# Patient Record
Sex: MALE | Race: WHITE | NOT HISPANIC OR LATINO | URBAN - METROPOLITAN AREA
[De-identification: names, ages, dates, MRNs, and addresses within clinical notes are randomized per-mention and may not be internally consistent; named-entity substitution may affect disease eponyms.]

---

## 2017-02-02 ENCOUNTER — APPOINTMENT (RX ONLY)
Dept: URBAN - METROPOLITAN AREA CLINIC 349 | Facility: CLINIC | Age: 77
Setting detail: DERMATOLOGY
End: 2017-02-02

## 2017-02-02 DIAGNOSIS — L82.1 OTHER SEBORRHEIC KERATOSIS: ICD-10-CM | Status: STABLE

## 2017-02-02 DIAGNOSIS — L57.0 ACTINIC KERATOSIS: ICD-10-CM

## 2017-02-02 DIAGNOSIS — Z71.89 OTHER SPECIFIED COUNSELING: ICD-10-CM

## 2017-02-02 PROCEDURE — ? COUNSELING

## 2017-02-02 PROCEDURE — 17000 DESTRUCT PREMALG LESION: CPT

## 2017-02-02 PROCEDURE — 17003 DESTRUCT PREMALG LES 2-14: CPT

## 2017-02-02 PROCEDURE — 99213 OFFICE O/P EST LOW 20 MIN: CPT | Mod: 25

## 2017-02-02 PROCEDURE — ? LIQUID NITROGEN

## 2017-02-02 ASSESSMENT — LOCATION DETAILED DESCRIPTION DERM
LOCATION DETAILED: RIGHT SUPERIOR OCCIPITAL SCALP
LOCATION DETAILED: RIGHT SUPERIOR UPPER BACK
LOCATION DETAILED: MID-OCCIPITAL SCALP
LOCATION DETAILED: LEFT MEDIAL INFERIOR CHEST
LOCATION DETAILED: LEFT SUPERIOR PARIETAL SCALP
LOCATION DETAILED: POSTERIOR MID-PARIETAL SCALP
LOCATION DETAILED: LEFT SUPERIOR OCCIPITAL SCALP
LOCATION DETAILED: LEFT MEDIAL SUPERIOR CHEST
LOCATION DETAILED: LEFT CENTRAL MALAR CHEEK

## 2017-02-02 ASSESSMENT — LOCATION SIMPLE DESCRIPTION DERM
LOCATION SIMPLE: LEFT OCCIPITAL SCALP
LOCATION SIMPLE: RIGHT OCCIPITAL SCALP
LOCATION SIMPLE: RIGHT UPPER BACK
LOCATION SIMPLE: POSTERIOR SCALP
LOCATION SIMPLE: CHEST
LOCATION SIMPLE: SCALP
LOCATION SIMPLE: LEFT CHEEK

## 2017-02-02 ASSESSMENT — PAIN INTENSITY VAS: HOW INTENSE IS YOUR PAIN 0 BEING NO PAIN, 10 BEING THE MOST SEVERE PAIN POSSIBLE?: NO PAIN

## 2017-02-02 ASSESSMENT — LOCATION ZONE DERM
LOCATION ZONE: TRUNK
LOCATION ZONE: SCALP
LOCATION ZONE: FACE

## 2017-02-02 NOTE — PROCEDURE: LIQUID NITROGEN
Number Of Freeze-Thaw Cycles: 2 freeze-thaw cycles
Detail Level: Detailed
Render Post-Care Instructions In Note?: no
Duration Of Freeze Thaw-Cycle (Seconds): 3
Consent: The patient's consent was obtained including but not limited to risks of crusting, scabbing, blistering, scarring, darker or lighter pigmentary change, recurrence, incomplete removal and infection.
Post-Care Instructions: I reviewed with the patient in detail post-care instructions. Patient is to wear sunprotection, and avoid picking at any of the treated lesions. Pt may apply Vaseline to crusted or scabbing areas.

## 2018-02-01 ENCOUNTER — APPOINTMENT (RX ONLY)
Dept: URBAN - METROPOLITAN AREA CLINIC 349 | Facility: CLINIC | Age: 78
Setting detail: DERMATOLOGY
End: 2018-02-01

## 2018-02-01 DIAGNOSIS — L82.1 OTHER SEBORRHEIC KERATOSIS: ICD-10-CM

## 2018-02-01 DIAGNOSIS — Z71.89 OTHER SPECIFIED COUNSELING: ICD-10-CM

## 2018-02-01 DIAGNOSIS — L91.8 OTHER HYPERTROPHIC DISORDERS OF THE SKIN: ICD-10-CM

## 2018-02-01 DIAGNOSIS — D18.0 HEMANGIOMA: ICD-10-CM

## 2018-02-01 DIAGNOSIS — L21.8 OTHER SEBORRHEIC DERMATITIS: ICD-10-CM

## 2018-02-01 PROBLEM — D18.01 HEMANGIOMA OF SKIN AND SUBCUTANEOUS TISSUE: Status: ACTIVE | Noted: 2018-02-01

## 2018-02-01 PROCEDURE — 99213 OFFICE O/P EST LOW 20 MIN: CPT

## 2018-02-01 PROCEDURE — ? PRESCRIPTION

## 2018-02-01 PROCEDURE — ? TREATMENT REGIMEN

## 2018-02-01 PROCEDURE — ? COUNSELING

## 2018-02-01 RX ORDER — CLOBETASOL PROPIONATE 0.46 MG/ML
SOLUTION TOPICAL
Qty: 1 | Refills: 6 | Status: ERX | COMMUNITY
Start: 2018-02-01

## 2018-02-01 RX ORDER — KETOCONAZOLE 20.5 MG/ML
SHAMPOO, SUSPENSION TOPICAL
Qty: 1 | Refills: 11 | Status: ERX | COMMUNITY
Start: 2018-02-01

## 2018-02-01 RX ADMIN — KETOCONAZOLE: 20.5 SHAMPOO, SUSPENSION TOPICAL at 00:00

## 2018-02-01 RX ADMIN — CLOBETASOL PROPIONATE: 0.46 SOLUTION TOPICAL at 00:00

## 2018-02-01 ASSESSMENT — LOCATION DETAILED DESCRIPTION DERM
LOCATION DETAILED: LEFT MEDIAL FRONTAL SCALP
LOCATION DETAILED: RIGHT PROXIMAL DORSAL FOREARM
LOCATION DETAILED: LEFT SUPERIOR PARIETAL SCALP
LOCATION DETAILED: LEFT CLAVICULAR NECK
LOCATION DETAILED: LEFT PROXIMAL PRETIBIAL REGION
LOCATION DETAILED: LEFT DISTAL DORSAL FOREARM
LOCATION DETAILED: LEFT MEDIAL UPPER BACK
LOCATION DETAILED: RIGHT CLAVICULAR NECK
LOCATION DETAILED: LEFT MEDIAL SUPERIOR CHEST
LOCATION DETAILED: RIGHT SUPERIOR MEDIAL UPPER BACK
LOCATION DETAILED: LEFT RIB CAGE
LOCATION DETAILED: RIGHT PROXIMAL PRETIBIAL REGION

## 2018-02-01 ASSESSMENT — LOCATION ZONE DERM
LOCATION ZONE: ARM
LOCATION ZONE: LEG
LOCATION ZONE: NECK
LOCATION ZONE: SCALP
LOCATION ZONE: TRUNK

## 2018-02-01 ASSESSMENT — LOCATION SIMPLE DESCRIPTION DERM
LOCATION SIMPLE: SCALP
LOCATION SIMPLE: ABDOMEN
LOCATION SIMPLE: RIGHT ANTERIOR NECK
LOCATION SIMPLE: CHEST
LOCATION SIMPLE: RIGHT UPPER BACK
LOCATION SIMPLE: RIGHT PRETIBIAL REGION
LOCATION SIMPLE: LEFT UPPER BACK
LOCATION SIMPLE: LEFT ANTERIOR NECK
LOCATION SIMPLE: LEFT SCALP
LOCATION SIMPLE: LEFT PRETIBIAL REGION
LOCATION SIMPLE: LEFT FOREARM
LOCATION SIMPLE: RIGHT FOREARM

## 2019-02-01 ENCOUNTER — APPOINTMENT (RX ONLY)
Dept: URBAN - METROPOLITAN AREA CLINIC 349 | Facility: CLINIC | Age: 79
Setting detail: DERMATOLOGY
End: 2019-02-01

## 2019-02-01 DIAGNOSIS — L91.8 OTHER HYPERTROPHIC DISORDERS OF THE SKIN: ICD-10-CM

## 2019-02-01 DIAGNOSIS — L57.0 ACTINIC KERATOSIS: ICD-10-CM

## 2019-02-01 DIAGNOSIS — L82.1 OTHER SEBORRHEIC KERATOSIS: ICD-10-CM

## 2019-02-01 DIAGNOSIS — Z71.89 OTHER SPECIFIED COUNSELING: ICD-10-CM

## 2019-02-01 PROCEDURE — 17003 DESTRUCT PREMALG LES 2-14: CPT

## 2019-02-01 PROCEDURE — 17000 DESTRUCT PREMALG LESION: CPT

## 2019-02-01 PROCEDURE — ? LIQUID NITROGEN

## 2019-02-01 PROCEDURE — ? COUNSELING

## 2019-02-01 PROCEDURE — 99213 OFFICE O/P EST LOW 20 MIN: CPT | Mod: 25

## 2019-02-01 ASSESSMENT — LOCATION ZONE DERM
LOCATION ZONE: TRUNK
LOCATION ZONE: NECK
LOCATION ZONE: ARM

## 2019-02-01 ASSESSMENT — LOCATION DETAILED DESCRIPTION DERM
LOCATION DETAILED: RIGHT INFERIOR MEDIAL UPPER BACK
LOCATION DETAILED: SUPERIOR THORACIC SPINE
LOCATION DETAILED: RIGHT MEDIAL SUPERIOR CHEST
LOCATION DETAILED: LEFT DORSAL WRIST
LOCATION DETAILED: EPIGASTRIC SKIN
LOCATION DETAILED: RIGHT PROXIMAL RADIAL DORSAL FOREARM
LOCATION DETAILED: LEFT DISTAL DORSAL FOREARM
LOCATION DETAILED: LEFT MEDIAL TRAPEZIAL NECK
LOCATION DETAILED: RIGHT CLAVICULAR NECK
LOCATION DETAILED: RIGHT DISTAL DORSAL FOREARM

## 2019-02-01 ASSESSMENT — LOCATION SIMPLE DESCRIPTION DERM
LOCATION SIMPLE: RIGHT UPPER BACK
LOCATION SIMPLE: CHEST
LOCATION SIMPLE: LEFT FOREARM
LOCATION SIMPLE: RIGHT ANTERIOR NECK
LOCATION SIMPLE: UPPER BACK
LOCATION SIMPLE: RIGHT FOREARM
LOCATION SIMPLE: ABDOMEN
LOCATION SIMPLE: LEFT WRIST
LOCATION SIMPLE: POSTERIOR NECK

## 2019-02-01 NOTE — PROCEDURE: LIQUID NITROGEN
Detail Level: Zone
Render Post-Care Instructions In Note?: no
Consent: The patient's consent was obtained including but not limited to risks of crusting, scabbing, blistering, scarring, darker or lighter pigmentary change, recurrence, incomplete removal and infection.
Duration Of Freeze Thaw-Cycle (Seconds): 3
Post-Care Instructions: I reviewed with the patient in detail post-care instructions. Patient is to wear sunprotection, and avoid picking at any of the treated lesions. Pt may apply Vaseline to crusted or scabbing areas.
Number Of Freeze-Thaw Cycles: 1 freeze-thaw cycle

## 2023-09-07 ENCOUNTER — APPOINTMENT (RX ONLY)
Dept: URBAN - METROPOLITAN AREA CLINIC 329 | Facility: CLINIC | Age: 83
Setting detail: DERMATOLOGY
End: 2023-09-07

## 2023-09-07 DIAGNOSIS — D18.0 HEMANGIOMA: ICD-10-CM

## 2023-09-07 DIAGNOSIS — L82.1 OTHER SEBORRHEIC KERATOSIS: ICD-10-CM

## 2023-09-07 DIAGNOSIS — D22 MELANOCYTIC NEVI: ICD-10-CM

## 2023-09-07 DIAGNOSIS — L57.0 ACTINIC KERATOSIS: ICD-10-CM | Status: INADEQUATELY CONTROLLED

## 2023-09-07 DIAGNOSIS — L81.4 OTHER MELANIN HYPERPIGMENTATION: ICD-10-CM

## 2023-09-07 PROBLEM — D22.5 MELANOCYTIC NEVI OF TRUNK: Status: ACTIVE | Noted: 2023-09-07

## 2023-09-07 PROBLEM — D22.72 MELANOCYTIC NEVI OF LEFT LOWER LIMB, INCLUDING HIP: Status: ACTIVE | Noted: 2023-09-07

## 2023-09-07 PROBLEM — D22.62 MELANOCYTIC NEVI OF LEFT UPPER LIMB, INCLUDING SHOULDER: Status: ACTIVE | Noted: 2023-09-07

## 2023-09-07 PROBLEM — D48.5 NEOPLASM OF UNCERTAIN BEHAVIOR OF SKIN: Status: ACTIVE | Noted: 2023-09-07

## 2023-09-07 PROBLEM — D18.01 HEMANGIOMA OF SKIN AND SUBCUTANEOUS TISSUE: Status: ACTIVE | Noted: 2023-09-07

## 2023-09-07 PROCEDURE — ? BIOPSY BY SHAVE METHOD

## 2023-09-07 PROCEDURE — 17003 DESTRUCT PREMALG LES 2-14: CPT

## 2023-09-07 PROCEDURE — ? LIQUID NITROGEN

## 2023-09-07 PROCEDURE — 11102 TANGNTL BX SKIN SINGLE LES: CPT

## 2023-09-07 PROCEDURE — 17000 DESTRUCT PREMALG LESION: CPT | Mod: 59

## 2023-09-07 PROCEDURE — ? TREATMENT REGIMEN

## 2023-09-07 PROCEDURE — ? COUNSELING

## 2023-09-07 PROCEDURE — 99203 OFFICE O/P NEW LOW 30 MIN: CPT | Mod: 25

## 2023-09-07 PROCEDURE — ? FULL BODY SKIN EXAM

## 2023-09-07 ASSESSMENT — LOCATION SIMPLE DESCRIPTION DERM
LOCATION SIMPLE: LEFT UPPER BACK
LOCATION SIMPLE: RIGHT UPPER BACK
LOCATION SIMPLE: LEFT ZYGOMA
LOCATION SIMPLE: LEFT UPPER BACK
LOCATION SIMPLE: LEFT NOSE
LOCATION SIMPLE: POSTERIOR SCALP
LOCATION SIMPLE: RIGHT ELBOW
LOCATION SIMPLE: LEFT FOREHEAD
LOCATION SIMPLE: RIGHT TEMPLE
LOCATION SIMPLE: LEFT UPPER ARM
LOCATION SIMPLE: CHEST
LOCATION SIMPLE: LEFT HAND
LOCATION SIMPLE: RIGHT THIGH
LOCATION SIMPLE: RIGHT FOREHEAD
LOCATION SIMPLE: LEFT EAR
LOCATION SIMPLE: LEFT THIGH
LOCATION SIMPLE: LEFT FOREARM
LOCATION SIMPLE: NOSE

## 2023-09-07 ASSESSMENT — LOCATION DETAILED DESCRIPTION DERM
LOCATION DETAILED: LEFT SUPERIOR FOREHEAD
LOCATION DETAILED: RIGHT SUPERIOR MEDIAL UPPER BACK
LOCATION DETAILED: LEFT LATERAL ZYGOMA
LOCATION DETAILED: LEFT RADIAL DORSAL HAND
LOCATION DETAILED: LEFT DISTAL DORSAL FOREARM
LOCATION DETAILED: LEFT SUPERIOR UPPER BACK
LOCATION DETAILED: NASAL TIP
LOCATION DETAILED: RIGHT ANTECUBITAL SKIN
LOCATION DETAILED: LEFT ANTERIOR DISTAL UPPER ARM
LOCATION DETAILED: LEFT NASAL ALA
LOCATION DETAILED: RIGHT INFERIOR UPPER BACK
LOCATION DETAILED: RIGHT ANTERIOR PROXIMAL THIGH
LOCATION DETAILED: RIGHT CENTRAL TEMPLE
LOCATION DETAILED: LEFT MEDIAL SUPERIOR CHEST
LOCATION DETAILED: RIGHT SUPERIOR FOREHEAD
LOCATION DETAILED: LEFT SUPERIOR UPPER BACK
LOCATION DETAILED: LEFT ANTERIOR PROXIMAL THIGH
LOCATION DETAILED: MID-OCCIPITAL SCALP
LOCATION DETAILED: LEFT SUPERIOR HELIX

## 2023-09-07 ASSESSMENT — LOCATION ZONE DERM
LOCATION ZONE: FACE
LOCATION ZONE: NOSE
LOCATION ZONE: EAR
LOCATION ZONE: TRUNK
LOCATION ZONE: TRUNK
LOCATION ZONE: ARM
LOCATION ZONE: HAND
LOCATION ZONE: SCALP
LOCATION ZONE: LEG

## 2023-09-07 NOTE — PROCEDURE: LIQUID NITROGEN
Render Note In Bullet Format When Appropriate: No
Post-Care Instructions: I reviewed with the patient in detail post-care instructions. Patient is to wear sunprotection, and avoid picking at any of the treated lesions. Pt may apply Vaseline to crusted or scabbing areas.
Duration Of Freeze Thaw-Cycle (Seconds): 0
Show Applicator Variable?: Yes
Detail Level: Detailed
Consent: The patient's consent was obtained including but not limited to risks of crusting, scabbing, blistering, scarring, darker or lighter pigmentary change, recurrence, incomplete removal and infection.
Number Of Freeze-Thaw Cycles: 2 freeze-thaw cycles

## 2023-09-07 NOTE — HPI: SKIN LESION
How Severe Is Your Skin Lesion?: mild
Is This A New Presentation, Or A Follow-Up?: Skin Lesions
Additional History: Patient declines any changes or concerns.

## 2023-11-09 ENCOUNTER — HOSPITAL ENCOUNTER (EMERGENCY)
Age: 83
Discharge: HOME | End: 2023-11-09
Payer: MEDICARE

## 2023-11-09 ENCOUNTER — APPOINTMENT (OUTPATIENT)
Dept: GENERAL RADIOLOGY | Age: 83
End: 2023-11-09
Payer: MEDICARE

## 2023-11-09 VITALS
WEIGHT: 206 LBS | DIASTOLIC BLOOD PRESSURE: 96 MMHG | TEMPERATURE: 97.9 F | RESPIRATION RATE: 25 BRPM | HEIGHT: 71 IN | SYSTOLIC BLOOD PRESSURE: 129 MMHG | HEART RATE: 102 BPM | OXYGEN SATURATION: 100 % | BODY MASS INDEX: 28.84 KG/M2

## 2023-11-09 DIAGNOSIS — J18.9 PNEUMONIA OF LEFT LOWER LOBE DUE TO INFECTIOUS ORGANISM: Primary | ICD-10-CM

## 2023-11-09 DIAGNOSIS — I73.9 CLAUDICATION (HCC): ICD-10-CM

## 2023-11-09 LAB
ALBUMIN SERPL-MCNC: 4.1 G/DL (ref 3.2–4.6)
ALBUMIN/GLOB SERPL: 1.4 (ref 0.4–1.6)
ALP SERPL-CCNC: 72 U/L (ref 45–117)
ALT SERPL-CCNC: 15 U/L (ref 13–61)
ANION GAP SERPL CALC-SCNC: 11 MMOL/L (ref 2–11)
AST SERPL-CCNC: 35 U/L (ref 15–37)
BASOPHILS # BLD: 0.1 K/UL (ref 0–0.2)
BASOPHILS NFR BLD: 1 % (ref 0–2)
BILIRUB SERPL-MCNC: 1.4 MG/DL (ref 0.2–1.1)
BUN SERPL-MCNC: 15 MG/DL (ref 8–23)
CALCIUM SERPL-MCNC: 9.1 MG/DL (ref 8.3–10.4)
CHLORIDE SERPL-SCNC: 102 MMOL/L (ref 98–107)
CO2 SERPL-SCNC: 26 MMOL/L (ref 21–32)
CREAT SERPL-MCNC: 0.99 MG/DL (ref 0.8–1.5)
DIFFERENTIAL METHOD BLD: NORMAL
EKG ATRIAL RATE: 101 BPM
EKG DIAGNOSIS: NORMAL
EKG P AXIS: 47 DEGREES
EKG P-R INTERVAL: 184 MS
EKG Q-T INTERVAL: 361 MS
EKG QRS DURATION: 96 MS
EKG QTC CALCULATION (BAZETT): 468 MS
EKG R AXIS: 21 DEGREES
EKG T AXIS: 87 DEGREES
EKG VENTRICULAR RATE: 101 BPM
EOSINOPHIL # BLD: 0.1 K/UL (ref 0–0.8)
EOSINOPHIL NFR BLD: 1 % (ref 0.5–7.8)
ERYTHROCYTE [DISTWIDTH] IN BLOOD BY AUTOMATED COUNT: 14.5 % (ref 11.9–14.6)
GLOBULIN SER CALC-MCNC: 3 G/DL (ref 2.8–4.5)
GLUCOSE SERPL-MCNC: 118 MG/DL (ref 65–100)
HCT VFR BLD AUTO: 43.5 % (ref 41.1–50.3)
HGB BLD-MCNC: 13.9 G/DL (ref 13.6–17.2)
IMM GRANULOCYTES # BLD AUTO: 0 K/UL (ref 0–0.5)
IMM GRANULOCYTES NFR BLD AUTO: 0 % (ref 0–5)
LYMPHOCYTES # BLD: 1.3 K/UL (ref 0.5–4.6)
LYMPHOCYTES NFR BLD: 14 % (ref 13–44)
MAGNESIUM SERPL-MCNC: 2 MG/DL (ref 1.2–2.6)
MCH RBC QN AUTO: 28.6 PG (ref 26.1–32.9)
MCHC RBC AUTO-ENTMCNC: 32 G/DL (ref 31.4–35)
MCV RBC AUTO: 89.5 FL (ref 82–102)
MONOCYTES # BLD: 0.9 K/UL (ref 0.1–1.3)
MONOCYTES NFR BLD: 10 % (ref 4–12)
NEUTS SEG # BLD: 6.6 K/UL (ref 1.7–8.2)
NEUTS SEG NFR BLD: 75 % (ref 43–78)
NRBC # BLD: 0 K/UL (ref 0–0.2)
NT PRO BNP: 5776 PG/ML (ref 0–450)
PLATELET # BLD AUTO: 248 K/UL (ref 150–450)
PMV BLD AUTO: 10.9 FL (ref 9.4–12.3)
POTASSIUM SERPL-SCNC: 4.7 MMOL/L (ref 3.5–5.1)
PROT SERPL-MCNC: 7.1 G/DL (ref 6.4–8.2)
RBC # BLD AUTO: 4.86 M/UL (ref 4.23–5.6)
SODIUM SERPL-SCNC: 139 MMOL/L (ref 133–143)
WBC # BLD AUTO: 8.9 K/UL (ref 4.3–11.1)

## 2023-11-09 PROCEDURE — 94762 N-INVAS EAR/PLS OXIMTRY CONT: CPT

## 2023-11-09 PROCEDURE — 96367 TX/PROPH/DG ADDL SEQ IV INF: CPT

## 2023-11-09 PROCEDURE — 2580000003 HC RX 258: Performed by: PHYSICIAN ASSISTANT

## 2023-11-09 PROCEDURE — 85025 COMPLETE CBC W/AUTO DIFF WBC: CPT

## 2023-11-09 PROCEDURE — 6360000002 HC RX W HCPCS: Performed by: PHYSICIAN ASSISTANT

## 2023-11-09 PROCEDURE — 83880 ASSAY OF NATRIURETIC PEPTIDE: CPT

## 2023-11-09 PROCEDURE — 80053 COMPREHEN METABOLIC PANEL: CPT

## 2023-11-09 PROCEDURE — 96365 THER/PROPH/DIAG IV INF INIT: CPT

## 2023-11-09 PROCEDURE — 83735 ASSAY OF MAGNESIUM: CPT

## 2023-11-09 PROCEDURE — 99285 EMERGENCY DEPT VISIT HI MDM: CPT

## 2023-11-09 PROCEDURE — 71045 X-RAY EXAM CHEST 1 VIEW: CPT

## 2023-11-09 PROCEDURE — 93005 ELECTROCARDIOGRAM TRACING: CPT | Performed by: STUDENT IN AN ORGANIZED HEALTH CARE EDUCATION/TRAINING PROGRAM

## 2023-11-09 PROCEDURE — 93010 ELECTROCARDIOGRAM REPORT: CPT | Performed by: INTERNAL MEDICINE

## 2023-11-09 RX ORDER — ALBUTEROL SULFATE 90 UG/1
2 AEROSOL, METERED RESPIRATORY (INHALATION) 4 TIMES DAILY PRN
Qty: 18 G | Refills: 5 | Status: SHIPPED | OUTPATIENT
Start: 2023-11-09

## 2023-11-09 RX ORDER — AMOXICILLIN AND CLAVULANATE POTASSIUM 875; 125 MG/1; MG/1
1 TABLET, FILM COATED ORAL 2 TIMES DAILY
Qty: 14 TABLET | Refills: 0 | Status: SHIPPED | OUTPATIENT
Start: 2023-11-09 | End: 2023-11-16

## 2023-11-09 RX ORDER — AZITHROMYCIN 250 MG/1
250 TABLET, FILM COATED ORAL SEE ADMIN INSTRUCTIONS
Qty: 6 TABLET | Refills: 0 | Status: SHIPPED | OUTPATIENT
Start: 2023-11-09 | End: 2023-11-14

## 2023-11-09 RX ADMIN — AZITHROMYCIN MONOHYDRATE 500 MG: 500 INJECTION, POWDER, LYOPHILIZED, FOR SOLUTION INTRAVENOUS at 15:11

## 2023-11-09 RX ADMIN — CEFTRIAXONE 1000 MG: 1 INJECTION, POWDER, FOR SOLUTION INTRAMUSCULAR; INTRAVENOUS at 14:32

## 2023-11-09 ASSESSMENT — PAIN DESCRIPTION - LOCATION: LOCATION: LEG

## 2023-11-09 ASSESSMENT — PAIN - FUNCTIONAL ASSESSMENT: PAIN_FUNCTIONAL_ASSESSMENT: 0-10

## 2023-11-09 ASSESSMENT — PAIN SCALES - GENERAL: PAINLEVEL_OUTOF10: 6

## 2023-11-09 ASSESSMENT — PAIN DESCRIPTION - DESCRIPTORS: DESCRIPTORS: ACHING

## 2023-11-09 NOTE — DISCHARGE INSTRUCTIONS
You Chest XR showed pneumonia on the left lung, you are being discharged with two antibiotics and inhaler. You can take 2 puffs of inhaler every 4-6 hours for shortness of breath. Please take full course of antibiotics. We recommend close follow up with your doctor on Monday to re-evaluate symptoms. We also placed referral for follow up with vascular surgeon to better evaluate your leg pain. Return immediately to the ER with any worsening shortness of breath or concerning symptoms.

## 2023-11-09 NOTE — ED PROVIDER NOTES
Emergency Department Provider Note       PCP: No, Pcp   Age: 82 y.o. Sex: male     DISPOSITION Decision To Discharge 11/09/2023 04:33:29 PM       ICD-10-CM    1. Pneumonia of left lower lobe due to infectious organism  J18.9       2. Claudication ([address])  I73.9 [facility], [facility] Decision Making     Complexity of Problems Addressed:  Acute illness    Data Reviewed and Analyzed:   I independently ordered and reviewed each unique test.  I reviewed external records: provider visit note from PCP. I independently ordered and interpreted the ED       I interpreted the X-rays CXR shows left lower pneumonia. Discussion of management or test interpretation. Patient is an 82-year-old male presenting with persistent cough and shortness of breath with exertion that has been ongoing for the last 5 weeks. He did complete course of amoxicillin in the beginning of October for sinus infection however he does not feel like his cough has gotten any better. He also reports that he gets short of breath when he is up and walking however has not stopped him from playing golf on a regular basis. He also reports that for the last month he gets pain in both of his legs after walking approximately 100 feet but improves with rest.  He did see his primary doctor yesterday but they did not do anything for his symptoms. He is afebrile, nontoxic in appearance, speaking in complete sentences without any signs of respiratory distress. No significant swelling in either leg and no pain with palpation of the legs. I am able to palpate both posterior tibialis pulses however I cannot palpate either dorsalis pedis pulses. I am able to hear DP pulses on bedside Doppler.   Labs Reviewed   COMPREHENSIVE METABOLIC PANEL - Abnormal; Notable for the following components:       Result Value    Glucose 118 (*)     Total Bilirubin 1.4 (*)     All other components within normal limits   BRAIN Eosinophils Absolute 0.1 0.0 - 0.8 K/UL    Basophils Absolute 0.1 0.0 - 0.2 K/UL    Absolute Immature Granulocyte 0.0 0.0 - 0.5 K/UL   Comprehensive Metabolic Panel   Result Value Ref Range    Sodium 139 133 - 143 mmol/L    Potassium 4.7 3.5 - 5.1 mmol/L    Chloride 102 98 - 107 mmol/L    CO2 26 21 - 32 mmol/L    Anion Gap 11 2 - 11 mmol/L    Glucose 118 (H) 65 - 100 mg/dL    BUN 15 8 - 23 MG/DL    Creatinine 0.99 0.8 - 1.5 MG/DL    Est, Glom Filt Rate >60 >60 ml/min/1.73m2    Calcium 9.1 8.3 - 10.4 MG/DL    Total Bilirubin 1.4 (H) 0.2 - 1.1 MG/DL    ALT 15 13.0 - 61.0 U/L    AST 35 15 - 37 U/L    Alk Phosphatase 72 45.0 - 117.0 U/L    Total Protein 7.1 6.4 - 8.2 g/dL    Albumin 4.1 3.2 - 4.6 g/dL    Globulin 3.0 2.8 - 4.5 g/dL    Albumin/Globulin Ratio 1.4 0.4 - 1.6     Magnesium   Result Value Ref Range    Magnesium 2.0 1.2 - 2.6 mg/dL   Brain Natriuretic Peptide   Result Value Ref Range    NT Pro-BNP 5,776 (H) 0 - 450 PG/ML   EKG 12 Lead   Result Value Ref Range    Ventricular Rate 101 BPM    Atrial Rate 101 BPM    P-R Interval 184 ms    QRS Duration 96 ms    Q-T Interval 361 ms    QTc Calculation (Bazett) 468 ms    P Axis 47 degrees    R Axis 21 degrees    T Axis 87 degrees    Diagnosis       Sinus tachycardia  Atrial premature complex  Probable left atrial enlargement  Anterior infarct, old    Confirmed by Sumi Rosenberg MD (), PEDRO DELGADO ([id number]) on 11/9/2023 5:55:56 PM          XR CHEST PORTABLE   Final Result   Left hemidiaphragm elevation and left basilar airspace disease reflecting   atelectasis or infiltrates. Voice dictation software was used during the making of this note. This software is not perfect and grammatical and other typographical errors may be present. This note has not been completely proofread for errors.      Karlene Sebastian  11/12/23 2057

## 2023-11-09 NOTE — ED TRIAGE NOTES
Pt ambulatory to triage. Pt states he went to urgent care and was told to come to the ED. Pt reports bilateral calf pain only when he walks and shortness of breath that started after he finished taking amoxicillin which was on 10/23. Pt denies any swelling.

## 2023-11-09 NOTE — ED NOTES
Pt 96% SpO2 before ambulatory. Pt 95% SpO2 while ambulating . Pt 96%SpO2 after walking/ at this time.      Cecille Patterson RN  11/09/23 0037

## 2023-11-12 ASSESSMENT — ENCOUNTER SYMPTOMS
VOMITING: 0
WHEEZING: 0
NAUSEA: 0
SORE THROAT: 0
ABDOMINAL PAIN: 0
COUGH: 1
BACK PAIN: 0
SHORTNESS OF BREATH: 1
DIARRHEA: 0
CHEST TIGHTNESS: 0
EYE REDNESS: 0

## 2023-11-20 ENCOUNTER — APPOINTMENT (OUTPATIENT)
Dept: NON INVASIVE DIAGNOSTICS | Age: 83
DRG: 273 | End: 2023-11-20
Attending: INTERNAL MEDICINE
Payer: MEDICARE

## 2023-11-20 ENCOUNTER — APPOINTMENT (OUTPATIENT)
Dept: GENERAL RADIOLOGY | Age: 83
DRG: 273 | End: 2023-11-20
Payer: MEDICARE

## 2023-11-20 ENCOUNTER — HOSPITAL ENCOUNTER (EMERGENCY)
Age: 83
Discharge: ANOTHER HOSPITAL | DRG: 273 | End: 2023-11-20
Attending: STUDENT IN AN ORGANIZED HEALTH CARE EDUCATION/TRAINING PROGRAM
Payer: MEDICARE

## 2023-11-20 ENCOUNTER — HOSPITAL ENCOUNTER (INPATIENT)
Age: 83
LOS: 2 days | Discharge: HOME | DRG: 273 | End: 2023-11-22
Attending: INTERNAL MEDICINE | Admitting: INTERNAL MEDICINE
Payer: MEDICARE

## 2023-11-20 VITALS
RESPIRATION RATE: 25 BRPM | HEART RATE: 102 BPM | TEMPERATURE: 97.9 F | HEIGHT: 71 IN | DIASTOLIC BLOOD PRESSURE: 102 MMHG | OXYGEN SATURATION: 99 % | BODY MASS INDEX: 29.12 KG/M2 | SYSTOLIC BLOOD PRESSURE: 125 MMHG | WEIGHT: 208 LBS

## 2023-11-20 DIAGNOSIS — I48.3 TYPICAL ATRIAL FLUTTER (HCC): ICD-10-CM

## 2023-11-20 DIAGNOSIS — I48.92 ATRIAL FLUTTER, UNSPECIFIED TYPE (HCC): ICD-10-CM

## 2023-11-20 DIAGNOSIS — R00.0 TACHYARRHYTHMIA: Primary | ICD-10-CM

## 2023-11-20 DIAGNOSIS — I48.91 ATRIAL FIBRILLATION WITH RVR (HCC): Primary | ICD-10-CM

## 2023-11-20 DIAGNOSIS — I48.92 ATRIAL FLUTTER (HCC): ICD-10-CM

## 2023-11-20 DIAGNOSIS — I48.91 A-FIB (HCC): ICD-10-CM

## 2023-11-20 PROBLEM — E87.70 VOLUME OVERLOAD: Status: ACTIVE | Noted: 2023-11-20

## 2023-11-20 LAB
ALBUMIN SERPL-MCNC: 4.4 G/DL (ref 3.2–4.6)
ALBUMIN/GLOB SERPL: 1.5 (ref 0.4–1.6)
ALP SERPL-CCNC: 66 U/L (ref 45–117)
ALT SERPL-CCNC: 13 U/L (ref 13–61)
ANION GAP SERPL CALC-SCNC: 12 MMOL/L (ref 2–11)
APTT PPP: 28.8 SEC (ref 24.5–34.2)
AST SERPL-CCNC: 34 U/L (ref 15–37)
BASOPHILS # BLD: 0.1 K/UL (ref 0–0.2)
BASOPHILS NFR BLD: 1 % (ref 0–2)
BILIRUB SERPL-MCNC: 1.1 MG/DL (ref 0.2–1.1)
BUN SERPL-MCNC: 24 MG/DL (ref 8–23)
CALCIUM SERPL-MCNC: 9.6 MG/DL (ref 8.3–10.4)
CHLORIDE SERPL-SCNC: 104 MMOL/L (ref 98–107)
CO2 SERPL-SCNC: 28 MMOL/L (ref 21–32)
CREAT SERPL-MCNC: 1.04 MG/DL (ref 0.8–1.5)
DIFFERENTIAL METHOD BLD: ABNORMAL
ECHO AO ASC DIAM: 3.6 CM
ECHO AO ASCENDING AORTA INDEX: 1.68 CM/M2
ECHO AO ROOT DIAM: 3.5 CM
ECHO AO ROOT INDEX: 1.64 CM/M2
ECHO AV AREA PEAK VELOCITY: 1.3 CM2
ECHO AV AREA VTI: 1.4 CM2
ECHO AV AREA/BSA PEAK VELOCITY: 0.6 CM2/M2
ECHO AV AREA/BSA VTI: 0.7 CM2/M2
ECHO AV MEAN GRADIENT: 8 MMHG
ECHO AV MEAN VELOCITY: 1.4 M/S
ECHO AV PEAK GRADIENT: 12 MMHG
ECHO AV PEAK VELOCITY: 1.8 M/S
ECHO AV VELOCITY RATIO: 0.33
ECHO AV VTI: 27.3 CM
ECHO BSA: 2.17 M2
ECHO EST RA PRESSURE: 15 MMHG
ECHO IVC PROX: 3.1 CM
ECHO LA AREA 4C: 27 CM2
ECHO LA DIAMETER INDEX: 1.92 CM/M2
ECHO LA DIAMETER: 4.1 CM
ECHO LA MAJOR AXIS: 6.7 CM
ECHO LA TO AORTIC ROOT RATIO: 1.17
ECHO LA VOL MOD A4C: 87 ML (ref 18–58)
ECHO LA VOLUME INDEX MOD A4C: 41 ML/M2 (ref 16–34)
ECHO LV E' LATERAL VELOCITY: 9 CM/S
ECHO LV E' SEPTAL VELOCITY: 9 CM/S
ECHO LV EDV A2C: 228 ML
ECHO LV EDV A4C: 235 ML
ECHO LV EDV INDEX A4C: 110 ML/M2
ECHO LV EDV NDEX A2C: 107 ML/M2
ECHO LV EJECTION FRACTION A2C: 24 %
ECHO LV EJECTION FRACTION A4C: 26 %
ECHO LV EJECTION FRACTION BIPLANE: 25 % (ref 55–100)
ECHO LV ESV A2C: 173 ML
ECHO LV ESV A4C: 175 ML
ECHO LV ESV INDEX A2C: 81 ML/M2
ECHO LV ESV INDEX A4C: 82 ML/M2
ECHO LV FRACTIONAL SHORTENING: 19 % (ref 28–44)
ECHO LV INTERNAL DIMENSION DIASTOLE INDEX: 2.2 CM/M2
ECHO LV INTERNAL DIMENSION DIASTOLIC: 4.7 CM (ref 4.2–5.9)
ECHO LV INTERNAL DIMENSION SYSTOLIC INDEX: 1.78 CM/M2
ECHO LV INTERNAL DIMENSION SYSTOLIC: 3.8 CM
ECHO LV IVSD: 1.5 CM (ref 0.6–1)
ECHO LV MASS 2D: 251.4 G (ref 88–224)
ECHO LV MASS INDEX 2D: 117.5 G/M2 (ref 49–115)
ECHO LV POSTERIOR WALL DIASTOLIC: 1.2 CM (ref 0.6–1)
ECHO LV RELATIVE WALL THICKNESS RATIO: 0.51
ECHO LVOT AREA: 3.8 CM2
ECHO LVOT AV VTI INDEX: 0.38
ECHO LVOT DIAM: 2.2 CM
ECHO LVOT MEAN GRADIENT: 1 MMHG
ECHO LVOT PEAK GRADIENT: 1 MMHG
ECHO LVOT PEAK VELOCITY: 0.6 M/S
ECHO LVOT STROKE VOLUME INDEX: 18.3 ML/M2
ECHO LVOT SV: 39.1 ML
ECHO LVOT VTI: 10.3 CM
ECHO MV E DECELERATION TIME (DT): 103 MS
ECHO MV E VELOCITY: 1.41 M/S
ECHO MV E/E' LATERAL: 15.67
ECHO MV E/E' RATIO (AVERAGED): 15.67
ECHO PV ACCELERATION TIME (AT): 63 MS
ECHO PV MAX VELOCITY: 0.7 M/S
ECHO PV PEAK GRADIENT: 2 MMHG
ECHO RIGHT VENTRICULAR SYSTOLIC PRESSURE (RVSP): 46 MMHG
ECHO RV BASAL DIMENSION: 4.7 CM
ECHO RV FREE WALL PEAK S': 9 CM/S
ECHO RV INTERNAL DIMENSION: 4.3 CM
ECHO RV TAPSE: 0.7 CM (ref 1.7–?)
ECHO TV REGURGITANT MAX VELOCITY: 2.79 M/S
ECHO TV REGURGITANT PEAK GRADIENT: 31 MMHG
EKG ATRIAL RATE: 103 BPM
EKG ATRIAL RATE: 152 BPM
EKG DIAGNOSIS: NORMAL
EKG DIAGNOSIS: NORMAL
EKG P AXIS: 0 DEGREES
EKG Q-T INTERVAL: 320 MS
EKG Q-T INTERVAL: 384 MS
EKG QRS DURATION: 115 MS
EKG QRS DURATION: 98 MS
EKG QTC CALCULATION (BAZETT): 488 MS
EKG QTC CALCULATION (BAZETT): 508 MS
EKG R AXIS: 75 DEGREES
EKG R AXIS: 82 DEGREES
EKG T AXIS: -43 DEGREES
EKG T AXIS: 85 DEGREES
EKG VENTRICULAR RATE: 104 BPM
EKG VENTRICULAR RATE: 153 BPM
EOSINOPHIL # BLD: 0.1 K/UL (ref 0–0.8)
EOSINOPHIL NFR BLD: 1 % (ref 0.5–7.8)
ERYTHROCYTE [DISTWIDTH] IN BLOOD BY AUTOMATED COUNT: 14.7 % (ref 11.9–14.6)
GLOBULIN SER CALC-MCNC: 2.9 G/DL (ref 2.8–4.5)
GLUCOSE SERPL-MCNC: 140 MG/DL (ref 65–100)
HCT VFR BLD AUTO: 45.5 % (ref 41.1–50.3)
HGB BLD-MCNC: 14.3 G/DL (ref 13.6–17.2)
IMM GRANULOCYTES # BLD AUTO: 0 K/UL (ref 0–0.5)
IMM GRANULOCYTES NFR BLD AUTO: 0 % (ref 0–5)
INR PPP: 1.2
LYMPHOCYTES # BLD: 1.7 K/UL (ref 0.5–4.6)
LYMPHOCYTES NFR BLD: 17 % (ref 13–44)
MAGNESIUM SERPL-MCNC: 2 MG/DL (ref 1.2–2.6)
MCH RBC QN AUTO: 28.3 PG (ref 26.1–32.9)
MCHC RBC AUTO-ENTMCNC: 31.4 G/DL (ref 31.4–35)
MCV RBC AUTO: 89.9 FL (ref 82–102)
MONOCYTES # BLD: 0.9 K/UL (ref 0.1–1.3)
MONOCYTES NFR BLD: 9 % (ref 4–12)
NEUTS SEG # BLD: 7.3 K/UL (ref 1.7–8.2)
NEUTS SEG NFR BLD: 73 % (ref 43–78)
NRBC # BLD: 0 K/UL (ref 0–0.2)
NT PRO BNP: 8422 PG/ML (ref 0–450)
PLATELET # BLD AUTO: 283 K/UL (ref 150–450)
PMV BLD AUTO: 10.5 FL (ref 9.4–12.3)
POTASSIUM SERPL-SCNC: 4.6 MMOL/L (ref 3.5–5.1)
PROT SERPL-MCNC: 7.3 G/DL (ref 6.4–8.2)
PROTHROMBIN TIME: 16.1 SEC (ref 12.6–14.3)
RBC # BLD AUTO: 5.06 M/UL (ref 4.23–5.6)
SODIUM SERPL-SCNC: 144 MMOL/L (ref 133–143)
TROPONIN T SERPL HS-MCNC: 141.3 NG/L (ref 0–22)
TROPONIN T SERPL HS-MCNC: 152.3 NG/L (ref 0–22)
TSH W FREE THYROID IF ABNORMAL: 2.75 UIU/ML (ref 0.36–3.74)
UFH PPP CHRO-ACNC: 0.12 IU/ML (ref 0.3–0.7)
UFH PPP CHRO-ACNC: <0.1 IU/ML (ref 0.3–0.7)
WBC # BLD AUTO: 10 K/UL (ref 4.3–11.1)

## 2023-11-20 PROCEDURE — 93306 TTE W/DOPPLER COMPLETE: CPT | Performed by: INTERNAL MEDICINE

## 2023-11-20 PROCEDURE — 71045 X-RAY EXAM CHEST 1 VIEW: CPT

## 2023-11-20 PROCEDURE — 2580000003 HC RX 258: Performed by: INTERNAL MEDICINE

## 2023-11-20 PROCEDURE — 83735 ASSAY OF MAGNESIUM: CPT

## 2023-11-20 PROCEDURE — 80053 COMPREHEN METABOLIC PANEL: CPT

## 2023-11-20 PROCEDURE — 2500000003 HC RX 250 WO HCPCS: Performed by: STUDENT IN AN ORGANIZED HEALTH CARE EDUCATION/TRAINING PROGRAM

## 2023-11-20 PROCEDURE — 85520 HEPARIN ASSAY: CPT

## 2023-11-20 PROCEDURE — 84443 ASSAY THYROID STIM HORMONE: CPT

## 2023-11-20 PROCEDURE — 2500000003 HC RX 250 WO HCPCS: Performed by: INTERNAL MEDICINE

## 2023-11-20 PROCEDURE — 6360000004 HC RX CONTRAST MEDICATION: Performed by: INTERNAL MEDICINE

## 2023-11-20 PROCEDURE — 99223 1ST HOSP IP/OBS HIGH 75: CPT | Performed by: INTERNAL MEDICINE

## 2023-11-20 PROCEDURE — C8929 TTE W OR WO FOL WCON,DOPPLER: HCPCS

## 2023-11-20 PROCEDURE — 6360000002 HC RX W HCPCS: Performed by: INTERNAL MEDICINE

## 2023-11-20 PROCEDURE — 2580000003 HC RX 258: Performed by: STUDENT IN AN ORGANIZED HEALTH CARE EDUCATION/TRAINING PROGRAM

## 2023-11-20 PROCEDURE — 36415 COLL VENOUS BLD VENIPUNCTURE: CPT

## 2023-11-20 PROCEDURE — 83880 ASSAY OF NATRIURETIC PEPTIDE: CPT

## 2023-11-20 PROCEDURE — 85610 PROTHROMBIN TIME: CPT

## 2023-11-20 PROCEDURE — 84484 ASSAY OF TROPONIN QUANT: CPT

## 2023-11-20 PROCEDURE — 2140000000 HC CCU INTERMEDIATE R&B

## 2023-11-20 PROCEDURE — 6360000002 HC RX W HCPCS: Performed by: NURSE PRACTITIONER

## 2023-11-20 PROCEDURE — 85730 THROMBOPLASTIN TIME PARTIAL: CPT

## 2023-11-20 PROCEDURE — 6370000000 HC RX 637 (ALT 250 FOR IP): Performed by: INTERNAL MEDICINE

## 2023-11-20 PROCEDURE — 93010 ELECTROCARDIOGRAM REPORT: CPT | Performed by: INTERNAL MEDICINE

## 2023-11-20 PROCEDURE — 93005 ELECTROCARDIOGRAM TRACING: CPT | Performed by: STUDENT IN AN ORGANIZED HEALTH CARE EDUCATION/TRAINING PROGRAM

## 2023-11-20 PROCEDURE — 85025 COMPLETE CBC W/AUTO DIFF WBC: CPT

## 2023-11-20 RX ORDER — LANOLIN ALCOHOL/MO/W.PET/CERES
6 CREAM (GRAM) TOPICAL NIGHTLY PRN
Status: DISCONTINUED | OUTPATIENT
Start: 2023-11-20 | End: 2023-11-22 | Stop reason: HOSPADM

## 2023-11-20 RX ORDER — CARVEDILOL 6.25 MG/1
6.25 TABLET ORAL 2 TIMES DAILY WITH MEALS
Status: DISCONTINUED | OUTPATIENT
Start: 2023-11-20 | End: 2023-11-21

## 2023-11-20 RX ORDER — AMOXICILLIN AND CLAVULANATE POTASSIUM 875; 125 MG/1; MG/1
TABLET, FILM COATED ORAL
Status: ON HOLD | COMMUNITY
Start: 2023-11-16 | End: 2023-11-22 | Stop reason: HOSPADM

## 2023-11-20 RX ORDER — SODIUM CHLORIDE 9 MG/ML
INJECTION, SOLUTION INTRAVENOUS PRN
Status: DISCONTINUED | OUTPATIENT
Start: 2023-11-20 | End: 2023-11-22 | Stop reason: HOSPADM

## 2023-11-20 RX ORDER — LISINOPRIL 5 MG/1
5 TABLET ORAL DAILY
Status: DISCONTINUED | OUTPATIENT
Start: 2023-11-20 | End: 2023-11-21

## 2023-11-20 RX ORDER — MAGNESIUM SULFATE IN WATER 40 MG/ML
2000 INJECTION, SOLUTION INTRAVENOUS PRN
Status: DISCONTINUED | OUTPATIENT
Start: 2023-11-20 | End: 2023-11-22 | Stop reason: HOSPADM

## 2023-11-20 RX ORDER — POTASSIUM CHLORIDE 7.45 MG/ML
10 INJECTION INTRAVENOUS PRN
Status: DISCONTINUED | OUTPATIENT
Start: 2023-11-20 | End: 2023-11-22 | Stop reason: HOSPADM

## 2023-11-20 RX ORDER — ACETAMINOPHEN 325 MG/1
650 TABLET ORAL EVERY 6 HOURS PRN
Status: DISCONTINUED | OUTPATIENT
Start: 2023-11-20 | End: 2023-11-22 | Stop reason: HOSPADM

## 2023-11-20 RX ORDER — SODIUM CHLORIDE 0.9 % (FLUSH) 0.9 %
5-40 SYRINGE (ML) INJECTION EVERY 12 HOURS SCHEDULED
Status: DISCONTINUED | OUTPATIENT
Start: 2023-11-20 | End: 2023-11-22 | Stop reason: HOSPADM

## 2023-11-20 RX ORDER — DILTIAZEM HYDROCHLORIDE 5 MG/ML
20 INJECTION INTRAVENOUS
Status: COMPLETED | OUTPATIENT
Start: 2023-11-20 | End: 2023-11-20

## 2023-11-20 RX ORDER — ONDANSETRON 4 MG/1
4 TABLET, ORALLY DISINTEGRATING ORAL EVERY 8 HOURS PRN
Status: DISCONTINUED | OUTPATIENT
Start: 2023-11-20 | End: 2023-11-22 | Stop reason: HOSPADM

## 2023-11-20 RX ORDER — FUROSEMIDE 10 MG/ML
40 INJECTION INTRAMUSCULAR; INTRAVENOUS 2 TIMES DAILY
Status: DISCONTINUED | OUTPATIENT
Start: 2023-11-20 | End: 2023-11-22

## 2023-11-20 RX ORDER — MAGNESIUM HYDROXIDE/ALUMINUM HYDROXICE/SIMETHICONE 120; 1200; 1200 MG/30ML; MG/30ML; MG/30ML
30 SUSPENSION ORAL EVERY 6 HOURS PRN
Status: DISCONTINUED | OUTPATIENT
Start: 2023-11-20 | End: 2023-11-22 | Stop reason: HOSPADM

## 2023-11-20 RX ORDER — HEPARIN SODIUM 1000 [USP'U]/ML
4000 INJECTION, SOLUTION INTRAVENOUS; SUBCUTANEOUS ONCE
Status: COMPLETED | OUTPATIENT
Start: 2023-11-20 | End: 2023-11-20

## 2023-11-20 RX ORDER — FUROSEMIDE 10 MG/ML
40 INJECTION INTRAMUSCULAR; INTRAVENOUS ONCE
Status: COMPLETED | OUTPATIENT
Start: 2023-11-20 | End: 2023-11-20

## 2023-11-20 RX ORDER — CALCIUM CARBONATE 500 MG/1
500 TABLET, CHEWABLE ORAL 3 TIMES DAILY PRN
Status: DISCONTINUED | OUTPATIENT
Start: 2023-11-20 | End: 2023-11-22 | Stop reason: HOSPADM

## 2023-11-20 RX ORDER — HEPARIN SODIUM 1000 [USP'U]/ML
2000 INJECTION, SOLUTION INTRAVENOUS; SUBCUTANEOUS PRN
Status: DISCONTINUED | OUTPATIENT
Start: 2023-11-20 | End: 2023-11-21 | Stop reason: ALTCHOICE

## 2023-11-20 RX ORDER — POLYETHYLENE GLYCOL 3350 17 G/17G
17 POWDER, FOR SOLUTION ORAL DAILY PRN
Status: DISCONTINUED | OUTPATIENT
Start: 2023-11-20 | End: 2023-11-22 | Stop reason: HOSPADM

## 2023-11-20 RX ORDER — ZOLPIDEM TARTRATE 10 MG/1
TABLET ORAL
Status: ON HOLD | COMMUNITY
Start: 2023-11-13 | End: 2023-11-22 | Stop reason: HOSPADM

## 2023-11-20 RX ORDER — ONDANSETRON 2 MG/ML
4 INJECTION INTRAMUSCULAR; INTRAVENOUS EVERY 6 HOURS PRN
Status: DISCONTINUED | OUTPATIENT
Start: 2023-11-20 | End: 2023-11-22 | Stop reason: HOSPADM

## 2023-11-20 RX ORDER — HEPARIN SODIUM 10000 [USP'U]/100ML
5-30 INJECTION, SOLUTION INTRAVENOUS CONTINUOUS
Status: DISCONTINUED | OUTPATIENT
Start: 2023-11-20 | End: 2023-11-21

## 2023-11-20 RX ORDER — SODIUM CHLORIDE 0.9 % (FLUSH) 0.9 %
5-40 SYRINGE (ML) INJECTION PRN
Status: DISCONTINUED | OUTPATIENT
Start: 2023-11-20 | End: 2023-11-22 | Stop reason: HOSPADM

## 2023-11-20 RX ORDER — POTASSIUM CHLORIDE 20 MEQ/1
40 TABLET, EXTENDED RELEASE ORAL PRN
Status: DISCONTINUED | OUTPATIENT
Start: 2023-11-20 | End: 2023-11-22 | Stop reason: HOSPADM

## 2023-11-20 RX ORDER — AMOXICILLIN 500 MG/1
CAPSULE ORAL
Status: ON HOLD | COMMUNITY
Start: 2023-10-13 | End: 2023-11-22 | Stop reason: HOSPADM

## 2023-11-20 RX ORDER — ZOLPIDEM TARTRATE 5 MG/1
5 TABLET ORAL NIGHTLY PRN
Status: DISCONTINUED | OUTPATIENT
Start: 2023-11-20 | End: 2023-11-22 | Stop reason: HOSPADM

## 2023-11-20 RX ORDER — HEPARIN SODIUM 1000 [USP'U]/ML
4000 INJECTION, SOLUTION INTRAVENOUS; SUBCUTANEOUS PRN
Status: DISCONTINUED | OUTPATIENT
Start: 2023-11-20 | End: 2023-11-21 | Stop reason: ALTCHOICE

## 2023-11-20 RX ADMIN — SODIUM CHLORIDE 10 MG/HR: 900 INJECTION, SOLUTION INTRAVENOUS at 11:45

## 2023-11-20 RX ADMIN — SODIUM CHLORIDE, PRESERVATIVE FREE 0.3 ML: 5 INJECTION INTRAVENOUS at 15:22

## 2023-11-20 RX ADMIN — DILTIAZEM HYDROCHLORIDE 20 MG: 5 INJECTION INTRAVENOUS at 08:12

## 2023-11-20 RX ADMIN — HEPARIN SODIUM 2000 UNITS: 1000 INJECTION INTRAVENOUS; SUBCUTANEOUS at 23:37

## 2023-11-20 RX ADMIN — SODIUM CHLORIDE 5 MG/HR: 900 INJECTION, SOLUTION INTRAVENOUS at 08:23

## 2023-11-20 RX ADMIN — SODIUM CHLORIDE 10 MG/HR: 900 INJECTION, SOLUTION INTRAVENOUS at 17:48

## 2023-11-20 RX ADMIN — FUROSEMIDE 40 MG: 10 INJECTION, SOLUTION INTRAMUSCULAR; INTRAVENOUS at 17:27

## 2023-11-20 RX ADMIN — LISINOPRIL 5 MG: 5 TABLET ORAL at 17:27

## 2023-11-20 RX ADMIN — HEPARIN SODIUM 4000 UNITS: 1000 INJECTION INTRAVENOUS; SUBCUTANEOUS at 15:53

## 2023-11-20 RX ADMIN — HEPARIN SODIUM 10 UNITS/KG/HR: 10000 INJECTION, SOLUTION INTRAVENOUS at 15:55

## 2023-11-20 RX ADMIN — SODIUM CHLORIDE, PRESERVATIVE FREE 10 ML: 5 INJECTION INTRAVENOUS at 19:46

## 2023-11-20 RX ADMIN — CARVEDILOL 6.25 MG: 6.25 TABLET, FILM COATED ORAL at 17:27

## 2023-11-20 RX ADMIN — FUROSEMIDE 40 MG: 10 INJECTION, SOLUTION INTRAMUSCULAR; INTRAVENOUS at 13:50

## 2023-11-20 ASSESSMENT — ENCOUNTER SYMPTOMS
SHORTNESS OF BREATH: 1
VOMITING: 0
EYES NEGATIVE: 1
NAUSEA: 0
HEARTBURN: 0

## 2023-11-20 ASSESSMENT — PAIN SCALES - GENERAL
PAINLEVEL_OUTOF10: 0

## 2023-11-20 ASSESSMENT — PAIN - FUNCTIONAL ASSESSMENT: PAIN_FUNCTIONAL_ASSESSMENT: NONE - DENIES PAIN

## 2023-11-20 NOTE — ED PROVIDER NOTES
mmol/L    Glucose 140 (H) 65 - 100 mg/dL    BUN 24 (H) 8 - 23 MG/DL    Creatinine 1.04 0.8 - 1.5 MG/DL    Est, Glom Filt Rate >60 >60 ml/min/1.73m2    Calcium 9.6 8.3 - 10.4 MG/DL    Total Bilirubin 1.1 0.2 - 1.1 MG/DL    ALT 13 13.0 - 61.0 U/L    AST 34 15 - 37 U/L    Alk Phosphatase 66 45.0 - 117.0 U/L    Total Protein 7.3 6.4 - 8.2 g/dL    Albumin 4.4 3.2 - 4.6 g/dL    Globulin 2.9 2.8 - 4.5 g/dL    Albumin/Globulin Ratio 1.5 0.4 - 1.6     Brain Natriuretic Peptide   Result Value Ref Range    NT Pro-BNP 8,422 (H) 0 - 450 PG/ML   Magnesium   Result Value Ref Range    Magnesium 2.0 1.2 - 2.6 mg/dL   Troponin T   Result Value Ref Range    Troponin T 152.3 (HH) 0 - 22 ng/L        XR CHEST PORTABLE   Final Result   Mild interstitial lung changes are present possibly pulmonary   vascular congestion or subtle interstitial edema. Atelectasis left lung base. Stable cardiomegaly. No pneumothorax. Small left pleural effusion unchanged. No significant right pleural effusion. Voice dictation software was used during the making of this note. This software is not perfect and grammatical and other typographical errors may be present. This note has not been completely proofread for errors.        Jillian Aguilar, DO  11/20/23 0855       Jillian Aguilar, DO  11/20/23 1039

## 2023-11-20 NOTE — ED TRIAGE NOTES
Pt states he was dx with pneumonia about 10 days ago, states he was started on abx and has been taking as prescribed. States he feels like he is getting worse with his SOB and cough. Denies chest pain. Has been using albuterol inhaler with some relief.

## 2023-11-20 NOTE — H&P
Value Ref Range    Magnesium 2.0 1.2 - 2.6 mg/dL   Troponin T    Collection Time: 11/20/23  8:02 AM   Result Value Ref Range    Troponin T 152.3 (HH) 0 - 22 ng/L   EKG 12 Lead    Collection Time: 11/20/23  8:30 AM   Result Value Ref Range    Ventricular Rate 104 BPM    Atrial Rate 103 BPM    QRS Duration 115 ms    Q-T Interval 384 ms    QTc Calculation (Bazett) 488 ms    R Axis 75 degrees    T Axis 85 degrees    Diagnosis       Atrial flutter with varied AV block,  Nonspecific intraventricular conduction delay  Inferior infarct, acute (LCx)      Confirmed by MD MARIAH, Shabbir Kipnewton ([id number]) on 11/20/2023 9:24:52 AM     Troponin T    Collection Time: 11/20/23  9:29 AM   Result Value Ref Range    Troponin T 141.3 (HH) 0 - 22 ng/L       I have personally reviewed imaging studies:  XR CHEST PORTABLE    Result Date: 11/20/2023  XR CHEST PORTABLE 11/20/2023 8:31 AM HISTORY: Shortness of breath. COMPARISON: Chest x-ray 11/9/2023. A portable AP view of the chest was obtained. Mild interstitial lung changes are present possibly pulmonary vascular congestion or subtle interstitial edema. Atelectasis left lung base. Stable cardiomegaly. No pneumothorax. Small left pleural effusion unchanged. No significant right pleural effusion. Signed:  Stacie Deng MD    Part of this note may have been written by using a voice dictation software. The note has been proof read but may still contain some grammatical/other typographical errors.

## 2023-11-20 NOTE — ED NOTES
TRANSFER - OUT REPORT:    Verbal report given to [facility] on Stephon Sessions.  being transferred to [facility] room 314 for routine progression of patient care       Report consisted of patient's Situation, Background, Assessment and   Recommendations(SBAR). Information from the following report(s) ED SBAR, MAR, Recent Results, and Cardiac Rhythm A-fib  was reviewed with the receiving nurse. [place] Fall Assessment:                           Lines:   Peripheral IV 11/20/23 Right Antecubital (Active)   Site Assessment Clean, dry & intact 11/20/23 0800   Line Status Blood return noted 11/20/23 0800   Phlebitis Assessment No symptoms 11/20/23 0800   Infiltration Assessment 0 11/20/23 0800       Peripheral IV 11/20/23 Left; Anterior Hand (Active)   Site Assessment Clean, dry & intact 11/20/23 0932   Line Status Blood return noted 11/20/23 0932   Phlebitis Assessment No symptoms 11/20/23 0932   Infiltration Assessment 0 11/20/23 0932        Opportunity for questions and clarification was provided.       Patient transported with:  Monitor and O2 @ 2lpm via SHEEBA Marin RN  11/20/23 1019

## 2023-11-20 NOTE — CARE COORDINATION
Pt presented to the ED c/o fatigue, cough and SOB. No significant PMHx. PTA, pt indep with his ADLs. A/O x4. [facility] Family and friends live nearby and are supportive. On RA. Denies DME need. No PCP established, will send referral to [facility] for PCP follow up at discharge. Will continue to monitor. 11/20/23 9477   Service Assessment   Patient Orientation Alert and Oriented   Cognition Alert   History Provided By Patient   Primary Caregiver Self   Support Systems Children;Family Members;[employer] Community;Friends/Neighbors   PCP Verified by CM No  (Will send referral to [facility] for PCP follow up)   Prior Functional Level Independent in ADLs/IADLs   Current Functional Level Independent in ADLs/IADLs   Can patient return to prior living arrangement Yes   Ability to make needs known: Good   Family able to assist with home care needs: Yes   Would you like for me to discuss the discharge plan with any other family members/significant others, and if so, who? No   Financial Resources Medicare   Community Resources None   Social/Functional History   Lives With Alone   Type of [address] [place] [address] Dr One level   [facility] None   Receives Help From Family   ADL Assistance Independent   Homemaking Assistance Independent   Ambulation Assistance Independent   Transfer Assistance Independent   Active  Yes   Mode of Transportation Car   Occupation Retired   Discharge Planning   Type of [address] Prior To Admission None   Potential Assistance Needed N/A   DME Ordered? No   Potential Assistance Purchasing Medications No   Type of Home Care Services None   Services At/After Discharge   Transition of Care Consult (CM Consult) Discharge [facility] Discharge None   [place] Resource Information Provided?  No   Mode of Transport at Discharge Other (see comment)  (Family)   Confirm Follow Up Transport Family

## 2023-11-21 ENCOUNTER — ANESTHESIA EVENT (OUTPATIENT)
Dept: CARDIAC CATH/INVASIVE PROCEDURES | Age: 83
DRG: 273 | End: 2023-11-21
Payer: MEDICARE

## 2023-11-21 ENCOUNTER — ANESTHESIA (OUTPATIENT)
Dept: CARDIAC CATH/INVASIVE PROCEDURES | Age: 83
DRG: 273 | End: 2023-11-21
Payer: MEDICARE

## 2023-11-21 ENCOUNTER — APPOINTMENT (OUTPATIENT)
Dept: CARDIAC CATH/INVASIVE PROCEDURES | Age: 83
DRG: 273 | End: 2023-11-21
Attending: INTERNAL MEDICINE
Payer: MEDICARE

## 2023-11-21 PROBLEM — I48.91 ATRIAL FIBRILLATION WITH RVR (HCC): Status: ACTIVE | Noted: 2023-11-21

## 2023-11-21 PROBLEM — I50.23 ACUTE ON CHRONIC SYSTOLIC (CONGESTIVE) HEART FAILURE (HCC): Status: ACTIVE | Noted: 2023-11-21

## 2023-11-21 LAB
ANION GAP SERPL CALC-SCNC: 10 MMOL/L (ref 2–11)
BASOPHILS # BLD: 0 K/UL (ref 0–0.2)
BASOPHILS NFR BLD: 1 % (ref 0–2)
BUN SERPL-MCNC: 30 MG/DL (ref 8–23)
CALCIUM SERPL-MCNC: 8.8 MG/DL (ref 8.3–10.4)
CHLORIDE SERPL-SCNC: 107 MMOL/L (ref 101–110)
CO2 SERPL-SCNC: 24 MMOL/L (ref 21–32)
CREAT SERPL-MCNC: 1.4 MG/DL (ref 0.8–1.5)
DIFFERENTIAL METHOD BLD: ABNORMAL
ECHO BSA: 2.17 M2
EKG ATRIAL RATE: 87 BPM
EKG DIAGNOSIS: NORMAL
EKG P AXIS: 74 DEGREES
EKG P-R INTERVAL: 194 MS
EKG Q-T INTERVAL: 372 MS
EKG QRS DURATION: 104 MS
EKG QTC CALCULATION (BAZETT): 447 MS
EKG R AXIS: 266 DEGREES
EKG T AXIS: 131 DEGREES
EKG VENTRICULAR RATE: 87 BPM
EOSINOPHIL # BLD: 0 K/UL (ref 0–0.8)
EOSINOPHIL NFR BLD: 0 % (ref 0.5–7.8)
ERYTHROCYTE [DISTWIDTH] IN BLOOD BY AUTOMATED COUNT: 14.6 % (ref 11.9–14.6)
GLUCOSE SERPL-MCNC: 114 MG/DL (ref 65–100)
HCT VFR BLD AUTO: 42.4 % (ref 41.1–50.3)
HGB BLD-MCNC: 13 G/DL (ref 13.6–17.2)
IMM GRANULOCYTES # BLD AUTO: 0 K/UL (ref 0–0.5)
IMM GRANULOCYTES NFR BLD AUTO: 0 % (ref 0–5)
LYMPHOCYTES # BLD: 1.4 K/UL (ref 0.5–4.6)
LYMPHOCYTES NFR BLD: 19 % (ref 13–44)
MAGNESIUM SERPL-MCNC: 2.3 MG/DL (ref 1.8–2.4)
MCH RBC QN AUTO: 28 PG (ref 26.1–32.9)
MCHC RBC AUTO-ENTMCNC: 30.7 G/DL (ref 31.4–35)
MCV RBC AUTO: 91.2 FL (ref 82–102)
MONOCYTES # BLD: 0.9 K/UL (ref 0.1–1.3)
MONOCYTES NFR BLD: 12 % (ref 4–12)
NEUTS SEG # BLD: 5.2 K/UL (ref 1.7–8.2)
NEUTS SEG NFR BLD: 69 % (ref 43–78)
NRBC # BLD: 0 K/UL (ref 0–0.2)
PLATELET # BLD AUTO: 241 K/UL (ref 150–450)
PMV BLD AUTO: 11.2 FL (ref 9.4–12.3)
POTASSIUM SERPL-SCNC: 4.4 MMOL/L (ref 3.5–5.1)
RBC # BLD AUTO: 4.65 M/UL (ref 4.23–5.6)
SODIUM SERPL-SCNC: 141 MMOL/L (ref 133–143)
UFH PPP CHRO-ACNC: <0.1 IU/ML (ref 0.3–0.7)
WBC # BLD AUTO: 7.5 K/UL (ref 4.3–11.1)

## 2023-11-21 PROCEDURE — 2500000003 HC RX 250 WO HCPCS: Performed by: NURSE ANESTHETIST, CERTIFIED REGISTERED

## 2023-11-21 PROCEDURE — 3700000000 HC ANESTHESIA ATTENDED CARE: Performed by: INTERNAL MEDICINE

## 2023-11-21 PROCEDURE — C1894 INTRO/SHEATH, NON-LASER: HCPCS | Performed by: INTERNAL MEDICINE

## 2023-11-21 PROCEDURE — 2580000003 HC RX 258: Performed by: INTERNAL MEDICINE

## 2023-11-21 PROCEDURE — 2500000003 HC RX 250 WO HCPCS: Performed by: INTERNAL MEDICINE

## 2023-11-21 PROCEDURE — 80048 BASIC METABOLIC PNL TOTAL CA: CPT

## 2023-11-21 PROCEDURE — 2709999900 HC NON-CHARGEABLE SUPPLY: Performed by: INTERNAL MEDICINE

## 2023-11-21 PROCEDURE — 36415 COLL VENOUS BLD VENIPUNCTURE: CPT

## 2023-11-21 PROCEDURE — C1766 INTRO/SHEATH,STRBLE,NON-PEEL: HCPCS | Performed by: INTERNAL MEDICINE

## 2023-11-21 PROCEDURE — C1759 CATH, INTRA ECHOCARDIOGRAPHY: HCPCS | Performed by: INTERNAL MEDICINE

## 2023-11-21 PROCEDURE — 85520 HEPARIN ASSAY: CPT

## 2023-11-21 PROCEDURE — 02583ZZ DESTRUCTION OF CONDUCTION MECHANISM, PERCUTANEOUS APPROACH: ICD-10-PCS | Performed by: INTERNAL MEDICINE

## 2023-11-21 PROCEDURE — 6360000002 HC RX W HCPCS: Performed by: NURSE PRACTITIONER

## 2023-11-21 PROCEDURE — 2720000010 HC SURG SUPPLY STERILE: Performed by: INTERNAL MEDICINE

## 2023-11-21 PROCEDURE — 93622 COMP EP EVAL L VENTR PAC&REC: CPT | Performed by: INTERNAL MEDICINE

## 2023-11-21 PROCEDURE — 85025 COMPLETE CBC W/AUTO DIFF WBC: CPT

## 2023-11-21 PROCEDURE — 2140000000 HC CCU INTERMEDIATE R&B

## 2023-11-21 PROCEDURE — B246YZZ ULTRASONOGRAPHY OF RIGHT AND LEFT HEART USING OTHER CONTRAST: ICD-10-PCS | Performed by: INTERNAL MEDICINE

## 2023-11-21 PROCEDURE — 93662 INTRACARDIAC ECG (ICE): CPT | Performed by: INTERNAL MEDICINE

## 2023-11-21 PROCEDURE — 93653 COMPRE EP EVAL TX SVT: CPT | Performed by: INTERNAL MEDICINE

## 2023-11-21 PROCEDURE — C1732 CATH, EP, DIAG/ABL, 3D/VECT: HCPCS | Performed by: INTERNAL MEDICINE

## 2023-11-21 PROCEDURE — 6370000000 HC RX 637 (ALT 250 FOR IP): Performed by: INTERNAL MEDICINE

## 2023-11-21 PROCEDURE — 3700000001 HC ADD 15 MINUTES (ANESTHESIA): Performed by: INTERNAL MEDICINE

## 2023-11-21 PROCEDURE — 2580000003 HC RX 258: Performed by: NURSE ANESTHETIST, CERTIFIED REGISTERED

## 2023-11-21 PROCEDURE — 6360000002 HC RX W HCPCS: Performed by: NURSE ANESTHETIST, CERTIFIED REGISTERED

## 2023-11-21 PROCEDURE — C1760 CLOSURE DEV, VASC: HCPCS | Performed by: INTERNAL MEDICINE

## 2023-11-21 PROCEDURE — 83735 ASSAY OF MAGNESIUM: CPT

## 2023-11-21 RX ORDER — LIDOCAINE HYDROCHLORIDE 20 MG/ML
INJECTION, SOLUTION EPIDURAL; INFILTRATION; INTRACAUDAL; PERINEURAL PRN
Status: DISCONTINUED | OUTPATIENT
Start: 2023-11-21 | End: 2023-11-21 | Stop reason: SDUPTHER

## 2023-11-21 RX ORDER — PROPOFOL 10 MG/ML
INJECTION, EMULSION INTRAVENOUS CONTINUOUS PRN
Status: DISCONTINUED | OUTPATIENT
Start: 2023-11-21 | End: 2023-11-21 | Stop reason: SDUPTHER

## 2023-11-21 RX ORDER — LISINOPRIL 5 MG/1
2.5 TABLET ORAL DAILY
Status: DISCONTINUED | OUTPATIENT
Start: 2023-11-22 | End: 2023-11-22 | Stop reason: HOSPADM

## 2023-11-21 RX ORDER — PHENYLEPHRINE HYDROCHLORIDE 10 MG/ML
INJECTION INTRAVENOUS PRN
Status: DISCONTINUED | OUTPATIENT
Start: 2023-11-21 | End: 2023-11-21 | Stop reason: SDUPTHER

## 2023-11-21 RX ORDER — CARVEDILOL 3.12 MG/1
3.12 TABLET ORAL 2 TIMES DAILY WITH MEALS
Status: DISCONTINUED | OUTPATIENT
Start: 2023-11-22 | End: 2023-11-22 | Stop reason: HOSPADM

## 2023-11-21 RX ORDER — HEPARIN SODIUM 10000 [USP'U]/100ML
5-30 INJECTION, SOLUTION INTRAVENOUS CONTINUOUS
Status: CANCELLED | OUTPATIENT
Start: 2023-11-21 | End: 2023-11-21

## 2023-11-21 RX ORDER — SODIUM CHLORIDE, SODIUM LACTATE, POTASSIUM CHLORIDE, CALCIUM CHLORIDE 600; 310; 30; 20 MG/100ML; MG/100ML; MG/100ML; MG/100ML
INJECTION, SOLUTION INTRAVENOUS CONTINUOUS PRN
Status: DISCONTINUED | OUTPATIENT
Start: 2023-11-21 | End: 2023-11-21 | Stop reason: SDUPTHER

## 2023-11-21 RX ORDER — EPHEDRINE SULFATE/0.9% NACL/PF 50 MG/5 ML
SYRINGE (ML) INTRAVENOUS PRN
Status: DISCONTINUED | OUTPATIENT
Start: 2023-11-21 | End: 2023-11-21 | Stop reason: SDUPTHER

## 2023-11-21 RX ADMIN — PHENYLEPHRINE HYDROCHLORIDE 100 MCG: 10 INJECTION INTRAVENOUS at 10:14

## 2023-11-21 RX ADMIN — FUROSEMIDE 40 MG: 10 INJECTION, SOLUTION INTRAMUSCULAR; INTRAVENOUS at 07:38

## 2023-11-21 RX ADMIN — LISINOPRIL 5 MG: 5 TABLET ORAL at 07:38

## 2023-11-21 RX ADMIN — HEPARIN SODIUM 4000 UNITS: 1000 INJECTION INTRAVENOUS; SUBCUTANEOUS at 07:30

## 2023-11-21 RX ADMIN — CARVEDILOL 6.25 MG: 6.25 TABLET, FILM COATED ORAL at 07:38

## 2023-11-21 RX ADMIN — Medication 10 MG: at 10:14

## 2023-11-21 RX ADMIN — LIDOCAINE HYDROCHLORIDE 40 MG: 20 INJECTION, SOLUTION EPIDURAL; INFILTRATION; INTRACAUDAL; PERINEURAL at 10:08

## 2023-11-21 RX ADMIN — Medication 2 G: at 10:20

## 2023-11-21 RX ADMIN — SODIUM CHLORIDE, PRESERVATIVE FREE 10 ML: 5 INJECTION INTRAVENOUS at 07:40

## 2023-11-21 RX ADMIN — PHENYLEPHRINE HYDROCHLORIDE 100 MCG: 10 INJECTION INTRAVENOUS at 10:12

## 2023-11-21 RX ADMIN — PHENYLEPHRINE HYDROCHLORIDE 100 MCG: 10 INJECTION INTRAVENOUS at 10:44

## 2023-11-21 RX ADMIN — SODIUM CHLORIDE, PRESERVATIVE FREE 10 ML: 5 INJECTION INTRAVENOUS at 19:58

## 2023-11-21 RX ADMIN — PROPOFOL 120 MCG/KG/MIN: 10 INJECTION, EMULSION INTRAVENOUS at 10:09

## 2023-11-21 RX ADMIN — PHENYLEPHRINE HYDROCHLORIDE 100 MCG: 10 INJECTION INTRAVENOUS at 10:13

## 2023-11-21 RX ADMIN — PHENYLEPHRINE HYDROCHLORIDE 100 MCG: 10 INJECTION INTRAVENOUS at 10:43

## 2023-11-21 RX ADMIN — SODIUM CHLORIDE, SODIUM LACTATE, POTASSIUM CHLORIDE, AND CALCIUM CHLORIDE: 600; 310; 30; 20 INJECTION, SOLUTION INTRAVENOUS at 10:02

## 2023-11-21 ASSESSMENT — PAIN SCALES - GENERAL
PAINLEVEL_OUTOF10: 0

## 2023-11-22 ENCOUNTER — TELEPHONE (OUTPATIENT)
Age: 83
End: 2023-11-22

## 2023-11-22 VITALS
TEMPERATURE: 97.9 F | RESPIRATION RATE: 20 BRPM | SYSTOLIC BLOOD PRESSURE: 115 MMHG | DIASTOLIC BLOOD PRESSURE: 76 MMHG | HEART RATE: 82 BPM | WEIGHT: 219 LBS | HEIGHT: 71 IN | OXYGEN SATURATION: 94 % | BODY MASS INDEX: 30.66 KG/M2

## 2023-11-22 LAB
ANION GAP SERPL CALC-SCNC: 8 MMOL/L (ref 2–11)
BUN SERPL-MCNC: 38 MG/DL (ref 8–23)
CALCIUM SERPL-MCNC: 8.7 MG/DL (ref 8.3–10.4)
CHLORIDE SERPL-SCNC: 103 MMOL/L (ref 101–110)
CO2 SERPL-SCNC: 26 MMOL/L (ref 21–32)
CREAT SERPL-MCNC: 1.4 MG/DL (ref 0.8–1.5)
GLUCOSE SERPL-MCNC: 121 MG/DL (ref 65–100)
MAGNESIUM SERPL-MCNC: 2.3 MG/DL (ref 1.8–2.4)
POTASSIUM SERPL-SCNC: 4.3 MMOL/L (ref 3.5–5.1)
SODIUM SERPL-SCNC: 137 MMOL/L (ref 133–143)

## 2023-11-22 PROCEDURE — 6370000000 HC RX 637 (ALT 250 FOR IP): Performed by: INTERNAL MEDICINE

## 2023-11-22 PROCEDURE — 2580000003 HC RX 258: Performed by: INTERNAL MEDICINE

## 2023-11-22 PROCEDURE — 6360000002 HC RX W HCPCS: Performed by: FAMILY MEDICINE

## 2023-11-22 PROCEDURE — 36415 COLL VENOUS BLD VENIPUNCTURE: CPT

## 2023-11-22 PROCEDURE — 83735 ASSAY OF MAGNESIUM: CPT

## 2023-11-22 PROCEDURE — 80048 BASIC METABOLIC PNL TOTAL CA: CPT

## 2023-11-22 RX ORDER — CARVEDILOL 3.12 MG/1
3.12 TABLET ORAL 2 TIMES DAILY WITH MEALS
Qty: 60 TABLET | Refills: 0 | Status: SHIPPED | OUTPATIENT
Start: 2023-11-22

## 2023-11-22 RX ORDER — LISINOPRIL 2.5 MG/1
2.5 TABLET ORAL DAILY
Qty: 30 TABLET | Refills: 0 | Status: SHIPPED | OUTPATIENT
Start: 2023-11-23

## 2023-11-22 RX ORDER — FUROSEMIDE 10 MG/ML
20 INJECTION INTRAMUSCULAR; INTRAVENOUS 2 TIMES DAILY
Status: DISCONTINUED | OUTPATIENT
Start: 2023-11-22 | End: 2023-11-22 | Stop reason: HOSPADM

## 2023-11-22 RX ORDER — FUROSEMIDE 20 MG/1
20 TABLET ORAL DAILY
Qty: 60 TABLET | Refills: 0 | Status: SHIPPED | OUTPATIENT
Start: 2023-11-22

## 2023-11-22 RX ADMIN — LISINOPRIL 2.5 MG: 5 TABLET ORAL at 09:03

## 2023-11-22 RX ADMIN — FUROSEMIDE 20 MG: 10 INJECTION, SOLUTION INTRAMUSCULAR; INTRAVENOUS at 09:03

## 2023-11-22 RX ADMIN — APIXABAN 5 MG: 5 TABLET, FILM COATED ORAL at 09:03

## 2023-11-22 RX ADMIN — SODIUM CHLORIDE, PRESERVATIVE FREE 10 ML: 5 INJECTION INTRAVENOUS at 09:04

## 2023-11-22 RX ADMIN — CARVEDILOL 3.12 MG: 3.12 TABLET, FILM COATED ORAL at 09:03

## 2023-11-22 ASSESSMENT — PAIN SCALES - GENERAL
PAINLEVEL_OUTOF10: 0
PAINLEVEL_OUTOF10: 0

## 2023-11-22 NOTE — DISCHARGE SUMMARY
Hospitalist Discharge Summary   Admit Date:  2023 11:29 AM   DC Note date: 2023  Name:  Erasmo Weathesr Age:  82 y.o. Sex:  male  :  1940   MRN:  [id number]   Room:  [facility]  PCP:  No, Pcp    Presenting Complaint: No chief complaint on file. Initial Admission Diagnosis: Atrial fibrillation with RVR ([facility]) [I48.91]     Problem List for this Hospitalization (present on admission):    Principal Problem:    Atrial flutter with rapid ventricular response (HCC)  Active Problems:    Volume overload    Acute on chronic systolic (congestive) heart failure (HCC)    Atrial fibrillation with RVR ([address])  Resolved Problems:    * No resolved hospital problems. *    H&P  Erasmo Weathers is a 82 y.o. male with no significant PMH who presented to the ER at [facility] today with c/o fatigue, cough and SOB. Seen in the ER on  for SOB. CXR showed L hemidiaphragm elevated with L basilar airspace disease, atelectasis vs infiltrates. He was discharged with antibiotics. He was also given a 10d supply of amoxicillin on 10/13 and a 4d supply on 10/9? Says he never really felt any better. Has had mild increase in leg swelling along with orthopnea and fatigue. Just bought a new chair so he can sleep upright. No chest pain, fevers, chills, N/V/D, rashes. EKG in the ER showed rapid atrial flutter. Rate was initially 150s. This improved with IV diltiazem bolus followed by drip. BNP 8400, hsTrop 152. CBC unremarkable. Repeat CXR showed atelectasis L base, cardiomegaly, small L effusion, some subtle interstitial edema. He was transferred to [facility] for admission and further management of rapid atrial flutter. HR currently low 100s but does occasionally increase. His SOB has improved a little with rate control. Of note -- he had CT a/p done this past July due to abdominal pain.  This noted a \"small wedge-shaped area of hypoattenuation in the lateral aspect of the spleen, which can be seen with small infarct or

## 2023-11-22 NOTE — MANAGEMENT PLAN
The chart is being review including labs, recent test, and H and P for the purpose of qualification of Life vest.  The last echocardiogram is included below. Indications:     Last Echo:      11/20/23    ECHO (TTE) COMPLETE (PRN CONTRAST/BUBBLE/STRAIN/3D) 11/20/2023  3:55 PM (Final)    Interpretation Summary    Left Ventricle: Normal left ventricular systolic function with a visually estimated EF of 20 - 25%. Left ventricle size is normal. Mildly increased wall thickness. Severe global hypokinesis present. Normal diastolic function. Left Atrium: Left atrium is moderately dilated. Right Atrium: Right atrium is mildly dilated. Aortic Valve: Moderately calcified cusp. Consider low flow aortic stenosis    Mitral Valve: Moderate regurgitation. Tricuspid Valve: Mild to moderate regurgitation. Mildly elevated RVSP. The estimated RVSP is 46 mmHg.     Signed by: Sherita Rai MD on 11/20/2023  3:55 PM        GILBERT Johnson - CNP  11/22/23  7:21 AM

## 2023-11-22 NOTE — DISCHARGE INSTRUCTIONS
such as high blood pressure, high cholesterol, diabetes, and sleep apnea. Follow-up care is a key part of your treatment and safety. Be sure to make and go to all appointments, and call your doctor if you are having problems. It's also a good idea to know your test results and keep a list of the medicines you take. When should you call for help? Call 911  anytime you think you may need emergency care. For example, call if:    You passed out (lost consciousness). You have symptoms of a heart attack. These may include:  Chest pain or pressure, or a strange feeling in the chest.  Sweating. Shortness of breath. Nausea or vomiting. Pain, pressure, or a strange feeling in the back, neck, jaw, or upper belly, or in one or both shoulders or arms. Lightheadedness or sudden weakness. A fast or irregular heartbeat. After you call 911, the  may tell you to chew 1 adult-strength or 2 to 4 low-dose aspirin. Wait for an ambulance. Do not try to drive yourself. You have symptoms of a stroke. These may include:  Sudden numbness, tingling, weakness, or loss of movement in your face, arm, or leg, especially on only one side of your body. Sudden vision changes. Sudden trouble speaking. Sudden confusion or trouble understanding simple statements. Sudden problems with walking or balance. A sudden, severe headache that is different from past headaches. Call your doctor now or seek immediate medical care if:    You are bleeding from the area where the catheter was put in your blood vessel. You have a fast-growing, painful lump at the catheter site. You have signs of infection, such as: Increased pain, swelling, warmth, or redness. Red streaks leading from the catheter site. Pus draining from the catheter site. A fever. Your leg, arm, or hand is painful, looks blue, or feels cold, numb, or tingly.    Watch closely for any changes in your health, and be sure to contact your doctor if you have

## 2023-11-22 NOTE — CARE COORDINATION
Discharge order is in. Pt is discharging home today in stable condition. Life Vest was delivered and fitted. PCP referral made to [facility]. No other discharge needs identified. Tx goals met. 1527-Family requesting [address] for pt. Verbalized no agency preference. [address] [facility] referral sent to [address] [place] [address],[address] for RN and PT/OT eduardo. Updated AVS.     11/22/23 1514   Services At/After Discharge   Transition of Care Consult (CM Consult) Discharge Planning; Other  ([facility] [place])   [address] Discharge None   [employer] Information Provided? No   Mode of Transport at Discharge Other (see comment)  (Family)   Confirm Follow Up Transport Family   Condition of Participation: Discharge Planning   The Patient and/or Patient Representative was provided with a Choice of Provider? Patient   The Patient and/Or Patient Representative agree with the Discharge Plan? Yes   Freedom of Choice list was provided with basic dialogue that supports the patient's individualized plan of care/goals, treatment preferences, and shares the quality data associated with the providers?   Yes

## 2023-11-22 NOTE — TELEPHONE ENCOUNTER
Per Protillo Saxena at the hospital patient needs TC-7 for new onset of heart failure. Patient is new to the practice. Called and unable to lvm for patient because voicemail was not set up yet.

## 2023-11-27 ENCOUNTER — TELEPHONE (OUTPATIENT)
Age: 83
End: 2023-11-27

## 2023-11-27 NOTE — TELEPHONE ENCOUNTER
Pt had surgery last week all his numbers as far as bp and heart rate are normal pt is just still very tired and not sleeping his son would like to know if this normal

## 2023-11-27 NOTE — TELEPHONE ENCOUNTER
Spoke to son. Pt asymptomatic except for ongoing fatigue, trouble sleeping. States VS are all good. Explained that it will take pt time to recover from both the procedure itself and the anesthesia he received, given that pt is 82 yo. Verb understanding.

## 2023-12-04 ENCOUNTER — TELEPHONE (OUTPATIENT)
Age: 83
End: 2023-12-04

## 2023-12-04 DIAGNOSIS — I48.0 PAROXYSMAL ATRIAL FIBRILLATION (HCC): Primary | ICD-10-CM

## 2023-12-04 DIAGNOSIS — I25.5 ISCHEMIC CARDIOMYOPATHY: Primary | ICD-10-CM

## 2023-12-04 NOTE — TELEPHONE ENCOUNTER
Lifevest noting episode of VT lasting ~16 seconds. Patient denies any sx at time of event. Event scanned to chart.

## 2023-12-21 PROBLEM — I50.20 HFREF (HEART FAILURE WITH REDUCED EJECTION FRACTION) (HCC): Status: ACTIVE | Noted: 2023-12-21

## 2023-12-21 PROBLEM — I48.3 TYPICAL ATRIAL FLUTTER (HCC): Status: ACTIVE | Noted: 2023-11-20

## 2023-12-26 ENCOUNTER — TELEPHONE (OUTPATIENT)
Age: 83
End: 2023-12-26

## 2023-12-26 DIAGNOSIS — Z75.8 DOES NOT HAVE PRIMARY CARE PROVIDER: ICD-10-CM

## 2023-12-26 DIAGNOSIS — J98.8 RESPIRATORY INFECTION: Primary | ICD-10-CM

## 2023-12-26 NOTE — TELEPHONE ENCOUNTER
Pt states he went to urgent care over the weekend. States was prescribed doxycycline and steroid inhaler. Was also given steroid shot. Suggest pt see his PCP. States he does not have a PCP at the moment. States he stopped seeing his previous PCP because he was concerned about the care he received. Will place urgent referral to primary care. Recommend returning to urgent care for worsening sx prior to PCP appt. Verb understanding.

## 2023-12-26 NOTE — TELEPHONE ENCOUNTER
Dr Pavan Aquino called stating he had not  received a call back. Advised Dr Pavan Aquino he was not on the RUDY. He stated he will call the patient and advised to call the patient between 2 and 3.

## 2023-12-26 NOTE — TELEPHONE ENCOUNTER
Dr Beverly Alvarez, friend of patient called stating the patient is experiencing the following symptoms :    Ablation with Dr Escobedo [place] 11/21  Lungs sound bad  Lower half bilaterally lots of wheezing   thought it may be pneumonia  Gave prednisone

## 2024-01-09 ENCOUNTER — TELEPHONE (OUTPATIENT)
Age: 84
End: 2024-01-09

## 2024-01-09 DIAGNOSIS — I50.20 HEART FAILURE WITH REDUCED EJECTION FRACTION (HCC): Primary | ICD-10-CM

## 2024-01-09 NOTE — TELEPHONE ENCOUNTER
----- Message from Cleveland Ma MD sent at 1/8/2024  3:10 PM EST -----  Echo reveals heart muscle remains very weak, he needs a cardiac catheterization to evaluate blood supply to his heart. Happy to see him back to discuss, or if he would prefer, OK to schedule Pt for cath

## 2024-01-09 NOTE — TELEPHONE ENCOUNTER
Please call Jitendra with results of echo.  Patient's son called and needs this to explained to you.

## 2024-01-09 NOTE — TELEPHONE ENCOUNTER
I called spoke to pt, I explained 's response. He voiced understanding. He would like to schedule heart cath.

## 2024-01-09 NOTE — TELEPHONE ENCOUNTER
I called and spoke to pt, he wanted me to repeat  results. He will still like to move forward with scheduling heart cath.

## 2024-01-10 NOTE — TELEPHONE ENCOUNTER
Called s/w pt's son, Brain.  He would like to know Echo results. States someone called his father w/results yesterday.   Per telephone note, 1/9/24, pt was called and informed of Echo results and Dr. Ma response, happy to see pt back to discuss or if he would prefer, ok to schedule pt for cath.   Pt's son v/u.  Would like to schedule both, appointment w/Dr Ma to discuss AND cardiac cath.  He would like to be contacted for above.

## 2024-01-16 NOTE — PROGRESS NOTES
Patient pre-assessment complete Austin Gordon with son Jitendra scheduled for [facility] at 1100am, arrival time 0800am. Patient verified using . NPO status reinforced. Patient informed to take 324mg aspirin on the day of procedure. Patient instructed to HOLD eliquis tonight and in am starting today. Instructed they can take all other medications excluding vitamins & supplements. Patient verbalizes understanding of all instructions & denies any questions at this time.

## 2024-01-17 ENCOUNTER — HOSPITAL ENCOUNTER (OUTPATIENT)
Age: 84
Setting detail: OUTPATIENT SURGERY
Discharge: HOME | End: 2024-01-17
Attending: INTERNAL MEDICINE | Admitting: INTERNAL MEDICINE
Payer: MEDICARE

## 2024-01-17 VITALS
SYSTOLIC BLOOD PRESSURE: 103 MMHG | TEMPERATURE: 97.9 F | HEART RATE: 80 BPM | RESPIRATION RATE: 16 BRPM | WEIGHT: 192 LBS | HEIGHT: 71 IN | OXYGEN SATURATION: 95 % | DIASTOLIC BLOOD PRESSURE: 65 MMHG | BODY MASS INDEX: 26.88 KG/M2

## 2024-01-17 DIAGNOSIS — R93.1 ABNORMAL ECHOCARDIOGRAM: ICD-10-CM

## 2024-01-17 LAB
ANION GAP SERPL CALC-SCNC: 3 MMOL/L (ref 2–11)
BASOPHILS # BLD: 0.1 K/UL (ref 0–0.2)
BASOPHILS NFR BLD: 1 % (ref 0–2)
BUN SERPL-MCNC: 12 MG/DL (ref 8–23)
CALCIUM SERPL-MCNC: 9.2 MG/DL (ref 8.3–10.4)
CHLORIDE SERPL-SCNC: 107 MMOL/L (ref 103–113)
CO2 SERPL-SCNC: 27 MMOL/L (ref 21–32)
CREAT SERPL-MCNC: 1.2 MG/DL (ref 0.8–1.5)
DIFFERENTIAL METHOD BLD: ABNORMAL
ECHO BSA: 2.09 M2
EKG ATRIAL RATE: 90 BPM
EKG DIAGNOSIS: NORMAL
EKG P AXIS: 37 DEGREES
EKG P-R INTERVAL: 176 MS
EKG Q-T INTERVAL: 374 MS
EKG QRS DURATION: 104 MS
EKG QTC CALCULATION (BAZETT): 457 MS
EKG R AXIS: -3 DEGREES
EKG T AXIS: 94 DEGREES
EKG VENTRICULAR RATE: 90 BPM
EOSINOPHIL # BLD: 0.4 K/UL (ref 0–0.8)
EOSINOPHIL NFR BLD: 5 % (ref 0.5–7.8)
ERYTHROCYTE [DISTWIDTH] IN BLOOD BY AUTOMATED COUNT: 15.8 % (ref 11.9–14.6)
GLUCOSE SERPL-MCNC: 116 MG/DL (ref 65–100)
HCT VFR BLD AUTO: 47.1 % (ref 41.1–50.3)
HGB BLD-MCNC: 14.9 G/DL (ref 13.6–17.2)
IMM GRANULOCYTES # BLD AUTO: 0 K/UL (ref 0–0.5)
IMM GRANULOCYTES NFR BLD AUTO: 0 % (ref 0–5)
LYMPHOCYTES # BLD: 1.9 K/UL (ref 0.5–4.6)
LYMPHOCYTES NFR BLD: 26 % (ref 13–44)
MCH RBC QN AUTO: 27 PG (ref 26.1–32.9)
MCHC RBC AUTO-ENTMCNC: 31.6 G/DL (ref 31.4–35)
MCV RBC AUTO: 85.5 FL (ref 82–102)
MONOCYTES # BLD: 0.7 K/UL (ref 0.1–1.3)
MONOCYTES NFR BLD: 10 % (ref 4–12)
NEUTS SEG # BLD: 4.2 K/UL (ref 1.7–8.2)
NEUTS SEG NFR BLD: 58 % (ref 43–78)
NRBC # BLD: 0 K/UL (ref 0–0.2)
PLATELET # BLD AUTO: 222 K/UL (ref 150–450)
PMV BLD AUTO: 11.2 FL (ref 9.4–12.3)
POTASSIUM SERPL-SCNC: 4.4 MMOL/L (ref 3.5–5.1)
RBC # BLD AUTO: 5.51 M/UL (ref 4.23–5.6)
SODIUM SERPL-SCNC: 137 MMOL/L (ref 136–146)
WBC # BLD AUTO: 7.3 K/UL (ref 4.3–11.1)

## 2024-01-17 PROCEDURE — 6360000004 HC RX CONTRAST MEDICATION: Performed by: INTERNAL MEDICINE

## 2024-01-17 PROCEDURE — 99152 MOD SED SAME PHYS/QHP 5/>YRS: CPT | Performed by: INTERNAL MEDICINE

## 2024-01-17 PROCEDURE — 80048 BASIC METABOLIC PNL TOTAL CA: CPT

## 2024-01-17 PROCEDURE — 93458 L HRT ARTERY/VENTRICLE ANGIO: CPT | Performed by: INTERNAL MEDICINE

## 2024-01-17 PROCEDURE — 2709999900 HC NON-CHARGEABLE SUPPLY: Performed by: INTERNAL MEDICINE

## 2024-01-17 PROCEDURE — C1894 INTRO/SHEATH, NON-LASER: HCPCS | Performed by: INTERNAL MEDICINE

## 2024-01-17 PROCEDURE — 6360000002 HC RX W HCPCS: Performed by: INTERNAL MEDICINE

## 2024-01-17 PROCEDURE — 93005 ELECTROCARDIOGRAM TRACING: CPT | Performed by: INTERNAL MEDICINE

## 2024-01-17 PROCEDURE — 85025 COMPLETE CBC W/AUTO DIFF WBC: CPT

## 2024-01-17 PROCEDURE — 2500000003 HC RX 250 WO HCPCS: Performed by: INTERNAL MEDICINE

## 2024-01-17 RX ORDER — SODIUM CHLORIDE 9 MG/ML
INJECTION, SOLUTION INTRAVENOUS CONTINUOUS
Status: DISCONTINUED | OUTPATIENT
Start: 2024-01-17 | End: 2024-01-17 | Stop reason: HOSPADM

## 2024-01-17 RX ORDER — SODIUM CHLORIDE 0.9 % (FLUSH) 0.9 %
5-40 SYRINGE (ML) INJECTION EVERY 12 HOURS SCHEDULED
OUTPATIENT
Start: 2024-01-17

## 2024-01-17 RX ORDER — ASPIRIN 81 MG/1
324 TABLET, CHEWABLE ORAL ONCE
Status: CANCELLED | OUTPATIENT
Start: 2024-01-17 | End: 2024-01-17

## 2024-01-17 RX ORDER — SODIUM CHLORIDE 0.9 % (FLUSH) 0.9 %
5-40 SYRINGE (ML) INJECTION PRN
OUTPATIENT
Start: 2024-01-17

## 2024-01-17 RX ORDER — LIDOCAINE HYDROCHLORIDE 10 MG/ML
INJECTION, SOLUTION INFILTRATION; PERINEURAL PRN
Status: DISCONTINUED | OUTPATIENT
Start: 2024-01-17 | End: 2024-01-17 | Stop reason: HOSPADM

## 2024-01-17 RX ORDER — SODIUM CHLORIDE 9 MG/ML
INJECTION, SOLUTION INTRAVENOUS PRN
OUTPATIENT
Start: 2024-01-17

## 2024-01-17 RX ORDER — HEPARIN SODIUM 200 [USP'U]/100ML
INJECTION, SOLUTION INTRAVENOUS CONTINUOUS PRN
Status: DISCONTINUED | OUTPATIENT
Start: 2024-01-17 | End: 2024-01-17 | Stop reason: HOSPADM

## 2024-01-17 RX ORDER — ACETAMINOPHEN 325 MG/1
650 TABLET ORAL EVERY 4 HOURS PRN
OUTPATIENT
Start: 2024-01-17

## 2024-01-17 RX ORDER — MIDAZOLAM HYDROCHLORIDE 1 MG/ML
INJECTION INTRAMUSCULAR; INTRAVENOUS PRN
Status: DISCONTINUED | OUTPATIENT
Start: 2024-01-17 | End: 2024-01-17 | Stop reason: HOSPADM

## 2024-01-17 NOTE — PROGRESS NOTES
Discharge instructions given per orders, voiced good understanding of wrist care, medications & follow up care. Denies any questions

## 2024-01-17 NOTE — DISCHARGE INSTRUCTIONS
HEART CATHETERIZATION/ANGIOGRAPHY DISCHARGE INSTRUCTIONS    Check puncture site frequently for swelling or bleeding. If there is any bleeding, apply pressure over the area with a clean towel or washcloth and call 911. Notify your doctor for any redness, swelling, drainage, or oozing from the puncture site. Notify your doctor for any fever or chills.  If the extremity becomes cold, numb, or painful call Dr. [facility] at [phone].  Activity should be limited for the next 48 hours. No heavy lifting, pushing, pulling  or strenuous activity for 48 hours. No heavy lifting (anything over 10 pounds) for 3 days.  You may resume your usual diet. Drink more fluids than usual.  Have a responsible person drive you home and stay with you for at least 24 hours after your heart catheterization/angiography.  You may remove bandage from your right wrist in 24 hours. You may shower in 24 hours. No tub baths, hot tubs, or swimming for 1 week. Do not place any lotions, creams, powders, or ointments over puncture site for 1 week. You may place a clean band-aid over the puncture site each day for 5 days. Change daily.        Sedation for a Medical Procedure: Care Instructions     You were given a sedative medication during your visit. While many of the effects will have worn   off before you leave; you may continue to feel some effects for several hours.      Common side effects from sedation include:  Feeling sleepy. (Your doctors and nurses will make sure you are not too sleepy to go home.)  Nausea and vomiting. This usually does not last long.  Feeling tired.     How can you care for yourself at home?  Activity    Don't do anything for 24 hours that requires attention to detail. It takes time for the medicine effects to completely wear off.     Do not make important legal decisions for 24 hours.     Do not sign any legal documents for 24 hours.     Do not drink alcohol today     For your safety, you should not drive or

## 2024-01-17 NOTE — PROGRESS NOTES
[facility] w/ Dr. Diallo  CV consult    R radial access  TR band to R radial @ 12mL  No s/sxs of bleeding or hematoma to R radial access site    Heparin 5000 units IC  Versed 2mg IV  Fentanyl 50mcg IV    TRANSFER - OUT REPORT:    Verbal report given to HUEY Gann on Dionisio Gordon Jr.  being transferred to CPRU for routine progression of patient care       Report consisted of patient's Situation, Background, Assessment and   Recommendations(SBAR).     Information from the following report(s) Nurse Handoff Report and MAR was reviewed with the receiving nurse.    Opportunity for questions and clarification was provided.      Patient transported with:  Tech

## 2024-01-17 NOTE — PROGRESS NOTES
R band deflation completed. right radial dressed with quick clot and tegaderm using sterile technique. No bleeding or hematoma. Tolerated without difficulty

## 2024-01-17 NOTE — PROGRESS NOTES
Patient received to CPRU room # 11  Ambulatory from [facility]. Patient scheduled for LHC today with Dr Ma. Procedure reviewed & questions answered, voiced good understanding consent obtained & placed on chart. All medications and medical history reviewed. Will prep patient per orders. Patient & family updated on plan of care.      The patient has a fraility score of 3-MANAGING WELL, based on ambulation.     Patient took Aspirin 324 today at 0600 prior to arrival.

## 2024-01-17 NOTE — PROGRESS NOTES
Report received from [facility] RN. Procedural finding communicated. Intra procedural medication administration reviewed. Progression of care discussed.    Patient received into CPRU room 4, Post C    Access site without bleeding or swelling. Right wrist    Patient instructed to limit movement of right upper extremity.    Routine post procedural vital signs & site assessment initiated.

## 2024-01-18 ENCOUNTER — TELEPHONE (OUTPATIENT)
Age: 84
End: 2024-01-18

## 2024-01-18 NOTE — TELEPHONE ENCOUNTER
Spoke with pharmacy who confirmed that pt has refills on all meds. Pharmacy to refill & notify pt. I notified pt's son as well. Understanding voiced.

## 2024-01-18 NOTE — TELEPHONE ENCOUNTER
MEDICATION REFILL REQUEST      Name of Medication:  Eliquis   Dose:  5 mg  Frequency:  twice a day   Quantity:    Days' supply:  90      Pharmacy Name/Location:  [facility] pharmacy    MEDICATION REFILL REQUEST      Name of Medication:  Coreg   Dose:  3.125 mg  Frequency:  twice a day   Quantity:    Days' supply:  90      Pharmacy Name/Location:  [facility] pharmacy     MEDICATION REFILL REQUEST      Name of Medication:  furosemide   Dose:  20 mg  Frequency:  daily   Quantity:    Days' supply:  90      Pharmacy Name/Location:  [facility]     MEDICATION REFILL REQUEST      Name of Medication:  Prinivil   Dose:  2.5 mg  Frequency:  daily   Quantity:    Days' supply:  90      Pharmacy Name/Location:  [facility]         Please call these in today. Patient had cath yesterday and he is completely out.

## 2024-01-24 ENCOUNTER — PREP FOR PROCEDURE (OUTPATIENT)
Dept: CARDIOTHORACIC SURGERY | Age: 84
End: 2024-01-24

## 2024-01-24 DIAGNOSIS — I48.0 PAF (PAROXYSMAL ATRIAL FIBRILLATION) (HCC): ICD-10-CM

## 2024-01-24 DIAGNOSIS — I25.5 ISCHEMIC CARDIOMYOPATHY: ICD-10-CM

## 2024-01-24 PROBLEM — I25.110 ATHEROSCLEROTIC HEART DISEASE OF NATIVE CORONARY ARTERY WITH UNSTABLE ANGINA PECTORIS (HCC): Status: ACTIVE | Noted: 2024-01-24

## 2024-02-02 ENCOUNTER — PREP FOR PROCEDURE (OUTPATIENT)
Dept: CARDIOTHORACIC SURGERY | Age: 84
End: 2024-02-02

## 2024-02-02 DIAGNOSIS — Z01.818 PRE-OP TESTING: Primary | ICD-10-CM

## 2024-02-02 RX ORDER — SODIUM CHLORIDE 0.9 % (FLUSH) 0.9 %
5-40 SYRINGE (ML) INJECTION EVERY 12 HOURS SCHEDULED
Status: CANCELLED | OUTPATIENT
Start: 2024-02-02

## 2024-02-02 RX ORDER — SODIUM CHLORIDE 0.9 % (FLUSH) 0.9 %
5-40 SYRINGE (ML) INJECTION PRN
Status: CANCELLED | OUTPATIENT
Start: 2024-02-02

## 2024-02-02 RX ORDER — AMIODARONE HYDROCHLORIDE 200 MG/1
600 TABLET ORAL ONCE
Status: CANCELLED | OUTPATIENT
Start: 2024-02-02 | End: 2024-02-02

## 2024-02-02 RX ORDER — SODIUM CHLORIDE 9 MG/ML
INJECTION, SOLUTION INTRAVENOUS PRN
Status: CANCELLED | OUTPATIENT
Start: 2024-02-02

## 2024-02-02 RX ORDER — AMIODARONE HYDROCHLORIDE 200 MG/1
600 TABLET ORAL ONCE
Qty: 3 TABLET | Refills: 0 | Status: SHIPPED | OUTPATIENT
Start: 2024-02-02 | End: 2024-02-02

## 2024-02-02 NOTE — H&P
MD Yony Telles MD           Subjective:     Patient is a 83 y.o. male who initially developed fatigue and insomnia around October 2023. He was treated for sinusitis but continued to feel poorly. He presented to the ER with progressive dyspnea. He was treated for pneumonia. Once again, he did not improve with treatment and presented back to the ER. He was found to be in atrial flutter with RVR. Chest xray showed pulmonary vascular congestion. He was admitted and evaluated by cardiology. Echocardiogram showed a severely reduced LV EF of 20-25% with moderate MR. He underwent atrial flutter ablation. A repeat limited echo showed LV EF unchanged with mild MR. Ischemic evaluation was then planned. He underwent cardiac catheterization that showed severe multivessel disease with occluded LAD. He was seen back in the office to discuss high risk CABG/MAZE. He wished to proceed with CABG/MAZE with 5.5 Impella support.         Past Medical History:   Diagnosis Date    History of bilateral knee replacement 2018    Pneumonia 11/2023      Past Surgical History:   Procedure Laterality Date    CARDIAC PROCEDURE N/A 1/17/2024    Left heart cath / coronary angiography performed by Mason Diallo MD at [facility] CARDIAC CATH LAB    EP DEVICE PROCEDURE N/A 11/21/2023    Ablation A-flutter performed by Cleveland Ma MD at [facility] CARDIAC CATH LAB      No Known Allergies  Social History     Tobacco Use    Smoking status: Former     Types: Pipe     Start date: 8/1/1967    Smokeless tobacco: Former     Types: Chew     Quit date: 1975   Substance Use Topics    Alcohol use: Not on file      FH: No family history on file.       Review of Systems   Constitutional: Positive for malaise/fatigue. Negative for chills, fever, weight gain and weight loss.   HENT:  Negative for ear pain, hearing loss, nosebleeds, sore throat and tinnitus.    Eyes:  Negative for blurred vision, vision loss in left eye and vision loss in right

## 2024-02-05 ENCOUNTER — HOSPITAL ENCOUNTER (OUTPATIENT)
Dept: SURGERY | Age: 84
Discharge: HOME | End: 2024-02-08
Payer: MEDICARE

## 2024-02-05 ENCOUNTER — ANESTHESIA EVENT (OUTPATIENT)
Dept: SURGERY | Age: 84
End: 2024-02-05
Payer: MEDICARE

## 2024-02-05 ENCOUNTER — HOSPITAL ENCOUNTER (OUTPATIENT)
Dept: GENERAL RADIOLOGY | Age: 84
Discharge: HOME | End: 2024-02-08
Payer: MEDICARE

## 2024-02-05 ENCOUNTER — HOSPITAL ENCOUNTER (OUTPATIENT)
Dept: LAB | Age: 84
Discharge: HOME | End: 2024-02-08
Payer: MEDICARE

## 2024-02-05 VITALS
WEIGHT: 205.9 LBS | DIASTOLIC BLOOD PRESSURE: 76 MMHG | HEIGHT: 71 IN | BODY MASS INDEX: 28.82 KG/M2 | OXYGEN SATURATION: 97 % | SYSTOLIC BLOOD PRESSURE: 110 MMHG | RESPIRATION RATE: 16 BRPM | HEART RATE: 82 BPM | TEMPERATURE: 97.5 F

## 2024-02-05 DIAGNOSIS — Z01.818 PRE-OP TESTING: ICD-10-CM

## 2024-02-05 LAB
ALBUMIN SERPL-MCNC: 3.5 G/DL (ref 3.2–4.6)
ALBUMIN/GLOB SERPL: 1 (ref 0.4–1.6)
ALP SERPL-CCNC: 47 U/L (ref 50–136)
ALT SERPL-CCNC: 16 U/L (ref 12–65)
ANION GAP SERPL CALC-SCNC: 5 MMOL/L (ref 2–11)
APPEARANCE UR: CLEAR
APTT PPP: 25.4 SEC (ref 23.3–37.4)
AST SERPL-CCNC: 20 U/L (ref 15–37)
BILIRUB SERPL-MCNC: 0.6 MG/DL (ref 0.2–1.1)
BILIRUB UR QL: NEGATIVE
BUN SERPL-MCNC: 21 MG/DL (ref 8–23)
CALCIUM SERPL-MCNC: 8.8 MG/DL (ref 8.3–10.4)
CHLORIDE SERPL-SCNC: 105 MMOL/L (ref 103–113)
CO2 SERPL-SCNC: 30 MMOL/L (ref 21–32)
COLOR UR: ABNORMAL
CREAT SERPL-MCNC: 0.84 MG/DL (ref 0.8–1.5)
ERYTHROCYTE [DISTWIDTH] IN BLOOD BY AUTOMATED COUNT: 16.7 % (ref 11.9–14.6)
EST. AVERAGE GLUCOSE BLD GHB EST-MCNC: 128 MG/DL
GLOBULIN SER CALC-MCNC: 3.4 G/DL (ref 2.8–4.5)
GLUCOSE SERPL-MCNC: 112 MG/DL (ref 65–100)
GLUCOSE UR STRIP.AUTO-MCNC: NEGATIVE MG/DL
HBA1C MFR BLD: 6.1 % (ref 4.8–5.6)
HCT VFR BLD AUTO: 45.4 % (ref 41.1–50.3)
HGB BLD-MCNC: 14.1 G/DL (ref 13.6–17.2)
HGB UR QL STRIP: NEGATIVE
HISTORY CHECK: NORMAL
INR PPP: 1
KETONES UR QL STRIP.AUTO: NEGATIVE MG/DL
LEUKOCYTE ESTERASE UR QL STRIP.AUTO: NEGATIVE
MAGNESIUM SERPL-MCNC: 2 MG/DL (ref 1.8–2.4)
MCH RBC QN AUTO: 27 PG (ref 26.1–32.9)
MCHC RBC AUTO-ENTMCNC: 31.1 G/DL (ref 31.4–35)
MCV RBC AUTO: 87 FL (ref 82–102)
MRSA DNA SPEC QL NAA+PROBE: NOT DETECTED
NITRITE UR QL STRIP.AUTO: NEGATIVE
NRBC # BLD: 0 K/UL (ref 0–0.2)
PH UR STRIP: 6.5 (ref 5–9)
PLATELET # BLD AUTO: 244 K/UL (ref 150–450)
PMV BLD AUTO: 11.7 FL (ref 9.4–12.3)
POTASSIUM SERPL-SCNC: 4.2 MMOL/L (ref 3.5–5.1)
PROT SERPL-MCNC: 6.9 G/DL (ref 6.3–8.2)
PROT UR STRIP-MCNC: NEGATIVE MG/DL
PROTHROMBIN TIME: 13.5 SEC (ref 11.3–14.9)
RBC # BLD AUTO: 5.22 M/UL (ref 4.23–5.6)
S AUREUS CPE NOSE QL NAA+PROBE: NOT DETECTED
SODIUM SERPL-SCNC: 140 MMOL/L (ref 136–146)
SP GR UR REFRACTOMETRY: 1.03 (ref 1–1.02)
UROBILINOGEN UR QL STRIP.AUTO: 1 EU/DL (ref 0.2–1)
WBC # BLD AUTO: 6.8 K/UL (ref 4.3–11.1)

## 2024-02-05 PROCEDURE — 71046 X-RAY EXAM CHEST 2 VIEWS: CPT

## 2024-02-05 PROCEDURE — 87641 MR-STAPH DNA AMP PROBE: CPT

## 2024-02-05 PROCEDURE — 94010 BREATHING CAPACITY TEST: CPT

## 2024-02-05 PROCEDURE — 83036 HEMOGLOBIN GLYCOSYLATED A1C: CPT

## 2024-02-05 PROCEDURE — 86901 BLOOD TYPING SEROLOGIC RH(D): CPT

## 2024-02-05 PROCEDURE — 86850 RBC ANTIBODY SCREEN: CPT

## 2024-02-05 PROCEDURE — 86920 COMPATIBILITY TEST SPIN: CPT

## 2024-02-05 PROCEDURE — 86900 BLOOD TYPING SEROLOGIC ABO: CPT

## 2024-02-05 PROCEDURE — 80053 COMPREHEN METABOLIC PANEL: CPT

## 2024-02-05 PROCEDURE — 81003 URINALYSIS AUTO W/O SCOPE: CPT

## 2024-02-05 PROCEDURE — 85027 COMPLETE CBC AUTOMATED: CPT

## 2024-02-05 PROCEDURE — 85610 PROTHROMBIN TIME: CPT

## 2024-02-05 PROCEDURE — 85730 THROMBOPLASTIN TIME PARTIAL: CPT

## 2024-02-05 PROCEDURE — 83735 ASSAY OF MAGNESIUM: CPT

## 2024-02-05 PROCEDURE — 86923 COMPATIBILITY TEST ELECTRIC: CPT

## 2024-02-05 NOTE — PERIOP NOTE
PLEASE CONTINUE TAKING ALL PRESCRIPTION MEDICATIONS UP TO THE DAY OF SURGERY UNLESS OTHERWISE DIRECTED BELOW. You may take Tylenol, allergy,  and/or indigestion medications.     TAKE ONLY THESE MEDICATIONS ON THE DAY OF SURGERY      Carvedilol (Coreg)           DISCONTINUE all vitamins and supplements 7 days prior to surgery. DISCONTINUE Non-Steroidal Anti-Inflammatory (NSAIDS) such as Advil and Aleve 5 days prior to surgery.     Home Medications to Hold- please continue all other medications except these.      Apixaban (Eliquis)- continue to hold as instructed by your surgeons office.        Comments   Amiodarone 200mg- take 3 tablets after 4pm the evening before surgery on  2.5.24.             Please do not bring home medications with you on the day of surgery unless otherwise directed by your nurse.  If you are instructed to bring home medications, please give them to your nurse as they will be administered by the nursing staff.    If you have any questions, please call [facility] [phone].    A copy of this note was provided to the patient for reference.      How to Use Your Incentive Spirometer       About Your Incentive Spirometer  An incentive spirometer is a device that will expand your lungs by helping you to breathe more deeply and fully. The parts of your incentive spirometer are labeled in Figure 1.    Using your incentive spirometer  When you're using your incentive spirometer, make sure to breathe through your mouth. If you breathe through your nose, the incentive spirometer won't work properly. You can hold your nose if you have trouble. DO NOT BLOW INTO THE DEVICE. If you feel dizzy at any time, stop and rest. Try again at a later time.  Sit upright in a chair or in bed. Hold the incentive spirometer at eye level.   Put the mouthpiece in your mouth and close your lips tightly around it. Slowly breathe out (exhale) completely.  Breathe in (inhale) slowly through your mouth

## 2024-02-05 NOTE — PERIOP NOTE
Labs within anesthesia guidelines, no follow-up required. Hemoglobin A1c:6.1 and MRSA/MSSA with pending results. Labs automatically routed to ordering provider via Epic documentation.

## 2024-02-05 NOTE — PERIOP NOTE
Patient verified name and . Patient provided medical/health information and PTA medications to the best of their ability.    TYPE  CASE: 3  Order for consent  found in EHR and matches case posting.   Labs per surgeon: CBC, CMP,MG, PTT,PT/INR, Hgb A1c, MRSA/MSSA, UA, T/C, CXR, and PFT; results pending.  Labs per anesthesia protocol: no additional;   EKG:  EKG 24    Reviewed chart with Dr Nichols, previous pneumonia dx and pt currently with life vest. No new orders received.     Pt and son voice understanding to go to [facility] [address].    MRSA/MSSA swab collected; pharmacy to review and dose antibiotic as appropriate.     Patient provided with and instructed on educational handouts including Heart Guide to Surgery, blood transfusions, pain management, central line infection prevention, being on a ventilator and hand hygiene for the family and community, and [facility] brochure. Instructed that family must be present in building at all times.      Incentive spirometry completed. FEV1 to be completed as ordered at [facility]. Patient viewed pre-operative DVD.     Instructed on chest-xray procedure. Instructed on type and cross match procedure and not to remove blue blood bank bracelet ([id number]). Instructed on medications- Amiodarone,  with Rx called into  pt pharmacy at [facility] in [place].      KAYLA-HEX surgical skin cleanser provided and instructions given per hospital policy.    Original medication prescription bottles were not visualized during patient appointment.     Patient teach back successful and patient demonstrates knowledge of instruction.

## 2024-02-06 ENCOUNTER — HOSPITAL ENCOUNTER (INPATIENT)
Age: 84
LOS: 21 days | Discharge: NURSING, REHAB OR LONG-TERM CARE | DRG: 001 | End: 2024-02-27
Attending: THORACIC SURGERY (CARDIOTHORACIC VASCULAR SURGERY) | Admitting: THORACIC SURGERY (CARDIOTHORACIC VASCULAR SURGERY)
Payer: MEDICARE

## 2024-02-06 ENCOUNTER — ANESTHESIA EVENT (OUTPATIENT)
Dept: SURGERY | Age: 84
End: 2024-02-06
Payer: MEDICARE

## 2024-02-06 ENCOUNTER — ANESTHESIA (OUTPATIENT)
Dept: SURGERY | Age: 84
End: 2024-02-06
Payer: MEDICARE

## 2024-02-06 ENCOUNTER — APPOINTMENT (OUTPATIENT)
Dept: GENERAL RADIOLOGY | Age: 84
DRG: 001 | End: 2024-02-06
Attending: THORACIC SURGERY (CARDIOTHORACIC VASCULAR SURGERY)
Payer: MEDICARE

## 2024-02-06 DIAGNOSIS — R93.89 INFILTRATE NOTED ON IMAGING STUDY: ICD-10-CM

## 2024-02-06 DIAGNOSIS — I25.110 ATHEROSCLEROTIC HEART DISEASE OF NATIVE CORONARY ARTERY WITH UNSTABLE ANGINA PECTORIS (HCC): ICD-10-CM

## 2024-02-06 DIAGNOSIS — I48.91 ATRIAL FIBRILLATION WITH RVR (HCC): Primary | ICD-10-CM

## 2024-02-06 DIAGNOSIS — Z01.818 PRE-OP TESTING: ICD-10-CM

## 2024-02-06 DIAGNOSIS — I48.0 PAF (PAROXYSMAL ATRIAL FIBRILLATION) (HCC): ICD-10-CM

## 2024-02-06 DIAGNOSIS — R57.0 CARDIOGENIC SHOCK (HCC): ICD-10-CM

## 2024-02-06 DIAGNOSIS — I25.5 ISCHEMIC CARDIOMYOPATHY: ICD-10-CM

## 2024-02-06 DIAGNOSIS — Z95.1 S/P CABG X 3: ICD-10-CM

## 2024-02-06 PROBLEM — Z99.11 ENCOUNTER FOR WEANING FROM VENTILATOR (HCC): Status: ACTIVE | Noted: 2024-02-06

## 2024-02-06 PROBLEM — I25.10 CAD, MULTIPLE VESSEL: Status: ACTIVE | Noted: 2024-02-06

## 2024-02-06 PROBLEM — R09.02 HYPOXEMIA: Status: ACTIVE | Noted: 2024-02-06

## 2024-02-06 LAB
ACT AVERAGE - AAVG: 110 SEC
ACT AVERAGE - AAVG: 424 SEC
ACT AVERAGE - AAVG: 442 SEC
ACT AVERAGE - AAVG: 523 SEC
ACT AVERAGE - AAVG: 568 SEC
ANION GAP BLD CALC-SCNC: ABNORMAL MMOL/L
ANION GAP SERPL CALC-SCNC: 2 MMOL/L (ref 2–11)
APTT PPP: 30.9 SEC (ref 23.3–37.4)
APTT PPP: 38.8 SEC (ref 23.3–37.4)
ARTERIAL PATENCY WRIST A: ABNORMAL
ARTERIAL PATENCY WRIST A: ABNORMAL
BASE DEFICIT BLD-SCNC: 0.3 MMOL/L
BASE DEFICIT BLD-SCNC: 0.5 MMOL/L
BASE DEFICIT BLD-SCNC: 0.5 MMOL/L
BASE DEFICIT BLD-SCNC: 0.7 MMOL/L
BASE DEFICIT BLD-SCNC: 11.1 MMOL/L
BASE DEFICIT BLD-SCNC: 3 MMOL/L
BASE DEFICIT BLD-SCNC: 3 MMOL/L
BASE DEFICIT BLD-SCNC: 5.6 MMOL/L
BASE EXCESS BLD CALC-SCNC: 0.2 MMOL/L
BASE EXCESS BLD CALC-SCNC: 0.9 MMOL/L
BASELINE ACT: 145 SEC
BASELINE ACT: 145 SEC
BDY SITE: ABNORMAL
BUN SERPL-MCNC: 18 MG/DL (ref 8–23)
CA-I BLD-MCNC: 1.09 MMOL/L (ref 1.12–1.32)
CA-I BLD-MCNC: 1.1 MMOL/L (ref 1.12–1.32)
CA-I BLD-MCNC: 1.1 MMOL/L (ref 1.12–1.32)
CA-I BLD-MCNC: 1.11 MMOL/L (ref 1.12–1.32)
CA-I BLD-MCNC: 1.16 MMOL/L (ref 1.12–1.32)
CA-I BLD-MCNC: 1.18 MMOL/L (ref 1.12–1.32)
CA-I BLD-MCNC: 1.27 MMOL/L (ref 1.12–1.32)
CA-I BLD-MCNC: 4.07 MG/DL (ref 4–5.2)
CALCIUM SERPL-MCNC: 8.3 MG/DL (ref 8.3–10.4)
CHLORIDE SERPL-SCNC: 117 MMOL/L (ref 103–113)
CIT FUNC FIB MAX AMP: 15.2 MM (ref 15–32)
CIT FUNC FIB MAX AMP: 16.2 MM (ref 15–32)
CIT FUNC FIB MAX AMP: 16.5 MM (ref 15–32)
CIT FUNC FIB MAX AMP: 16.8 MM (ref 15–32)
CIT FUNC FIB MAX AMP: 18.1 MM (ref 15–32)
CIT KAOLIN/HEP R-TIME: 5 MINS (ref 4.3–8.3)
CIT KAOLIN/HEP R-TIME: 5.4 MINS (ref 4.3–8.3)
CIT KAOLIN/HEP R-TIME: 6.2 MINS (ref 4.3–8.3)
CIT KAOLIN/HEP R-TIME: 6.2 MINS (ref 4.3–8.3)
CIT KAOLIN/HEP R-TIME: 7 MINS (ref 4.3–8.3)
CIT RAPIDTEG MAX AMP: 52.3 MM (ref 52–70)
CIT RAPIDTEG MAX AMP: 54.3 MM (ref 52–70)
CIT RAPIDTEG MAX AMP: 55.7 MM (ref 52–70)
CIT RAPIDTEG MAX AMP: 58 MM (ref 52–70)
CIT RAPIDTEG MAX AMP: 60.2 MM (ref 52–70)
CITRATED KAOLIN ANGLE: 59.7 DEG (ref 63–78)
CITRATED KAOLIN ANGLE: 66.1 DEG (ref 63–78)
CITRATED KAOLIN ANGLE: 68.5 DEG (ref 63–78)
CITRATED KAOLIN ANGLE: 71 DEG (ref 63–78)
CITRATED KAOLIN ANGLE: 71.6 DEG (ref 63–78)
CITRATED KAOLIN K TIME: 1.3 MINS (ref 0.8–2.1)
CITRATED KAOLIN K TIME: 1.5 MINS (ref 0.8–2.1)
CITRATED KAOLIN K TIME: 1.7 MINS (ref 0.8–2.1)
CITRATED KAOLIN K TIME: 1.8 MINS (ref 0.8–2.1)
CITRATED KAOLIN K TIME: 2.7 MINS (ref 0.8–2.1)
CITRATED KAOLIN MAX AMPLITUDE: 52.7 MM (ref 52–69)
CITRATED KAOLIN MAX AMPLITUDE: 54 MM (ref 52–69)
CITRATED KAOLIN MAX AMPLITUDE: 56.1 MM (ref 52–69)
CITRATED KAOLIN MAX AMPLITUDE: 57.3 MM (ref 52–69)
CITRATED KAOLIN MAX AMPLITUDE: 61.4 MM (ref 52–69)
CITRATED KAOLIN R TIME: 5.6 MINS (ref 4.6–9.1)
CITRATED KAOLIN R TIME: 5.7 MINS (ref 4.6–9.1)
CITRATED KAOLIN R TIME: 7.1 MINS (ref 4.6–9.1)
CITRATED KAOLIN R TIME: 7.2 MINS (ref 4.6–9.1)
CITRATED KAOLIN R TIME: 9 MINS (ref 4.6–9.1)
CO2 BLD-SCNC: 16 MMOL/L (ref 13–23)
CO2 BLD-SCNC: 20 MMOL/L (ref 13–23)
CO2 BLD-SCNC: 22 MMOL/L (ref 13–23)
CO2 BLD-SCNC: 24 MMOL/L (ref 13–23)
CO2 BLD-SCNC: 24 MMOL/L (ref 13–23)
CO2 BLD-SCNC: 25 MMOL/L (ref 13–23)
CO2 BLD-SCNC: 26 MMOL/L (ref 13–23)
CO2 SERPL-SCNC: 25 MMOL/L (ref 21–32)
CREAT SERPL-MCNC: 1 MG/DL (ref 0.8–1.5)
ERYTHROCYTE [DISTWIDTH] IN BLOOD BY AUTOMATED COUNT: 15.7 % (ref 11.9–14.6)
ERYTHROCYTE [DISTWIDTH] IN BLOOD BY AUTOMATED COUNT: 16.5 % (ref 11.9–14.6)
ERYTHROCYTE [DISTWIDTH] IN BLOOD BY AUTOMATED COUNT: 16.9 % (ref 11.9–14.6)
ERYTHROCYTE [DISTWIDTH] IN BLOOD BY AUTOMATED COUNT: 17 % (ref 11.9–14.6)
FIBRINOGEN PPP-MCNC: 213 MG/DL (ref 190–501)
FIBRINOGEN PPP-MCNC: 224 MG/DL (ref 190–501)
FIO2 ON VENT: 30 %
FIO2 ON VENT: 30 %
FUNCT FIBRINOGEN LEVEL: 277.4 MG/DL (ref 278–581)
FUNCT FIBRINOGEN LEVEL: 295.6 MG/DL (ref 278–581)
FUNCT FIBRINOGEN LEVEL: 301.1 MG/DL (ref 278–581)
FUNCT FIBRINOGEN LEVEL: 306.6 MG/DL (ref 278–581)
FUNCT FIBRINOGEN LEVEL: 330.3 MG/DL (ref 278–581)
GAS FLOW.O2 O2 DELIVERY SYS: ABNORMAL
GLUCOSE BLD STRIP.AUTO-MCNC: 108 MG/DL (ref 65–100)
GLUCOSE BLD STRIP.AUTO-MCNC: 109 MG/DL (ref 65–100)
GLUCOSE BLD STRIP.AUTO-MCNC: 116 MG/DL (ref 65–100)
GLUCOSE BLD STRIP.AUTO-MCNC: 117 MG/DL (ref 65–100)
GLUCOSE BLD STRIP.AUTO-MCNC: 119 MG/DL (ref 65–100)
GLUCOSE BLD STRIP.AUTO-MCNC: 124 MG/DL (ref 65–100)
GLUCOSE BLD STRIP.AUTO-MCNC: 133 MG/DL (ref 65–100)
GLUCOSE BLD STRIP.AUTO-MCNC: 134 MG/DL (ref 65–100)
GLUCOSE BLD STRIP.AUTO-MCNC: 139 MG/DL (ref 65–100)
GLUCOSE BLD STRIP.AUTO-MCNC: 144 MG/DL (ref 65–100)
GLUCOSE BLD STRIP.AUTO-MCNC: 146 MG/DL (ref 65–100)
GLUCOSE BLD STRIP.AUTO-MCNC: 147 MG/DL (ref 65–100)
GLUCOSE BLD STRIP.AUTO-MCNC: 153 MG/DL (ref 65–100)
GLUCOSE BLD STRIP.AUTO-MCNC: 153 MG/DL (ref 65–100)
GLUCOSE BLD STRIP.AUTO-MCNC: 168 MG/DL (ref 65–100)
GLUCOSE BLD STRIP.AUTO-MCNC: 170 MG/DL (ref 65–100)
GLUCOSE BLD STRIP.AUTO-MCNC: 174 MG/DL (ref 65–100)
GLUCOSE BLD STRIP.AUTO-MCNC: 183 MG/DL (ref 65–100)
GLUCOSE BLD STRIP.AUTO-MCNC: 216 MG/DL (ref 65–100)
GLUCOSE SERPL-MCNC: 128 MG/DL (ref 65–100)
HCO3 BLD-SCNC: 15.5 MMOL/L (ref 22–26)
HCO3 BLD-SCNC: 20 MMOL/L (ref 22–26)
HCO3 BLD-SCNC: 22 MMOL/L (ref 22–26)
HCO3 BLD-SCNC: 23 MMOL/L (ref 22–26)
HCO3 BLD-SCNC: 24.3 MMOL/L (ref 22–26)
HCO3 BLD-SCNC: 24.7 MMOL/L (ref 22–26)
HCO3 BLD-SCNC: 25.4 MMOL/L (ref 22–26)
HCO3 BLD-SCNC: 25.7 MMOL/L (ref 22–26)
HCO3 BLD-SCNC: 25.7 MMOL/L (ref 22–26)
HCO3 BLD-SCNC: 26.7 MMOL/L (ref 22–26)
HCT VFR BLD AUTO: 26.4 % (ref 41.1–50.3)
HCT VFR BLD AUTO: 28 % (ref 41.1–50.3)
HCT VFR BLD AUTO: 28.4 % (ref 41.1–50.3)
HCT VFR BLD AUTO: 32.7 % (ref 41.1–50.3)
HCT VFR BLD AUTO: 39 % (ref 41.1–50.3)
HEPARIN BOLUS-PATIENT: NORMAL UNITS
HEPARIN BOLUS-PATIENT: NORMAL UNITS
HEPARIN BOLUS-PUMP: 0 UNITS
HEPARIN BOLUS-PUMP: 0 UNITS
HEPARIN BOLUS-TOTAL: NORMAL UNITS
HEPARIN BOLUS-TOTAL: NORMAL UNITS
HEPARIN REQUIRED-PATIENT: 0
HEPARIN REQUIRED-PATIENT: 0
HEPARIN REQUIRED-PATIENT: 4418
HEPARIN REQUIRED-PATIENT: NORMAL
HEPARIN REQUIRED-TOTAL: 0 UNITS
HEPARIN REQUIRED-TOTAL: 0 UNITS
HEPARIN REQUIRED-TOTAL: 5098 UNITS
HEPARIN REQUIRED-TOTAL: NORMAL UNITS
HEPARIN TEST CONCENTRATE: 0 MG/KG
HEPARIN TEST CONCENTRATE: 2 MG/KG
HEPARIN TEST CONCENTRATE: 2.5 MG/KG
HGB BLD-MCNC: 10.3 G/DL (ref 13.6–17.2)
HGB BLD-MCNC: 12.5 G/DL (ref 13.6–17.2)
HGB BLD-MCNC: 8.6 G/DL (ref 13.6–17.2)
HGB BLD-MCNC: 8.9 G/DL (ref 13.6–17.2)
HGB BLD-MCNC: 9.2 G/DL (ref 13.6–17.2)
HISTORY CHECK: NORMAL
INR PPP: 1.3
MAGNESIUM SERPL-MCNC: 2.2 MG/DL (ref 1.8–2.4)
MAGNESIUM SERPL-MCNC: 2.6 MG/DL (ref 1.8–2.4)
MAGNESIUM SERPL-MCNC: 3.1 MG/DL (ref 1.8–2.4)
MCH RBC QN AUTO: 28 PG (ref 26.1–32.9)
MCH RBC QN AUTO: 28 PG (ref 26.1–32.9)
MCH RBC QN AUTO: 29 PG (ref 26.1–32.9)
MCH RBC QN AUTO: 29.3 PG (ref 26.1–32.9)
MCHC RBC AUTO-ENTMCNC: 31.3 G/DL (ref 31.4–35)
MCHC RBC AUTO-ENTMCNC: 32.1 G/DL (ref 31.4–35)
MCHC RBC AUTO-ENTMCNC: 32.6 G/DL (ref 31.4–35)
MCHC RBC AUTO-ENTMCNC: 32.9 G/DL (ref 31.4–35)
MCV RBC AUTO: 87.4 FL (ref 82–102)
MCV RBC AUTO: 88.3 FL (ref 82–102)
MCV RBC AUTO: 89.3 FL (ref 82–102)
MCV RBC AUTO: 89.8 FL (ref 82–102)
NRBC # BLD: 0 K/UL (ref 0–0.2)
O2/TOTAL GAS SETTING VFR VENT: 60 %
PCO2 BLD: 37.1 MMHG (ref 35–45)
PCO2 BLD: 38.1 MMHG (ref 35–45)
PCO2 BLD: 38.7 MMHG (ref 35–45)
PCO2 BLD: 39.6 MMHG (ref 35–45)
PCO2 BLD: 41.1 MMHG (ref 35–45)
PCO2 BLD: 41.6 MMHG (ref 35–45)
PCO2 BLD: 43.7 MMHG (ref 35–45)
PCO2 BLD: 45.7 MMHG (ref 35–45)
PCO2 BLD: 46.7 MMHG (ref 35–45)
PCO2 BLD: 49.9 MMHG (ref 35–45)
PEEP RESPIRATORY: 8
PEEP RESPIRATORY: 8
PEEP RESPIRATORY: 8 CMH2O
PH BLD: 7.23 (ref 7.35–7.45)
PH BLD: 7.32 (ref 7.35–7.45)
PH BLD: 7.33 (ref 7.35–7.45)
PH BLD: 7.34 (ref 7.35–7.45)
PH BLD: 7.34 (ref 7.35–7.45)
PH BLD: 7.36 (ref 7.35–7.45)
PH BLD: 7.37 (ref 7.35–7.45)
PH BLD: 7.38 (ref 7.35–7.45)
PH BLD: 7.4 (ref 7.35–7.45)
PH BLD: 7.4 (ref 7.35–7.45)
PLATELET # BLD AUTO: 146 K/UL (ref 150–450)
PLATELET # BLD AUTO: 166 K/UL (ref 150–450)
PLATELET # BLD AUTO: 206 K/UL (ref 150–450)
PLATELET # BLD AUTO: 209 K/UL (ref 150–450)
PMV BLD AUTO: 10.1 FL (ref 9.4–12.3)
PMV BLD AUTO: 10.7 FL (ref 9.4–12.3)
PMV BLD AUTO: 11.3 FL (ref 9.4–12.3)
PMV BLD AUTO: 11.3 FL (ref 9.4–12.3)
PO2 BLD: 105 MMHG (ref 75–100)
PO2 BLD: 192 MMHG (ref 75–100)
PO2 BLD: 224 MMHG (ref 75–100)
PO2 BLD: 329 MMHG (ref 75–100)
PO2 BLD: 394 MMHG (ref 75–100)
PO2 BLD: 395 MMHG (ref 75–100)
PO2 BLD: 397 MMHG (ref 75–100)
PO2 BLD: 429 MMHG (ref 75–100)
PO2 BLD: 473 MMHG (ref 75–100)
PO2 BLD: 87 MMHG (ref 75–100)
POTASSIUM BLD-SCNC: 4.3 MMOL/L (ref 3.5–5.1)
POTASSIUM BLD-SCNC: 4.4 MMOL/L (ref 3.5–5.1)
POTASSIUM BLD-SCNC: 4.6 MMOL/L (ref 3.5–5.1)
POTASSIUM BLD-SCNC: 5 MMOL/L (ref 3.5–5.1)
POTASSIUM BLD-SCNC: 5.1 MMOL/L (ref 3.5–5.1)
POTASSIUM BLD-SCNC: 5.1 MMOL/L (ref 3.5–5.1)
POTASSIUM BLD-SCNC: 5.4 MMOL/L (ref 3.5–5.1)
POTASSIUM BLD-SCNC: 5.4 MMOL/L (ref 3.5–5.1)
POTASSIUM BLD-SCNC: 5.6 MMOL/L (ref 3.5–5.1)
POTASSIUM SERPL-SCNC: 4.4 MMOL/L (ref 3.5–5.1)
POTASSIUM SERPL-SCNC: 5 MMOL/L (ref 3.5–5.1)
POTASSIUM SERPL-SCNC: 5.1 MMOL/L (ref 3.5–5.1)
PRESSURE SUPPORT SETTING VENT: 10
PRESSURE SUPPORT SETTING VENT: 10
PRESSURE SUPPORT SETTING VENT: 10 CMH2O
PROJECTED HEPARIN CONCENTATION: 2.4 MG/KG
PROJECTED HEPARIN CONCENTATION: 2.4 MG/KG
PROTAMINE DOSE-PUMP: 0 MG
PROTAMINE DOSE-PUMP: 30 MG
PROTAMINE DOSE-PUMP: 37 MG
PROTAMINE DOSE-PUMP: 37 MG
PROTAMINE DOSE-TOTAL: 0 MG
PROTAMINE DOSE-TOTAL: 224 MG
PROTAMINE DOSE-TOTAL: 280 MG
PROTAMINE DOSE-TOTAL: 280 MG
PROTHROMBIN TIME: 16.8 SEC (ref 11.3–14.9)
RBC # BLD AUTO: 2.94 M/UL (ref 4.23–5.6)
RBC # BLD AUTO: 3.17 M/UL (ref 4.23–5.6)
RBC # BLD AUTO: 3.18 M/UL (ref 4.23–5.6)
RBC # BLD AUTO: 4.46 M/UL (ref 4.23–5.6)
RESPIRATORY RATE, POC: 19 (ref 5–40)
RESPIRATORY RATE: 15
RESPIRATORY RATE: 22
SAO2 % BLD: 100 %
SAO2 % BLD: 95 %
SAO2 % BLD: 98 %
SAO2 % BLD: 99.7 % (ref 95–98)
SERVICE CMNT-IMP: ABNORMAL
SLOPE: 102
SLOPE: 102
SODIUM BLD-SCNC: 139 MMOL/L (ref 136–145)
SODIUM BLD-SCNC: 140 MMOL/L (ref 136–145)
SODIUM BLD-SCNC: 141 MMOL/L (ref 136–145)
SODIUM BLD-SCNC: 143 MMOL/L (ref 136–145)
SODIUM BLD-SCNC: 143 MMOL/L (ref 136–145)
SODIUM BLD-SCNC: 144 MMOL/L (ref 136–145)
SODIUM BLD-SCNC: 144 MMOL/L (ref 136–145)
SODIUM SERPL-SCNC: 144 MMOL/L (ref 136–146)
SPECIMEN SITE: ABNORMAL
SPECIMEN TYPE: ABNORMAL
VENTILATION MODE VENT: ABNORMAL
VT SETTING VENT: 520
VT SETTING VENT: 530
VT SETTING VENT: 530 ML
WBC # BLD AUTO: 10.2 K/UL (ref 4.3–11.1)
WBC # BLD AUTO: 10.5 K/UL (ref 4.3–11.1)
WBC # BLD AUTO: 19.5 K/UL (ref 4.3–11.1)
WBC # BLD AUTO: 8.4 K/UL (ref 4.3–11.1)

## 2024-02-06 PROCEDURE — 6370000000 HC RX 637 (ALT 250 FOR IP): Performed by: PHYSICIAN ASSISTANT

## 2024-02-06 PROCEDURE — 2580000003 HC RX 258

## 2024-02-06 PROCEDURE — 99223 1ST HOSP IP/OBS HIGH 75: CPT | Performed by: INTERNAL MEDICINE

## 2024-02-06 PROCEDURE — 2580000003 HC RX 258: Performed by: PHYSICIAN ASSISTANT

## 2024-02-06 PROCEDURE — P9045 ALBUMIN (HUMAN), 5%, 250 ML: HCPCS

## 2024-02-06 PROCEDURE — 82330 ASSAY OF CALCIUM: CPT

## 2024-02-06 PROCEDURE — 37799 UNLISTED PX VASCULAR SURGERY: CPT

## 2024-02-06 PROCEDURE — L8670 VASCULAR GRAFT, SYNTHETIC: HCPCS | Performed by: THORACIC SURGERY (CARDIOTHORACIC VASCULAR SURGERY)

## 2024-02-06 PROCEDURE — 6360000002 HC RX W HCPCS: Performed by: NURSE ANESTHETIST, CERTIFIED REGISTERED

## 2024-02-06 PROCEDURE — 021109W BYPASS CORONARY ARTERY, TWO ARTERIES FROM AORTA WITH AUTOLOGOUS VENOUS TISSUE, OPEN APPROACH: ICD-10-PCS | Performed by: THORACIC SURGERY (CARDIOTHORACIC VASCULAR SURGERY)

## 2024-02-06 PROCEDURE — 83735 ASSAY OF MAGNESIUM: CPT

## 2024-02-06 PROCEDURE — 6360000002 HC RX W HCPCS

## 2024-02-06 PROCEDURE — 02L70CK OCCLUSION OF LEFT ATRIAL APPENDAGE WITH EXTRALUMINAL DEVICE, OPEN APPROACH: ICD-10-PCS | Performed by: THORACIC SURGERY (CARDIOTHORACIC VASCULAR SURGERY)

## 2024-02-06 PROCEDURE — 2500000003 HC RX 250 WO HCPCS: Performed by: PHYSICIAN ASSISTANT

## 2024-02-06 PROCEDURE — 6370000000 HC RX 637 (ALT 250 FOR IP): Performed by: THORACIC SURGERY (CARDIOTHORACIC VASCULAR SURGERY)

## 2024-02-06 PROCEDURE — 2580000003 HC RX 258: Performed by: THORACIC SURGERY (CARDIOTHORACIC VASCULAR SURGERY)

## 2024-02-06 PROCEDURE — P9047 ALBUMIN (HUMAN), 25%, 50ML: HCPCS

## 2024-02-06 PROCEDURE — 94002 VENT MGMT INPAT INIT DAY: CPT

## 2024-02-06 PROCEDURE — 80048 BASIC METABOLIC PNL TOTAL CA: CPT

## 2024-02-06 PROCEDURE — 2709999900 HC NON-CHARGEABLE SUPPLY: Performed by: THORACIC SURGERY (CARDIOTHORACIC VASCULAR SURGERY)

## 2024-02-06 PROCEDURE — 2720000010 HC SURG SUPPLY STERILE: Performed by: THORACIC SURGERY (CARDIOTHORACIC VASCULAR SURGERY)

## 2024-02-06 PROCEDURE — C1713 ANCHOR/SCREW BN/BN,TIS/BN: HCPCS | Performed by: THORACIC SURGERY (CARDIOTHORACIC VASCULAR SURGERY)

## 2024-02-06 PROCEDURE — C1729 CATH, DRAINAGE: HCPCS | Performed by: THORACIC SURGERY (CARDIOTHORACIC VASCULAR SURGERY)

## 2024-02-06 PROCEDURE — 85730 THROMBOPLASTIN TIME PARTIAL: CPT

## 2024-02-06 PROCEDURE — 3700000001 HC ADD 15 MINUTES (ANESTHESIA): Performed by: THORACIC SURGERY (CARDIOTHORACIC VASCULAR SURGERY)

## 2024-02-06 PROCEDURE — 71045 X-RAY EXAM CHEST 1 VIEW: CPT

## 2024-02-06 PROCEDURE — 85520 HEPARIN ASSAY: CPT

## 2024-02-06 PROCEDURE — 85530 HEPARIN-PROTAMINE TOLERANCE: CPT

## 2024-02-06 PROCEDURE — 33975 IMPLANT VENTRICULAR DEVICE: CPT | Performed by: INTERNAL MEDICINE

## 2024-02-06 PROCEDURE — 82803 BLOOD GASES ANY COMBINATION: CPT

## 2024-02-06 PROCEDURE — 5A0221D ASSISTANCE WITH CARDIAC OUTPUT USING IMPELLER PUMP, CONTINUOUS: ICD-10-PCS | Performed by: THORACIC SURGERY (CARDIOTHORACIC VASCULAR SURGERY)

## 2024-02-06 PROCEDURE — C1751 CATH, INF, PER/CENT/MIDLINE: HCPCS | Performed by: THORACIC SURGERY (CARDIOTHORACIC VASCULAR SURGERY)

## 2024-02-06 PROCEDURE — 85027 COMPLETE CBC AUTOMATED: CPT

## 2024-02-06 PROCEDURE — 3600000008 HC SURGERY OHS BASE: Performed by: THORACIC SURGERY (CARDIOTHORACIC VASCULAR SURGERY)

## 2024-02-06 PROCEDURE — 6360000002 HC RX W HCPCS: Performed by: PHYSICIAN ASSISTANT

## 2024-02-06 PROCEDURE — 84132 ASSAY OF SERUM POTASSIUM: CPT

## 2024-02-06 PROCEDURE — C1769 GUIDE WIRE: HCPCS | Performed by: THORACIC SURGERY (CARDIOTHORACIC VASCULAR SURGERY)

## 2024-02-06 PROCEDURE — 30233M1 TRANSFUSION OF NONAUTOLOGOUS PLASMA CRYOPRECIPITATE INTO PERIPHERAL VEIN, PERCUTANEOUS APPROACH: ICD-10-PCS | Performed by: THORACIC SURGERY (CARDIOTHORACIC VASCULAR SURGERY)

## 2024-02-06 PROCEDURE — 85610 PROTHROMBIN TIME: CPT

## 2024-02-06 PROCEDURE — P9016 RBC LEUKOCYTES REDUCED: HCPCS

## 2024-02-06 PROCEDURE — P9012 CRYOPRECIPITATE EACH UNIT: HCPCS

## 2024-02-06 PROCEDURE — 30233R1 TRANSFUSION OF NONAUTOLOGOUS PLATELETS INTO PERIPHERAL VEIN, PERCUTANEOUS APPROACH: ICD-10-PCS | Performed by: THORACIC SURGERY (CARDIOTHORACIC VASCULAR SURGERY)

## 2024-02-06 PROCEDURE — 5A1221Z PERFORMANCE OF CARDIAC OUTPUT, CONTINUOUS: ICD-10-PCS | Performed by: THORACIC SURGERY (CARDIOTHORACIC VASCULAR SURGERY)

## 2024-02-06 PROCEDURE — 3700000000 HC ANESTHESIA ATTENDED CARE: Performed by: THORACIC SURGERY (CARDIOTHORACIC VASCULAR SURGERY)

## 2024-02-06 PROCEDURE — 84295 ASSAY OF SERUM SODIUM: CPT

## 2024-02-06 PROCEDURE — 85014 HEMATOCRIT: CPT

## 2024-02-06 PROCEDURE — 85384 FIBRINOGEN ACTIVITY: CPT

## 2024-02-06 PROCEDURE — 36430 TRANSFUSION BLD/BLD COMPNT: CPT

## 2024-02-06 PROCEDURE — P9035 PLATELET PHERES LEUKOREDUCED: HCPCS

## 2024-02-06 PROCEDURE — 85347 COAGULATION TIME ACTIVATED: CPT

## 2024-02-06 PROCEDURE — C1889 IMPLANT/INSERT DEVICE, NOC: HCPCS | Performed by: THORACIC SURGERY (CARDIOTHORACIC VASCULAR SURGERY)

## 2024-02-06 PROCEDURE — 85018 HEMOGLOBIN: CPT

## 2024-02-06 PROCEDURE — 06BQ4ZZ EXCISION OF LEFT SAPHENOUS VEIN, PERCUTANEOUS ENDOSCOPIC APPROACH: ICD-10-PCS | Performed by: THORACIC SURGERY (CARDIOTHORACIC VASCULAR SURGERY)

## 2024-02-06 PROCEDURE — 02HA0RZ INSERTION OF SHORT-TERM EXTERNAL HEART ASSIST SYSTEM INTO HEART, OPEN APPROACH: ICD-10-PCS | Performed by: THORACIC SURGERY (CARDIOTHORACIC VASCULAR SURGERY)

## 2024-02-06 PROCEDURE — 2500000003 HC RX 250 WO HCPCS: Performed by: NURSE ANESTHETIST, CERTIFIED REGISTERED

## 2024-02-06 PROCEDURE — 6360000002 HC RX W HCPCS: Performed by: THORACIC SURGERY (CARDIOTHORACIC VASCULAR SURGERY)

## 2024-02-06 PROCEDURE — P9041 ALBUMIN (HUMAN),5%, 50ML: HCPCS

## 2024-02-06 PROCEDURE — 02580ZZ DESTRUCTION OF CONDUCTION MECHANISM, OPEN APPROACH: ICD-10-PCS | Performed by: THORACIC SURGERY (CARDIOTHORACIC VASCULAR SURGERY)

## 2024-02-06 PROCEDURE — 85576 BLOOD PLATELET AGGREGATION: CPT

## 2024-02-06 PROCEDURE — 82962 GLUCOSE BLOOD TEST: CPT

## 2024-02-06 PROCEDURE — A4216 STERILE WATER/SALINE, 10 ML: HCPCS | Performed by: PHYSICIAN ASSISTANT

## 2024-02-06 PROCEDURE — 02100Z9 BYPASS CORONARY ARTERY, ONE ARTERY FROM LEFT INTERNAL MAMMARY, OPEN APPROACH: ICD-10-PCS | Performed by: THORACIC SURGERY (CARDIOTHORACIC VASCULAR SURGERY)

## 2024-02-06 PROCEDURE — 0WCC0ZZ EXTIRPATION OF MATTER FROM MEDIASTINUM, OPEN APPROACH: ICD-10-PCS | Performed by: THORACIC SURGERY (CARDIOTHORACIC VASCULAR SURGERY)

## 2024-02-06 PROCEDURE — 2500000003 HC RX 250 WO HCPCS

## 2024-02-06 PROCEDURE — 2100000001 HC CVICU R&B

## 2024-02-06 PROCEDURE — P9041 ALBUMIN (HUMAN),5%, 50ML: HCPCS | Performed by: THORACIC SURGERY (CARDIOTHORACIC VASCULAR SURGERY)

## 2024-02-06 PROCEDURE — X2HX0F9 INSERTION OF CONDUIT TO SHORT-TERM EXTERNAL HEART ASSIST SYSTEM INTO THORACIC AORTA, ASCENDING, OPEN APPROACH, NEW TECHNOLOGY GROUP 9: ICD-10-PCS | Performed by: THORACIC SURGERY (CARDIOTHORACIC VASCULAR SURGERY)

## 2024-02-06 PROCEDURE — B24BZZ4 ULTRASONOGRAPHY OF HEART WITH AORTA, TRANSESOPHAGEAL: ICD-10-PCS | Performed by: THORACIC SURGERY (CARDIOTHORACIC VASCULAR SURGERY)

## 2024-02-06 PROCEDURE — 3600000018 HC SURGERY OHS ADDTL 15MIN: Performed by: THORACIC SURGERY (CARDIOTHORACIC VASCULAR SURGERY)

## 2024-02-06 PROCEDURE — 2500000003 HC RX 250 WO HCPCS: Performed by: THORACIC SURGERY (CARDIOTHORACIC VASCULAR SURGERY)

## 2024-02-06 PROCEDURE — 82947 ASSAY GLUCOSE BLOOD QUANT: CPT

## 2024-02-06 PROCEDURE — 02HV33Z INSERTION OF INFUSION DEVICE INTO SUPERIOR VENA CAVA, PERCUTANEOUS APPROACH: ICD-10-PCS | Performed by: ANESTHESIOLOGY

## 2024-02-06 PROCEDURE — 3E033XZ INTRODUCTION OF VASOPRESSOR INTO PERIPHERAL VEIN, PERCUTANEOUS APPROACH: ICD-10-PCS | Performed by: THORACIC SURGERY (CARDIOTHORACIC VASCULAR SURGERY)

## 2024-02-06 PROCEDURE — 2580000003 HC RX 258: Performed by: NURSE ANESTHETIST, CERTIFIED REGISTERED

## 2024-02-06 DEVICE — GRAFT VASC L300MM OD10MM UNIV STR STD WALL N TAPR NRINGED: Type: IMPLANTABLE DEVICE | Site: SUBCLAVIAN | Status: FUNCTIONAL

## 2024-02-06 DEVICE — APPLIER CLIP MED SUTURE LESS 45 MM SYS 1 HND STRL ATRICLIP FLX V: Type: IMPLANTABLE DEVICE | Site: HEART | Status: FUNCTIONAL

## 2024-02-06 RX ORDER — ETOMIDATE 2 MG/ML
INJECTION INTRAVENOUS PRN
Status: DISCONTINUED | OUTPATIENT
Start: 2024-02-06 | End: 2024-02-06 | Stop reason: SDUPTHER

## 2024-02-06 RX ORDER — PROTAMINE SULFATE 10 MG/ML
INJECTION, SOLUTION INTRAVENOUS
Status: COMPLETED
Start: 2024-02-06 | End: 2024-02-06

## 2024-02-06 RX ORDER — POTASSIUM CHLORIDE 29.8 MG/ML
20 INJECTION INTRAVENOUS PRN
Status: DISCONTINUED | OUTPATIENT
Start: 2024-02-06 | End: 2024-02-22

## 2024-02-06 RX ORDER — ROCURONIUM BROMIDE 10 MG/ML
INJECTION, SOLUTION INTRAVENOUS PRN
Status: DISCONTINUED | OUTPATIENT
Start: 2024-02-06 | End: 2024-02-06 | Stop reason: SDUPTHER

## 2024-02-06 RX ORDER — DEXTROSE MONOHYDRATE 100 MG/ML
INJECTION, SOLUTION INTRAVENOUS CONTINUOUS PRN
Status: DISCONTINUED | OUTPATIENT
Start: 2024-02-06 | End: 2024-02-22

## 2024-02-06 RX ORDER — ALBUMIN, HUMAN INJ 5% 5 %
25 SOLUTION INTRAVENOUS ONCE
Status: COMPLETED | OUTPATIENT
Start: 2024-02-06 | End: 2024-02-06

## 2024-02-06 RX ORDER — NICARDIPINE HYDROCHLORIDE 2.5 MG/ML
INJECTION INTRAVENOUS PRN
Status: DISCONTINUED | OUTPATIENT
Start: 2024-02-06 | End: 2024-02-06 | Stop reason: SDUPTHER

## 2024-02-06 RX ORDER — DEXMEDETOMIDINE HYDROCHLORIDE 4 UG/ML
INJECTION, SOLUTION INTRAVENOUS CONTINUOUS PRN
Status: DISCONTINUED | OUTPATIENT
Start: 2024-02-06 | End: 2024-02-06 | Stop reason: SDUPTHER

## 2024-02-06 RX ORDER — SODIUM CHLORIDE 9 MG/ML
INJECTION, SOLUTION INTRAVENOUS PRN
Status: DISCONTINUED | OUTPATIENT
Start: 2024-02-06 | End: 2024-02-06 | Stop reason: SDUPTHER

## 2024-02-06 RX ORDER — SODIUM CHLORIDE 9 MG/ML
INJECTION, SOLUTION INTRAVENOUS PRN
Status: DISCONTINUED | OUTPATIENT
Start: 2024-02-06 | End: 2024-02-07

## 2024-02-06 RX ORDER — MORPHINE SULFATE 4 MG/ML
4 INJECTION, SOLUTION INTRAMUSCULAR; INTRAVENOUS
Status: DISCONTINUED | OUTPATIENT
Start: 2024-02-06 | End: 2024-02-13

## 2024-02-06 RX ORDER — ALBUMIN, HUMAN INJ 5% 5 %
SOLUTION INTRAVENOUS
Status: COMPLETED
Start: 2024-02-06 | End: 2024-02-06

## 2024-02-06 RX ORDER — DEXTROSE MONOHYDRATE, SODIUM CHLORIDE, AND POTASSIUM CHLORIDE 50; 1.49; 4.5 G/1000ML; G/1000ML; G/1000ML
INJECTION, SOLUTION INTRAVENOUS CONTINUOUS
Status: DISCONTINUED | OUTPATIENT
Start: 2024-02-06 | End: 2024-02-21

## 2024-02-06 RX ORDER — AMIODARONE HYDROCHLORIDE 200 MG/1
200 TABLET ORAL 2 TIMES DAILY
Status: DISCONTINUED | OUTPATIENT
Start: 2024-02-06 | End: 2024-02-20 | Stop reason: SDUPTHER

## 2024-02-06 RX ORDER — ONDANSETRON 2 MG/ML
4 INJECTION INTRAMUSCULAR; INTRAVENOUS EVERY 4 HOURS PRN
Status: DISCONTINUED | OUTPATIENT
Start: 2024-02-06 | End: 2024-02-22

## 2024-02-06 RX ORDER — SODIUM CHLORIDE 0.9 % (FLUSH) 0.9 %
5-40 SYRINGE (ML) INJECTION PRN
Status: DISCONTINUED | OUTPATIENT
Start: 2024-02-06 | End: 2024-02-22

## 2024-02-06 RX ORDER — SODIUM CHLORIDE 0.9 % (FLUSH) 0.9 %
5-40 SYRINGE (ML) INJECTION PRN
Status: DISCONTINUED | OUTPATIENT
Start: 2024-02-06 | End: 2024-02-06 | Stop reason: HOSPADM

## 2024-02-06 RX ORDER — SODIUM CHLORIDE 9 MG/ML
INJECTION, SOLUTION INTRAVENOUS PRN
Status: DISCONTINUED | OUTPATIENT
Start: 2024-02-06 | End: 2024-02-22

## 2024-02-06 RX ORDER — PROTAMINE SULFATE 10 MG/ML
INJECTION, SOLUTION INTRAVENOUS PRN
Status: DISCONTINUED | OUTPATIENT
Start: 2024-02-06 | End: 2024-02-06 | Stop reason: SDUPTHER

## 2024-02-06 RX ORDER — SODIUM CHLORIDE 0.9 % (FLUSH) 0.9 %
5-40 SYRINGE (ML) INJECTION EVERY 12 HOURS SCHEDULED
Status: DISCONTINUED | OUTPATIENT
Start: 2024-02-06 | End: 2024-02-22

## 2024-02-06 RX ORDER — EPHEDRINE SULFATE/0.9% NACL/PF 50 MG/5 ML
SYRINGE (ML) INTRAVENOUS PRN
Status: DISCONTINUED | OUTPATIENT
Start: 2024-02-06 | End: 2024-02-06 | Stop reason: SDUPTHER

## 2024-02-06 RX ORDER — PROTAMINE SULFATE 10 MG/ML
25 INJECTION, SOLUTION INTRAVENOUS ONCE
Status: COMPLETED | OUTPATIENT
Start: 2024-02-06 | End: 2024-02-06

## 2024-02-06 RX ORDER — SODIUM CHLORIDE 9 MG/ML
INJECTION, SOLUTION INTRAVENOUS PRN
Status: DISCONTINUED | OUTPATIENT
Start: 2024-02-06 | End: 2024-02-06 | Stop reason: HOSPADM

## 2024-02-06 RX ORDER — DEXTROSE AND SODIUM CHLORIDE 5; .45 G/100ML; G/100ML
INJECTION, SOLUTION INTRAVENOUS CONTINUOUS
Status: DISCONTINUED | OUTPATIENT
Start: 2024-02-06 | End: 2024-02-21

## 2024-02-06 RX ORDER — ALBUMIN, HUMAN INJ 5% 5 %
25 SOLUTION INTRAVENOUS ONCE
Status: DISCONTINUED | OUTPATIENT
Start: 2024-02-06 | End: 2024-02-13 | Stop reason: SDUPTHER

## 2024-02-06 RX ORDER — DOBUTAMINE HYDROCHLORIDE 200 MG/100ML
2.5-1 INJECTION INTRAVENOUS CONTINUOUS
Status: DISCONTINUED | OUTPATIENT
Start: 2024-02-06 | End: 2024-02-15

## 2024-02-06 RX ORDER — PAPAVERINE HYDROCHLORIDE 30 MG/ML
INJECTION INTRAMUSCULAR; INTRAVENOUS PRN
Status: DISCONTINUED | OUTPATIENT
Start: 2024-02-06 | End: 2024-02-06 | Stop reason: ALTCHOICE

## 2024-02-06 RX ORDER — MORPHINE SULFATE 4 MG/ML
3 INJECTION, SOLUTION INTRAMUSCULAR; INTRAVENOUS
Status: DISCONTINUED | OUTPATIENT
Start: 2024-02-06 | End: 2024-02-13

## 2024-02-06 RX ORDER — MIDAZOLAM HYDROCHLORIDE 1 MG/ML
INJECTION INTRAMUSCULAR; INTRAVENOUS PRN
Status: DISCONTINUED | OUTPATIENT
Start: 2024-02-06 | End: 2024-02-06 | Stop reason: SDUPTHER

## 2024-02-06 RX ORDER — SODIUM CHLORIDE 9 MG/ML
INJECTION, SOLUTION INTRAVENOUS CONTINUOUS PRN
Status: DISCONTINUED | OUTPATIENT
Start: 2024-02-06 | End: 2024-02-06 | Stop reason: SDUPTHER

## 2024-02-06 RX ORDER — NITROGLYCERIN 20 MG/100ML
INJECTION INTRAVENOUS CONTINUOUS PRN
Status: DISCONTINUED | OUTPATIENT
Start: 2024-02-06 | End: 2024-02-06 | Stop reason: SDUPTHER

## 2024-02-06 RX ORDER — LIDOCAINE HYDROCHLORIDE 20 MG/ML
INJECTION, SOLUTION EPIDURAL; INFILTRATION; INTRACAUDAL; PERINEURAL PRN
Status: DISCONTINUED | OUTPATIENT
Start: 2024-02-06 | End: 2024-02-06 | Stop reason: SDUPTHER

## 2024-02-06 RX ORDER — INSULIN LISPRO 100 [IU]/ML
0-12 INJECTION, SOLUTION INTRAVENOUS; SUBCUTANEOUS
Status: DISCONTINUED | OUTPATIENT
Start: 2024-02-07 | End: 2024-02-19

## 2024-02-06 RX ORDER — MIDAZOLAM HYDROCHLORIDE 2 MG/2ML
1 INJECTION, SOLUTION INTRAMUSCULAR; INTRAVENOUS
Status: DISCONTINUED | OUTPATIENT
Start: 2024-02-06 | End: 2024-02-20

## 2024-02-06 RX ORDER — ACETAMINOPHEN 325 MG/1
650 TABLET ORAL EVERY 4 HOURS PRN
Status: DISCONTINUED | OUTPATIENT
Start: 2024-02-06 | End: 2024-02-06 | Stop reason: SDUPTHER

## 2024-02-06 RX ORDER — ASPIRIN 81 MG/1
81 TABLET ORAL DAILY
Status: DISCONTINUED | OUTPATIENT
Start: 2024-02-07 | End: 2024-02-27 | Stop reason: HOSPADM

## 2024-02-06 RX ORDER — SODIUM CHLORIDE, SODIUM LACTATE, POTASSIUM CHLORIDE, CALCIUM CHLORIDE 600; 310; 30; 20 MG/100ML; MG/100ML; MG/100ML; MG/100ML
INJECTION, SOLUTION INTRAVENOUS CONTINUOUS PRN
Status: DISCONTINUED | OUTPATIENT
Start: 2024-02-06 | End: 2024-02-06 | Stop reason: SDUPTHER

## 2024-02-06 RX ORDER — AMINOCAPROIC ACID 250 MG/ML
INJECTION, SOLUTION INTRAVENOUS PRN
Status: DISCONTINUED | OUTPATIENT
Start: 2024-02-06 | End: 2024-02-06 | Stop reason: SDUPTHER

## 2024-02-06 RX ORDER — FENTANYL CITRATE 0.05 MG/ML
INJECTION, SOLUTION INTRAMUSCULAR; INTRAVENOUS PRN
Status: DISCONTINUED | OUTPATIENT
Start: 2024-02-06 | End: 2024-02-06 | Stop reason: SDUPTHER

## 2024-02-06 RX ORDER — NOREPINEPHRINE BITARTRATE 1 MG/ML
INJECTION, SOLUTION INTRAVENOUS PRN
Status: DISCONTINUED | OUTPATIENT
Start: 2024-02-06 | End: 2024-02-06 | Stop reason: SDUPTHER

## 2024-02-06 RX ORDER — SODIUM CHLORIDE 0.9 % (FLUSH) 0.9 %
5-40 SYRINGE (ML) INJECTION EVERY 12 HOURS SCHEDULED
Status: DISCONTINUED | OUTPATIENT
Start: 2024-02-06 | End: 2024-02-06 | Stop reason: HOSPADM

## 2024-02-06 RX ORDER — ATORVASTATIN CALCIUM 40 MG/1
40 TABLET, FILM COATED ORAL NIGHTLY
Status: DISCONTINUED | OUTPATIENT
Start: 2024-02-06 | End: 2024-02-27 | Stop reason: HOSPADM

## 2024-02-06 RX ORDER — AMIODARONE HYDROCHLORIDE 50 MG/ML
INJECTION, SOLUTION INTRAVENOUS PRN
Status: DISCONTINUED | OUTPATIENT
Start: 2024-02-06 | End: 2024-02-06 | Stop reason: SDUPTHER

## 2024-02-06 RX ORDER — CHLORHEXIDINE GLUCONATE ORAL RINSE 1.2 MG/ML
10 SOLUTION DENTAL 2 TIMES DAILY
Status: DISCONTINUED | OUTPATIENT
Start: 2024-02-06 | End: 2024-02-20

## 2024-02-06 RX ORDER — NOREPINEPHRINE BITARTRATE 0.02 MG/ML
INJECTION, SOLUTION INTRAVENOUS CONTINUOUS PRN
Status: DISCONTINUED | OUTPATIENT
Start: 2024-02-06 | End: 2024-02-06 | Stop reason: SDUPTHER

## 2024-02-06 RX ORDER — ACETAMINOPHEN 325 MG/1
650 TABLET ORAL EVERY 4 HOURS PRN
Status: DISCONTINUED | OUTPATIENT
Start: 2024-02-06 | End: 2024-02-22

## 2024-02-06 RX ORDER — OXYCODONE HYDROCHLORIDE AND ACETAMINOPHEN 5; 325 MG/1; MG/1
1 TABLET ORAL EVERY 4 HOURS PRN
Status: DISCONTINUED | OUTPATIENT
Start: 2024-02-06 | End: 2024-02-15

## 2024-02-06 RX ORDER — HEPARIN SODIUM 1000 [USP'U]/ML
INJECTION, SOLUTION INTRAVENOUS; SUBCUTANEOUS PRN
Status: DISCONTINUED | OUTPATIENT
Start: 2024-02-06 | End: 2024-02-06 | Stop reason: SDUPTHER

## 2024-02-06 RX ORDER — DEXMEDETOMIDINE HYDROCHLORIDE 4 UG/ML
.1-1.5 INJECTION, SOLUTION INTRAVENOUS CONTINUOUS
Status: DISCONTINUED | OUTPATIENT
Start: 2024-02-06 | End: 2024-02-21

## 2024-02-06 RX ORDER — NITROGLYCERIN 20 MG/100ML
0-100 INJECTION INTRAVENOUS CONTINUOUS PRN
Status: DISCONTINUED | OUTPATIENT
Start: 2024-02-06 | End: 2024-02-22

## 2024-02-06 RX ORDER — FAMOTIDINE 20 MG/1
20 TABLET, FILM COATED ORAL 2 TIMES DAILY
Status: DISCONTINUED | OUTPATIENT
Start: 2024-02-06 | End: 2024-02-22

## 2024-02-06 RX ORDER — AMIODARONE HYDROCHLORIDE 200 MG/1
600 TABLET ORAL ONCE
Status: COMPLETED | OUTPATIENT
Start: 2024-02-06 | End: 2024-02-06

## 2024-02-06 RX ORDER — INSULIN LISPRO 100 [IU]/ML
0-6 INJECTION, SOLUTION INTRAVENOUS; SUBCUTANEOUS NIGHTLY
Status: DISCONTINUED | OUTPATIENT
Start: 2024-02-07 | End: 2024-02-19

## 2024-02-06 RX ORDER — VECURONIUM BROMIDE 1 MG/ML
INJECTION, POWDER, LYOPHILIZED, FOR SOLUTION INTRAVENOUS PRN
Status: DISCONTINUED | OUTPATIENT
Start: 2024-02-06 | End: 2024-02-06 | Stop reason: SDUPTHER

## 2024-02-06 RX ORDER — FENTANYL CITRATE-0.9 % NACL/PF 10 MCG/ML
25-200 PLASTIC BAG, INJECTION (ML) INTRAVENOUS CONTINUOUS
Status: DISCONTINUED | OUTPATIENT
Start: 2024-02-06 | End: 2024-02-15

## 2024-02-06 RX ORDER — MAGNESIUM SULFATE 1 G/100ML
1000 INJECTION INTRAVENOUS PRN
Status: DISCONTINUED | OUTPATIENT
Start: 2024-02-06 | End: 2024-02-22

## 2024-02-06 RX ORDER — FENTANYL CITRATE-0.9 % NACL/PF 10 MCG/ML
PLASTIC BAG, INJECTION (ML) INTRAVENOUS
Status: COMPLETED
Start: 2024-02-06 | End: 2024-02-07

## 2024-02-06 RX ORDER — ALBUMIN, HUMAN INJ 5% 5 %
SOLUTION INTRAVENOUS CONTINUOUS PRN
Status: COMPLETED | OUTPATIENT
Start: 2024-02-06 | End: 2024-02-06

## 2024-02-06 RX ADMIN — SODIUM BICARBONATE: 84 INJECTION, SOLUTION INTRAVENOUS at 19:00

## 2024-02-06 RX ADMIN — VECURONIUM BROMIDE 4 MG: 1 INJECTION, POWDER, LYOPHILIZED, FOR SOLUTION INTRAVENOUS at 10:55

## 2024-02-06 RX ADMIN — ROCURONIUM BROMIDE 50 MG: 50 INJECTION, SOLUTION INTRAVENOUS at 07:45

## 2024-02-06 RX ADMIN — DEXMEDETOMIDINE 1.1 MCG/KG/HR: 100 INJECTION, SOLUTION, CONCENTRATE INTRAVENOUS at 16:47

## 2024-02-06 RX ADMIN — FAMOTIDINE 20 MG: 10 INJECTION, SOLUTION INTRAVENOUS at 22:18

## 2024-02-06 RX ADMIN — Medication 25 MEQ: at 19:00

## 2024-02-06 RX ADMIN — FENTANYL CITRATE 250 MCG: 0.05 INJECTION, SOLUTION INTRAMUSCULAR; INTRAVENOUS at 08:37

## 2024-02-06 RX ADMIN — FENTANYL CITRATE 300 MCG: 0.05 INJECTION, SOLUTION INTRAMUSCULAR; INTRAVENOUS at 07:45

## 2024-02-06 RX ADMIN — VECURONIUM BROMIDE 3 MG: 1 INJECTION, POWDER, LYOPHILIZED, FOR SOLUTION INTRAVENOUS at 09:03

## 2024-02-06 RX ADMIN — MIDAZOLAM 1 MG: 1 INJECTION INTRAMUSCULAR; INTRAVENOUS at 15:51

## 2024-02-06 RX ADMIN — DOBUTAMINE HYDROCHLORIDE 2.5 MCG/KG/MIN: 200 INJECTION INTRAVENOUS at 23:15

## 2024-02-06 RX ADMIN — NITROGLYCERIN 20 MCG: 20 INJECTION INTRAVENOUS at 09:29

## 2024-02-06 RX ADMIN — FENTANYL CITRATE 150 MCG: 0.05 INJECTION, SOLUTION INTRAMUSCULAR; INTRAVENOUS at 10:55

## 2024-02-06 RX ADMIN — Medication 0.04 MCG/KG/MIN: at 11:24

## 2024-02-06 RX ADMIN — NICARDIPINE HYDROCHLORIDE 0.1 MG: 25 INJECTION INTRAVENOUS at 09:39

## 2024-02-06 RX ADMIN — EPINEPHRINE 1 MG: 0.1 INJECTION INTRACARDIAC; INTRAVENOUS at 23:44

## 2024-02-06 RX ADMIN — PROTAMINE SULFATE 25 MG: 10 INJECTION, SOLUTION INTRAVENOUS at 18:33

## 2024-02-06 RX ADMIN — AMIODARONE HYDROCHLORIDE 150 MG: 50 INJECTION, SOLUTION INTRAVENOUS at 11:53

## 2024-02-06 RX ADMIN — DEXTROSE MONOHYDRATE, SODIUM CHLORIDE, AND POTASSIUM CHLORIDE: 50; 4.5; 1.49 INJECTION, SOLUTION INTRAVENOUS at 16:02

## 2024-02-06 RX ADMIN — Medication 0.04 MCG/KG/MIN: at 13:22

## 2024-02-06 RX ADMIN — NITROGLYCERIN 10 MCG/MIN: 20 INJECTION INTRAVENOUS at 08:16

## 2024-02-06 RX ADMIN — ALBUMIN, HUMAN INJ 5% 25 G: 5 SOLUTION at 15:14

## 2024-02-06 RX ADMIN — VECURONIUM BROMIDE 3 MG: 1 INJECTION, POWDER, LYOPHILIZED, FOR SOLUTION INTRAVENOUS at 10:12

## 2024-02-06 RX ADMIN — Medication 2000 MG: at 20:33

## 2024-02-06 RX ADMIN — SODIUM CHLORIDE: 9 INJECTION, SOLUTION INTRAVENOUS at 08:06

## 2024-02-06 RX ADMIN — NOREPINEPHRINE BITARTRATE 8 MCG: 1 SOLUTION INTRAVENOUS at 07:47

## 2024-02-06 RX ADMIN — TUBERCULIN PURIFIED PROTEIN DERIVATIVE 5 UNITS: 5 INJECTION, SOLUTION INTRADERMAL at 22:18

## 2024-02-06 RX ADMIN — Medication 2000 MG: at 11:32

## 2024-02-06 RX ADMIN — HEPARIN SODIUM 25000 UNITS: 1000 INJECTION INTRAVENOUS; SUBCUTANEOUS at 09:51

## 2024-02-06 RX ADMIN — VECURONIUM BROMIDE 4 MG: 1 INJECTION, POWDER, LYOPHILIZED, FOR SOLUTION INTRAVENOUS at 09:40

## 2024-02-06 RX ADMIN — NOREPINEPHRINE BITARTRATE 16 MCG: 1 SOLUTION INTRAVENOUS at 08:16

## 2024-02-06 RX ADMIN — Medication 3 AMPULE: at 06:12

## 2024-02-06 RX ADMIN — ALBUMIN (HUMAN) 25 G: 12.5 INJECTION, SOLUTION INTRAVENOUS at 15:14

## 2024-02-06 RX ADMIN — FENTANYL CITRATE 100 MCG: 0.05 INJECTION, SOLUTION INTRAMUSCULAR; INTRAVENOUS at 09:37

## 2024-02-06 RX ADMIN — MIDAZOLAM 1.5 MG: 1 INJECTION INTRAMUSCULAR; INTRAVENOUS at 07:45

## 2024-02-06 RX ADMIN — MIDAZOLAM 5 MG: 1 INJECTION INTRAMUSCULAR; INTRAVENOUS at 12:43

## 2024-02-06 RX ADMIN — NICARDIPINE HYDROCHLORIDE 0.2 MG: 25 INJECTION INTRAVENOUS at 09:29

## 2024-02-06 RX ADMIN — PROTAMINE SULFATE 250 MG: 10 INJECTION, SOLUTION INTRAVENOUS at 12:16

## 2024-02-06 RX ADMIN — HEPARIN SODIUM 5000 UNITS: 1000 INJECTION INTRAVENOUS; SUBCUTANEOUS at 09:26

## 2024-02-06 RX ADMIN — DEXMEDETOMIDINE 0.8 MCG/KG/HR: 100 INJECTION, SOLUTION, CONCENTRATE INTRAVENOUS at 21:45

## 2024-02-06 RX ADMIN — AMINOCAPROIC ACID 10000 MG: 250 INJECTION, SOLUTION INTRAVENOUS at 08:16

## 2024-02-06 RX ADMIN — AMIODARONE HYDROCHLORIDE 600 MG: 200 TABLET ORAL at 06:10

## 2024-02-06 RX ADMIN — NITROGLYCERIN 20 MCG: 20 INJECTION INTRAVENOUS at 09:26

## 2024-02-06 RX ADMIN — SODIUM BICARBONATE 25 MEQ: 84 INJECTION, SOLUTION INTRAVENOUS at 19:00

## 2024-02-06 RX ADMIN — LIDOCAINE HYDROCHLORIDE 80 MG: 20 INJECTION, SOLUTION EPIDURAL; INFILTRATION; INTRACAUDAL; PERINEURAL at 07:45

## 2024-02-06 RX ADMIN — NOREPINEPHRINE BITARTRATE 8 MCG: 1 SOLUTION INTRAVENOUS at 07:56

## 2024-02-06 RX ADMIN — MORPHINE SULFATE 4 MG: 4 INJECTION INTRAVENOUS at 13:55

## 2024-02-06 RX ADMIN — DEXMEDETOMIDINE HYDROCHLORIDE 0.5 MCG/KG/HR: 4 INJECTION, SOLUTION INTRAVENOUS at 10:51

## 2024-02-06 RX ADMIN — CHLORHEXIDINE GLUCONATE 10 ML: 1.2 RINSE ORAL at 22:03

## 2024-02-06 RX ADMIN — FENTANYL CITRATE 25 MCG/HR: 0.05 INJECTION, SOLUTION INTRAMUSCULAR; INTRAVENOUS at 16:44

## 2024-02-06 RX ADMIN — ETOMIDATE 12 MG: 40 INJECTION, SOLUTION INTRAVENOUS at 07:45

## 2024-02-06 RX ADMIN — Medication 2000 MG: at 08:15

## 2024-02-06 RX ADMIN — SODIUM CHLORIDE, PRESERVATIVE FREE 10 ML: 5 INJECTION INTRAVENOUS at 22:22

## 2024-02-06 RX ADMIN — DOBUTAMINE HYDROCHLORIDE 2.5 MCG/KG/MIN: 200 INJECTION INTRAVENOUS at 15:23

## 2024-02-06 RX ADMIN — MIDAZOLAM 1.5 MG: 1 INJECTION INTRAMUSCULAR; INTRAVENOUS at 10:55

## 2024-02-06 RX ADMIN — ALBUMIN (HUMAN) 25 G: 12.5 INJECTION, SOLUTION INTRAVENOUS at 21:28

## 2024-02-06 RX ADMIN — CHLORHEXIDINE GLUCONATE 10 ML: 1.2 RINSE ORAL at 15:50

## 2024-02-06 RX ADMIN — NICARDIPINE HYDROCHLORIDE 0.2 MG: 25 INJECTION INTRAVENOUS at 09:36

## 2024-02-06 RX ADMIN — ALBUMIN (HUMAN) 250 ML: 12.5 SOLUTION INTRAVENOUS at 12:45

## 2024-02-06 RX ADMIN — VECURONIUM BROMIDE 3 MG: 1 INJECTION, POWDER, LYOPHILIZED, FOR SOLUTION INTRAVENOUS at 08:30

## 2024-02-06 RX ADMIN — NOREPINEPHRINE BITARTRATE 16 MCG: 1 SOLUTION INTRAVENOUS at 10:07

## 2024-02-06 RX ADMIN — Medication 10 MG: at 07:45

## 2024-02-06 RX ADMIN — NOREPINEPHRINE BITARTRATE 4 MCG: 1 SOLUTION INTRAVENOUS at 12:38

## 2024-02-06 RX ADMIN — ALBUMIN (HUMAN) 250 ML: 12.5 SOLUTION INTRAVENOUS at 12:34

## 2024-02-06 RX ADMIN — Medication 0.03 MCG/KG/MIN: at 08:16

## 2024-02-06 RX ADMIN — DEXTROSE AND SODIUM CHLORIDE: 5; 450 INJECTION, SOLUTION INTRAVENOUS at 18:38

## 2024-02-06 RX ADMIN — AMINOCAPROIC ACID 1 G/HR: 250 INJECTION, SOLUTION INTRAVENOUS at 08:50

## 2024-02-06 RX ADMIN — FENTANYL CITRATE 200 MCG: 0.05 INJECTION, SOLUTION INTRAMUSCULAR; INTRAVENOUS at 08:34

## 2024-02-06 RX ADMIN — MIDAZOLAM 2 MG: 1 INJECTION INTRAMUSCULAR; INTRAVENOUS at 07:26

## 2024-02-06 RX ADMIN — NOREPINEPHRINE BITARTRATE 16 MCG: 1 SOLUTION INTRAVENOUS at 08:01

## 2024-02-06 RX ADMIN — SODIUM CHLORIDE, SODIUM LACTATE, POTASSIUM CHLORIDE, AND CALCIUM CHLORIDE: 600; 310; 30; 20 INJECTION, SOLUTION INTRAVENOUS at 07:21

## 2024-02-06 ASSESSMENT — PAIN - FUNCTIONAL ASSESSMENT: PAIN_FUNCTIONAL_ASSESSMENT: 0-10

## 2024-02-06 ASSESSMENT — PAIN SCALES - GENERAL
PAINLEVEL_OUTOF10: 0
PAINLEVEL_OUTOF10: 0

## 2024-02-06 ASSESSMENT — PULMONARY FUNCTION TESTS
PIF_VALUE: 27
PIF_VALUE: 20
PIF_VALUE: 22
PIF_VALUE: 22

## 2024-02-06 NOTE — BRIEF OP NOTE
Brief Postoperative Note      Patient: Dionisio Gordon Jr.  YOB: 1940  MRN: [id number]    Date of Procedure: 2/6/2024    Pre-Op Diagnosis Codes:     * 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 (HCC) [I25.110]     * PAF (paroxysmal atrial fibrillation) ([facility]) [I48.0]     * Ischemic cardiomyopathy [I25.5]    Post-Op Diagnosis: Same       Procedure(s):  CORONARY ARTERY BYPASS GRAFT (CABG X 3), LIMA; MAZE PROCEDURE; LEFT ATRIAL APPENDAGE CLIPPING; IMPELLA 5.5 LV HEART ASSIST DEVICE INSERTION  TRANSESOPHAGEAL ECHOCARDIOGRAM  Ventricular assist device (VAD) insertion    Surgeon(s):  Yony Saunders MD Nessmith, Matthew G, MD    Assistant:  Surgical Assistant: Vicki Leone    Anesthesia: General    Estimated Blood Loss (mL): Minimal    Complications: None    Specimens:   * No specimens in log *    Implants:  Implant Name Type Inv. Item Serial No.  Lot No. LRB No. Used Action   GRAFT VASC L300MM OD10MM UNIV STR STD WALL N TAPR NRINGED - [id number] Vascular grafts GRAFT VASC L300MM OD10MM UNIV STR STD WALL N TAPR NRINGED [id number] TERUM[employer]-WD  N/A 1 Implanted   APPLIER CLIP MED SUTURE LESS 45 MM SYS 1 HND STRL ATRICLIP FLX V - [id number] Cardiovascular occluders APPLIER CLIP MED SUTURE LESS 45 MM SYS 1 HND STRL ATRICLIP FLX V  [employer]-WD [id number] N/A 1 Implanted         Drains:   Chest Tube Left Pleural 1 (Active)       Chest Tube Other (Comment) Mediastinal 2 (Active)       Chest Tube Right Pleural 3 (Active)       NG/OG/NJ/NE Tube Orogastric 18 fr (Active)       Urinary Catheter 02/06/24 2 Way;Dang-Temperature (Active)       Findings: cad      Electronically signed by YONY SAUNDERS MD on 2/6/2024 at 1:18 PM

## 2024-02-06 NOTE — ANESTHESIA POSTPROCEDURE EVALUATION
Department of Anesthesiology  Postprocedure Note    Patient: Dionisio Gordon Jr.  MRN: [id number]  YOB: 1940  Date of evaluation: 2/6/2024    Procedure Summary       Date: 02/06/24 Room / Location: [facility] MAIN OR 04 CARDIAC / [facility] MAIN OR    Anesthesia Start: 0721 Anesthesia Stop: 1329    Procedures:       CORONARY ARTERY BYPASS GRAFT (CABG X 3), LIMA; MAZE PROCEDURE; LEFT ATRIAL APPENDAGE CLIPPING; IMPELLA 5.5 LV HEART ASSIST DEVICE INSERTION (Chest)      TRANSESOPHAGEAL ECHOCARDIOGRAM (Esophagus)      Ventricular assist device (VAD) insertion Diagnosis:       Atherosclerosis of native coronary artery of native heart with unstable angina pectoris (HCC)      PAF (paroxysmal atrial fibrillation) (HCC)      Ischemic cardiomyopathy      (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 (HCC) [I25.110])      (PAF (paroxysmal atrial fibrillation) (HCC) [I48.0])      (Ischemic cardiomyopathy [I25.5])    Providers: Yony Shi MD; Cleveland Sebastian MD Responsible Provider: Jose Inman MD    Anesthesia Type: general ASA Status: 4            Anesthesia Type: No value filed.    Laura Phase I: Laura Score: 10    Laura Phase II:      Anesthesia Post Evaluation    Patient location during evaluation: ICU  Patient participation: complete - patient cannot participate  Level of consciousness: sedated and ventilated  Airway patency: patent  Cardiovascular status: hemodynamically stable  Respiratory status: ventilator  Hydration status: euvolemic    No notable events documented.

## 2024-02-06 NOTE — ANESTHESIA PRE PROCEDURE
Department of Anesthesiology  Preprocedure Note       Name:  Dionisio Gordon Jr.   Age:  83 y.o.  :  1940                                          MRN:  [id number]         Date:  2024      Surgeon: Surgeon(s):  Yony Shi MD    Procedure: Procedure(s):  CABG CORONARY ARTERY BYPASS GRAFTING/MAZE PROCEDURE/IMPELLA 5.5 INSERT Pt has a LifeVest    Medications prior to admission:   Prior to Admission medications    Medication Sig Start Date End Date Taking? Authorizing Provider   amiodarone (CORDARONE) 200 MG tablet Take 3 tablets by mouth once for 1 dose Take 3 tablets after 4pm the evening before surgery. 24  Pau Gallardo PA   apixaban (ELIQUIS) 5 MG TABS tablet Take 1 tablet by mouth 2 times daily 23   Clevleand Ma MD   carvedilol (COREG) 3.125 MG tablet Take 1 tablet by mouth 2 times daily (with meals) 23   Cleveland Ma MD   furosemide (LASIX) 20 MG tablet Take 1 tablet by mouth daily 23   Cleveland Ma MD   lisinopril (PRINIVIL;ZESTRIL) 2.5 MG tablet Take 1 tablet by mouth daily 23   Cleveland Ma MD       Current medications:    Current Facility-Administered Medications   Medication Dose Route Frequency Provider Last Rate Last Admin   • sodium chloride flush 0.9 % injection 5-40 mL  5-40 mL IntraVENous 2 times per day Pau Gallardo PA       • sodium chloride flush 0.9 % injection 5-40 mL  5-40 mL IntraVENous PRN Pau Gallardo PA       • 0.9 % sodium chloride infusion   IntraVENous PRN Pau Gallardo PA       • ceFAZolin (ANCEF) 2000 mg in sterile water 20 mL IV syringe  2,000 mg IntraVENous On Call to OR Pau Gallardo PA           Allergies:  No Known Allergies    Problem List:    Patient Active Problem List   Diagnosis Code   • Typical atrial flutter (HCC) I48.3   • Volume overload E87.70   • Acute on chronic systolic (congestive) heart failure (HCC) I50.23   • Atrial fibrillation with RVR

## 2024-02-06 NOTE — ANESTHESIA PROCEDURE NOTES
Arterial Line:    An arterial line was placed using ultrasound guidance, in the OR for the following indication(s): continuous blood pressure monitoring and blood sampling needed.    A 20 gauge (size) (length), Arrow (type) catheter was placed, Seldinger technique used, into the left radial artery, secured by Tegaderm and tape.  Anesthesia type: Local  Local infiltration: Injection    Events:  patient tolerated procedure well with no complications and EBL < 5mL.    Additional notes:  Left arm prepped with ChloraPrep, 0.8ml of 1% lidocaine infiltrated at skin, ultrasound guided Seldinger technique, good blood return, good waveform.      Potential access sites were examined with ultrasound and acceptable patent access site selected as noted above.  Needle path and artery access visualized in real time using ultrasound, an image of wire in vessel recorded for permanent record.    2/6/2024 7:36 AM2/6/2024 7:39 AM  Resident/CRNA: Luke Burgess APRN - CRNA  Performed: Resident/CRNA   Preanesthetic Checklist  Completed: patient identified, IV checked, site marked, risks and benefits discussed, surgical/procedural consents, equipment checked, pre-op evaluation, timeout performed, anesthesia consent given, oxygen available, monitors applied/VS acknowledged, fire risk safety assessment completed and verbalized and blood product R/B/A discussed and consented          
Airway  Date/Time: 2/6/2024 7:48 AM  Urgency: elective    Airway not difficult    General Information and Staff    Patient location during procedure: OR  Resident/CRNA: Luke Burgess APRN - CRNA  Performed: resident/CRNA   Performed by: Luke Burgess APRN - CRNA  Authorized by: Jose Inman MD      Indications and Patient Condition  Indications for airway management: anesthesia  Spontaneous Ventilation: absent  Sedation level: deep  Preoxygenated: yes  Patient position: sniffing  MILS not maintained throughout  Mask difficulty assessment: vent by bag mask    Final Airway Details  Final airway type: endotracheal airway      Successful airway: ETT  Cuffed: yes   Successful intubation technique: direct laryngoscopy  Facilitating devices/methods: cricoid pressure and intubating stylet  Endotracheal tube insertion site: oral  Blade: Leone  Blade size: #3  ETT size (mm): 8.0  Cormack-Lehane Classification: grade IIa - partial view of glottis  Placement verified by: chest auscultation and capnometry   Inital cuff pressure (cm H2O): 25  Measured from: lips  ETT to lips (cm): 24  Number of attempts at approach: 1  Ventilation between attempts: 2 hand mask  Number of other approaches attempted: 0    no      
Procedure Performed: DARI       Start Time:  2/6/2024 8:24 AM       End Time:   2/6/2024 8:30 AM    Preanesthesia Checklist:  Patient identified, IV assessed, risks and benefits discussed, monitors and equipment assessed, procedure being performed at surgeon's request and anesthesia consent obtained.    General Procedure Information  Diagnostic Indications for Echo:  assessment of ascending aorta, hemodynamic monitoring and assessment of valve function  Physician Requesting Echo: Yony Shi MD  Location performed:  OR  Intubated  Bite block not placed  Heart visualized  Probe Insertion:  Easy  Probe Type:  3D and mulitplane  Modalities:  2D and color flow mapping    Echocardiographic and Doppler Measurements    Ventricles    Right Ventricle:  Cavity size dilated.  Thrombus not present.  Global function mildly impaired.    Left Ventricle:  Cavity size dilated.  Thrombus not present.  Global Function severely impaired.  Ejection Fraction 20%.          Valves    Aortic Valve:  Annulus normal.  Leaflets calcified.  Leaflet motions restricted.      Mitral Valve:  Regurgitation mild.      Other Valve Findings:       Poor AV cusp excursion- unclear whether this is due to his poor EF and low flow or truly restricted motion; cath demonstrated no AS gradient on pull back    Aorta    Ascending Aorta:  Size normal.    Aortic Arch:  Size normal.        Atria    Right Atrium:  Size dilated.    Left Atrium:  Size dilated.                  Anesthesia Information  Performed Personally  Anesthesiologist:  Jose Inman MD      Echocardiogram Comments:       All findings discussed with Dr. ShiPost Intervention Follow-up Study  Aortic Function: unchangedMitral Function: unchanged1+Tricuspid Function: unchangedNoneComments: Impella placement verified with DARI with inflow tract 4.6 cm below Ao valve annulus      
jugular vein on first stick with real time ultrasound guidance.  Veinous cannulation confirmed with manometry and visualization of wire in vein on ultrasound.  Easy introducer and PAC insertion to  45 centimeters.  Sterile tegaderm applied.  No complications.  CXR to be performed in ICU.  Seven step protocol followed.    Potential vascular access site(s) were examined with ultrasound and the acceptable, patent vessel access site selected (site noted above).  The Needle path and vessel access were visualized in real time using ultrasonography.  As noted above, a confirmatory image of the wire in the vessel is permanently stored in the chart.     Staffing  Performed: Anesthesiologist   Anesthesiologist: Jose Inman MD  Performed by: Jose Inman MD  Authorized by: Jose Inman MD    Preanesthetic Checklist  Completed: patient identified, IV checked, risks and benefits discussed, surgical/procedural consents, equipment checked, pre-op evaluation, anesthesia consent given, oxygen available, monitors applied/VS acknowledged and blood product R/B/A discussed and consented

## 2024-02-06 NOTE — OP NOTE
[facility]  OPERATIVE REPORT    Name:  SHREYA ZULUAGA  MR#:  [id number]  :  1940  ACCOUNT #:  [id number]  DATE OF SERVICE:  2024    PREOPERATIVE DIAGNOSES:  1.  Ischemic cardiomyopathy, ejection fraction of 20%.  2.  Multivessel atherosclerotic coronary artery disease.  3.  Paroxysmal atrial fibrillation.    POSTOPERATIVE DIAGNOSES:  1.  Ischemic cardiomyopathy, ejection fraction of 20%.  2.  Multivessel atherosclerotic coronary artery disease.  3.  Paroxysmal atrial fibrillation.    PROCEDURE PERFORMED:  1.  Endoscopic vein harvest from left leg.  2.  Box maze using the AtriCure EnCompass clamp.  3.  Clipping of the left atrial appendage.  4.  Coronary artery bypass grafting x3 with grafting consisting of:  I.  Left internal mammary artery to LAD.  II.  Reverse saphenous vein graft to the posterior left ventricular branch.  III.  Reverse saphenous vein graft to the PDA.  5.  Impella 5.5 direct aortic insertion implant.    SURGEON:  Yony Shi MD    ASSISTANT:  Cleveland Sebastian MD    ANESTHESIA:  General endotracheal with DARI.    COMPLICATIONS:  None.    SPECIMENS REMOVED:  .    IMPLANTS:  .    ESTIMATED BLOOD LOSS:  Minimal.    FINDINGS:  Saphenous vein 3-5 mm.  LIMA 3 mm.  The patient noted to have massive cardiomegaly.  He also was noted to have severe diffuse coronary artery disease with multiple occluded coronary arteries.  The LAD was chronically occluded 1.4 mm, severe distal disease, PL was chronically occluded 1.5 mm, severe distal disease.  PDA is 1.5 mm, severe distal disease.    INDICATION:  The patient is a very pleasant, very active 83-year-old gentleman who has been feeling poorly recently.  He presented to emergency room with progressively worsening dyspnea, was treated for pneumonia; however, he did not improve.  He presented back to the emergency room and was noted to be in atrial fibrillation with rapid ventricular response.  Chest x-ray showed

## 2024-02-06 NOTE — PERIOP NOTE
TRANSFER - OUT REPORT:    Verbal report given to HUEY NGUYEN on Dionisio Gordon Jr.  being transferred to CVICU for routine progression of patient care       Report consisted of patient's Situation, Background, Assessment and   Recommendations(SBAR).     Information from the following report(s) Nurse Handoff Report and Surgery Report was reviewed with the receiving nurse.           Lines:   Peripheral IV 02/06/24 Posterior;Right Hand (Active)   Site Assessment Clean, dry & intact 02/06/24 0617   Line Status Blood return noted;Infusing 02/06/24 0617   Phlebitis Assessment No symptoms 02/06/24 0617   Infiltration Assessment 0 02/06/24 0617   Dressing Status Clean, dry & intact 02/06/24 0617       Arterial Line 02/06/24 Radial (Active)        Opportunity for questions and clarification was provided.      Patient transported with:  Monitor, O2 @ 15 lpm, and Registered Nurse, CRNA, MANNY

## 2024-02-06 NOTE — H&P (VIEW-ONLY)
Cardiovascular ICU Consult Note: 2/6/2024  Dionisio Landindorothy Lazcano.                                                     Admission Date: 2/6/2024     The patient's chart is reviewed and the patient is discussed with the staff.    Subjective:     Patient is seen at the request of Yony Shi MD  for respiratory management status post cardiac surgery.  Patient had CAD, multiple vessel.  Patient had CABG x 3 including LIMA, Maze procedure, left atrial appendage clipping, Impella 5.5 LVAD insertion.  Currently is sedated in CV-ICU and orally intubated receiving  mechanical ventilation.    We have been asked to see in the CV-ICU for mechanical ventilation management and weaning.    Current Outpatient Medications   Medication Instructions    amiodarone (CORDARONE) 600 mg, Oral, ONCE, Take 3 tablets after 4pm the evening before surgery.    apixaban (ELIQUIS) 5 mg, Oral, 2 TIMES DAILY    carvedilol (COREG) 3.125 mg, Oral, 2 TIMES DAILY WITH MEALS    furosemide (LASIX) 20 mg, Oral, DAILY    lisinopril (PRINIVIL;ZESTRIL) 2.5 mg, Oral, DAILY      Review of Systems: unable to determine due to intubated status  Past Medical History:   Diagnosis Date    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 (HCC)     CABG scheduled on 2.5.24    CAD (coronary artery disease)     followed by    Former pipe smoker     History of atrial flutter 11/21/2023    pt underwent atrial flutter ablation    History of bilateral knee replacement 2018    History of kidney stones     X1 naturally pass    PAF (paroxysmal atrial fibrillation) ([facility]) 2024    Pneumonia 11/2023    \"pt states they thought it was pneumonia but it was my heart\"    Uses LifeVest defibrillator 02/05/2024     Past Surgical History:   Procedure Laterality Date    CARDIAC PROCEDURE N/A 01/17/2024    Left heart cath / coronary angiography performed by Mason Diallo MD at [facility] CARDIAC CATH LAB    COLONOSCOPY      EP DEVICE

## 2024-02-07 ENCOUNTER — APPOINTMENT (OUTPATIENT)
Dept: GENERAL RADIOLOGY | Age: 84
DRG: 001 | End: 2024-02-07
Attending: THORACIC SURGERY (CARDIOTHORACIC VASCULAR SURGERY)
Payer: MEDICARE

## 2024-02-07 LAB
ALBUMIN SERPL-MCNC: 2.6 G/DL (ref 3.2–4.6)
ALBUMIN/GLOB SERPL: 1.7 (ref 0.4–1.6)
ALP SERPL-CCNC: 22 U/L (ref 50–136)
ALT SERPL-CCNC: 31 U/L (ref 12–65)
ANION GAP BLD CALC-SCNC: ABNORMAL MMOL/L
ANION GAP BLD CALC-SCNC: ABNORMAL MMOL/L
ANION GAP SERPL CALC-SCNC: 4 MMOL/L (ref 2–11)
APTT PPP: 40.2 SEC (ref 23.3–37.4)
ARTERIAL PATENCY WRIST A: ABNORMAL
AST SERPL-CCNC: 92 U/L (ref 15–37)
BASE DEFICIT BLD-SCNC: 11.9 MMOL/L
BASE DEFICIT BLD-SCNC: 5.3 MMOL/L
BASE DEFICIT BLDV-SCNC: 4.5 MMOL/L
BASE DEFICIT BLDV-SCNC: 6.8 MMOL/L
BDY SITE: ABNORMAL
BILIRUB DIRECT SERPL-MCNC: 0.5 MG/DL
BILIRUB SERPL-MCNC: 1.7 MG/DL (ref 0.2–1.1)
BLD PROD TYP BPU: NORMAL
BLOOD BANK DISPENSE STATUS: NORMAL
BPU ID: NORMAL
BUN SERPL-MCNC: 17 MG/DL (ref 8–23)
CA-I BLD-MCNC: 1.07 MMOL/L (ref 1.12–1.32)
CA-I BLD-MCNC: 1.16 MMOL/L (ref 1.12–1.32)
CA-I BLD-MCNC: 4.19 MG/DL (ref 4–5.2)
CA-I BLD-MCNC: 4.24 MG/DL (ref 4–5.2)
CALCIUM SERPL-MCNC: 6.9 MG/DL (ref 8.3–10.4)
CHLORIDE SERPL-SCNC: 121 MMOL/L (ref 103–113)
CO2 BLD-SCNC: 15 MMOL/L (ref 13–23)
CO2 BLD-SCNC: 20 MMOL/L (ref 13–23)
CO2 SERPL-SCNC: 21 MMOL/L (ref 21–32)
CREAT SERPL-MCNC: 0.9 MG/DL (ref 0.8–1.5)
EKG ATRIAL RATE: 77 BPM
EKG ATRIAL RATE: 88 BPM
EKG DIAGNOSIS: NORMAL
EKG DIAGNOSIS: NORMAL
EKG P AXIS: 17 DEGREES
EKG P AXIS: 49 DEGREES
EKG P-R INTERVAL: 118 MS
EKG P-R INTERVAL: 164 MS
EKG Q-T INTERVAL: 430 MS
EKG Q-T INTERVAL: 434 MS
EKG QRS DURATION: 124 MS
EKG QRS DURATION: 128 MS
EKG QTC CALCULATION (BAZETT): 486 MS
EKG QTC CALCULATION (BAZETT): 525 MS
EKG R AXIS: 12 DEGREES
EKG R AXIS: 5 DEGREES
EKG T AXIS: -1 DEGREES
EKG T AXIS: -15 DEGREES
EKG VENTRICULAR RATE: 77 BPM
EKG VENTRICULAR RATE: 88 BPM
ERYTHROCYTE [DISTWIDTH] IN BLOOD BY AUTOMATED COUNT: 15.1 % (ref 11.9–14.6)
FIBRINOGEN PPP-MCNC: 195 MG/DL (ref 190–501)
FIO2 ON VENT: 50 %
GAS FLOW.O2 O2 DELIVERY SYS: ABNORMAL
GLOBULIN SER CALC-MCNC: 1.5 G/DL (ref 2.8–4.5)
GLUCOSE BLD STRIP.AUTO-MCNC: 101 MG/DL (ref 65–100)
GLUCOSE BLD STRIP.AUTO-MCNC: 102 MG/DL (ref 65–100)
GLUCOSE BLD STRIP.AUTO-MCNC: 103 MG/DL (ref 65–100)
GLUCOSE BLD STRIP.AUTO-MCNC: 107 MG/DL (ref 65–100)
GLUCOSE BLD STRIP.AUTO-MCNC: 109 MG/DL (ref 65–100)
GLUCOSE BLD STRIP.AUTO-MCNC: 111 MG/DL (ref 65–100)
GLUCOSE BLD STRIP.AUTO-MCNC: 123 MG/DL (ref 65–100)
GLUCOSE BLD STRIP.AUTO-MCNC: 131 MG/DL (ref 65–100)
GLUCOSE BLD STRIP.AUTO-MCNC: 132 MG/DL (ref 65–100)
GLUCOSE BLD STRIP.AUTO-MCNC: 134 MG/DL (ref 65–100)
GLUCOSE BLD STRIP.AUTO-MCNC: 157 MG/DL (ref 65–100)
GLUCOSE BLD STRIP.AUTO-MCNC: 208 MG/DL (ref 65–100)
GLUCOSE BLD STRIP.AUTO-MCNC: 98 MG/DL (ref 65–100)
GLUCOSE SERPL-MCNC: 138 MG/DL (ref 65–100)
HCO3 BLD-SCNC: 14.7 MMOL/L (ref 22–26)
HCO3 BLD-SCNC: 20.1 MMOL/L (ref 22–26)
HCO3 BLDV-SCNC: 19.7 MMOL/L (ref 23–28)
HCO3 BLDV-SCNC: 21.9 MMOL/L (ref 23–28)
HCT VFR BLD AUTO: 34.2 % (ref 41.1–50.3)
HGB BLD-MCNC: 11.4 G/DL (ref 13.6–17.2)
HISTORY CHECK: NORMAL
HISTORY CHECK: NORMAL
INR PPP: 1.7
LACTATE SERPL-SCNC: 1.3 MMOL/L (ref 0.4–2)
MAGNESIUM SERPL-MCNC: 2.1 MG/DL (ref 1.8–2.4)
MCH RBC QN AUTO: 28.5 PG (ref 26.1–32.9)
MCHC RBC AUTO-ENTMCNC: 33.3 G/DL (ref 31.4–35)
MCV RBC AUTO: 85.5 FL (ref 82–102)
MM INDURATION, POC: 0 MM (ref 0–5)
NRBC # BLD: 0 K/UL (ref 0–0.2)
NT PRO BNP: 1527 PG/ML
NT PRO BNP: 3053 PG/ML
O2/TOTAL GAS SETTING VFR VENT: 50 %
O2/TOTAL GAS SETTING VFR VENT: 50 %
PCO2 BLD: 35.2 MMHG (ref 35–45)
PCO2 BLD: 37.9 MMHG (ref 35–45)
PCO2 BLDV: 42.6 MMHG (ref 41–51)
PCO2 BLDV: 45.2 MMHG (ref 41–51)
PEEP RESPIRATORY: 8
PEEP RESPIRATORY: 8 CMH2O
PEEP RESPIRATORY: 8 CMH2O
PH BLD: 7.23 (ref 7.35–7.45)
PH BLD: 7.33 (ref 7.35–7.45)
PH BLDV: 7.27 (ref 7.32–7.42)
PH BLDV: 7.29 (ref 7.32–7.42)
PLATELET # BLD AUTO: 132 K/UL (ref 150–450)
PMV BLD AUTO: 10.6 FL (ref 9.4–12.3)
PO2 BLD: 113 MMHG (ref 75–100)
PO2 BLD: 390 MMHG (ref 75–100)
PO2 BLDV: 29 MMHG
PO2 BLDV: 30 MMHG
POTASSIUM BLD-SCNC: 4.4 MMOL/L (ref 3.5–5.1)
POTASSIUM BLD-SCNC: 5 MMOL/L (ref 3.5–5.1)
POTASSIUM SERPL-SCNC: 4.9 MMOL/L (ref 3.5–5.1)
PPD, POC: NEGATIVE
PRESSURE SUPPORT SETTING VENT: 10
PRESSURE SUPPORT SETTING VENT: 10 CMH2O
PRESSURE SUPPORT SETTING VENT: 10 CMH2O
PROT SERPL-MCNC: 4.1 G/DL (ref 6.3–8.2)
PROTHROMBIN TIME: 20.5 SEC (ref 11.3–14.9)
RBC # BLD AUTO: 4 M/UL (ref 4.23–5.6)
RESPIRATORY RATE: 18
SAO2 % BLD: 100 %
SAO2 % BLD: 98 %
SAO2 % BLDV: 46.7 % (ref 65–88)
SAO2 % BLDV: 50.1 % (ref 65–88)
SERVICE CMNT-IMP: ABNORMAL
SERVICE CMNT-IMP: NORMAL
SODIUM BLD-SCNC: 144 MMOL/L (ref 136–145)
SODIUM BLD-SCNC: 145 MMOL/L (ref 136–145)
SODIUM SERPL-SCNC: 146 MMOL/L (ref 136–146)
SPECIMEN SITE: ABNORMAL
SPECIMEN SITE: ABNORMAL
SPECIMEN TYPE: ABNORMAL
SPECIMEN TYPE: ABNORMAL
UNIT DIVISION: 0
VENTILATION MODE VENT: ABNORMAL
VT SETTING VENT: 520
VT SETTING VENT: 520 ML
VT SETTING VENT: 520 ML
WBC # BLD AUTO: 8.5 K/UL (ref 4.3–11.1)

## 2024-02-07 PROCEDURE — 89220 SPUTUM SPECIMEN COLLECTION: CPT

## 2024-02-07 PROCEDURE — 2709999900 HC NON-CHARGEABLE SUPPLY: Performed by: THORACIC SURGERY (CARDIOTHORACIC VASCULAR SURGERY)

## 2024-02-07 PROCEDURE — 2580000003 HC RX 258: Performed by: PHYSICIAN ASSISTANT

## 2024-02-07 PROCEDURE — 93005 ELECTROCARDIOGRAM TRACING: CPT | Performed by: THORACIC SURGERY (CARDIOTHORACIC VASCULAR SURGERY)

## 2024-02-07 PROCEDURE — 85730 THROMBOPLASTIN TIME PARTIAL: CPT

## 2024-02-07 PROCEDURE — 82947 ASSAY GLUCOSE BLOOD QUANT: CPT

## 2024-02-07 PROCEDURE — 2580000003 HC RX 258: Performed by: THORACIC SURGERY (CARDIOTHORACIC VASCULAR SURGERY)

## 2024-02-07 PROCEDURE — 2500000003 HC RX 250 WO HCPCS: Performed by: THORACIC SURGERY (CARDIOTHORACIC VASCULAR SURGERY)

## 2024-02-07 PROCEDURE — 93010 ELECTROCARDIOGRAM REPORT: CPT | Performed by: INTERNAL MEDICINE

## 2024-02-07 PROCEDURE — 2580000003 HC RX 258

## 2024-02-07 PROCEDURE — 2500000003 HC RX 250 WO HCPCS: Performed by: PHYSICIAN ASSISTANT

## 2024-02-07 PROCEDURE — 3700000001 HC ADD 15 MINUTES (ANESTHESIA): Performed by: THORACIC SURGERY (CARDIOTHORACIC VASCULAR SURGERY)

## 2024-02-07 PROCEDURE — 87086 URINE CULTURE/COLONY COUNT: CPT

## 2024-02-07 PROCEDURE — 80048 BASIC METABOLIC PNL TOTAL CA: CPT

## 2024-02-07 PROCEDURE — P9016 RBC LEUKOCYTES REDUCED: HCPCS

## 2024-02-07 PROCEDURE — 84132 ASSAY OF SERUM POTASSIUM: CPT

## 2024-02-07 PROCEDURE — 82330 ASSAY OF CALCIUM: CPT

## 2024-02-07 PROCEDURE — 85610 PROTHROMBIN TIME: CPT

## 2024-02-07 PROCEDURE — 99291 CRITICAL CARE FIRST HOUR: CPT | Performed by: INTERNAL MEDICINE

## 2024-02-07 PROCEDURE — 80076 HEPATIC FUNCTION PANEL: CPT

## 2024-02-07 PROCEDURE — 2580000003 HC RX 258: Performed by: ANESTHESIOLOGY

## 2024-02-07 PROCEDURE — 93005 ELECTROCARDIOGRAM TRACING: CPT | Performed by: PHYSICIAN ASSISTANT

## 2024-02-07 PROCEDURE — 3600000008 HC SURGERY OHS BASE: Performed by: THORACIC SURGERY (CARDIOTHORACIC VASCULAR SURGERY)

## 2024-02-07 PROCEDURE — 2100000001 HC CVICU R&B

## 2024-02-07 PROCEDURE — 2720000010 HC SURG SUPPLY STERILE: Performed by: THORACIC SURGERY (CARDIOTHORACIC VASCULAR SURGERY)

## 2024-02-07 PROCEDURE — 83605 ASSAY OF LACTIC ACID: CPT

## 2024-02-07 PROCEDURE — 82803 BLOOD GASES ANY COMBINATION: CPT

## 2024-02-07 PROCEDURE — 85027 COMPLETE CBC AUTOMATED: CPT

## 2024-02-07 PROCEDURE — 6360000002 HC RX W HCPCS: Performed by: THORACIC SURGERY (CARDIOTHORACIC VASCULAR SURGERY)

## 2024-02-07 PROCEDURE — 71045 X-RAY EXAM CHEST 1 VIEW: CPT

## 2024-02-07 PROCEDURE — 87040 BLOOD CULTURE FOR BACTERIA: CPT

## 2024-02-07 PROCEDURE — P9041 ALBUMIN (HUMAN),5%, 50ML: HCPCS | Performed by: THORACIC SURGERY (CARDIOTHORACIC VASCULAR SURGERY)

## 2024-02-07 PROCEDURE — 3600000018 HC SURGERY OHS ADDTL 15MIN: Performed by: THORACIC SURGERY (CARDIOTHORACIC VASCULAR SURGERY)

## 2024-02-07 PROCEDURE — 85384 FIBRINOGEN ACTIVITY: CPT

## 2024-02-07 PROCEDURE — 6360000002 HC RX W HCPCS: Performed by: PHYSICIAN ASSISTANT

## 2024-02-07 PROCEDURE — 6370000000 HC RX 637 (ALT 250 FOR IP): Performed by: PHYSICIAN ASSISTANT

## 2024-02-07 PROCEDURE — 83880 ASSAY OF NATRIURETIC PEPTIDE: CPT

## 2024-02-07 PROCEDURE — 37799 UNLISTED PX VASCULAR SURGERY: CPT

## 2024-02-07 PROCEDURE — 87205 SMEAR GRAM STAIN: CPT

## 2024-02-07 PROCEDURE — 3700000000 HC ANESTHESIA ATTENDED CARE: Performed by: THORACIC SURGERY (CARDIOTHORACIC VASCULAR SURGERY)

## 2024-02-07 PROCEDURE — 6370000000 HC RX 637 (ALT 250 FOR IP): Performed by: THORACIC SURGERY (CARDIOTHORACIC VASCULAR SURGERY)

## 2024-02-07 PROCEDURE — C1729 CATH, DRAINAGE: HCPCS | Performed by: THORACIC SURGERY (CARDIOTHORACIC VASCULAR SURGERY)

## 2024-02-07 PROCEDURE — 94003 VENT MGMT INPAT SUBQ DAY: CPT

## 2024-02-07 PROCEDURE — 87070 CULTURE OTHR SPECIMN AEROBIC: CPT

## 2024-02-07 PROCEDURE — 6360000002 HC RX W HCPCS

## 2024-02-07 PROCEDURE — 84295 ASSAY OF SERUM SODIUM: CPT

## 2024-02-07 PROCEDURE — 99233 SBSQ HOSP IP/OBS HIGH 50: CPT | Performed by: INTERNAL MEDICINE

## 2024-02-07 PROCEDURE — 83735 ASSAY OF MAGNESIUM: CPT

## 2024-02-07 PROCEDURE — 36415 COLL VENOUS BLD VENIPUNCTURE: CPT

## 2024-02-07 PROCEDURE — 2500000003 HC RX 250 WO HCPCS: Performed by: ANESTHESIOLOGY

## 2024-02-07 RX ORDER — CALCIUM CHLORIDE 100 MG/ML
INJECTION INTRAVENOUS; INTRAVENTRICULAR PRN
Status: DISCONTINUED | OUTPATIENT
Start: 2024-02-07 | End: 2024-02-07 | Stop reason: SDUPTHER

## 2024-02-07 RX ORDER — FENTANYL CITRATE-0.9 % NACL/PF 20 MCG/2ML
50 SYRINGE (ML) INTRAVENOUS EVERY 30 MIN PRN
Status: DISCONTINUED | OUTPATIENT
Start: 2024-02-07 | End: 2024-02-15

## 2024-02-07 RX ORDER — ROCURONIUM BROMIDE 10 MG/ML
INJECTION, SOLUTION INTRAVENOUS PRN
Status: DISCONTINUED | OUTPATIENT
Start: 2024-02-07 | End: 2024-02-07 | Stop reason: SDUPTHER

## 2024-02-07 RX ORDER — SODIUM CHLORIDE 9 MG/ML
INJECTION, SOLUTION INTRAVENOUS CONTINUOUS PRN
Status: DISCONTINUED | OUTPATIENT
Start: 2024-02-07 | End: 2024-02-07 | Stop reason: SDUPTHER

## 2024-02-07 RX ORDER — FUROSEMIDE 10 MG/ML
20 INJECTION INTRAMUSCULAR; INTRAVENOUS ONCE
Status: COMPLETED | OUTPATIENT
Start: 2024-02-07 | End: 2024-02-07

## 2024-02-07 RX ORDER — EPINEPHRINE IN SOD CHLOR,ISO 1 MG/10 ML
SYRINGE (ML) INTRAVENOUS
Status: COMPLETED | OUTPATIENT
Start: 2024-02-06 | End: 2024-02-06

## 2024-02-07 RX ORDER — ALBUMIN, HUMAN INJ 5% 5 %
25 SOLUTION INTRAVENOUS ONCE
Status: COMPLETED | OUTPATIENT
Start: 2024-02-07 | End: 2024-02-07

## 2024-02-07 RX ADMIN — Medication 20 MCG: at 00:10

## 2024-02-07 RX ADMIN — FENTANYL CITRATE 50 MCG/HR: 0.05 INJECTION, SOLUTION INTRAMUSCULAR; INTRAVENOUS at 04:06

## 2024-02-07 RX ADMIN — CALCIUM CHLORIDE 0.25 G: 100 INJECTION INTRAVENOUS; INTRAVENTRICULAR at 01:10

## 2024-02-07 RX ADMIN — ALBUMIN (HUMAN) 25 G: 12.5 INJECTION, SOLUTION INTRAVENOUS at 12:45

## 2024-02-07 RX ADMIN — Medication 50 MCG: at 23:59

## 2024-02-07 RX ADMIN — SODIUM BICARBONATE 50 MEQ: 84 INJECTION, SOLUTION INTRAVENOUS at 00:36

## 2024-02-07 RX ADMIN — SODIUM BICARBONATE: 84 INJECTION, SOLUTION INTRAVENOUS at 19:17

## 2024-02-07 RX ADMIN — DEXMEDETOMIDINE 1.3 MCG/KG/HR: 100 INJECTION, SOLUTION, CONCENTRATE INTRAVENOUS at 10:19

## 2024-02-07 RX ADMIN — DEXMEDETOMIDINE 1.5 MCG/KG/HR: 100 INJECTION, SOLUTION, CONCENTRATE INTRAVENOUS at 13:44

## 2024-02-07 RX ADMIN — Medication 2000 MG: at 04:16

## 2024-02-07 RX ADMIN — Medication 50 MCG: at 11:31

## 2024-02-07 RX ADMIN — DOBUTAMINE HYDROCHLORIDE 5.5 MCG/KG/MIN: 200 INJECTION INTRAVENOUS at 14:23

## 2024-02-07 RX ADMIN — FENTANYL CITRATE 125 MCG/HR: 0.05 INJECTION, SOLUTION INTRAMUSCULAR; INTRAVENOUS at 13:08

## 2024-02-07 RX ADMIN — Medication 50 MCG: at 11:00

## 2024-02-07 RX ADMIN — Medication 2000 MG: at 00:26

## 2024-02-07 RX ADMIN — ASPIRIN 81 MG: 81 TABLET, COATED ORAL at 08:54

## 2024-02-07 RX ADMIN — FENTANYL CITRATE 150 MCG/HR: 0.05 INJECTION, SOLUTION INTRAMUSCULAR; INTRAVENOUS at 20:19

## 2024-02-07 RX ADMIN — CHLORHEXIDINE GLUCONATE 10 ML: 1.2 RINSE ORAL at 08:54

## 2024-02-07 RX ADMIN — AMIODARONE HYDROCHLORIDE 200 MG: 200 TABLET ORAL at 08:57

## 2024-02-07 RX ADMIN — Medication 50 MCG: at 18:00

## 2024-02-07 RX ADMIN — DEXMEDETOMIDINE 0.7 MCG/KG/HR: 100 INJECTION, SOLUTION, CONCENTRATE INTRAVENOUS at 05:02

## 2024-02-07 RX ADMIN — ATORVASTATIN CALCIUM 40 MG: 40 TABLET, FILM COATED ORAL at 20:07

## 2024-02-07 RX ADMIN — ROCURONIUM BROMIDE 50 MG: 50 INJECTION, SOLUTION INTRAVENOUS at 00:10

## 2024-02-07 RX ADMIN — SODIUM CHLORIDE: 900 INJECTION INTRAVENOUS at 00:14

## 2024-02-07 RX ADMIN — Medication 50 MCG: at 20:11

## 2024-02-07 RX ADMIN — Medication 2000 MG: at 20:07

## 2024-02-07 RX ADMIN — DEXMEDETOMIDINE 1.5 MCG/KG/HR: 100 INJECTION, SOLUTION, CONCENTRATE INTRAVENOUS at 17:07

## 2024-02-07 RX ADMIN — FAMOTIDINE 20 MG: 20 TABLET, FILM COATED ORAL at 09:06

## 2024-02-07 RX ADMIN — ACETAMINOPHEN 650 MG: 325 TABLET ORAL at 11:13

## 2024-02-07 RX ADMIN — FUROSEMIDE 20 MG: 10 INJECTION, SOLUTION INTRAVENOUS at 08:54

## 2024-02-07 RX ADMIN — SODIUM CHLORIDE: 9 INJECTION, SOLUTION INTRAVENOUS at 09:15

## 2024-02-07 RX ADMIN — Medication 50 MCG: at 19:37

## 2024-02-07 RX ADMIN — Medication 2000 MG: at 12:12

## 2024-02-07 RX ADMIN — FAMOTIDINE 20 MG: 20 TABLET, FILM COATED ORAL at 20:08

## 2024-02-07 RX ADMIN — SODIUM CHLORIDE, PRESERVATIVE FREE 10 ML: 5 INJECTION INTRAVENOUS at 09:04

## 2024-02-07 RX ADMIN — CHLORHEXIDINE GLUCONATE 10 ML: 1.2 RINSE ORAL at 19:16

## 2024-02-07 RX ADMIN — AMIODARONE HYDROCHLORIDE 200 MG: 200 TABLET ORAL at 20:07

## 2024-02-07 RX ADMIN — SODIUM CHLORIDE, PRESERVATIVE FREE 10 ML: 5 INJECTION INTRAVENOUS at 20:08

## 2024-02-07 RX ADMIN — DEXMEDETOMIDINE 1.5 MCG/KG/HR: 100 INJECTION, SOLUTION, CONCENTRATE INTRAVENOUS at 23:29

## 2024-02-07 RX ADMIN — DEXMEDETOMIDINE 1.5 MCG/KG/HR: 100 INJECTION, SOLUTION, CONCENTRATE INTRAVENOUS at 20:23

## 2024-02-07 RX ADMIN — ACETAMINOPHEN 650 MG: 325 TABLET ORAL at 15:53

## 2024-02-07 RX ADMIN — Medication 50 MCG: at 09:11

## 2024-02-07 RX ADMIN — ACETAMINOPHEN 650 MG: 325 TABLET ORAL at 20:20

## 2024-02-07 RX ADMIN — Medication 40 MCG: at 00:12

## 2024-02-07 RX ADMIN — Medication 50 MCG: at 21:33

## 2024-02-07 RX ADMIN — Medication 50 MCG: at 23:23

## 2024-02-07 ASSESSMENT — PULMONARY FUNCTION TESTS
PIF_VALUE: 23
PIF_VALUE: 31
PIF_VALUE: 25
PIF_VALUE: 34
PIF_VALUE: 24
PIF_VALUE: 28

## 2024-02-07 ASSESSMENT — PAIN SCALES - GENERAL
PAINLEVEL_OUTOF10: 0

## 2024-02-07 NOTE — ANESTHESIA PRE PROCEDURE
and child/children.    Use of blood products discussed with patient and child/children whom consented to blood products.                      TODD TURNER MD   2/6/2024          
Male

## 2024-02-07 NOTE — BRIEF OP NOTE
Brief Postoperative Note      Patient: Dionisio Gordon Jr.  YOB: 1940  MRN: [id number]    Date of Procedure: 2/6/2024    Pre-Op Diagnosis Codes:     * Bleeding [R58]    Post-Op Diagnosis: Same       Procedure(s):  CARDIAC RE-ENTRY EVACUATION CLOT    Surgeon(s):  Yony Shi MD    Assistant:  * No surgical staff found *    Anesthesia: General    Estimated Blood Loss (mL): 500    Complications: None    Specimens:   * No specimens in log *    Implants:  * No implants in log *      Drains:   Chest Tube Left Pleural 1 (Active)   Chest Tube Airleak No 02/06/24 2300   Status Continuous Suction 02/06/24 2300   Suction -20 cm H2O 02/06/24 2300   Y Connector Used Yes 02/06/24 2300   Drainage Description Sanguinous 02/06/24 2300   Dressing Status Clean, dry & intact 02/06/24 2300   Chest Tube Dressing Petroleum Jelly 02/06/24 2300   Site Assessment Clean, dry & intact 02/06/24 2300   Surrounding Skin Clean, dry & intact 02/06/24 2300   Output (ml) 350 ml 02/07/24 0000       Chest Tube Other (Comment) Mediastinal 2 (Active)   Chest Tube Airleak No 02/06/24 2300   Status Continuous Suction 02/06/24 2300   Suction -20 cm H2O 02/06/24 2300   Y Connector Used No 02/06/24 2300   Drainage Description Sanguinous 02/06/24 2300   Dressing Status Clean, dry & intact 02/06/24 2300   Chest Tube Dressing Petroleum Jelly 02/06/24 2300   Site Assessment Clean, dry & intact 02/06/24 2300   Surrounding Skin Clean, dry & intact 02/06/24 2300   Output (ml) 250 ml 02/07/24 0000       Chest Tube Right Pleural 3 (Active)   Chest Tube Airleak No 02/06/24 2300   Status Continuous Suction 02/06/24 2300   Suction -20 cm H2O 02/06/24 2300   Y Connector Used Yes 02/06/24 2300   Drainage Description Sanguinous 02/06/24 2300   Dressing Status Clean, dry & intact 02/06/24 2300   Chest Tube Dressing Petroleum Jelly 02/06/24 2300   Site Assessment Clean, dry & intact 02/06/24 2300   Surrounding Skin Clean, dry & intact 02/06/24 2300

## 2024-02-07 NOTE — INTERDISCIPLINARY ROUNDS
Multi-D Rounds/Checklist (leapfrog):  Lines: can any be removed?: None   ETT  (Active)     Chest Tube Left Pleural 1 (Active)       Chest Tube Other (Comment) Mediastinal 2 (Active)       Chest Tube Right Pleural 3 (Active)       NG/OG/NJ/NE Tube Orogastric 18 fr (Active)       Urinary Catheter 02/06/24 2 Way;Dang-Temperature (Active)     Introducer 02/06/24 Internal jugular Right (Active)       Arterial Line 02/06/24 Radial (Active)       CVC Double Lumen 02/06/24 Right Internal jugular (Active)       Pulmonary Artery Cath Single 02/06/24 Right Internal jugular (Active)     DVT Prophylaxis: Ordered  Vent: HOB elevated? Yes ;  mL/kg: N/A ; Vent day 2  Nutrition Ordered/appropriate: Per Primary Team  Can antibiotics or other drugs be stopped? Yes/End Date set Yes/No  Inpat Anti-Infectives (From admission, onward)       Start     Ordered Stop    02/06/24 2000  ceFAZolin (ANCEF) 2000 mg in sterile water 20 mL IV syringe  2,000 mg,   IntraVENous,   EVERY 8 HOURS        Note to Pharmacy: Default Settings:Auto-dosed by Weight Q8h x 2 doses  Auto-dosed: Pt Wt<119.9kg-2gm, >120kg-3gm    02/06/24 1322 02/08/24 1159                  Consults needed: None  A: Is pain control adequate? (has PRNs? Stop drip?) Yes  B: Sedation break and SBT? Yes  C: Is sedation choice appropriate? Yes  D: Delirium/CAM-ICU? No  E: Mobility goals/appropriateness? Yes  F: Family update and plan? child is surrogate decision maker and is being updated daily by primary attending and nursing staff.    Chen Rm, APRN - CNP

## 2024-02-07 NOTE — ANESTHESIA POSTPROCEDURE EVALUATION
Department of Anesthesiology  Postprocedure Note    Patient: Dionisio Gordon Jr.  MRN: [id number]  YOB: 1940  Date of evaluation: 2/7/2024    Procedure Summary     Date: 02/07/24 Room / Location: [facility] MAIN OR 04 CARDIAC / [facility] MAIN OR    Anesthesia Start: 0009 Anesthesia Stop: 0202    Procedure: CARDIAC RE-ENTRY EVACUATION CLOT (Chest) Diagnosis:       Bleeding      (Bleeding [R58])    Providers: Yony Shi MD Responsible Provider: Jose Inman MD    Anesthesia Type: general ASA Status: 5 - Emergent          Anesthesia Type: No value filed.    Laura Phase I: Laura Score: 10    Laura Phase II:      Anesthesia Post Evaluation    Patient location during evaluation: ICU  Patient participation: complete - patient cannot participate  Level of consciousness: sedated and ventilated  Airway patency: patent  Cardiovascular status: hemodynamically stable  Respiratory status: ventilator  Hydration status: euvolemic        No notable events documented.

## 2024-02-07 NOTE — OP NOTE
[facility]  OPERATIVE REPORT    Name:  SHREYA ZULUAGA  MR#:  [id number]  :  1940  ACCOUNT #:  [id number]  DATE OF SERVICE:  2024    PREOPERATIVE DIAGNOSIS:  Postoperative bleed.    POSTOPERATIVE DIAGNOSIS:  Postoperative bleed.    PROCEDURE PERFORMED:  Mediastinal re-exploration with evacuation of clot.    SURGEON:  Yony Shi MD    ASSISTANT:      ANESTHESIA:  General endotracheal with DARI.    COMPLICATIONS:  None.    SPECIMENS REMOVED:  .    IMPLANTS:  .    ESTIMATED BLOOD LOSS:  Moderate.    INDICATIONS:  The patient is well known to me status post three-vessel CABG, maze, Impella 5.5 by me earlier in the day.  He had remained coagulopathic throughout the day with on and off bleeding.  He eventually presented with some tamponade physiology requiring re-exploration.    PROCEDURE:  The patient was taken emergently back to the operating room where he was placed in supine position.  General anesthesia induced.  He was prepped and draped in usual sterile fashion.  The previous incision had been opened in the CVICU.  Mosquera retractor was placed and clot was evacuated.  He was noted to have a moderate amount of clot along the right side of his heart and inferiorly.  This was all removed, washed out with antibiotic saline.  At this point, a meticulous search for bleeding was then undertaken.  The 10-mm graft sewn onto the aorta was hemostatic.  The aortotomy was hemostatic.  The right atriotomy was hemostatic.  Both proximals were hemostatic and all three distals were hemostatic.  No evidence of bleeding.  The pericardium was then copiously irrigated with antibiotic saline.  Chest tubes were replaced.  The sternum was reapproximated with stainless-steel wire, fascia with #1 PDS, subcu closed with 3-0 Vicryl, and the skin was closed with 4-0 Vicryl subcuticular.  All instrument and sponge counts were correct at the end of the case.      YONY SHI

## 2024-02-08 ENCOUNTER — APPOINTMENT (OUTPATIENT)
Dept: CARDIAC CATH/INVASIVE PROCEDURES | Age: 84
DRG: 001 | End: 2024-02-08
Attending: INTERNAL MEDICINE
Payer: MEDICARE

## 2024-02-08 ENCOUNTER — APPOINTMENT (OUTPATIENT)
Dept: GENERAL RADIOLOGY | Age: 84
DRG: 001 | End: 2024-02-08
Attending: THORACIC SURGERY (CARDIOTHORACIC VASCULAR SURGERY)
Payer: MEDICARE

## 2024-02-08 LAB
ANION GAP SERPL CALC-SCNC: 1 MMOL/L (ref 2–11)
ARTERIAL PATENCY WRIST A: ABNORMAL
ARTERIAL PATENCY WRIST A: ABNORMAL
BASE DEFICIT BLD-SCNC: 3.1 MMOL/L
BASE DEFICIT BLDV-SCNC: 2.3 MMOL/L
BDY SITE: ABNORMAL
BDY SITE: ABNORMAL
BUN SERPL-MCNC: 16 MG/DL (ref 8–23)
CA-I BLD-MCNC: 4.23 MG/DL (ref 4–5.2)
CALCIUM SERPL-MCNC: 7.7 MG/DL (ref 8.3–10.4)
CHLORIDE SERPL-SCNC: 118 MMOL/L (ref 103–113)
CO2 SERPL-SCNC: 24 MMOL/L (ref 21–32)
CREAT SERPL-MCNC: 0.6 MG/DL (ref 0.8–1.5)
ECHO BSA: 2.3 M2
EKG DIAGNOSIS: NORMAL
EKG Q-T INTERVAL: 318 MS
EKG QRS DURATION: 102 MS
EKG QTC CALCULATION (BAZETT): 420 MS
EKG R AXIS: 33 DEGREES
EKG T AXIS: -23 DEGREES
EKG VENTRICULAR RATE: 105 BPM
ERYTHROCYTE [DISTWIDTH] IN BLOOD BY AUTOMATED COUNT: 15.8 % (ref 11.9–14.6)
GAS FLOW.O2 O2 DELIVERY SYS: ABNORMAL
GAS FLOW.O2 O2 DELIVERY SYS: ABNORMAL
GLUCOSE BLD STRIP.AUTO-MCNC: 133 MG/DL (ref 65–100)
GLUCOSE BLD STRIP.AUTO-MCNC: 153 MG/DL (ref 65–100)
GLUCOSE BLD STRIP.AUTO-MCNC: 157 MG/DL (ref 65–100)
GLUCOSE SERPL-MCNC: 157 MG/DL (ref 65–100)
HCO3 BLD-SCNC: 19.8 MMOL/L (ref 22–26)
HCO3 BLDV-SCNC: 22.2 MMOL/L (ref 23–28)
HCT VFR BLD AUTO: 28.1 % (ref 41.1–50.3)
HCT VFR BLD AUTO: 29.9 % (ref 41.1–50.3)
HGB BLD-MCNC: 10 G/DL (ref 13.6–17.2)
HGB BLD-MCNC: 9.7 G/DL (ref 13.6–17.2)
LACTATE SERPL-SCNC: 1.5 MMOL/L (ref 0.4–2)
MAGNESIUM SERPL-MCNC: 2 MG/DL (ref 1.8–2.4)
MCH RBC QN AUTO: 29 PG (ref 26.1–32.9)
MCHC RBC AUTO-ENTMCNC: 34.5 G/DL (ref 31.4–35)
MCV RBC AUTO: 84.1 FL (ref 82–102)
MM INDURATION, POC: 0 MM (ref 0–5)
NRBC # BLD: 0 K/UL (ref 0–0.2)
NT PRO BNP: 2152 PG/ML
O2/TOTAL GAS SETTING VFR VENT: 70 %
O2/TOTAL GAS SETTING VFR VENT: 70 %
PCO2 BLD: 26.9 MMHG (ref 35–45)
PCO2 BLDV: 35.9 MMHG (ref 41–51)
PEEP RESPIRATORY: 8 CMH2O
PEEP RESPIRATORY: 8 CMH2O
PH BLD: 7.48 (ref 7.35–7.45)
PH BLDV: 7.4 (ref 7.32–7.42)
PLATELET # BLD AUTO: 101 K/UL (ref 150–450)
PMV BLD AUTO: 11.1 FL (ref 9.4–12.3)
PO2 BLD: 205 MMHG (ref 75–100)
PO2 BLDV: 34 MMHG
POTASSIUM SERPL-SCNC: 4 MMOL/L (ref 3.5–5.1)
PPD, POC: NEGATIVE
PRESSURE SUPPORT SETTING VENT: 10 CMH2O
PRESSURE SUPPORT SETTING VENT: 10 CMH2O
RBC # BLD AUTO: 3.34 M/UL (ref 4.23–5.6)
SAO2 % BLD: 99.8 % (ref 95–98)
SAO2 % BLDV: 65.1 % (ref 65–88)
SERVICE CMNT-IMP: ABNORMAL
SODIUM SERPL-SCNC: 143 MMOL/L (ref 136–146)
SPECIMEN TYPE: ABNORMAL
SPECIMEN TYPE: ABNORMAL
VENTILATION MODE VENT: ABNORMAL
VENTILATION MODE VENT: ABNORMAL
VT SETTING VENT: 520 ML
VT SETTING VENT: 520 ML
WBC # BLD AUTO: 8.2 K/UL (ref 4.3–11.1)

## 2024-02-08 PROCEDURE — 71045 X-RAY EXAM CHEST 1 VIEW: CPT

## 2024-02-08 PROCEDURE — 85027 COMPLETE CBC AUTOMATED: CPT

## 2024-02-08 PROCEDURE — 6360000002 HC RX W HCPCS: Performed by: PHYSICIAN ASSISTANT

## 2024-02-08 PROCEDURE — 80048 BASIC METABOLIC PNL TOTAL CA: CPT

## 2024-02-08 PROCEDURE — 36430 TRANSFUSION BLD/BLD COMPNT: CPT

## 2024-02-08 PROCEDURE — 2100000001 HC CVICU R&B

## 2024-02-08 PROCEDURE — 6360000002 HC RX W HCPCS: Performed by: THORACIC SURGERY (CARDIOTHORACIC VASCULAR SURGERY)

## 2024-02-08 PROCEDURE — 92960 CARDIOVERSION ELECTRIC EXT: CPT

## 2024-02-08 PROCEDURE — 37799 UNLISTED PX VASCULAR SURGERY: CPT

## 2024-02-08 PROCEDURE — 99291 CRITICAL CARE FIRST HOUR: CPT | Performed by: INTERNAL MEDICINE

## 2024-02-08 PROCEDURE — 83880 ASSAY OF NATRIURETIC PEPTIDE: CPT

## 2024-02-08 PROCEDURE — 82803 BLOOD GASES ANY COMBINATION: CPT

## 2024-02-08 PROCEDURE — 5A1955Z RESPIRATORY VENTILATION, GREATER THAN 96 CONSECUTIVE HOURS: ICD-10-PCS | Performed by: THORACIC SURGERY (CARDIOTHORACIC VASCULAR SURGERY)

## 2024-02-08 PROCEDURE — 99152 MOD SED SAME PHYS/QHP 5/>YRS: CPT | Performed by: INTERNAL MEDICINE

## 2024-02-08 PROCEDURE — 2580000003 HC RX 258: Performed by: PHYSICIAN ASSISTANT

## 2024-02-08 PROCEDURE — P9016 RBC LEUKOCYTES REDUCED: HCPCS

## 2024-02-08 PROCEDURE — 2500000003 HC RX 250 WO HCPCS: Performed by: PHYSICIAN ASSISTANT

## 2024-02-08 PROCEDURE — 2500000003 HC RX 250 WO HCPCS: Performed by: THORACIC SURGERY (CARDIOTHORACIC VASCULAR SURGERY)

## 2024-02-08 PROCEDURE — 6370000000 HC RX 637 (ALT 250 FOR IP): Performed by: THORACIC SURGERY (CARDIOTHORACIC VASCULAR SURGERY)

## 2024-02-08 PROCEDURE — 85014 HEMATOCRIT: CPT

## 2024-02-08 PROCEDURE — 0BH17EZ INSERTION OF ENDOTRACHEAL AIRWAY INTO TRACHEA, VIA NATURAL OR ARTIFICIAL OPENING: ICD-10-PCS | Performed by: THORACIC SURGERY (CARDIOTHORACIC VASCULAR SURGERY)

## 2024-02-08 PROCEDURE — 92960 CARDIOVERSION ELECTRIC EXT: CPT | Performed by: INTERNAL MEDICINE

## 2024-02-08 PROCEDURE — 83735 ASSAY OF MAGNESIUM: CPT

## 2024-02-08 PROCEDURE — 83605 ASSAY OF LACTIC ACID: CPT

## 2024-02-08 PROCEDURE — 2580000003 HC RX 258: Performed by: THORACIC SURGERY (CARDIOTHORACIC VASCULAR SURGERY)

## 2024-02-08 PROCEDURE — 6370000000 HC RX 637 (ALT 250 FOR IP): Performed by: PHYSICIAN ASSISTANT

## 2024-02-08 PROCEDURE — 6360000002 HC RX W HCPCS: Performed by: INTERNAL MEDICINE

## 2024-02-08 PROCEDURE — 30233N1 TRANSFUSION OF NONAUTOLOGOUS RED BLOOD CELLS INTO PERIPHERAL VEIN, PERCUTANEOUS APPROACH: ICD-10-PCS | Performed by: THORACIC SURGERY (CARDIOTHORACIC VASCULAR SURGERY)

## 2024-02-08 PROCEDURE — 85018 HEMOGLOBIN: CPT

## 2024-02-08 PROCEDURE — A4216 STERILE WATER/SALINE, 10 ML: HCPCS | Performed by: PHYSICIAN ASSISTANT

## 2024-02-08 PROCEDURE — 93005 ELECTROCARDIOGRAM TRACING: CPT | Performed by: THORACIC SURGERY (CARDIOTHORACIC VASCULAR SURGERY)

## 2024-02-08 PROCEDURE — 94003 VENT MGMT INPAT SUBQ DAY: CPT

## 2024-02-08 PROCEDURE — 82962 GLUCOSE BLOOD TEST: CPT

## 2024-02-08 PROCEDURE — 93010 ELECTROCARDIOGRAM REPORT: CPT | Performed by: INTERNAL MEDICINE

## 2024-02-08 RX ORDER — MIDAZOLAM HYDROCHLORIDE 1 MG/ML
INJECTION INTRAMUSCULAR; INTRAVENOUS PRN
Status: COMPLETED | OUTPATIENT
Start: 2024-02-08 | End: 2024-02-08

## 2024-02-08 RX ORDER — MIDAZOLAM HYDROCHLORIDE 1 MG/ML
INJECTION INTRAMUSCULAR; INTRAVENOUS
Status: DISPENSED
Start: 2024-02-08 | End: 2024-02-09

## 2024-02-08 RX ORDER — NOREPINEPHRINE BITARTRATE 0.02 MG/ML
.01-3.3 INJECTION, SOLUTION INTRAVENOUS CONTINUOUS
Status: DISCONTINUED | OUTPATIENT
Start: 2024-02-08 | End: 2024-02-21

## 2024-02-08 RX ORDER — SODIUM CHLORIDE 9 MG/ML
INJECTION, SOLUTION INTRAVENOUS PRN
Status: DISCONTINUED | OUTPATIENT
Start: 2024-02-08 | End: 2024-02-13

## 2024-02-08 RX ORDER — MIDAZOLAM HYDROCHLORIDE 1 MG/ML
5 INJECTION, SOLUTION INTRAMUSCULAR; INTRAVENOUS ONCE
Status: DISCONTINUED | OUTPATIENT
Start: 2024-02-08 | End: 2024-02-15

## 2024-02-08 RX ORDER — DILTIAZEM HYDROCHLORIDE 5 MG/ML
10 INJECTION INTRAVENOUS ONCE
Status: COMPLETED | OUTPATIENT
Start: 2024-02-08 | End: 2024-02-08

## 2024-02-08 RX ORDER — FUROSEMIDE 10 MG/ML
20 INJECTION INTRAMUSCULAR; INTRAVENOUS ONCE
Status: COMPLETED | OUTPATIENT
Start: 2024-02-08 | End: 2024-02-08

## 2024-02-08 RX ADMIN — ACETAMINOPHEN 650 MG: 325 TABLET ORAL at 01:06

## 2024-02-08 RX ADMIN — SODIUM CHLORIDE: 9 INJECTION, SOLUTION INTRAVENOUS at 18:13

## 2024-02-08 RX ADMIN — SODIUM CHLORIDE 0.01 MCG/KG/MIN: 9 INJECTION, SOLUTION INTRAVENOUS at 06:02

## 2024-02-08 RX ADMIN — MIDAZOLAM 1 MG: 1 INJECTION INTRAMUSCULAR; INTRAVENOUS at 04:31

## 2024-02-08 RX ADMIN — Medication 50 MCG: at 06:48

## 2024-02-08 RX ADMIN — CHLORHEXIDINE GLUCONATE 10 ML: 1.2 RINSE ORAL at 10:59

## 2024-02-08 RX ADMIN — SODIUM CHLORIDE, PRESERVATIVE FREE 30 ML: 5 INJECTION INTRAVENOUS at 11:01

## 2024-02-08 RX ADMIN — ATORVASTATIN CALCIUM 40 MG: 40 TABLET, FILM COATED ORAL at 20:38

## 2024-02-08 RX ADMIN — DEXMEDETOMIDINE 0.6 MCG/KG/HR: 100 INJECTION, SOLUTION, CONCENTRATE INTRAVENOUS at 18:15

## 2024-02-08 RX ADMIN — Medication 50 MCG: at 03:31

## 2024-02-08 RX ADMIN — MIDAZOLAM 2 MG: 1 INJECTION INTRAMUSCULAR; INTRAVENOUS at 14:26

## 2024-02-08 RX ADMIN — FAMOTIDINE 20 MG: 20 TABLET, FILM COATED ORAL at 20:38

## 2024-02-08 RX ADMIN — CHLORHEXIDINE GLUCONATE 10 ML: 1.2 RINSE ORAL at 20:38

## 2024-02-08 RX ADMIN — ACETAMINOPHEN 650 MG: 325 TABLET ORAL at 18:48

## 2024-02-08 RX ADMIN — FENTANYL CITRATE 150 MCG/HR: 0.05 INJECTION, SOLUTION INTRAMUSCULAR; INTRAVENOUS at 01:49

## 2024-02-08 RX ADMIN — DEXMEDETOMIDINE 1.5 MCG/KG/HR: 100 INJECTION, SOLUTION, CONCENTRATE INTRAVENOUS at 02:29

## 2024-02-08 RX ADMIN — AMIODARONE HYDROCHLORIDE 1 MG/MIN: 50 INJECTION, SOLUTION INTRAVENOUS at 22:44

## 2024-02-08 RX ADMIN — DEXMEDETOMIDINE 0.8 MCG/KG/HR: 100 INJECTION, SOLUTION, CONCENTRATE INTRAVENOUS at 12:16

## 2024-02-08 RX ADMIN — FENTANYL CITRATE 75 MCG/HR: 0.05 INJECTION, SOLUTION INTRAMUSCULAR; INTRAVENOUS at 18:17

## 2024-02-08 RX ADMIN — SODIUM CHLORIDE 5 MG/HR: 900 INJECTION, SOLUTION INTRAVENOUS at 05:19

## 2024-02-08 RX ADMIN — DOBUTAMINE HYDROCHLORIDE 1.5 MCG/KG/MIN: 200 INJECTION INTRAVENOUS at 18:14

## 2024-02-08 RX ADMIN — AMIODARONE HYDROCHLORIDE 1 MG/MIN: 50 INJECTION, SOLUTION INTRAVENOUS at 14:05

## 2024-02-08 RX ADMIN — ACETAMINOPHEN 650 MG: 325 TABLET ORAL at 10:59

## 2024-02-08 RX ADMIN — FENTANYL CITRATE 100 MCG/HR: 0.05 INJECTION, SOLUTION INTRAMUSCULAR; INTRAVENOUS at 06:51

## 2024-02-08 RX ADMIN — INSULIN LISPRO 2 UNITS: 100 INJECTION, SOLUTION INTRAVENOUS; SUBCUTANEOUS at 18:03

## 2024-02-08 RX ADMIN — FUROSEMIDE 20 MG: 10 INJECTION, SOLUTION INTRAMUSCULAR; INTRAVENOUS at 11:30

## 2024-02-08 RX ADMIN — INSULIN LISPRO 1 UNITS: 100 INJECTION, SOLUTION INTRAVENOUS; SUBCUTANEOUS at 20:48

## 2024-02-08 RX ADMIN — Medication 0.01 MCG/KG/MIN: at 06:12

## 2024-02-08 RX ADMIN — AMIODARONE HYDROCHLORIDE 1 MG/MIN: 50 INJECTION, SOLUTION INTRAVENOUS at 05:52

## 2024-02-08 RX ADMIN — SODIUM CHLORIDE, PRESERVATIVE FREE 10 ML: 5 INJECTION INTRAVENOUS at 20:48

## 2024-02-08 RX ADMIN — MIDAZOLAM 1 MG: 1 INJECTION INTRAMUSCULAR; INTRAVENOUS at 00:03

## 2024-02-08 RX ADMIN — DEXMEDETOMIDINE 1.2 MCG/KG/HR: 100 INJECTION, SOLUTION, CONCENTRATE INTRAVENOUS at 06:21

## 2024-02-08 RX ADMIN — Medication 50 MCG: at 04:09

## 2024-02-08 RX ADMIN — DEXMEDETOMIDINE 1.2 MCG/KG/HR: 100 INJECTION, SOLUTION, CONCENTRATE INTRAVENOUS at 22:45

## 2024-02-08 RX ADMIN — Medication 50 MCG: at 04:54

## 2024-02-08 RX ADMIN — ASPIRIN 81 MG: 81 TABLET, COATED ORAL at 11:01

## 2024-02-08 RX ADMIN — DILTIAZEM HYDROCHLORIDE 10 MG: 5 INJECTION, SOLUTION INTRAVENOUS at 05:14

## 2024-02-08 RX ADMIN — FAMOTIDINE 20 MG: 10 INJECTION, SOLUTION INTRAVENOUS at 11:00

## 2024-02-08 ASSESSMENT — PULMONARY FUNCTION TESTS
PIF_VALUE: 34
PIF_VALUE: 30
PIF_VALUE: 29
PIF_VALUE: 19
PIF_VALUE: 24

## 2024-02-08 ASSESSMENT — PAIN SCALES - GENERAL
PAINLEVEL_OUTOF10: 0

## 2024-02-08 NOTE — INTERDISCIPLINARY ROUNDS
Multi-D Rounds/Checklist (leapfrog):  Lines: can any be removed?: None   ETT  (Active)     Chest Tube Left Pleural 1 (Active)       Chest Tube Other (Comment) Mediastinal 2 (Active)       Chest Tube Right Pleural 3 (Active)       NG/OG/NJ/NE Tube Orogastric 18 fr (Active)       Urinary Catheter 02/06/24 2 Way;Dang-Temperature (Active)     Introducer 02/06/24 Internal jugular Right (Active)       Arterial Line 02/06/24 Radial (Active)       CVC Double Lumen 02/06/24 Right Internal jugular (Active)       Pulmonary Artery Cath Single 02/06/24 Right Internal jugular (Active)     DVT Prophylaxis: Ordered  Vent: HOB elevated? Yes ;  mL/kg: N/A ; Vent day 3  Nutrition Ordered/appropriate: Per Primary Team  Can antibiotics or other drugs be stopped? Yes/End Date set Yes/No  Inpat Anti-Infectives (From admission, onward)       Start     Ordered Stop    02/06/24 2000  ceFAZolin (ANCEF) 2000 mg in sterile water 20 mL IV syringe  2,000 mg,   IntraVENous,   EVERY 8 HOURS        Note to Pharmacy: Default Settings:Auto-dosed by Weight Q8h x 2 doses  Auto-dosed: Pt Wt<119.9kg-2gm, >120kg-3gm    02/06/24 1322 02/08/24 1159                  Consults needed: None  A: Is pain control adequate? (has PRNs? Stop drip?) Yes  B: Sedation break and SBT? Yes  C: Is sedation choice appropriate? Yes  D: Delirium/CAM-ICU? Yes  E: Mobility goals/appropriateness? Yes  F: Family update and plan? child is surrogate decision maker and is being updated daily by primary attending and nursing staff.    Chen Rm, APRN - CNP

## 2024-02-09 ENCOUNTER — APPOINTMENT (OUTPATIENT)
Dept: GENERAL RADIOLOGY | Age: 84
DRG: 001 | End: 2024-02-09
Attending: THORACIC SURGERY (CARDIOTHORACIC VASCULAR SURGERY)
Payer: MEDICARE

## 2024-02-09 LAB
ABO + RH BLD: NORMAL
ACT BLD: 174 SECS (ref 70–128)
ANION GAP SERPL CALC-SCNC: 0 MMOL/L (ref 2–11)
ARTERIAL PATENCY WRIST A: ABNORMAL
BASE DEFICIT BLD-SCNC: 2.6 MMOL/L
BASE DEFICIT BLD-SCNC: 3.2 MMOL/L
BASE DEFICIT BLDV-SCNC: 3.4 MMOL/L
BDY SITE: ABNORMAL
BLD PROD TYP BPU: NORMAL
BLOOD BANK DISPENSE STATUS: NORMAL
BLOOD GROUP ANTIBODIES SERPL: NORMAL
BPU ID: NORMAL
BUN SERPL-MCNC: 24 MG/DL (ref 8–23)
CALCIUM SERPL-MCNC: 7.6 MG/DL (ref 8.3–10.4)
CHLORIDE SERPL-SCNC: 118 MMOL/L (ref 103–113)
CO2 SERPL-SCNC: 25 MMOL/L (ref 21–32)
CREAT SERPL-MCNC: 0.8 MG/DL (ref 0.8–1.5)
CROSSMATCH RESULT: NORMAL
EKG ATRIAL RATE: 78 BPM
EKG DIAGNOSIS: NORMAL
EKG P AXIS: 60 DEGREES
EKG P-R INTERVAL: 150 MS
EKG Q-T INTERVAL: 424 MS
EKG QRS DURATION: 100 MS
EKG QTC CALCULATION (BAZETT): 483 MS
EKG R AXIS: 94 DEGREES
EKG T AXIS: 12 DEGREES
EKG VENTRICULAR RATE: 78 BPM
ERYTHROCYTE [DISTWIDTH] IN BLOOD BY AUTOMATED COUNT: 16.2 % (ref 11.9–14.6)
ERYTHROCYTE [DISTWIDTH] IN BLOOD BY AUTOMATED COUNT: 16.3 % (ref 11.9–14.6)
GAS FLOW.O2 O2 DELIVERY SYS: ABNORMAL
GLUCOSE BLD STRIP.AUTO-MCNC: 150 MG/DL (ref 65–100)
GLUCOSE BLD STRIP.AUTO-MCNC: 151 MG/DL (ref 65–100)
GLUCOSE BLD STRIP.AUTO-MCNC: 153 MG/DL (ref 65–100)
GLUCOSE SERPL-MCNC: 161 MG/DL (ref 65–100)
HCO3 BLD-SCNC: 21 MMOL/L (ref 22–26)
HCO3 BLD-SCNC: 21.1 MMOL/L (ref 22–26)
HCO3 BLDV-SCNC: 21.8 MMOL/L (ref 23–28)
HCT VFR BLD AUTO: 28.2 % (ref 41.1–50.3)
HCT VFR BLD AUTO: 29.7 % (ref 41.1–50.3)
HGB BLD-MCNC: 9.2 G/DL (ref 13.6–17.2)
HGB BLD-MCNC: 9.8 G/DL (ref 13.6–17.2)
HISTORY CHECK: NORMAL
LACTATE SERPL-SCNC: 2 MMOL/L (ref 0.4–2)
MAGNESIUM SERPL-MCNC: 2 MG/DL (ref 1.8–2.4)
MCH RBC QN AUTO: 28.4 PG (ref 26.1–32.9)
MCH RBC QN AUTO: 28.6 PG (ref 26.1–32.9)
MCHC RBC AUTO-ENTMCNC: 32.6 G/DL (ref 31.4–35)
MCHC RBC AUTO-ENTMCNC: 33 G/DL (ref 31.4–35)
MCV RBC AUTO: 86.6 FL (ref 82–102)
MCV RBC AUTO: 87 FL (ref 82–102)
MM INDURATION, POC: 0 MM (ref 0–5)
NRBC # BLD: 0.02 K/UL (ref 0–0.2)
NRBC # BLD: 0.03 K/UL (ref 0–0.2)
O2/TOTAL GAS SETTING VFR VENT: 40 %
O2/TOTAL GAS SETTING VFR VENT: 50 %
O2/TOTAL GAS SETTING VFR VENT: 50 %
PCO2 BLD: 31.5 MMHG (ref 35–45)
PCO2 BLD: 33.3 MMHG (ref 35–45)
PCO2 BLDV: 38.7 MMHG (ref 41–51)
PEEP RESPIRATORY: 8 CMH2O
PH BLD: 7.41 (ref 7.35–7.45)
PH BLD: 7.44 (ref 7.35–7.45)
PH BLDV: 7.36 (ref 7.32–7.42)
PLATELET # BLD AUTO: 77 K/UL (ref 150–450)
PLATELET # BLD AUTO: 87 K/UL (ref 150–450)
PMV BLD AUTO: 10.8 FL (ref 9.4–12.3)
PMV BLD AUTO: 11.4 FL (ref 9.4–12.3)
PO2 BLD: 151 MMHG (ref 75–100)
PO2 BLD: 99 MMHG (ref 75–100)
PO2 BLDV: 26 MMHG
POTASSIUM SERPL-SCNC: 4.4 MMOL/L (ref 3.5–5.1)
PPD, POC: NEGATIVE
PRESSURE SUPPORT SETTING VENT: 10 CMH2O
PRESSURE SUPPORT SETTING VENT: 10 CMH2O
PRESSURE SUPPORT SETTING VENT: 8 CMH2O
PROCALCITONIN SERPL-MCNC: 0.97 NG/ML (ref 0–0.49)
RBC # BLD AUTO: 3.24 M/UL (ref 4.23–5.6)
RBC # BLD AUTO: 3.43 M/UL (ref 4.23–5.6)
RESPIRATORY RATE, POC: 26 (ref 5–40)
SAO2 % BLD: 98 % (ref 95–98)
SAO2 % BLD: 99.4 % (ref 95–98)
SAO2 % BLDV: 44.1 % (ref 65–88)
SERVICE CMNT-IMP: ABNORMAL
SODIUM SERPL-SCNC: 143 MMOL/L (ref 136–146)
SPECIMEN EXP DATE BLD: NORMAL
SPECIMEN TYPE: ABNORMAL
UNIT DIVISION: 0
VENTILATION MODE VENT: ABNORMAL
VT SETTING VENT: 520 ML
VT SETTING VENT: 520 ML
WBC # BLD AUTO: 6.6 K/UL (ref 4.3–11.1)
WBC # BLD AUTO: 7.4 K/UL (ref 4.3–11.1)

## 2024-02-09 PROCEDURE — A4216 STERILE WATER/SALINE, 10 ML: HCPCS | Performed by: PHYSICIAN ASSISTANT

## 2024-02-09 PROCEDURE — 2580000003 HC RX 258: Performed by: PHYSICIAN ASSISTANT

## 2024-02-09 PROCEDURE — 6370000000 HC RX 637 (ALT 250 FOR IP): Performed by: THORACIC SURGERY (CARDIOTHORACIC VASCULAR SURGERY)

## 2024-02-09 PROCEDURE — 86901 BLOOD TYPING SEROLOGIC RH(D): CPT

## 2024-02-09 PROCEDURE — 71045 X-RAY EXAM CHEST 1 VIEW: CPT

## 2024-02-09 PROCEDURE — 86850 RBC ANTIBODY SCREEN: CPT

## 2024-02-09 PROCEDURE — 2580000003 HC RX 258: Performed by: THORACIC SURGERY (CARDIOTHORACIC VASCULAR SURGERY)

## 2024-02-09 PROCEDURE — 2100000001 HC CVICU R&B

## 2024-02-09 PROCEDURE — 83735 ASSAY OF MAGNESIUM: CPT

## 2024-02-09 PROCEDURE — 37799 UNLISTED PX VASCULAR SURGERY: CPT

## 2024-02-09 PROCEDURE — 99291 CRITICAL CARE FIRST HOUR: CPT | Performed by: INTERNAL MEDICINE

## 2024-02-09 PROCEDURE — 83605 ASSAY OF LACTIC ACID: CPT

## 2024-02-09 PROCEDURE — 84145 PROCALCITONIN (PCT): CPT

## 2024-02-09 PROCEDURE — 85347 COAGULATION TIME ACTIVATED: CPT

## 2024-02-09 PROCEDURE — 87205 SMEAR GRAM STAIN: CPT

## 2024-02-09 PROCEDURE — 80048 BASIC METABOLIC PNL TOTAL CA: CPT

## 2024-02-09 PROCEDURE — 36415 COLL VENOUS BLD VENIPUNCTURE: CPT

## 2024-02-09 PROCEDURE — 87086 URINE CULTURE/COLONY COUNT: CPT

## 2024-02-09 PROCEDURE — 87070 CULTURE OTHR SPECIMN AEROBIC: CPT

## 2024-02-09 PROCEDURE — 86923 COMPATIBILITY TEST ELECTRIC: CPT

## 2024-02-09 PROCEDURE — 6360000002 HC RX W HCPCS: Performed by: THORACIC SURGERY (CARDIOTHORACIC VASCULAR SURGERY)

## 2024-02-09 PROCEDURE — 85027 COMPLETE CBC AUTOMATED: CPT

## 2024-02-09 PROCEDURE — 87186 SC STD MICRODIL/AGAR DIL: CPT

## 2024-02-09 PROCEDURE — 87077 CULTURE AEROBIC IDENTIFY: CPT

## 2024-02-09 PROCEDURE — 36430 TRANSFUSION BLD/BLD COMPNT: CPT

## 2024-02-09 PROCEDURE — 87040 BLOOD CULTURE FOR BACTERIA: CPT

## 2024-02-09 PROCEDURE — 6370000000 HC RX 637 (ALT 250 FOR IP): Performed by: PHYSICIAN ASSISTANT

## 2024-02-09 PROCEDURE — 93005 ELECTROCARDIOGRAM TRACING: CPT | Performed by: PHYSICIAN ASSISTANT

## 2024-02-09 PROCEDURE — 94003 VENT MGMT INPAT SUBQ DAY: CPT

## 2024-02-09 PROCEDURE — 82962 GLUCOSE BLOOD TEST: CPT

## 2024-02-09 PROCEDURE — 93010 ELECTROCARDIOGRAM REPORT: CPT | Performed by: INTERNAL MEDICINE

## 2024-02-09 PROCEDURE — 86900 BLOOD TYPING SEROLOGIC ABO: CPT

## 2024-02-09 PROCEDURE — 2500000003 HC RX 250 WO HCPCS: Performed by: PHYSICIAN ASSISTANT

## 2024-02-09 PROCEDURE — 2700000000 HC OXYGEN THERAPY PER DAY

## 2024-02-09 PROCEDURE — 74018 RADEX ABDOMEN 1 VIEW: CPT

## 2024-02-09 PROCEDURE — P9016 RBC LEUKOCYTES REDUCED: HCPCS

## 2024-02-09 PROCEDURE — 82803 BLOOD GASES ANY COMBINATION: CPT

## 2024-02-09 RX ORDER — FUROSEMIDE 10 MG/ML
20 INJECTION INTRAMUSCULAR; INTRAVENOUS ONCE
Status: COMPLETED | OUTPATIENT
Start: 2024-02-09 | End: 2024-02-09

## 2024-02-09 RX ORDER — SODIUM CHLORIDE 9 MG/ML
INJECTION, SOLUTION INTRAVENOUS PRN
Status: DISCONTINUED | OUTPATIENT
Start: 2024-02-09 | End: 2024-02-15

## 2024-02-09 RX ORDER — HEPARIN SODIUM 10000 [USP'U]/100ML
0-1400 INJECTION, SOLUTION INTRAVENOUS CONTINUOUS
Status: DISCONTINUED | OUTPATIENT
Start: 2024-02-09 | End: 2024-02-15

## 2024-02-09 RX ADMIN — AMIODARONE HYDROCHLORIDE 1 MG/MIN: 50 INJECTION, SOLUTION INTRAVENOUS at 16:10

## 2024-02-09 RX ADMIN — SODIUM CHLORIDE, PRESERVATIVE FREE 10 ML: 5 INJECTION INTRAVENOUS at 09:32

## 2024-02-09 RX ADMIN — FAMOTIDINE 20 MG: 20 TABLET, FILM COATED ORAL at 21:38

## 2024-02-09 RX ADMIN — SODIUM CHLORIDE, PRESERVATIVE FREE 10 ML: 5 INJECTION INTRAVENOUS at 21:39

## 2024-02-09 RX ADMIN — HEPARIN SODIUM: 10000 INJECTION INTRAVENOUS; SUBCUTANEOUS at 15:28

## 2024-02-09 RX ADMIN — DEXMEDETOMIDINE 1.4 MCG/KG/HR: 100 INJECTION, SOLUTION, CONCENTRATE INTRAVENOUS at 06:40

## 2024-02-09 RX ADMIN — ACETAMINOPHEN 650 MG: 325 TABLET ORAL at 09:27

## 2024-02-09 RX ADMIN — FENTANYL CITRATE 75 MCG/HR: 0.05 INJECTION, SOLUTION INTRAMUSCULAR; INTRAVENOUS at 06:26

## 2024-02-09 RX ADMIN — CHLORHEXIDINE GLUCONATE 10 ML: 1.2 RINSE ORAL at 09:29

## 2024-02-09 RX ADMIN — DEXMEDETOMIDINE 1.2 MCG/KG/HR: 100 INJECTION, SOLUTION, CONCENTRATE INTRAVENOUS at 03:04

## 2024-02-09 RX ADMIN — AMIODARONE HYDROCHLORIDE 1 MG/MIN: 50 INJECTION, SOLUTION INTRAVENOUS at 06:29

## 2024-02-09 RX ADMIN — DEXMEDETOMIDINE 1.2 MCG/KG/HR: 100 INJECTION, SOLUTION, CONCENTRATE INTRAVENOUS at 21:40

## 2024-02-09 RX ADMIN — INSULIN LISPRO 1 UNITS: 100 INJECTION, SOLUTION INTRAVENOUS; SUBCUTANEOUS at 21:38

## 2024-02-09 RX ADMIN — INSULIN LISPRO 2 UNITS: 100 INJECTION, SOLUTION INTRAVENOUS; SUBCUTANEOUS at 18:12

## 2024-02-09 RX ADMIN — ATORVASTATIN CALCIUM 40 MG: 40 TABLET, FILM COATED ORAL at 21:38

## 2024-02-09 RX ADMIN — DEXMEDETOMIDINE 1.4 MCG/KG/HR: 100 INJECTION, SOLUTION, CONCENTRATE INTRAVENOUS at 10:22

## 2024-02-09 RX ADMIN — FAMOTIDINE 20 MG: 10 INJECTION, SOLUTION INTRAVENOUS at 09:27

## 2024-02-09 RX ADMIN — FUROSEMIDE 20 MG: 10 INJECTION, SOLUTION INTRAMUSCULAR; INTRAVENOUS at 12:56

## 2024-02-09 RX ADMIN — CHLORHEXIDINE GLUCONATE 10 ML: 1.2 RINSE ORAL at 21:40

## 2024-02-09 RX ADMIN — INSULIN LISPRO 2 UNITS: 100 INJECTION, SOLUTION INTRAVENOUS; SUBCUTANEOUS at 11:29

## 2024-02-09 ASSESSMENT — PAIN SCALES - GENERAL
PAINLEVEL_OUTOF10: 0

## 2024-02-09 ASSESSMENT — PULMONARY FUNCTION TESTS
PIF_VALUE: 16
PIF_VALUE: 21
PIF_VALUE: 24

## 2024-02-09 NOTE — INTERDISCIPLINARY ROUNDS
Multi-D Rounds/Checklist (leapfrog):  Lines: can any be removed?: None   ETT  (Active)     Chest Tube Left Pleural 1 (Active)       Chest Tube Other (Comment) Mediastinal 2 (Active)       Chest Tube Right Pleural 3 (Active)       NG/OG/NJ/NE Tube Orogastric 18 fr (Active)       Urinary Catheter 02/06/24 2 Way;Dang-Temperature (Active)     Introducer 02/06/24 Internal jugular Right (Active)       Arterial Line 02/06/24 Radial (Active)       CVC Double Lumen 02/06/24 Right Internal jugular (Active)       Pulmonary Artery Cath Single 02/06/24 Right Internal jugular (Active)     DVT Prophylaxis: Ordered  Vent: HOB elevated? Yes ;  mL/kg: N/A ; Vent day 3  Nutrition Ordered/appropriate: Per Primary Team  Can antibiotics or other drugs be stopped? N/A Yes/No  Inpat Anti-Infectives (From admission, onward)      None             Consults needed: None  A: Is pain control adequate? (has PRNs? Stop drip?) Yes  B: Sedation break and SBT? Yes  C: Is sedation choice appropriate? Yes  D: Delirium/CAM-ICU? No  E: Mobility goals/appropriateness? Yes  F: Family update and plan? child is surrogate decision maker and is being updated daily by primary attending and nursing staff.    Chen Rm, APRN - CNP

## 2024-02-10 ENCOUNTER — APPOINTMENT (OUTPATIENT)
Dept: GENERAL RADIOLOGY | Age: 84
DRG: 001 | End: 2024-02-10
Attending: THORACIC SURGERY (CARDIOTHORACIC VASCULAR SURGERY)
Payer: MEDICARE

## 2024-02-10 PROBLEM — R57.0 CARDIOGENIC SHOCK (HCC): Status: ACTIVE | Noted: 2024-02-10

## 2024-02-10 PROBLEM — R50.9 FEVER: Status: ACTIVE | Noted: 2024-02-10

## 2024-02-10 PROBLEM — J96.01 ACUTE RESPIRATORY FAILURE WITH HYPOXIA (HCC): Status: ACTIVE | Noted: 2024-02-10

## 2024-02-10 LAB
ABO + RH BLD: NORMAL
ACT BLD: 163 SECS (ref 70–128)
ACT BLD: 168 SECS (ref 70–128)
ACT BLD: 174 SECS (ref 70–128)
ACT BLD: 174 SECS (ref 70–128)
ANION GAP SERPL CALC-SCNC: 1 MMOL/L (ref 2–11)
BACTERIA SPEC CULT: ABNORMAL
BACTERIA SPEC CULT: ABNORMAL
BACTERIA SPEC CULT: NORMAL
BASOPHILS # BLD: 0 K/UL (ref 0–0.2)
BASOPHILS NFR BLD: 0 % (ref 0–2)
BLD PROD TYP BPU: NORMAL
BLOOD BANK DISPENSE STATUS: NORMAL
BLOOD GROUP ANTIBODIES SERPL: NORMAL
BPU ID: NORMAL
BUN SERPL-MCNC: 24 MG/DL (ref 8–23)
CALCIUM SERPL-MCNC: 7.7 MG/DL (ref 8.3–10.4)
CHLORIDE SERPL-SCNC: 116 MMOL/L (ref 103–113)
CO2 SERPL-SCNC: 24 MMOL/L (ref 21–32)
CREAT SERPL-MCNC: 0.5 MG/DL (ref 0.8–1.5)
CROSSMATCH RESULT: NORMAL
DIFFERENTIAL METHOD BLD: ABNORMAL
EKG ATRIAL RATE: 73 BPM
EKG DIAGNOSIS: NORMAL
EKG P AXIS: 61 DEGREES
EKG P-R INTERVAL: 192 MS
EKG Q-T INTERVAL: 426 MS
EKG QRS DURATION: 104 MS
EKG QTC CALCULATION (BAZETT): 469 MS
EKG R AXIS: 20 DEGREES
EKG T AXIS: -11 DEGREES
EKG VENTRICULAR RATE: 73 BPM
EOSINOPHIL # BLD: 0 K/UL (ref 0–0.8)
EOSINOPHIL NFR BLD: 0 % (ref 0.5–7.8)
ERYTHROCYTE [DISTWIDTH] IN BLOOD BY AUTOMATED COUNT: 16.3 % (ref 11.9–14.6)
GLUCOSE BLD STRIP.AUTO-MCNC: 125 MG/DL (ref 65–100)
GLUCOSE BLD STRIP.AUTO-MCNC: 127 MG/DL (ref 65–100)
GLUCOSE BLD STRIP.AUTO-MCNC: 146 MG/DL (ref 65–100)
GLUCOSE BLD STRIP.AUTO-MCNC: 146 MG/DL (ref 65–100)
GLUCOSE SERPL-MCNC: 150 MG/DL (ref 65–100)
GRAM STN SPEC: ABNORMAL
HCT VFR BLD AUTO: 28.5 % (ref 41.1–50.3)
HGB BLD-MCNC: 9.2 G/DL (ref 13.6–17.2)
IMM GRANULOCYTES # BLD AUTO: 0.1 K/UL (ref 0–0.5)
IMM GRANULOCYTES NFR BLD AUTO: 1 % (ref 0–5)
LYMPHOCYTES # BLD: 0.8 K/UL (ref 0.5–4.6)
LYMPHOCYTES NFR BLD: 13 % (ref 13–44)
MCH RBC QN AUTO: 28 PG (ref 26.1–32.9)
MCHC RBC AUTO-ENTMCNC: 32.3 G/DL (ref 31.4–35)
MCV RBC AUTO: 86.9 FL (ref 82–102)
MONOCYTES # BLD: 1 K/UL (ref 0.1–1.3)
MONOCYTES NFR BLD: 15 % (ref 4–12)
NEUTS SEG # BLD: 4.4 K/UL (ref 1.7–8.2)
NEUTS SEG NFR BLD: 71 % (ref 43–78)
NRBC # BLD: 0.05 K/UL (ref 0–0.2)
PLATELET # BLD AUTO: 74 K/UL (ref 150–450)
PMV BLD AUTO: 11.4 FL (ref 9.4–12.3)
POTASSIUM SERPL-SCNC: 4 MMOL/L (ref 3.5–5.1)
RBC # BLD AUTO: 3.28 M/UL (ref 4.23–5.6)
SERVICE CMNT-IMP: ABNORMAL
SERVICE CMNT-IMP: NORMAL
SODIUM SERPL-SCNC: 141 MMOL/L (ref 136–146)
SPECIMEN EXP DATE BLD: NORMAL
UNIT DIVISION: 0
WBC # BLD AUTO: 6.3 K/UL (ref 4.3–11.1)

## 2024-02-10 PROCEDURE — 2100000001 HC CVICU R&B

## 2024-02-10 PROCEDURE — 2500000003 HC RX 250 WO HCPCS: Performed by: PHYSICIAN ASSISTANT

## 2024-02-10 PROCEDURE — 71045 X-RAY EXAM CHEST 1 VIEW: CPT

## 2024-02-10 PROCEDURE — 6360000002 HC RX W HCPCS: Performed by: THORACIC SURGERY (CARDIOTHORACIC VASCULAR SURGERY)

## 2024-02-10 PROCEDURE — 2580000003 HC RX 258: Performed by: THORACIC SURGERY (CARDIOTHORACIC VASCULAR SURGERY)

## 2024-02-10 PROCEDURE — 6360000002 HC RX W HCPCS: Performed by: INTERNAL MEDICINE

## 2024-02-10 PROCEDURE — 87641 MR-STAPH DNA AMP PROBE: CPT

## 2024-02-10 PROCEDURE — A4216 STERILE WATER/SALINE, 10 ML: HCPCS | Performed by: PHYSICIAN ASSISTANT

## 2024-02-10 PROCEDURE — 85025 COMPLETE CBC W/AUTO DIFF WBC: CPT

## 2024-02-10 PROCEDURE — 2580000003 HC RX 258: Performed by: PHYSICIAN ASSISTANT

## 2024-02-10 PROCEDURE — 93010 ELECTROCARDIOGRAM REPORT: CPT | Performed by: INTERNAL MEDICINE

## 2024-02-10 PROCEDURE — 2580000003 HC RX 258: Performed by: INTERNAL MEDICINE

## 2024-02-10 PROCEDURE — 99291 CRITICAL CARE FIRST HOUR: CPT | Performed by: INTERNAL MEDICINE

## 2024-02-10 PROCEDURE — 6370000000 HC RX 637 (ALT 250 FOR IP): Performed by: PHYSICIAN ASSISTANT

## 2024-02-10 PROCEDURE — 93005 ELECTROCARDIOGRAM TRACING: CPT | Performed by: PHYSICIAN ASSISTANT

## 2024-02-10 PROCEDURE — 80048 BASIC METABOLIC PNL TOTAL CA: CPT

## 2024-02-10 PROCEDURE — 37799 UNLISTED PX VASCULAR SURGERY: CPT

## 2024-02-10 PROCEDURE — 2700000000 HC OXYGEN THERAPY PER DAY

## 2024-02-10 PROCEDURE — 92610 EVALUATE SWALLOWING FUNCTION: CPT

## 2024-02-10 PROCEDURE — 85347 COAGULATION TIME ACTIVATED: CPT

## 2024-02-10 PROCEDURE — 82962 GLUCOSE BLOOD TEST: CPT

## 2024-02-10 RX ADMIN — SODIUM CHLORIDE, PRESERVATIVE FREE 10 ML: 5 INJECTION INTRAVENOUS at 11:52

## 2024-02-10 RX ADMIN — AMIODARONE HYDROCHLORIDE 0.5 MG/MIN: 50 INJECTION, SOLUTION INTRAVENOUS at 15:39

## 2024-02-10 RX ADMIN — AMIODARONE HYDROCHLORIDE 1 MG/MIN: 50 INJECTION, SOLUTION INTRAVENOUS at 01:00

## 2024-02-10 RX ADMIN — INSULIN LISPRO 2 UNITS: 100 INJECTION, SOLUTION INTRAVENOUS; SUBCUTANEOUS at 12:28

## 2024-02-10 RX ADMIN — AMIODARONE HYDROCHLORIDE 0.5 MG/MIN: 50 INJECTION, SOLUTION INTRAVENOUS at 07:04

## 2024-02-10 RX ADMIN — DOBUTAMINE HYDROCHLORIDE 1.5 MCG/KG/MIN: 200 INJECTION INTRAVENOUS at 03:21

## 2024-02-10 RX ADMIN — DEXMEDETOMIDINE 0.4 MCG/KG/HR: 100 INJECTION, SOLUTION, CONCENTRATE INTRAVENOUS at 20:12

## 2024-02-10 RX ADMIN — SODIUM CHLORIDE, PRESERVATIVE FREE 10 ML: 5 INJECTION INTRAVENOUS at 20:49

## 2024-02-10 RX ADMIN — ATORVASTATIN CALCIUM 40 MG: 40 TABLET, FILM COATED ORAL at 21:00

## 2024-02-10 RX ADMIN — INSULIN LISPRO 2 UNITS: 100 INJECTION, SOLUTION INTRAVENOUS; SUBCUTANEOUS at 09:46

## 2024-02-10 RX ADMIN — DEXMEDETOMIDINE 1.4 MCG/KG/HR: 100 INJECTION, SOLUTION, CONCENTRATE INTRAVENOUS at 09:38

## 2024-02-10 RX ADMIN — DEXMEDETOMIDINE 1.5 MCG/KG/HR: 100 INJECTION, SOLUTION, CONCENTRATE INTRAVENOUS at 03:23

## 2024-02-10 RX ADMIN — CEFEPIME 2000 MG: 2 INJECTION, POWDER, FOR SOLUTION INTRAVENOUS at 20:38

## 2024-02-10 RX ADMIN — VANCOMYCIN HYDROCHLORIDE 1500 MG: 10 INJECTION, POWDER, LYOPHILIZED, FOR SOLUTION INTRAVENOUS at 23:41

## 2024-02-10 RX ADMIN — FAMOTIDINE 20 MG: 10 INJECTION, SOLUTION INTRAVENOUS at 20:51

## 2024-02-10 RX ADMIN — FAMOTIDINE 20 MG: 20 TABLET, FILM COATED ORAL at 09:47

## 2024-02-10 RX ADMIN — VANCOMYCIN HYDROCHLORIDE 2000 MG: 10 INJECTION, POWDER, LYOPHILIZED, FOR SOLUTION INTRAVENOUS at 12:44

## 2024-02-10 RX ADMIN — CHLORHEXIDINE GLUCONATE 10 ML: 1.2 RINSE ORAL at 20:41

## 2024-02-10 RX ADMIN — HEPARIN SODIUM: 10000 INJECTION INTRAVENOUS; SUBCUTANEOUS at 16:44

## 2024-02-10 RX ADMIN — HEPARIN SODIUM 500 UNITS/HR: 10000 INJECTION, SOLUTION INTRAVENOUS at 15:38

## 2024-02-10 RX ADMIN — WATER 2000 MG: 1 INJECTION INTRAMUSCULAR; INTRAVENOUS; SUBCUTANEOUS at 12:29

## 2024-02-10 RX ADMIN — DEXMEDETOMIDINE 1 MCG/KG/HR: 100 INJECTION, SOLUTION, CONCENTRATE INTRAVENOUS at 12:39

## 2024-02-10 RX ADMIN — DEXMEDETOMIDINE 1.5 MCG/KG/HR: 100 INJECTION, SOLUTION, CONCENTRATE INTRAVENOUS at 01:21

## 2024-02-10 ASSESSMENT — PAIN SCALES - GENERAL
PAINLEVEL_OUTOF10: 0

## 2024-02-11 ENCOUNTER — APPOINTMENT (OUTPATIENT)
Dept: GENERAL RADIOLOGY | Age: 84
DRG: 001 | End: 2024-02-11
Attending: THORACIC SURGERY (CARDIOTHORACIC VASCULAR SURGERY)
Payer: MEDICARE

## 2024-02-11 LAB
ACT BLD: 158 SECS (ref 70–128)
ACT BLD: 163 SECS (ref 70–128)
ANION GAP SERPL CALC-SCNC: 4 MMOL/L (ref 2–11)
BACTERIA SPEC CULT: ABNORMAL
BACTERIA SPEC CULT: NORMAL
BASOPHILS # BLD: 0 K/UL (ref 0–0.2)
BASOPHILS NFR BLD: 0 % (ref 0–2)
BUN SERPL-MCNC: 21 MG/DL (ref 8–23)
CALCIUM SERPL-MCNC: 7.9 MG/DL (ref 8.3–10.4)
CHLORIDE SERPL-SCNC: 118 MMOL/L (ref 103–113)
CO2 SERPL-SCNC: 22 MMOL/L (ref 21–32)
CREAT SERPL-MCNC: 0.6 MG/DL (ref 0.8–1.5)
DIFFERENTIAL METHOD BLD: ABNORMAL
EKG DIAGNOSIS: NORMAL
EKG Q-T INTERVAL: 390 MS
EKG QRS DURATION: 108 MS
EKG QTC CALCULATION (BAZETT): 492 MS
EKG R AXIS: -7 DEGREES
EKG T AXIS: -16 DEGREES
EKG VENTRICULAR RATE: 96 BPM
EOSINOPHIL # BLD: 0.1 K/UL (ref 0–0.8)
EOSINOPHIL NFR BLD: 1 % (ref 0.5–7.8)
ERYTHROCYTE [DISTWIDTH] IN BLOOD BY AUTOMATED COUNT: 16.4 % (ref 11.9–14.6)
GLUCOSE BLD STRIP.AUTO-MCNC: 143 MG/DL (ref 65–100)
GLUCOSE BLD STRIP.AUTO-MCNC: 144 MG/DL (ref 65–100)
GLUCOSE BLD STRIP.AUTO-MCNC: 159 MG/DL (ref 65–100)
GLUCOSE BLD STRIP.AUTO-MCNC: 176 MG/DL (ref 65–100)
GLUCOSE SERPL-MCNC: 134 MG/DL (ref 65–100)
GRAM STN SPEC: ABNORMAL
HCT VFR BLD AUTO: 29.4 % (ref 41.1–50.3)
HGB BLD-MCNC: 9.4 G/DL (ref 13.6–17.2)
IMM GRANULOCYTES # BLD AUTO: 0.2 K/UL (ref 0–0.5)
IMM GRANULOCYTES NFR BLD AUTO: 2 % (ref 0–5)
LYMPHOCYTES # BLD: 0.8 K/UL (ref 0.5–4.6)
LYMPHOCYTES NFR BLD: 9 % (ref 13–44)
MAGNESIUM SERPL-MCNC: 2.3 MG/DL (ref 1.8–2.4)
MCH RBC QN AUTO: 28.2 PG (ref 26.1–32.9)
MCHC RBC AUTO-ENTMCNC: 32 G/DL (ref 31.4–35)
MCV RBC AUTO: 88.3 FL (ref 82–102)
MONOCYTES # BLD: 1.2 K/UL (ref 0.1–1.3)
MONOCYTES NFR BLD: 14 % (ref 4–12)
MRSA DNA SPEC QL NAA+PROBE: NOT DETECTED
NEUTS SEG # BLD: 6.1 K/UL (ref 1.7–8.2)
NEUTS SEG NFR BLD: 74 % (ref 43–78)
NRBC # BLD: 0.06 K/UL (ref 0–0.2)
PLATELET # BLD AUTO: 94 K/UL (ref 150–450)
PLATELET COMMENT: ABNORMAL
PMV BLD AUTO: 12.1 FL (ref 9.4–12.3)
POTASSIUM SERPL-SCNC: 3.9 MMOL/L (ref 3.5–5.1)
RBC # BLD AUTO: 3.33 M/UL (ref 4.23–5.6)
RBC MORPH BLD: ABNORMAL
RBC MORPH BLD: ABNORMAL
S AUREUS CPE NOSE QL NAA+PROBE: NOT DETECTED
SERVICE CMNT-IMP: ABNORMAL
SERVICE CMNT-IMP: NORMAL
SODIUM SERPL-SCNC: 144 MMOL/L (ref 136–146)
VANCOMYCIN SERPL-MCNC: 9.8 UG/ML
WBC # BLD AUTO: 8.4 K/UL (ref 4.3–11.1)

## 2024-02-11 PROCEDURE — 6360000002 HC RX W HCPCS: Performed by: INTERNAL MEDICINE

## 2024-02-11 PROCEDURE — 93010 ELECTROCARDIOGRAM REPORT: CPT | Performed by: INTERNAL MEDICINE

## 2024-02-11 PROCEDURE — 83735 ASSAY OF MAGNESIUM: CPT

## 2024-02-11 PROCEDURE — 92526 ORAL FUNCTION THERAPY: CPT

## 2024-02-11 PROCEDURE — A4216 STERILE WATER/SALINE, 10 ML: HCPCS | Performed by: PHYSICIAN ASSISTANT

## 2024-02-11 PROCEDURE — C1713 ANCHOR/SCREW BN/BN,TIS/BN: HCPCS

## 2024-02-11 PROCEDURE — 94640 AIRWAY INHALATION TREATMENT: CPT

## 2024-02-11 PROCEDURE — 94664 DEMO&/EVAL PT USE INHALER: CPT

## 2024-02-11 PROCEDURE — 37799 UNLISTED PX VASCULAR SURGERY: CPT

## 2024-02-11 PROCEDURE — 71045 X-RAY EXAM CHEST 1 VIEW: CPT

## 2024-02-11 PROCEDURE — 2580000003 HC RX 258: Performed by: PHYSICIAN ASSISTANT

## 2024-02-11 PROCEDURE — 85025 COMPLETE CBC W/AUTO DIFF WBC: CPT

## 2024-02-11 PROCEDURE — 2580000003 HC RX 258: Performed by: THORACIC SURGERY (CARDIOTHORACIC VASCULAR SURGERY)

## 2024-02-11 PROCEDURE — 80048 BASIC METABOLIC PNL TOTAL CA: CPT

## 2024-02-11 PROCEDURE — 2580000003 HC RX 258: Performed by: INTERNAL MEDICINE

## 2024-02-11 PROCEDURE — 6370000000 HC RX 637 (ALT 250 FOR IP): Performed by: THORACIC SURGERY (CARDIOTHORACIC VASCULAR SURGERY)

## 2024-02-11 PROCEDURE — 99291 CRITICAL CARE FIRST HOUR: CPT | Performed by: INTERNAL MEDICINE

## 2024-02-11 PROCEDURE — 85347 COAGULATION TIME ACTIVATED: CPT

## 2024-02-11 PROCEDURE — 80202 ASSAY OF VANCOMYCIN: CPT

## 2024-02-11 PROCEDURE — 6360000002 HC RX W HCPCS: Performed by: PHYSICIAN ASSISTANT

## 2024-02-11 PROCEDURE — 2500000003 HC RX 250 WO HCPCS

## 2024-02-11 PROCEDURE — 6360000002 HC RX W HCPCS: Performed by: THORACIC SURGERY (CARDIOTHORACIC VASCULAR SURGERY)

## 2024-02-11 PROCEDURE — 93005 ELECTROCARDIOGRAM TRACING: CPT | Performed by: PHYSICIAN ASSISTANT

## 2024-02-11 PROCEDURE — 82962 GLUCOSE BLOOD TEST: CPT

## 2024-02-11 PROCEDURE — 2500000003 HC RX 250 WO HCPCS: Performed by: PHYSICIAN ASSISTANT

## 2024-02-11 PROCEDURE — 6370000000 HC RX 637 (ALT 250 FOR IP): Performed by: PHYSICIAN ASSISTANT

## 2024-02-11 PROCEDURE — 2100000001 HC CVICU R&B

## 2024-02-11 RX ORDER — GUAIFENESIN 200 MG/10ML
200 LIQUID ORAL EVERY 4 HOURS PRN
Status: DISCONTINUED | OUTPATIENT
Start: 2024-02-11 | End: 2024-02-22

## 2024-02-11 RX ORDER — DIGOXIN 0.25 MG/ML
250 INJECTION INTRAMUSCULAR; INTRAVENOUS ONCE
Status: COMPLETED | OUTPATIENT
Start: 2024-02-11 | End: 2024-02-11

## 2024-02-11 RX ORDER — FUROSEMIDE 10 MG/ML
40 INJECTION INTRAMUSCULAR; INTRAVENOUS ONCE
Status: COMPLETED | OUTPATIENT
Start: 2024-02-11 | End: 2024-02-11

## 2024-02-11 RX ORDER — POLYETHYLENE GLYCOL 3350 17 G/17G
17 POWDER, FOR SOLUTION ORAL DAILY PRN
Status: DISCONTINUED | OUTPATIENT
Start: 2024-02-11 | End: 2024-02-15

## 2024-02-11 RX ORDER — ALBUTEROL SULFATE 2.5 MG/3ML
2.5 SOLUTION RESPIRATORY (INHALATION)
Status: DISCONTINUED | OUTPATIENT
Start: 2024-02-11 | End: 2024-02-27 | Stop reason: HOSPADM

## 2024-02-11 RX ADMIN — ALBUTEROL SULFATE 2.5 MG: 2.5 SOLUTION RESPIRATORY (INHALATION) at 11:20

## 2024-02-11 RX ADMIN — ALBUTEROL SULFATE 2.5 MG: 2.5 SOLUTION RESPIRATORY (INHALATION) at 15:56

## 2024-02-11 RX ADMIN — CEFEPIME 2000 MG: 2 INJECTION, POWDER, FOR SOLUTION INTRAVENOUS at 03:54

## 2024-02-11 RX ADMIN — AMIODARONE HYDROCHLORIDE 1 MG/MIN: 50 INJECTION, SOLUTION INTRAVENOUS at 08:11

## 2024-02-11 RX ADMIN — AMIODARONE HYDROCHLORIDE 150 MG: 50 INJECTION, SOLUTION INTRAVENOUS at 07:55

## 2024-02-11 RX ADMIN — SODIUM CHLORIDE, PRESERVATIVE FREE 10 ML: 5 INJECTION INTRAVENOUS at 09:06

## 2024-02-11 RX ADMIN — INSULIN LISPRO 2 UNITS: 100 INJECTION, SOLUTION INTRAVENOUS; SUBCUTANEOUS at 08:59

## 2024-02-11 RX ADMIN — PHENYLEPHRINE HYDROCHLORIDE 10 MCG/MIN: 10 INJECTION INTRAVENOUS at 08:26

## 2024-02-11 RX ADMIN — CEFEPIME 2000 MG: 2 INJECTION, POWDER, FOR SOLUTION INTRAVENOUS at 21:04

## 2024-02-11 RX ADMIN — DEXMEDETOMIDINE 1 MCG/KG/HR: 100 INJECTION, SOLUTION, CONCENTRATE INTRAVENOUS at 06:50

## 2024-02-11 RX ADMIN — DEXMEDETOMIDINE 1 MCG/KG/HR: 100 INJECTION, SOLUTION, CONCENTRATE INTRAVENOUS at 02:33

## 2024-02-11 RX ADMIN — INSULIN LISPRO 1 UNITS: 100 INJECTION, SOLUTION INTRAVENOUS; SUBCUTANEOUS at 21:25

## 2024-02-11 RX ADMIN — HEPARIN SODIUM: 10000 INJECTION INTRAVENOUS; SUBCUTANEOUS at 17:19

## 2024-02-11 RX ADMIN — INSULIN LISPRO 2 UNITS: 100 INJECTION, SOLUTION INTRAVENOUS; SUBCUTANEOUS at 11:48

## 2024-02-11 RX ADMIN — CHLORHEXIDINE GLUCONATE 10 ML: 1.2 RINSE ORAL at 21:08

## 2024-02-11 RX ADMIN — DIGOXIN 250 MCG: 0.25 INJECTION INTRAMUSCULAR; INTRAVENOUS at 23:48

## 2024-02-11 RX ADMIN — ONDANSETRON 4 MG: 2 INJECTION INTRAMUSCULAR; INTRAVENOUS at 20:50

## 2024-02-11 RX ADMIN — FAMOTIDINE 20 MG: 20 TABLET, FILM COATED ORAL at 09:05

## 2024-02-11 RX ADMIN — FUROSEMIDE 40 MG: 10 INJECTION, SOLUTION INTRAVENOUS at 10:36

## 2024-02-11 RX ADMIN — GUAIFENESIN 200 MG: 200 SOLUTION ORAL at 22:18

## 2024-02-11 RX ADMIN — FAMOTIDINE 20 MG: 10 INJECTION, SOLUTION INTRAVENOUS at 21:08

## 2024-02-11 RX ADMIN — ACETAMINOPHEN 650 MG: 325 TABLET ORAL at 04:24

## 2024-02-11 RX ADMIN — CEFEPIME 2000 MG: 2 INJECTION, POWDER, FOR SOLUTION INTRAVENOUS at 11:43

## 2024-02-11 RX ADMIN — INSULIN LISPRO 2 UNITS: 100 INJECTION, SOLUTION INTRAVENOUS; SUBCUTANEOUS at 17:08

## 2024-02-11 RX ADMIN — ATORVASTATIN CALCIUM 40 MG: 40 TABLET, FILM COATED ORAL at 21:08

## 2024-02-11 RX ADMIN — ALBUTEROL SULFATE 2.5 MG: 2.5 SOLUTION RESPIRATORY (INHALATION) at 19:09

## 2024-02-11 RX ADMIN — AMIODARONE HYDROCHLORIDE 1 MG/MIN: 50 INJECTION, SOLUTION INTRAVENOUS at 16:45

## 2024-02-11 RX ADMIN — DIGOXIN 250 MCG: 0.25 INJECTION INTRAMUSCULAR; INTRAVENOUS at 17:10

## 2024-02-11 RX ADMIN — SODIUM CHLORIDE, PRESERVATIVE FREE 10 ML: 5 INJECTION INTRAVENOUS at 21:31

## 2024-02-11 RX ADMIN — POTASSIUM CHLORIDE 20 MEQ: 400 INJECTION, SOLUTION INTRAVENOUS at 09:05

## 2024-02-11 RX ADMIN — DOCUSATE SODIUM 100 MG: 50 LIQUID ORAL at 15:24

## 2024-02-11 ASSESSMENT — PAIN SCALES - GENERAL
PAINLEVEL_OUTOF10: 0

## 2024-02-12 ENCOUNTER — APPOINTMENT (OUTPATIENT)
Dept: GENERAL RADIOLOGY | Age: 84
DRG: 001 | End: 2024-02-12
Attending: THORACIC SURGERY (CARDIOTHORACIC VASCULAR SURGERY)
Payer: MEDICARE

## 2024-02-12 ENCOUNTER — ANESTHESIA EVENT (OUTPATIENT)
Dept: SURGERY | Age: 84
End: 2024-02-12
Payer: MEDICARE

## 2024-02-12 ENCOUNTER — PREP FOR PROCEDURE (OUTPATIENT)
Dept: CARDIOTHORACIC SURGERY | Age: 84
End: 2024-02-12

## 2024-02-12 PROBLEM — J15.69: Status: ACTIVE | Noted: 2024-02-12

## 2024-02-12 PROBLEM — J15.0: Status: ACTIVE | Noted: 2024-02-12

## 2024-02-12 LAB
ABO + RH BLD: NORMAL
ACT BLD: 158 SECS (ref 70–128)
ACT BLD: 163 SECS (ref 70–128)
ACT BLD: 168 SECS (ref 70–128)
ACT BLD: 168 SECS (ref 70–128)
ACT BLD: 174 SECS (ref 70–128)
ANION GAP SERPL CALC-SCNC: 6 MMOL/L (ref 2–11)
BACTERIA SPEC CULT: NORMAL
BACTERIA SPEC CULT: NORMAL
BASOPHILS # BLD: 0.2 K/UL (ref 0–0.2)
BASOPHILS NFR BLD: 1 % (ref 0–2)
BLD PROD TYP BPU: NORMAL
BLOOD BANK DISPENSE STATUS: NORMAL
BLOOD GROUP ANTIBODIES SERPL: NORMAL
BPU ID: NORMAL
BUN SERPL-MCNC: 23 MG/DL (ref 8–23)
CALCIUM SERPL-MCNC: 8.2 MG/DL (ref 8.3–10.4)
CHLORIDE SERPL-SCNC: 114 MMOL/L (ref 103–113)
CO2 SERPL-SCNC: 22 MMOL/L (ref 21–32)
CREAT SERPL-MCNC: 0.8 MG/DL (ref 0.8–1.5)
CROSSMATCH RESULT: NORMAL
DIFFERENTIAL METHOD BLD: ABNORMAL
EKG DIAGNOSIS: NORMAL
EKG Q-T INTERVAL: 328 MS
EKG QRS DURATION: 104 MS
EKG QTC CALCULATION (BAZETT): 463 MS
EKG R AXIS: 52 DEGREES
EKG T AXIS: -20 DEGREES
EKG VENTRICULAR RATE: 120 BPM
EOSINOPHIL # BLD: 0 K/UL (ref 0–0.8)
EOSINOPHIL NFR BLD: 0 % (ref 0.5–7.8)
ERYTHROCYTE [DISTWIDTH] IN BLOOD BY AUTOMATED COUNT: 16.9 % (ref 11.9–14.6)
GLUCOSE BLD STRIP.AUTO-MCNC: 136 MG/DL (ref 65–100)
GLUCOSE BLD STRIP.AUTO-MCNC: 137 MG/DL (ref 65–100)
GLUCOSE BLD STRIP.AUTO-MCNC: 141 MG/DL (ref 65–100)
GLUCOSE BLD STRIP.AUTO-MCNC: 153 MG/DL (ref 65–100)
GLUCOSE SERPL-MCNC: 193 MG/DL (ref 65–100)
HCT VFR BLD AUTO: 34.2 % (ref 41.1–50.3)
HGB BLD-MCNC: 10.6 G/DL (ref 13.6–17.2)
IMM GRANULOCYTES # BLD AUTO: 1 K/UL (ref 0–0.5)
IMM GRANULOCYTES NFR BLD AUTO: 6 % (ref 0–5)
LYMPHOCYTES # BLD: 1.2 K/UL (ref 0.5–4.6)
LYMPHOCYTES NFR BLD: 7 % (ref 13–44)
MAGNESIUM SERPL-MCNC: 2.4 MG/DL (ref 1.8–2.4)
MCH RBC QN AUTO: 28.3 PG (ref 26.1–32.9)
MCHC RBC AUTO-ENTMCNC: 31 G/DL (ref 31.4–35)
MCV RBC AUTO: 91.4 FL (ref 82–102)
MONOCYTES # BLD: 2.2 K/UL (ref 0.1–1.3)
MONOCYTES NFR BLD: 13 % (ref 4–12)
NEUTS SEG # BLD: 12.6 K/UL (ref 1.7–8.2)
NEUTS SEG NFR BLD: 73 % (ref 43–78)
NRBC # BLD: 0.44 K/UL (ref 0–0.2)
PHOSPHATE SERPL-MCNC: 3.4 MG/DL (ref 2.3–3.7)
PLATELET # BLD AUTO: 195 K/UL (ref 150–450)
PLATELET COMMENT: ADEQUATE
PMV BLD AUTO: 12.4 FL (ref 9.4–12.3)
POTASSIUM SERPL-SCNC: 4.1 MMOL/L (ref 3.5–5.1)
RBC # BLD AUTO: 3.74 M/UL (ref 4.23–5.6)
RBC MORPH BLD: ABNORMAL
RBC MORPH BLD: ABNORMAL
SERVICE CMNT-IMP: ABNORMAL
SERVICE CMNT-IMP: NORMAL
SERVICE CMNT-IMP: NORMAL
SODIUM SERPL-SCNC: 142 MMOL/L (ref 136–146)
SPECIMEN EXP DATE BLD: NORMAL
UNIT DIVISION: 0
WBC # BLD AUTO: 17.2 K/UL (ref 4.3–11.1)
WBC MORPH BLD: ABNORMAL

## 2024-02-12 PROCEDURE — 85347 COAGULATION TIME ACTIVATED: CPT

## 2024-02-12 PROCEDURE — 2580000003 HC RX 258: Performed by: THORACIC SURGERY (CARDIOTHORACIC VASCULAR SURGERY)

## 2024-02-12 PROCEDURE — 85025 COMPLETE CBC W/AUTO DIFF WBC: CPT

## 2024-02-12 PROCEDURE — 6360000002 HC RX W HCPCS: Performed by: INTERNAL MEDICINE

## 2024-02-12 PROCEDURE — 6370000000 HC RX 637 (ALT 250 FOR IP): Performed by: THORACIC SURGERY (CARDIOTHORACIC VASCULAR SURGERY)

## 2024-02-12 PROCEDURE — 2580000003 HC RX 258: Performed by: PHYSICIAN ASSISTANT

## 2024-02-12 PROCEDURE — 6360000002 HC RX W HCPCS

## 2024-02-12 PROCEDURE — 37799 UNLISTED PX VASCULAR SURGERY: CPT

## 2024-02-12 PROCEDURE — 99291 CRITICAL CARE FIRST HOUR: CPT | Performed by: INTERNAL MEDICINE

## 2024-02-12 PROCEDURE — 84100 ASSAY OF PHOSPHORUS: CPT

## 2024-02-12 PROCEDURE — 6370000000 HC RX 637 (ALT 250 FOR IP): Performed by: INTERNAL MEDICINE

## 2024-02-12 PROCEDURE — 80048 BASIC METABOLIC PNL TOTAL CA: CPT

## 2024-02-12 PROCEDURE — 94640 AIRWAY INHALATION TREATMENT: CPT

## 2024-02-12 PROCEDURE — 6360000002 HC RX W HCPCS: Performed by: THORACIC SURGERY (CARDIOTHORACIC VASCULAR SURGERY)

## 2024-02-12 PROCEDURE — 2700000000 HC OXYGEN THERAPY PER DAY

## 2024-02-12 PROCEDURE — 2100000001 HC CVICU R&B

## 2024-02-12 PROCEDURE — 2580000003 HC RX 258: Performed by: INTERNAL MEDICINE

## 2024-02-12 PROCEDURE — 6370000000 HC RX 637 (ALT 250 FOR IP): Performed by: PHYSICIAN ASSISTANT

## 2024-02-12 PROCEDURE — 82962 GLUCOSE BLOOD TEST: CPT

## 2024-02-12 PROCEDURE — 6360000002 HC RX W HCPCS: Performed by: PHYSICIAN ASSISTANT

## 2024-02-12 PROCEDURE — 83735 ASSAY OF MAGNESIUM: CPT

## 2024-02-12 PROCEDURE — 71045 X-RAY EXAM CHEST 1 VIEW: CPT

## 2024-02-12 RX ORDER — FUROSEMIDE 10 MG/ML
40 INJECTION INTRAMUSCULAR; INTRAVENOUS ONCE
Status: COMPLETED | OUTPATIENT
Start: 2024-02-12 | End: 2024-02-12

## 2024-02-12 RX ORDER — FUROSEMIDE 10 MG/ML
40 INJECTION INTRAMUSCULAR; INTRAVENOUS DAILY
Status: COMPLETED | OUTPATIENT
Start: 2024-02-12 | End: 2024-02-15

## 2024-02-12 RX ADMIN — FUROSEMIDE 40 MG: 10 INJECTION, SOLUTION INTRAMUSCULAR; INTRAVENOUS at 10:01

## 2024-02-12 RX ADMIN — INSULIN LISPRO 2 UNITS: 100 INJECTION, SOLUTION INTRAVENOUS; SUBCUTANEOUS at 12:49

## 2024-02-12 RX ADMIN — METOPROLOL TARTRATE 25 MG: 25 TABLET, FILM COATED ORAL at 12:43

## 2024-02-12 RX ADMIN — ONDANSETRON 4 MG: 2 INJECTION INTRAMUSCULAR; INTRAVENOUS at 01:31

## 2024-02-12 RX ADMIN — SODIUM CHLORIDE, PRESERVATIVE FREE 10 ML: 5 INJECTION INTRAVENOUS at 20:56

## 2024-02-12 RX ADMIN — ATORVASTATIN CALCIUM 40 MG: 40 TABLET, FILM COATED ORAL at 20:52

## 2024-02-12 RX ADMIN — AMIODARONE HYDROCHLORIDE 1 MG/MIN: 50 INJECTION, SOLUTION INTRAVENOUS at 18:42

## 2024-02-12 RX ADMIN — FUROSEMIDE 40 MG: 10 INJECTION, SOLUTION INTRAVENOUS at 03:49

## 2024-02-12 RX ADMIN — ALBUTEROL SULFATE 2.5 MG: 2.5 SOLUTION RESPIRATORY (INHALATION) at 19:53

## 2024-02-12 RX ADMIN — FAMOTIDINE 20 MG: 20 TABLET, FILM COATED ORAL at 20:52

## 2024-02-12 RX ADMIN — ALBUTEROL SULFATE 2.5 MG: 2.5 SOLUTION RESPIRATORY (INHALATION) at 15:59

## 2024-02-12 RX ADMIN — HEPARIN SODIUM 900 UNITS/HR: 10000 INJECTION, SOLUTION INTRAVENOUS at 06:55

## 2024-02-12 RX ADMIN — FAMOTIDINE 20 MG: 20 TABLET, FILM COATED ORAL at 09:47

## 2024-02-12 RX ADMIN — ALBUTEROL SULFATE 2.5 MG: 2.5 SOLUTION RESPIRATORY (INHALATION) at 07:44

## 2024-02-12 RX ADMIN — CEFEPIME 2000 MG: 2 INJECTION, POWDER, FOR SOLUTION INTRAVENOUS at 03:59

## 2024-02-12 RX ADMIN — ALBUTEROL SULFATE 2.5 MG: 2.5 SOLUTION RESPIRATORY (INHALATION) at 10:57

## 2024-02-12 RX ADMIN — DOCUSATE SODIUM 100 MG: 50 LIQUID ORAL at 09:47

## 2024-02-12 RX ADMIN — METOPROLOL TARTRATE 25 MG: 25 TABLET, FILM COATED ORAL at 18:26

## 2024-02-12 RX ADMIN — CEFEPIME 2000 MG: 2 INJECTION, POWDER, FOR SOLUTION INTRAVENOUS at 12:47

## 2024-02-12 RX ADMIN — HEPARIN SODIUM: 10000 INJECTION INTRAVENOUS; SUBCUTANEOUS at 20:25

## 2024-02-12 RX ADMIN — CEFEPIME 2000 MG: 2 INJECTION, POWDER, FOR SOLUTION INTRAVENOUS at 20:16

## 2024-02-12 RX ADMIN — AMIODARONE HYDROCHLORIDE 1 MG/MIN: 50 INJECTION, SOLUTION INTRAVENOUS at 09:43

## 2024-02-12 RX ADMIN — METOPROLOL TARTRATE 25 MG: 25 TABLET, FILM COATED ORAL at 09:46

## 2024-02-12 RX ADMIN — ASPIRIN 81 MG: 81 TABLET, COATED ORAL at 09:46

## 2024-02-12 RX ADMIN — AMIODARONE HYDROCHLORIDE 1 MG/MIN: 50 INJECTION, SOLUTION INTRAVENOUS at 01:04

## 2024-02-12 ASSESSMENT — PAIN SCALES - GENERAL
PAINLEVEL_OUTOF10: 0

## 2024-02-12 NOTE — ANESTHESIA PRE PROCEDURE
GLUCOSE 193 02/12/2024 02:58 AM    PROT 4.1 02/07/2024 02:06 AM    CALCIUM 8.2 02/12/2024 02:58 AM    BILITOT 1.7 02/07/2024 02:06 AM    ALKPHOS 22 02/07/2024 02:06 AM    AST 92 02/07/2024 02:06 AM    ALT 31 02/07/2024 02:06 AM       POC Tests:   Recent Labs     02/12/24  1742   POCGLU 136*       Coags:   Lab Results   Component Value Date/Time    PROTIME 20.5 02/07/2024 02:06 AM    INR 1.7 02/07/2024 02:06 AM    APTT 40.2 02/07/2024 02:06 AM       HCG (If Applicable): No results found for: \"PREGTESTUR\", \"PREGSERUM\", \"HCG\", \"HCGQUANT\"     ABGs: No results found for: \"PHART\", \"PO2ART\", \"JAD6OGG\", \"UBB0ZNE\", \"BEART\", \"H1SSNOCO\"     Type & Screen (If Applicable):  No results found for: \"LABABO\", \"LABRH\"    Drug/Infectious Status (If Applicable):  No results found for: \"HIV\", \"HEPCAB\"    COVID-19 Screening (If Applicable): No results found for: \"COVID19\"        Anesthesia Evaluation  Patient summary reviewed and Nursing notes reviewed   no history of anesthetic complications:   Airway: Mallampati: II  TM distance: >3 FB   Neck ROM: limited  Mouth opening: > = 3 FB   Dental:    (+) poor dentition      Pulmonary:normal exam  breath sounds clear to auscultation  (+)     sleep apnea (based on home study):                                  Cardiovascular:  Exercise tolerance: no interval change, Ambulates well unassisted    (+) valvular problems/murmurs: MR, CAD: obstructive, CABG/stent (This admission):, dysrhythmias (with RVR): atrial flutter, CHF (Newly diagnosed, ? tachycardia mediated): systolic, murmur: Grade 2, Mitral        Rhythm: regular  Rate: normal  Echocardiogram reviewed               ROS comment: Preop ECHO with EF 20-25%     Repeat post op ECHO pending     Impella 5.5 in place pending removal.      Neuro/Psych:   Negative Neuro/Psych ROS              GI/Hepatic/Renal:   (+) renal disease: kidney stones          Endo/Other:    (+) : arthritis: OA..                 Abdominal:             Vascular: negative

## 2024-02-12 NOTE — PLAN OF CARE
Problem: Chronic Conditions and Co-morbidities  Goal: Patient's chronic conditions and co-morbidity symptoms are monitored and maintained or improved  Outcome: Progressing  Flowsheets (Taken 2/12/2024 0800)  Care Plan - Patient's Chronic Conditions and Co-Morbidity Symptoms are Monitored and Maintained or Improved: Monitor and assess patient's chronic conditions and comorbid symptoms for stability, deterioration, or improvement     Problem: Safety - Adult  Goal: Free from fall injury  Outcome: Progressing  Flowsheets (Taken 2/12/2024 0800)  Free From Fall Injury: Instruct family/caregiver on patient safety     Problem: Pain  Goal: Verbalizes/displays adequate comfort level or baseline comfort level  Outcome: Progressing  Flowsheets (Taken 2/12/2024 0800)  Verbalizes/displays adequate comfort level or baseline comfort level: Encourage patient to monitor pain and request assistance     Problem: Discharge Planning  Goal: Discharge to home or other facility with appropriate resources  Outcome: Progressing  Flowsheets (Taken 2/12/2024 0800)  Discharge to home or other facility with appropriate resources: Identify barriers to discharge with patient and caregiver     Problem: Skin/Tissue Integrity  Goal: Absence of new skin breakdown  Description: 1.  Monitor for areas of redness and/or skin breakdown  2.  Assess vascular access sites hourly  3.  Every 4-6 hours minimum:  Change oxygen saturation probe site  4.  Every 4-6 hours:  If on nasal continuous positive airway pressure, respiratory therapy assess nares and determine need for appliance change or resting period.  Outcome: Progressing     Problem: ABCDS Injury Assessment  Goal: Absence of physical injury  Outcome: Progressing  Flowsheets (Taken 2/12/2024 0800)  Absence of Physical Injury: Implement safety measures based on patient assessment     Problem: Safety - Medical Restraint  Goal: Remains free of injury from restraints (Restraint for Interference with Medical

## 2024-02-12 NOTE — INTERDISCIPLINARY ROUNDS
Multi-D Rounds/Checklist (leapfrog):  Lines: can any be removed?:  all per CV protocol      NG/OG/NJ/NE Tube Nasogastric 12 fr Right nostril (Active)       Urinary Catheter 02/09/24 Dang (Active)     Introducer 02/06/24 Internal jugular Right (Active)       Arterial Line 02/06/24 Radial (Active)       CVC Double Lumen 02/06/24 Right Internal jugular (Active)       Extended Dwell Peripherial IV 02/10/24 Right Basilic (Active)       Pulmonary Artery Cath Single 02/06/24 Right Internal jugular (Active)     DVT Prophylaxis: Ordered  Vent: N/A  Nutrition Ordered/appropriate: Ordered-- need speech to re-evaluate  Can antibiotics or other drugs be stopped? No /End Date set   Inpat Anti-Infectives (From admission, onward)       Start     Ordered Stop    02/10/24 2000  ceFEPIme (MAXIPIME) 2,000 mg in sodium chloride 0.9 % 100 mL IVPB (mini-bag)  2,000 mg,   IntraVENous,   EVERY 8 HOURS        See Hyperspace for full Linked Orders Report.    02/10/24 1100 02/17/24 1959                  Consults needed: None  A: Is pain control adequate? (has PRNs? Stop drip?) Yes  B: Sedation break and SBT? N/A  C: Is sedation choice appropriate? N/A  D: Delirium/CAM-ICU? No  E: Mobility goals/appropriateness? Yes  F: Family update and plan? Sons are surrogate decision maker and is being updated daily by primary attending and nursing staff.    Jhoana Smith, APRN - NP

## 2024-02-13 ENCOUNTER — ANESTHESIA (OUTPATIENT)
Dept: SURGERY | Age: 84
End: 2024-02-13
Payer: MEDICARE

## 2024-02-13 ENCOUNTER — APPOINTMENT (OUTPATIENT)
Dept: NON INVASIVE DIAGNOSTICS | Age: 84
DRG: 001 | End: 2024-02-13
Attending: THORACIC SURGERY (CARDIOTHORACIC VASCULAR SURGERY)
Payer: MEDICARE

## 2024-02-13 ENCOUNTER — APPOINTMENT (OUTPATIENT)
Dept: GENERAL RADIOLOGY | Age: 84
DRG: 001 | End: 2024-02-13
Attending: THORACIC SURGERY (CARDIOTHORACIC VASCULAR SURGERY)
Payer: MEDICARE

## 2024-02-13 PROBLEM — T17.500A MUCUS PLUGGING OF BRONCHI: Status: ACTIVE | Noted: 2024-02-13

## 2024-02-13 LAB
ACT BLD: 163 SECS (ref 70–128)
ACT BLD: 168 SECS (ref 70–128)
ACT BLD: 174 SECS (ref 70–128)
ANION GAP SERPL CALC-SCNC: 1 MMOL/L (ref 2–11)
ANION GAP SERPL CALC-SCNC: 3 MMOL/L (ref 2–11)
ARTERIAL PATENCY WRIST A: ABNORMAL
ARTERIAL PATENCY WRIST A: ABNORMAL
BASE DEFICIT BLD-SCNC: 3.5 MMOL/L
BASE EXCESS BLD CALC-SCNC: 3.7 MMOL/L
BDY SITE: ABNORMAL
BDY SITE: ABNORMAL
BUN SERPL-MCNC: 21 MG/DL (ref 8–23)
BUN SERPL-MCNC: 21 MG/DL (ref 8–23)
CALCIUM SERPL-MCNC: 7.7 MG/DL (ref 8.3–10.4)
CALCIUM SERPL-MCNC: 7.9 MG/DL (ref 8.3–10.4)
CHLORIDE SERPL-SCNC: 112 MMOL/L (ref 103–113)
CHLORIDE SERPL-SCNC: 112 MMOL/L (ref 103–113)
CO2 SERPL-SCNC: 30 MMOL/L (ref 21–32)
CO2 SERPL-SCNC: 31 MMOL/L (ref 21–32)
CREAT SERPL-MCNC: 0.8 MG/DL (ref 0.8–1.5)
CREAT SERPL-MCNC: 0.9 MG/DL (ref 0.8–1.5)
ECHO BSA: 2.3 M2
ECHO LV EDV A2C: 182 ML
ECHO LV EDV A4C: 217 ML
ECHO LV EDV INDEX A4C: 95 ML/M2
ECHO LV EDV NDEX A2C: 80 ML/M2
ECHO LV EJECTION FRACTION A2C: 36 %
ECHO LV EJECTION FRACTION A4C: 28 %
ECHO LV EJECTION FRACTION BIPLANE: 35 % (ref 55–100)
ECHO LV ESV A2C: 116 ML
ECHO LV ESV A4C: 157 ML
ECHO LV ESV INDEX A2C: 51 ML/M2
ECHO LV ESV INDEX A4C: 69 ML/M2
EKG ATRIAL RATE: 97 BPM
EKG DIAGNOSIS: NORMAL
EKG P-R INTERVAL: 176 MS
EKG Q-T INTERVAL: 386 MS
EKG QRS DURATION: 104 MS
EKG QTC CALCULATION (BAZETT): 490 MS
EKG R AXIS: -4 DEGREES
EKG T AXIS: -11 DEGREES
EKG VENTRICULAR RATE: 97 BPM
ERYTHROCYTE [DISTWIDTH] IN BLOOD BY AUTOMATED COUNT: 17 % (ref 11.9–14.6)
GAS FLOW.O2 O2 DELIVERY SYS: ABNORMAL
GAS FLOW.O2 O2 DELIVERY SYS: ABNORMAL
GLUCOSE BLD STRIP.AUTO-MCNC: 112 MG/DL (ref 65–100)
GLUCOSE BLD STRIP.AUTO-MCNC: 128 MG/DL (ref 65–100)
GLUCOSE BLD STRIP.AUTO-MCNC: 131 MG/DL (ref 65–100)
GLUCOSE SERPL-MCNC: 144 MG/DL (ref 65–100)
GLUCOSE SERPL-MCNC: 155 MG/DL (ref 65–100)
HCO3 BLD-SCNC: 21 MMOL/L (ref 22–26)
HCO3 BLD-SCNC: 31.2 MMOL/L (ref 22–26)
HCT VFR BLD AUTO: 32.5 % (ref 41.1–50.3)
HGB BLD-MCNC: 10.2 G/DL (ref 13.6–17.2)
INSPIRATION.DURATION SETTING TIME VENT: 0.9 SEC
IPAP/PIP/HIGH PEEP: 18
IPAP/PIP/HIGH PEEP: 30
MAGNESIUM SERPL-MCNC: 2 MG/DL (ref 1.8–2.4)
MAGNESIUM SERPL-MCNC: 2.1 MG/DL (ref 1.8–2.4)
MCH RBC QN AUTO: 28.7 PG (ref 26.1–32.9)
MCHC RBC AUTO-ENTMCNC: 31.4 G/DL (ref 31.4–35)
MCV RBC AUTO: 91.3 FL (ref 82–102)
NRBC # BLD: 0.47 K/UL (ref 0–0.2)
O2/TOTAL GAS SETTING VFR VENT: 50 %
O2/TOTAL GAS SETTING VFR VENT: 70 %
PAW @ MEAN EXP FLOW ON VENT: 16 CMH2O
PCO2 BLD: 34.2 MMHG (ref 35–45)
PCO2 BLD: 62 MMHG (ref 35–45)
PEEP RESPIRATORY: 8 CMH2O
PEEP RESPIRATORY: 8 CMH2O
PH BLD: 7.31 (ref 7.35–7.45)
PH BLD: 7.4 (ref 7.35–7.45)
PHOSPHATE SERPL-MCNC: 1.9 MG/DL (ref 2.3–3.7)
PLATELET # BLD AUTO: 163 K/UL (ref 150–450)
PLATELET # BLD AUTO: 236 K/UL (ref 150–450)
PMV BLD AUTO: 10.9 FL (ref 9.4–12.3)
PO2 BLD: 66 MMHG (ref 75–100)
PO2 BLD: 70 MMHG (ref 75–100)
POTASSIUM SERPL-SCNC: 3.3 MMOL/L (ref 3.5–5.1)
POTASSIUM SERPL-SCNC: 3.5 MMOL/L (ref 3.5–5.1)
RBC # BLD AUTO: 3.56 M/UL (ref 4.23–5.6)
RESPIRATORY RATE, POC: 20 (ref 5–40)
SAO2 % BLD: 91.4 % (ref 95–98)
SAO2 % BLD: 92.7 % (ref 95–98)
SERVICE CMNT-IMP: ABNORMAL
SODIUM SERPL-SCNC: 144 MMOL/L (ref 136–146)
SODIUM SERPL-SCNC: 145 MMOL/L (ref 136–146)
SPECIMEN TYPE: ABNORMAL
SPECIMEN TYPE: ABNORMAL
VENTILATION MODE VENT: ABNORMAL
VENTILATION MODE VENT: ABNORMAL
VT SETTING VENT: 400 ML
WBC # BLD AUTO: 12.7 K/UL (ref 4.3–11.1)

## 2024-02-13 PROCEDURE — 0B9F8ZX DRAINAGE OF RIGHT LOWER LUNG LOBE, VIA NATURAL OR ARTIFICIAL OPENING ENDOSCOPIC, DIAGNOSTIC: ICD-10-PCS | Performed by: INTERNAL MEDICINE

## 2024-02-13 PROCEDURE — 2700000000 HC OXYGEN THERAPY PER DAY

## 2024-02-13 PROCEDURE — 99232 SBSQ HOSP IP/OBS MODERATE 35: CPT | Performed by: INTERNAL MEDICINE

## 2024-02-13 PROCEDURE — 85049 AUTOMATED PLATELET COUNT: CPT

## 2024-02-13 PROCEDURE — 84100 ASSAY OF PHOSPHORUS: CPT

## 2024-02-13 PROCEDURE — 6360000002 HC RX W HCPCS: Performed by: NURSE ANESTHETIST, CERTIFIED REGISTERED

## 2024-02-13 PROCEDURE — 36592 COLLECT BLOOD FROM PICC: CPT

## 2024-02-13 PROCEDURE — 94660 CPAP INITIATION&MGMT: CPT

## 2024-02-13 PROCEDURE — 37799 UNLISTED PX VASCULAR SURGERY: CPT

## 2024-02-13 PROCEDURE — 80048 BASIC METABOLIC PNL TOTAL CA: CPT

## 2024-02-13 PROCEDURE — 93010 ELECTROCARDIOGRAM REPORT: CPT | Performed by: INTERNAL MEDICINE

## 2024-02-13 PROCEDURE — 82962 GLUCOSE BLOOD TEST: CPT

## 2024-02-13 PROCEDURE — C8924 2D TTE W OR W/O FOL W/CON,FU: HCPCS

## 2024-02-13 PROCEDURE — 6360000002 HC RX W HCPCS: Performed by: INTERNAL MEDICINE

## 2024-02-13 PROCEDURE — A4216 STERILE WATER/SALINE, 10 ML: HCPCS | Performed by: PHYSICIAN ASSISTANT

## 2024-02-13 PROCEDURE — 0B9C8ZX DRAINAGE OF RIGHT UPPER LUNG LOBE, VIA NATURAL OR ARTIFICIAL OPENING ENDOSCOPIC, DIAGNOSTIC: ICD-10-PCS | Performed by: INTERNAL MEDICINE

## 2024-02-13 PROCEDURE — 82803 BLOOD GASES ANY COMBINATION: CPT

## 2024-02-13 PROCEDURE — 71045 X-RAY EXAM CHEST 1 VIEW: CPT

## 2024-02-13 PROCEDURE — 6360000002 HC RX W HCPCS: Performed by: THORACIC SURGERY (CARDIOTHORACIC VASCULAR SURGERY)

## 2024-02-13 PROCEDURE — 2500000003 HC RX 250 WO HCPCS: Performed by: NURSE ANESTHETIST, CERTIFIED REGISTERED

## 2024-02-13 PROCEDURE — 31645 BRNCHSC W/THER ASPIR 1ST: CPT | Performed by: INTERNAL MEDICINE

## 2024-02-13 PROCEDURE — 2580000003 HC RX 258: Performed by: THORACIC SURGERY (CARDIOTHORACIC VASCULAR SURGERY)

## 2024-02-13 PROCEDURE — 2580000003 HC RX 258: Performed by: INTERNAL MEDICINE

## 2024-02-13 PROCEDURE — 3700000000 HC ANESTHESIA ATTENDED CARE: Performed by: THORACIC SURGERY (CARDIOTHORACIC VASCULAR SURGERY)

## 2024-02-13 PROCEDURE — P9041 ALBUMIN (HUMAN),5%, 50ML: HCPCS | Performed by: THORACIC SURGERY (CARDIOTHORACIC VASCULAR SURGERY)

## 2024-02-13 PROCEDURE — 2500000003 HC RX 250 WO HCPCS: Performed by: PHYSICIAN ASSISTANT

## 2024-02-13 PROCEDURE — 87070 CULTURE OTHR SPECIMN AEROBIC: CPT

## 2024-02-13 PROCEDURE — 83735 ASSAY OF MAGNESIUM: CPT

## 2024-02-13 PROCEDURE — 6360000002 HC RX W HCPCS: Performed by: PHYSICIAN ASSISTANT

## 2024-02-13 PROCEDURE — 6360000004 HC RX CONTRAST MEDICATION: Performed by: THORACIC SURGERY (CARDIOTHORACIC VASCULAR SURGERY)

## 2024-02-13 PROCEDURE — 2709999900 HC NON-CHARGEABLE SUPPLY: Performed by: INTERNAL MEDICINE

## 2024-02-13 PROCEDURE — 94003 VENT MGMT INPAT SUBQ DAY: CPT

## 2024-02-13 PROCEDURE — 2500000003 HC RX 250 WO HCPCS: Performed by: THORACIC SURGERY (CARDIOTHORACIC VASCULAR SURGERY)

## 2024-02-13 PROCEDURE — 2580000003 HC RX 258: Performed by: NURSE ANESTHETIST, CERTIFIED REGISTERED

## 2024-02-13 PROCEDURE — 6370000000 HC RX 637 (ALT 250 FOR IP): Performed by: THORACIC SURGERY (CARDIOTHORACIC VASCULAR SURGERY)

## 2024-02-13 PROCEDURE — 3609027000 HC BRONCHOSCOPY: Performed by: INTERNAL MEDICINE

## 2024-02-13 PROCEDURE — 3600000008 HC SURGERY OHS BASE: Performed by: THORACIC SURGERY (CARDIOTHORACIC VASCULAR SURGERY)

## 2024-02-13 PROCEDURE — 6370000000 HC RX 637 (ALT 250 FOR IP): Performed by: INTERNAL MEDICINE

## 2024-02-13 PROCEDURE — 85027 COMPLETE CBC AUTOMATED: CPT

## 2024-02-13 PROCEDURE — 2100000001 HC CVICU R&B

## 2024-02-13 PROCEDURE — 85347 COAGULATION TIME ACTIVATED: CPT

## 2024-02-13 PROCEDURE — 2709999900 HC NON-CHARGEABLE SUPPLY: Performed by: THORACIC SURGERY (CARDIOTHORACIC VASCULAR SURGERY)

## 2024-02-13 PROCEDURE — 3600000018 HC SURGERY OHS ADDTL 15MIN: Performed by: THORACIC SURGERY (CARDIOTHORACIC VASCULAR SURGERY)

## 2024-02-13 PROCEDURE — 0B9J8ZX DRAINAGE OF LEFT LOWER LUNG LOBE, VIA NATURAL OR ARTIFICIAL OPENING ENDOSCOPIC, DIAGNOSTIC: ICD-10-PCS | Performed by: INTERNAL MEDICINE

## 2024-02-13 PROCEDURE — 94640 AIRWAY INHALATION TREATMENT: CPT

## 2024-02-13 PROCEDURE — 0B9G8ZX DRAINAGE OF LEFT UPPER LUNG LOBE, VIA NATURAL OR ARTIFICIAL OPENING ENDOSCOPIC, DIAGNOSTIC: ICD-10-PCS | Performed by: INTERNAL MEDICINE

## 2024-02-13 PROCEDURE — 99291 CRITICAL CARE FIRST HOUR: CPT | Performed by: INTERNAL MEDICINE

## 2024-02-13 PROCEDURE — 87205 SMEAR GRAM STAIN: CPT

## 2024-02-13 PROCEDURE — 2580000003 HC RX 258: Performed by: PHYSICIAN ASSISTANT

## 2024-02-13 PROCEDURE — 6370000000 HC RX 637 (ALT 250 FOR IP): Performed by: PHYSICIAN ASSISTANT

## 2024-02-13 PROCEDURE — 93005 ELECTROCARDIOGRAM TRACING: CPT | Performed by: PHYSICIAN ASSISTANT

## 2024-02-13 PROCEDURE — 3700000001 HC ADD 15 MINUTES (ANESTHESIA): Performed by: THORACIC SURGERY (CARDIOTHORACIC VASCULAR SURGERY)

## 2024-02-13 RX ORDER — PHENYLEPHRINE HYDROCHLORIDE 10 MG/ML
INJECTION INTRAVENOUS PRN
Status: DISCONTINUED | OUTPATIENT
Start: 2024-02-13 | End: 2024-02-13 | Stop reason: SDUPTHER

## 2024-02-13 RX ORDER — FENTANYL CITRATE 50 UG/ML
INJECTION, SOLUTION INTRAMUSCULAR; INTRAVENOUS PRN
Status: DISCONTINUED | OUTPATIENT
Start: 2024-02-13 | End: 2024-02-13 | Stop reason: SDUPTHER

## 2024-02-13 RX ORDER — FUROSEMIDE 10 MG/ML
40 INJECTION INTRAMUSCULAR; INTRAVENOUS ONCE
Status: COMPLETED | OUTPATIENT
Start: 2024-02-13 | End: 2024-02-13

## 2024-02-13 RX ORDER — MIDAZOLAM HYDROCHLORIDE 1 MG/ML
4 INJECTION, SOLUTION INTRAMUSCULAR; INTRAVENOUS ONCE
Status: COMPLETED | OUTPATIENT
Start: 2024-02-13 | End: 2024-02-13

## 2024-02-13 RX ORDER — DEXMEDETOMIDINE HYDROCHLORIDE 4 UG/ML
INJECTION, SOLUTION INTRAVENOUS CONTINUOUS PRN
Status: DISCONTINUED | OUTPATIENT
Start: 2024-02-13 | End: 2024-02-13

## 2024-02-13 RX ORDER — LIDOCAINE HYDROCHLORIDE 20 MG/ML
INJECTION, SOLUTION EPIDURAL; INFILTRATION; INTRACAUDAL; PERINEURAL PRN
Status: DISCONTINUED | OUTPATIENT
Start: 2024-02-13 | End: 2024-02-13 | Stop reason: SDUPTHER

## 2024-02-13 RX ORDER — ETOMIDATE 2 MG/ML
INJECTION INTRAVENOUS PRN
Status: DISCONTINUED | OUTPATIENT
Start: 2024-02-13 | End: 2024-02-13 | Stop reason: SDUPTHER

## 2024-02-13 RX ORDER — NOREPINEPHRINE BITARTRATE 1 MG/ML
INJECTION, SOLUTION INTRAVENOUS PRN
Status: DISCONTINUED | OUTPATIENT
Start: 2024-02-13 | End: 2024-02-13 | Stop reason: SDUPTHER

## 2024-02-13 RX ORDER — ROCURONIUM BROMIDE 10 MG/ML
INJECTION, SOLUTION INTRAVENOUS PRN
Status: DISCONTINUED | OUTPATIENT
Start: 2024-02-13 | End: 2024-02-13 | Stop reason: SDUPTHER

## 2024-02-13 RX ORDER — FENTANYL CITRATE 50 UG/ML
100 INJECTION, SOLUTION INTRAMUSCULAR; INTRAVENOUS ONCE
Status: COMPLETED | OUTPATIENT
Start: 2024-02-13 | End: 2024-02-13

## 2024-02-13 RX ORDER — SODIUM CHLORIDE 9 MG/ML
INJECTION, SOLUTION INTRAVENOUS CONTINUOUS PRN
Status: DISCONTINUED | OUTPATIENT
Start: 2024-02-13 | End: 2024-02-13 | Stop reason: SDUPTHER

## 2024-02-13 RX ORDER — ALBUMIN, HUMAN INJ 5% 5 %
25 SOLUTION INTRAVENOUS ONCE
Status: COMPLETED | OUTPATIENT
Start: 2024-02-13 | End: 2024-02-14

## 2024-02-13 RX ADMIN — FAMOTIDINE 20 MG: 20 TABLET, FILM COATED ORAL at 08:36

## 2024-02-13 RX ADMIN — ROCURONIUM BROMIDE 50 MG: 50 INJECTION, SOLUTION INTRAVENOUS at 12:19

## 2024-02-13 RX ADMIN — NOREPINEPHRINE BITARTRATE 16 MCG: 1 SOLUTION INTRAVENOUS at 13:04

## 2024-02-13 RX ADMIN — FENTANYL CITRATE 50 MCG: 50 INJECTION, SOLUTION INTRAMUSCULAR; INTRAVENOUS at 12:31

## 2024-02-13 RX ADMIN — POTASSIUM CHLORIDE 20 MEQ: 400 INJECTION, SOLUTION INTRAVENOUS at 15:26

## 2024-02-13 RX ADMIN — SODIUM CHLORIDE 0.01 MCG/KG/MIN: 9 INJECTION, SOLUTION INTRAVENOUS at 21:52

## 2024-02-13 RX ADMIN — AMIODARONE HYDROCHLORIDE 1 MG/MIN: 50 INJECTION, SOLUTION INTRAVENOUS at 02:16

## 2024-02-13 RX ADMIN — CEFEPIME 2000 MG: 2 INJECTION, POWDER, FOR SOLUTION INTRAVENOUS at 04:54

## 2024-02-13 RX ADMIN — AMIODARONE HYDROCHLORIDE 1 MG/MIN: 50 INJECTION, SOLUTION INTRAVENOUS at 10:50

## 2024-02-13 RX ADMIN — FUROSEMIDE 40 MG: 10 INJECTION, SOLUTION INTRAMUSCULAR; INTRAVENOUS at 04:30

## 2024-02-13 RX ADMIN — DOCUSATE SODIUM 100 MG: 50 LIQUID ORAL at 08:36

## 2024-02-13 RX ADMIN — DEXMEDETOMIDINE 0.5 MCG/KG/HR: 100 INJECTION, SOLUTION, CONCENTRATE INTRAVENOUS at 21:08

## 2024-02-13 RX ADMIN — ALBUTEROL SULFATE 2.5 MG: 2.5 SOLUTION RESPIRATORY (INHALATION) at 09:12

## 2024-02-13 RX ADMIN — AMIODARONE HYDROCHLORIDE 200 MG: 200 TABLET ORAL at 08:37

## 2024-02-13 RX ADMIN — FAMOTIDINE 20 MG: 10 INJECTION, SOLUTION INTRAVENOUS at 21:00

## 2024-02-13 RX ADMIN — MIDAZOLAM 2 MG: 1 INJECTION INTRAMUSCULAR; INTRAVENOUS at 13:54

## 2024-02-13 RX ADMIN — FENTANYL CITRATE 25 MCG/HR: 0.05 INJECTION, SOLUTION INTRAMUSCULAR; INTRAVENOUS at 16:24

## 2024-02-13 RX ADMIN — FUROSEMIDE 40 MG: 10 INJECTION, SOLUTION INTRAMUSCULAR; INTRAVENOUS at 08:37

## 2024-02-13 RX ADMIN — POTASSIUM CHLORIDE 20 MEQ: 400 INJECTION, SOLUTION INTRAVENOUS at 18:33

## 2024-02-13 RX ADMIN — Medication 2 G: at 12:17

## 2024-02-13 RX ADMIN — ALBUTEROL SULFATE 2.5 MG: 2.5 SOLUTION RESPIRATORY (INHALATION) at 15:59

## 2024-02-13 RX ADMIN — PHENYLEPHRINE HYDROCHLORIDE 100 MCG: 10 INJECTION INTRAVENOUS at 12:15

## 2024-02-13 RX ADMIN — SODIUM CHLORIDE, PRESERVATIVE FREE 0.45 ML: 5 INJECTION INTRAVENOUS at 08:00

## 2024-02-13 RX ADMIN — HEPARIN SODIUM 900 UNITS/HR: 10000 INJECTION, SOLUTION INTRAVENOUS at 18:55

## 2024-02-13 RX ADMIN — SODIUM CHLORIDE, PRESERVATIVE FREE 10 ML: 5 INJECTION INTRAVENOUS at 21:00

## 2024-02-13 RX ADMIN — LIDOCAINE HYDROCHLORIDE 100 MG: 20 INJECTION, SOLUTION EPIDURAL; INFILTRATION; INTRACAUDAL; PERINEURAL at 12:19

## 2024-02-13 RX ADMIN — ALBUTEROL SULFATE 2.5 MG: 2.5 SOLUTION RESPIRATORY (INHALATION) at 20:10

## 2024-02-13 RX ADMIN — PHENYLEPHRINE HYDROCHLORIDE 100 MCG: 10 INJECTION INTRAVENOUS at 12:30

## 2024-02-13 RX ADMIN — ALBUTEROL SULFATE 2.5 MG: 2.5 SOLUTION RESPIRATORY (INHALATION) at 11:36

## 2024-02-13 RX ADMIN — METOPROLOL TARTRATE 25 MG: 25 TABLET, FILM COATED ORAL at 00:23

## 2024-02-13 RX ADMIN — PHENYLEPHRINE HYDROCHLORIDE 100 MCG: 10 INJECTION INTRAVENOUS at 13:02

## 2024-02-13 RX ADMIN — FENTANYL CITRATE 50 MCG: 50 INJECTION, SOLUTION INTRAMUSCULAR; INTRAVENOUS at 13:53

## 2024-02-13 RX ADMIN — DOBUTAMINE HYDROCHLORIDE 5 MCG/KG/MIN: 200 INJECTION INTRAVENOUS at 11:05

## 2024-02-13 RX ADMIN — FENTANYL CITRATE 50 MCG: 50 INJECTION, SOLUTION INTRAMUSCULAR; INTRAVENOUS at 12:07

## 2024-02-13 RX ADMIN — ALBUMIN (HUMAN) 25 G: 12.5 INJECTION, SOLUTION INTRAVENOUS at 22:32

## 2024-02-13 RX ADMIN — ETOMIDATE 28 MG: 2 INJECTION, SOLUTION INTRAVENOUS at 12:19

## 2024-02-13 RX ADMIN — HEPARIN SODIUM 900 UNITS/HR: 10000 INJECTION, SOLUTION INTRAVENOUS at 06:59

## 2024-02-13 RX ADMIN — HEPARIN SODIUM: 10000 INJECTION INTRAVENOUS; SUBCUTANEOUS at 21:50

## 2024-02-13 RX ADMIN — SODIUM CHLORIDE: 900 INJECTION INTRAVENOUS at 12:07

## 2024-02-13 RX ADMIN — METOPROLOL TARTRATE 25 MG: 25 TABLET, FILM COATED ORAL at 04:54

## 2024-02-13 ASSESSMENT — PAIN SCALES - GENERAL
PAINLEVEL_OUTOF10: 0

## 2024-02-13 ASSESSMENT — PULMONARY FUNCTION TESTS
PIF_VALUE: 31
PIF_VALUE: 20
PIF_VALUE: 31
PIF_VALUE: 39
PIF_VALUE: 28

## 2024-02-13 NOTE — ANESTHESIA PROCEDURE NOTES
Airway  Date/Time: 2/13/2024 12:21 PM  Urgency: elective    Airway not difficult    General Information and Staff    Patient location during procedure: OR  Resident/CRNA: Luke Burgess APRN - CRNA  Performed: resident/CRNA   Performed by: Luke Burgess APRN - CRNA  Authorized by: Genet Sterling MD      Indications and Patient Condition  Indications for airway management: anesthesia  Spontaneous Ventilation: absent  Sedation level: deep  Preoxygenated: yes  Patient position: sniffing  MILS not maintained throughout  Mask difficulty assessment: vent by bag mask    Final Airway Details  Final airway type: endotracheal airway      Successful airway: ETT  Cuffed: yes   Successful intubation technique: video laryngoscopy  Endotracheal tube insertion site: oral  Blade size: #4  ETT size (mm): 8.0  Cormack-Lehane Classification: grade I - full view of glottis  Placement verified by: chest auscultation and capnometry   Inital cuff pressure (cm H2O): 25  Measured from: lips  ETT to lips (cm): 22  Number of attempts at approach: 1  Number of other approaches attempted: 0    Additional Comments  RSI with cricoid, elected glidescope ETT easily and atraumatically placed   no

## 2024-02-13 NOTE — INTERDISCIPLINARY ROUNDS
Multi-D Rounds/Checklist (leapfrog):  Lines: can any be removed?:  all per CV protocol      NG/OG/NJ/NE Tube Nasogastric 12 fr Right nostril (Active)       Urinary Catheter 02/09/24 Dang (Active)     Introducer 02/06/24 Internal jugular Right (Active)       CVC Double Lumen 02/06/24 Right Internal jugular (Active)       Extended Dwell Peripherial IV 02/10/24 Right Basilic (Active)     DVT Prophylaxis: Ordered  Vent: N/A  Nutrition Ordered/appropriate: Ordered-- need speech to re-evaluate  Can antibiotics or other drugs be stopped? No /End Date set     Inpat Anti-Infectives (From admission, onward)       Start     Ordered Stop    02/10/24 2000  ceFEPIme (MAXIPIME) 2,000 mg in sodium chloride 0.9 % 100 mL IVPB (mini-bag)  2,000 mg,   IntraVENous,   EVERY 8 HOURS        See Hyperspace for full Linked Orders Report.    02/10/24 1100 02/17/24 1959                  Consults needed: None  A: Is pain control adequate? (has PRNs? Stop drip?) Yes  B: Sedation break and SBT? N/A  C: Is sedation choice appropriate? N/A  D: Delirium/CAM-ICU? No  E: Mobility goals/appropriateness? Yes  F: Family update and plan? Sons are surrogate decision maker and is being updated daily by primary attending and nursing staff.    Jhoana Smith, APRN - NP

## 2024-02-13 NOTE — PLAN OF CARE
Problem: Chronic Conditions and Co-morbidities  Goal: Patient's chronic conditions and co-morbidity symptoms are monitored and maintained or improved  Outcome: Progressing  Flowsheets  Taken 2/13/2024 0715 by Sadaf Martinez RN  Care Plan - Patient's Chronic Conditions and Co-Morbidity Symptoms are Monitored and Maintained or Improved: Monitor and assess patient's chronic conditions and comorbid symptoms for stability, deterioration, or improvement  Taken 2/12/2024 1915 by Vita Prieto RN  Care Plan - Patient's Chronic Conditions and Co-Morbidity Symptoms are Monitored and Maintained or Improved: Monitor and assess patient's chronic conditions and comorbid symptoms for stability, deterioration, or improvement     Problem: Safety - Adult  Goal: Free from fall injury  Outcome: Progressing  Flowsheets (Taken 2/13/2024 0715)  Free From Fall Injury: Instruct family/caregiver on patient safety     Problem: Pain  Goal: Verbalizes/displays adequate comfort level or baseline comfort level  Outcome: Progressing  Flowsheets  Taken 2/13/2024 0715 by Sadaf Martinez RN  Verbalizes/displays adequate comfort level or baseline comfort level:   Encourage patient to monitor pain and request assistance   Assess pain using appropriate pain scale  Taken 2/12/2024 1915 by Vita Prieto RN  Verbalizes/displays adequate comfort level or baseline comfort level: Encourage patient to monitor pain and request assistance     Problem: Discharge Planning  Goal: Discharge to home or other facility with appropriate resources  Outcome: Progressing  Flowsheets  Taken 2/13/2024 0715 by Sadaf Martinez RN  Discharge to home or other facility with appropriate resources: Identify barriers to discharge with patient and caregiver  Taken 2/12/2024 1915 by Vita Prieto RN  Discharge to home or other facility with appropriate resources: Identify barriers to discharge with patient and caregiver     Problem: Skin/Tissue Integrity  Goal: Absence of new skin

## 2024-02-13 NOTE — ANESTHESIA PROCEDURE NOTES
Arterial Line:    An arterial line was placed using ultrasound guidance, in the OR for the following indication(s): continuous blood pressure monitoring and blood sampling needed.    A 20 gauge (size) (length), Arrow (type) catheter was placed, Seldinger technique used, into the left radial artery, secured by Tegaderm and tape.  Anesthesia type: Local  Local infiltration: Injection    Events:  patient tolerated procedure well with no complications and EBL < 5mL.    Additional notes:  Left arm prepped with ChloraPrep, ultrasound guided Seldinger technique, good blood return, good waveform.      Potential access sites were examined with ultrasound and acceptable patent access site selected as noted above.  Needle path and artery access visualized in real time using ultrasound, an image of wire in vessel recorded for permanent record.    2/13/2024 1:00 PM2/13/2024 1:02 PM  Resident/CRNA: Luke Burgess APRN - CRNA  Performed: Resident/CRNA   Preanesthetic Checklist  Completed: patient identified, IV checked, site marked, risks and benefits discussed, surgical/procedural consents, equipment checked, pre-op evaluation, timeout performed, anesthesia consent given, oxygen available, monitors applied/VS acknowledged, fire risk safety assessment completed and verbalized and blood product R/B/A discussed and consented

## 2024-02-13 NOTE — BRIEF OP NOTE
Brief Postoperative Note      Patient: Dionisio Gordon Jr.  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 (HCC) [I25.110]     * PAF (paroxysmal atrial fibrillation) (Formerly [facility]) [I48.0]    Post-Op Diagnosis: Same       Procedure(s):  ATTEMPTED REMOVAL CARDIAC VENTRICULAR DEVICE/IMPELLA 5.5 EXPLANT    Surgeon(s):  Yony Shi MD    Assistant:  Surgical Assistant: Kamilah Daniels    Anesthesia: General    Estimated Blood Loss (mL): Minimal    Complications: None    Specimens:   * No specimens in log *    Implants:  * No implants in log *      Drains:   NG/OG/NJ/NE Tube Nasogastric 12 fr Right nostril (Active)   Surrounding Skin Clean, dry & intact 02/13/24 1100   Securement device Tape 02/13/24 1100   Status Clamped 02/13/24 1100   Placement Verified External Catheter Length 02/13/24 1100   NG/OG/NJ/NE External Measurement (cm) 70 cm 02/13/24 1100   Tube Feeding Peptide Based 02/12/24 0335   Tube feeding/verify rate (mL/hr) 30 mL/hr 02/11/24 1515   Tube Feeding Intake (mL) 237 ml 02/11/24 1930   Free Water/Flush (mL) 60 mL 02/11/24 1930   Action Taken Placement verified (comment) 02/13/24 0300       Urinary Catheter 02/09/24 Dang (Active)   Catheter Indications Need for fluid volume management of the critically ill patient in a critical care setting 02/13/24 1100   Site Assessment No urethral drainage 02/13/24 1100   Urine Color Madeline 02/13/24 1100   Urine Appearance Clear 02/13/24 1100   Collection Container Standard 02/13/24 1100   Securement Method Securing device (Describe) 02/13/24 1100   Catheter Care  Perineal wipes 02/13/24 0715   Catheter Best Practices  Drainage tube clipped to bed;Catheter secured to thigh;Tamper seal intact;Bag below bladder;Bag not on floor;Lack of dependent loop in tubing;Drainage bag less than half full

## 2024-02-13 NOTE — PROCEDURES
PROCEDURE:  Bronchoscopy with airway inspection /cleanout /BAL /    INDICATION: mucus plugging with rhonchi    EQUIPMENT:  T190 Olympus bronchoscope    ANESTHESIA:   Fentanyl 50 mcg IV;   Versed 2 mg IV;   Lidocaine 190  mg to tracheo-bronchial tree and vocal cords;   Please see ICU/CCU/CTICU medication record.    IMAGING: reviewed      AIRWAY INSPECTION  After obtaining informed consent,  Olympus Bronchoscope         RIGHT  LOCATION NORM/ABNORM DESCRIPTION   Larynx NA    VOCAL CORDS NA    TRACHEA NL White phlegm/TF seen along ETT   DAVIE NL    RMSB abNL White mucus bubbling from both mainstem bronchi which was easily suctioned and removed   RUL abNL Mild amount of mucus suctioned   BI abNL Moderate secretions suctioned   RML NL    RLL abNL Significant white mucus suctioned    SUP SEGM RLL abNL Each segment washed and suctioned   MED BASAL abNL \"   ANTERIOR BASAL abNL \"   LATERAL BASAL abNL \"   POSTERIOR BASAL abNL \"         LEFT  LOCATION NORM/ABNORM DESCRIPTION   LMSB NL    JEF NL Mild amount of mucus suctioned and removed   LINGULA NL    LLL abNL Thick white mucus suctioned and removed   SUPERIOR SEG LLL NL    TATYANA-MEDIAL LLL NL    LATERAL LLL NL    POSTERIOR LLL NL      The following samples were obtained:    Bronchial Wash: sent for culture    The procedure was completed without complication and the patient tolerated the procedure well.      Recommendations:   Follow up cultures and CXR    Maddy Victor MD

## 2024-02-13 NOTE — ANESTHESIA POSTPROCEDURE EVALUATION
Department of Anesthesiology  Postprocedure Note    Patient: Dionisio Gordon Jr.  MRN: [id number]  YOB: 1940  Date of evaluation: 2/13/2024    Procedure Summary       Date: 02/13/24 Room / Location: 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    Laura Phase I: Laura Score: 4    Laura Phase II:      Anesthesia Post Evaluation    Patient location during evaluation: ICU  Patient participation: complete - patient cannot participate  Level of consciousness: sedated and ventilated  Pain score: 0  Airway patency: patent  Nausea & Vomiting: no nausea  Cardiovascular status: on BP support and impella.  Respiratory status: ventilator  Hydration status: euvolemic  Comments: Present for transport.  Decision to abort procedure due to low intraoperative BP after induction.    Blood pressure 121/65, pulse (!) 118, temperature 98 °F (36.7 °C), temperature source Temporal, resp. rate 16, height 1.803 m (5' 10.98\"), weight 109.1 kg (240 lb 8.4 oz), SpO2 100 %.   Pain management: adequate        No notable events documented.

## 2024-02-14 ENCOUNTER — APPOINTMENT (OUTPATIENT)
Dept: GENERAL RADIOLOGY | Age: 84
DRG: 001 | End: 2024-02-14
Attending: THORACIC SURGERY (CARDIOTHORACIC VASCULAR SURGERY)
Payer: MEDICARE

## 2024-02-14 LAB
ACT BLD: 157 SECS (ref 70–128)
ACT BLD: 158 SECS (ref 70–128)
ACT BLD: 158 SECS (ref 70–128)
ACT BLD: 163 SECS (ref 70–128)
ACT BLD: 168 SECS (ref 70–128)
ACT BLD: 168 SECS (ref 70–128)
ACT BLD: 174 SECS (ref 70–128)
ACT BLD: 174 SECS (ref 70–128)
ANION GAP SERPL CALC-SCNC: 3 MMOL/L (ref 2–11)
ARTERIAL PATENCY WRIST A: ABNORMAL
BACTERIA SPEC CULT: NORMAL
BACTERIA SPEC CULT: NORMAL
BASE EXCESS BLD CALC-SCNC: 3.9 MMOL/L
BASOPHILS # BLD: 0 K/UL (ref 0–0.2)
BASOPHILS NFR BLD: 0 % (ref 0–2)
BDY SITE: ABNORMAL
BUN SERPL-MCNC: 20 MG/DL (ref 8–23)
CALCIUM SERPL-MCNC: 7.7 MG/DL (ref 8.3–10.4)
CHLORIDE SERPL-SCNC: 114 MMOL/L (ref 103–113)
CO2 SERPL-SCNC: 28 MMOL/L (ref 21–32)
CREAT SERPL-MCNC: 0.8 MG/DL (ref 0.8–1.5)
DIFFERENTIAL METHOD BLD: ABNORMAL
EKG ATRIAL RATE: 59 BPM
EKG DIAGNOSIS: NORMAL
EKG P-R INTERVAL: 208 MS
EKG Q-T INTERVAL: 482 MS
EKG QRS DURATION: 102 MS
EKG QTC CALCULATION (BAZETT): 477 MS
EKG R AXIS: 89 DEGREES
EKG T AXIS: 16 DEGREES
EKG VENTRICULAR RATE: 59 BPM
EOSINOPHIL # BLD: 0.1 K/UL (ref 0–0.8)
EOSINOPHIL NFR BLD: 1 % (ref 0.5–7.8)
ERYTHROCYTE [DISTWIDTH] IN BLOOD BY AUTOMATED COUNT: 16.7 % (ref 11.9–14.6)
GAS FLOW.O2 O2 DELIVERY SYS: ABNORMAL
GLUCOSE BLD STRIP.AUTO-MCNC: 115 MG/DL (ref 65–100)
GLUCOSE BLD STRIP.AUTO-MCNC: 118 MG/DL (ref 65–100)
GLUCOSE BLD STRIP.AUTO-MCNC: 123 MG/DL (ref 65–100)
GLUCOSE BLD STRIP.AUTO-MCNC: 129 MG/DL (ref 65–100)
GLUCOSE SERPL-MCNC: 135 MG/DL (ref 65–100)
HCO3 BLD-SCNC: 29 MMOL/L (ref 22–26)
HCT VFR BLD AUTO: 25.6 % (ref 41.1–50.3)
HGB BLD-MCNC: 8 G/DL (ref 13.6–17.2)
HISTORY CHECK: NORMAL
IMM GRANULOCYTES # BLD AUTO: 0.5 K/UL (ref 0–0.5)
IMM GRANULOCYTES NFR BLD AUTO: 4 % (ref 0–5)
LYMPHOCYTES # BLD: 1.1 K/UL (ref 0.5–4.6)
LYMPHOCYTES NFR BLD: 10 % (ref 13–44)
MAGNESIUM SERPL-MCNC: 2.1 MG/DL (ref 1.8–2.4)
MCH RBC QN AUTO: 28.8 PG (ref 26.1–32.9)
MCHC RBC AUTO-ENTMCNC: 31.3 G/DL (ref 31.4–35)
MCV RBC AUTO: 92.1 FL (ref 82–102)
MONOCYTES # BLD: 1 K/UL (ref 0.1–1.3)
MONOCYTES NFR BLD: 9 % (ref 4–12)
NEUTS SEG # BLD: 8.6 K/UL (ref 1.7–8.2)
NEUTS SEG NFR BLD: 76 % (ref 43–78)
NRBC # BLD: 0.27 K/UL (ref 0–0.2)
O2/TOTAL GAS SETTING VFR VENT: 40 %
PCO2 BLD: 45.3 MMHG (ref 35–45)
PEEP RESPIRATORY: 8 CMH2O
PH BLD: 7.41 (ref 7.35–7.45)
PHOSPHATE SERPL-MCNC: 1.5 MG/DL (ref 2.3–3.7)
PLATELET # BLD AUTO: 205 K/UL (ref 150–450)
PMV BLD AUTO: 10.8 FL (ref 9.4–12.3)
PO2 BLD: 88 MMHG (ref 75–100)
POTASSIUM SERPL-SCNC: 3.8 MMOL/L (ref 3.5–5.1)
PRESSURE SUPPORT SETTING VENT: 10 CMH2O
RBC # BLD AUTO: 2.78 M/UL (ref 4.23–5.6)
SAO2 % BLD: 96.7 % (ref 95–98)
SERVICE CMNT-IMP: ABNORMAL
SERVICE CMNT-IMP: NORMAL
SERVICE CMNT-IMP: NORMAL
SODIUM SERPL-SCNC: 145 MMOL/L (ref 136–146)
SPECIMEN TYPE: ABNORMAL
VENTILATION MODE VENT: ABNORMAL
VT SETTING VENT: 500 ML
WBC # BLD AUTO: 11.4 K/UL (ref 4.3–11.1)

## 2024-02-14 PROCEDURE — 74018 RADEX ABDOMEN 1 VIEW: CPT

## 2024-02-14 PROCEDURE — 6360000002 HC RX W HCPCS: Performed by: INTERNAL MEDICINE

## 2024-02-14 PROCEDURE — 6360000002 HC RX W HCPCS: Performed by: THORACIC SURGERY (CARDIOTHORACIC VASCULAR SURGERY)

## 2024-02-14 PROCEDURE — 6370000000 HC RX 637 (ALT 250 FOR IP): Performed by: PHYSICIAN ASSISTANT

## 2024-02-14 PROCEDURE — 93005 ELECTROCARDIOGRAM TRACING: CPT | Performed by: PHYSICIAN ASSISTANT

## 2024-02-14 PROCEDURE — 2500000003 HC RX 250 WO HCPCS: Performed by: PHYSICIAN ASSISTANT

## 2024-02-14 PROCEDURE — 86923 COMPATIBILITY TEST ELECTRIC: CPT

## 2024-02-14 PROCEDURE — 2500000003 HC RX 250 WO HCPCS: Performed by: THORACIC SURGERY (CARDIOTHORACIC VASCULAR SURGERY)

## 2024-02-14 PROCEDURE — 2100000001 HC CVICU R&B

## 2024-02-14 PROCEDURE — 94640 AIRWAY INHALATION TREATMENT: CPT

## 2024-02-14 PROCEDURE — 85347 COAGULATION TIME ACTIVATED: CPT

## 2024-02-14 PROCEDURE — 2580000003 HC RX 258: Performed by: PHYSICIAN ASSISTANT

## 2024-02-14 PROCEDURE — 93010 ELECTROCARDIOGRAM REPORT: CPT | Performed by: INTERNAL MEDICINE

## 2024-02-14 PROCEDURE — 99291 CRITICAL CARE FIRST HOUR: CPT | Performed by: INTERNAL MEDICINE

## 2024-02-14 PROCEDURE — 80048 BASIC METABOLIC PNL TOTAL CA: CPT

## 2024-02-14 PROCEDURE — 83735 ASSAY OF MAGNESIUM: CPT

## 2024-02-14 PROCEDURE — 82962 GLUCOSE BLOOD TEST: CPT

## 2024-02-14 PROCEDURE — 86901 BLOOD TYPING SEROLOGIC RH(D): CPT

## 2024-02-14 PROCEDURE — A4216 STERILE WATER/SALINE, 10 ML: HCPCS | Performed by: PHYSICIAN ASSISTANT

## 2024-02-14 PROCEDURE — 2580000003 HC RX 258: Performed by: INTERNAL MEDICINE

## 2024-02-14 PROCEDURE — 85025 COMPLETE CBC W/AUTO DIFF WBC: CPT

## 2024-02-14 PROCEDURE — 2709999900 HC NON-CHARGEABLE SUPPLY

## 2024-02-14 PROCEDURE — 94003 VENT MGMT INPAT SUBQ DAY: CPT

## 2024-02-14 PROCEDURE — 86900 BLOOD TYPING SEROLOGIC ABO: CPT

## 2024-02-14 PROCEDURE — 71045 X-RAY EXAM CHEST 1 VIEW: CPT

## 2024-02-14 PROCEDURE — 86850 RBC ANTIBODY SCREEN: CPT

## 2024-02-14 PROCEDURE — P9016 RBC LEUKOCYTES REDUCED: HCPCS

## 2024-02-14 PROCEDURE — 82803 BLOOD GASES ANY COMBINATION: CPT

## 2024-02-14 PROCEDURE — 36430 TRANSFUSION BLD/BLD COMPNT: CPT

## 2024-02-14 PROCEDURE — 84100 ASSAY OF PHOSPHORUS: CPT

## 2024-02-14 PROCEDURE — 6360000002 HC RX W HCPCS: Performed by: PHYSICIAN ASSISTANT

## 2024-02-14 PROCEDURE — 2580000003 HC RX 258: Performed by: THORACIC SURGERY (CARDIOTHORACIC VASCULAR SURGERY)

## 2024-02-14 PROCEDURE — 37799 UNLISTED PX VASCULAR SURGERY: CPT

## 2024-02-14 RX ORDER — MIDAZOLAM HYDROCHLORIDE 2 MG/2ML
2 INJECTION, SOLUTION INTRAMUSCULAR; INTRAVENOUS ONCE
Status: COMPLETED | OUTPATIENT
Start: 2024-02-14 | End: 2024-02-14

## 2024-02-14 RX ORDER — SODIUM CHLORIDE 9 MG/ML
INJECTION, SOLUTION INTRAVENOUS PRN
Status: DISCONTINUED | OUTPATIENT
Start: 2024-02-14 | End: 2024-02-15

## 2024-02-14 RX ORDER — FENTANYL CITRATE 50 UG/ML
25 INJECTION, SOLUTION INTRAMUSCULAR; INTRAVENOUS
Status: DISCONTINUED | OUTPATIENT
Start: 2024-02-14 | End: 2024-02-15

## 2024-02-14 RX ADMIN — DEXMEDETOMIDINE 0.8 MCG/KG/HR: 100 INJECTION, SOLUTION, CONCENTRATE INTRAVENOUS at 14:31

## 2024-02-14 RX ADMIN — MIDAZOLAM 2 MG: 1 INJECTION INTRAMUSCULAR; INTRAVENOUS at 10:06

## 2024-02-14 RX ADMIN — POTASSIUM CHLORIDE 20 MEQ: 400 INJECTION, SOLUTION INTRAVENOUS at 03:45

## 2024-02-14 RX ADMIN — CHLORHEXIDINE GLUCONATE 10 ML: 1.2 RINSE ORAL at 09:32

## 2024-02-14 RX ADMIN — FUROSEMIDE 40 MG: 10 INJECTION, SOLUTION INTRAMUSCULAR; INTRAVENOUS at 12:54

## 2024-02-14 RX ADMIN — CEFEPIME 2000 MG: 2 INJECTION, POWDER, FOR SOLUTION INTRAVENOUS at 04:44

## 2024-02-14 RX ADMIN — Medication 50 MCG: at 01:19

## 2024-02-14 RX ADMIN — HEPARIN SODIUM: 10000 INJECTION INTRAVENOUS; SUBCUTANEOUS at 21:25

## 2024-02-14 RX ADMIN — CHLORHEXIDINE GLUCONATE 10 ML: 1.2 RINSE ORAL at 20:54

## 2024-02-14 RX ADMIN — Medication 50 MCG: at 22:33

## 2024-02-14 RX ADMIN — CEFEPIME 2000 MG: 2 INJECTION, POWDER, FOR SOLUTION INTRAVENOUS at 19:36

## 2024-02-14 RX ADMIN — Medication 50 MCG: at 23:32

## 2024-02-14 RX ADMIN — CHLORHEXIDINE GLUCONATE 10 ML: 1.2 RINSE ORAL at 00:49

## 2024-02-14 RX ADMIN — FAMOTIDINE 20 MG: 10 INJECTION, SOLUTION INTRAVENOUS at 20:54

## 2024-02-14 RX ADMIN — Medication 50 MCG: at 05:54

## 2024-02-14 RX ADMIN — Medication 25 MCG: at 09:16

## 2024-02-14 RX ADMIN — Medication 50 MCG: at 03:30

## 2024-02-14 RX ADMIN — ALBUTEROL SULFATE 2.5 MG: 2.5 SOLUTION RESPIRATORY (INHALATION) at 15:51

## 2024-02-14 RX ADMIN — CEFEPIME 2000 MG: 2 INJECTION, POWDER, FOR SOLUTION INTRAVENOUS at 13:09

## 2024-02-14 RX ADMIN — SODIUM CHLORIDE, PRESERVATIVE FREE 10 ML: 5 INJECTION INTRAVENOUS at 21:04

## 2024-02-14 RX ADMIN — FAMOTIDINE 20 MG: 10 INJECTION, SOLUTION INTRAVENOUS at 10:05

## 2024-02-14 RX ADMIN — ATORVASTATIN CALCIUM 40 MG: 40 TABLET, FILM COATED ORAL at 20:54

## 2024-02-14 RX ADMIN — CEFEPIME 2000 MG: 2 INJECTION, POWDER, FOR SOLUTION INTRAVENOUS at 00:47

## 2024-02-14 RX ADMIN — DEXMEDETOMIDINE 0.8 MCG/KG/HR: 100 INJECTION, SOLUTION, CONCENTRATE INTRAVENOUS at 22:28

## 2024-02-14 RX ADMIN — SODIUM PHOSPHATE, MONOBASIC, MONOHYDRATE AND SODIUM PHOSPHATE, DIBASIC, ANHYDROUS 15 MMOL: 276; 142 INJECTION, SOLUTION INTRAVENOUS at 04:50

## 2024-02-14 RX ADMIN — Medication 50 MCG: at 16:50

## 2024-02-14 RX ADMIN — ALBUTEROL SULFATE 2.5 MG: 2.5 SOLUTION RESPIRATORY (INHALATION) at 13:03

## 2024-02-14 RX ADMIN — MIDAZOLAM 2 MG: 1 INJECTION INTRAMUSCULAR; INTRAVENOUS at 17:27

## 2024-02-14 RX ADMIN — Medication 50 MCG: at 20:45

## 2024-02-14 RX ADMIN — DEXMEDETOMIDINE 0.4 MCG/KG/HR: 100 INJECTION, SOLUTION, CONCENTRATE INTRAVENOUS at 06:25

## 2024-02-14 RX ADMIN — MIDAZOLAM 2 MG: 1 INJECTION INTRAMUSCULAR; INTRAVENOUS at 16:50

## 2024-02-14 RX ADMIN — SODIUM CHLORIDE, PRESERVATIVE FREE 10 ML: 5 INJECTION INTRAVENOUS at 09:32

## 2024-02-14 RX ADMIN — Medication 50 MCG: at 19:29

## 2024-02-14 RX ADMIN — Medication 50 MCG: at 17:28

## 2024-02-14 RX ADMIN — FENTANYL CITRATE 30 MCG/HR: 0.05 INJECTION, SOLUTION INTRAMUSCULAR; INTRAVENOUS at 05:57

## 2024-02-14 RX ADMIN — AMIODARONE HYDROCHLORIDE 1 MG/MIN: 50 INJECTION, SOLUTION INTRAVENOUS at 13:02

## 2024-02-14 RX ADMIN — ALBUTEROL SULFATE 2.5 MG: 2.5 SOLUTION RESPIRATORY (INHALATION) at 08:02

## 2024-02-14 RX ADMIN — ALBUTEROL SULFATE 2.5 MG: 2.5 SOLUTION RESPIRATORY (INHALATION) at 20:05

## 2024-02-14 ASSESSMENT — PULMONARY FUNCTION TESTS
PIF_VALUE: 22
PIF_VALUE: 26
PIF_VALUE: 31
PIF_VALUE: 25

## 2024-02-14 ASSESSMENT — PAIN SCALES - GENERAL
PAINLEVEL_OUTOF10: 0
PAINLEVEL_OUTOF10: 4

## 2024-02-14 NOTE — INTERDISCIPLINARY ROUNDS
Multi-D Rounds/Checklist (leapfrog):  Lines: can any be removed?:  all per CV protocol     ETT  (Active)     Urinary Catheter 02/09/24 Dang (Active)     Introducer 02/06/24 Internal jugular Right (Active)       Arterial Line 02/13/24 Radial (Active)       CVC Double Lumen 02/06/24 Right Internal jugular (Active)       Extended Dwell Peripherial IV 02/10/24 Right Basilic (Active)     DVT Prophylaxis: Ordered  Vent: HOB elevated? Yes ;  mL/kg: N/A ; Vent day 2  Nutrition Ordered/appropriate: Contraindicated- reintubated  Can antibiotics or other drugs be stopped? No /End Date set   Inpat Anti-Infectives (From admission, onward)       Start     Ordered Stop    02/10/24 2000  ceFEPIme (MAXIPIME) 2,000 mg in sodium chloride 0.9 % 100 mL IVPB (mini-bag)  2,000 mg,   IntraVENous,   EVERY 8 HOURS        See Hyperspace for full Linked Orders Report.    02/10/24 1100 02/17/24 1959                 Consults needed: None  A: Is pain control adequate? (has PRNs? Stop drip?) Yes  B: Sedation break and SBT? N/A  C: Is sedation choice appropriate? Yes  D: Delirium/CAM-ICU? No  E: Mobility goals/appropriateness? Yes  F: Family update and plan? Sons are surrogate decision maker and is being updated daily by primary attending and nursing staff.    Jhoana Smith, APRN - NP

## 2024-02-15 ENCOUNTER — ANESTHESIA EVENT (OUTPATIENT)
Dept: SURGERY | Age: 84
End: 2024-02-15
Payer: MEDICARE

## 2024-02-15 ENCOUNTER — APPOINTMENT (OUTPATIENT)
Dept: GENERAL RADIOLOGY | Age: 84
DRG: 001 | End: 2024-02-15
Attending: THORACIC SURGERY (CARDIOTHORACIC VASCULAR SURGERY)
Payer: MEDICARE

## 2024-02-15 ENCOUNTER — ANESTHESIA (OUTPATIENT)
Dept: SURGERY | Age: 84
End: 2024-02-15
Payer: MEDICARE

## 2024-02-15 LAB
ABO + RH BLD: NORMAL
ACT BLD: 168 SECS (ref 70–128)
ACT BLD: 174 SECS (ref 70–128)
ANION GAP SERPL CALC-SCNC: 6 MMOL/L (ref 2–11)
ARTERIAL PATENCY WRIST A: ABNORMAL
BASE EXCESS BLD CALC-SCNC: 2.1 MMOL/L
BASE EXCESS BLD CALC-SCNC: 2.5 MMOL/L
BASE EXCESS BLD CALC-SCNC: 3 MMOL/L
BASOPHILS # BLD: 0 K/UL (ref 0–0.2)
BASOPHILS NFR BLD: 0 % (ref 0–2)
BDY SITE: ABNORMAL
BLD PROD TYP BPU: NORMAL
BLOOD BANK DISPENSE STATUS: NORMAL
BLOOD GROUP ANTIBODIES SERPL: NORMAL
BPU ID: NORMAL
BUN SERPL-MCNC: 21 MG/DL (ref 8–23)
CALCIUM SERPL-MCNC: 8.2 MG/DL (ref 8.3–10.4)
CHLORIDE SERPL-SCNC: 114 MMOL/L (ref 103–113)
CO2 SERPL-SCNC: 27 MMOL/L (ref 21–32)
CREAT SERPL-MCNC: 0.7 MG/DL (ref 0.8–1.5)
CROSSMATCH RESULT: NORMAL
DIFFERENTIAL METHOD BLD: ABNORMAL
EKG DIAGNOSIS: NORMAL
EKG QRS DURATION: 96 MS
EKG QTC CALCULATION (BAZETT): 448 MS
EKG R AXIS: 38 DEGREES
EKG T AXIS: -14 DEGREES
EKG VENTRICULAR RATE: 63 BPM
EOSINOPHIL # BLD: 0.4 K/UL (ref 0–0.8)
EOSINOPHIL NFR BLD: 3 % (ref 0.5–7.8)
ERYTHROCYTE [DISTWIDTH] IN BLOOD BY AUTOMATED COUNT: 17.2 % (ref 11.9–14.6)
GAS FLOW.O2 O2 DELIVERY SYS: ABNORMAL
GLUCOSE BLD STRIP.AUTO-MCNC: 101 MG/DL (ref 65–100)
GLUCOSE BLD STRIP.AUTO-MCNC: 121 MG/DL (ref 65–100)
GLUCOSE BLD STRIP.AUTO-MCNC: 128 MG/DL (ref 65–100)
GLUCOSE SERPL-MCNC: 132 MG/DL (ref 65–100)
HCO3 BLD-SCNC: 25.6 MMOL/L (ref 22–26)
HCO3 BLD-SCNC: 26.2 MMOL/L (ref 22–26)
HCO3 BLD-SCNC: 27.3 MMOL/L (ref 22–26)
HCT VFR BLD AUTO: 30.3 % (ref 41.1–50.3)
HGB BLD-MCNC: 9.5 G/DL (ref 13.6–17.2)
IMM GRANULOCYTES # BLD AUTO: 0.7 K/UL (ref 0–0.5)
IMM GRANULOCYTES NFR BLD AUTO: 5 % (ref 0–5)
LYMPHOCYTES # BLD: 1.4 K/UL (ref 0.5–4.6)
LYMPHOCYTES NFR BLD: 11 % (ref 13–44)
MAGNESIUM SERPL-MCNC: 2.5 MG/DL (ref 1.8–2.4)
MCH RBC QN AUTO: 28.3 PG (ref 26.1–32.9)
MCHC RBC AUTO-ENTMCNC: 31.4 G/DL (ref 31.4–35)
MCV RBC AUTO: 90.2 FL (ref 82–102)
MONOCYTES # BLD: 1 K/UL (ref 0.1–1.3)
MONOCYTES NFR BLD: 7 % (ref 4–12)
NEUTS SEG # BLD: 9.7 K/UL (ref 1.7–8.2)
NEUTS SEG NFR BLD: 74 % (ref 43–78)
NRBC # BLD: 0.11 K/UL (ref 0–0.2)
O2/TOTAL GAS SETTING VFR VENT: 30 %
PCO2 BLD: 32 MMHG (ref 35–45)
PCO2 BLD: 38.2 MMHG (ref 35–45)
PCO2 BLD: 42.6 MMHG (ref 35–45)
PEEP RESPIRATORY: 8 CMH2O
PH BLD: 7.42 (ref 7.35–7.45)
PH BLD: 7.45 (ref 7.35–7.45)
PH BLD: 7.51 (ref 7.35–7.45)
PLATELET # BLD AUTO: 212 K/UL (ref 150–450)
PMV BLD AUTO: 11 FL (ref 9.4–12.3)
PO2 BLD: 105 MMHG (ref 75–100)
PO2 BLD: 113 MMHG (ref 75–100)
PO2 BLD: 82 MMHG (ref 75–100)
POTASSIUM SERPL-SCNC: 3.6 MMOL/L (ref 3.5–5.1)
PRESSURE SUPPORT SETTING VENT: 8 CMH2O
RBC # BLD AUTO: 3.36 M/UL (ref 4.23–5.6)
RESPIRATORY RATE, POC: 25 (ref 5–40)
SAO2 % BLD: 96.2 % (ref 95–98)
SAO2 % BLD: 98.6 % (ref 95–98)
SAO2 % BLD: 98.6 % (ref 95–98)
SERVICE CMNT-IMP: ABNORMAL
SODIUM SERPL-SCNC: 147 MMOL/L (ref 136–146)
SPECIMEN EXP DATE BLD: NORMAL
SPECIMEN TYPE: ABNORMAL
UNIT DIVISION: 0
VENTILATION MODE VENT: ABNORMAL
VT SETTING VENT: 500 ML
VT SETTING VENT: 500 ML
WBC # BLD AUTO: 13.2 K/UL (ref 4.3–11.1)

## 2024-02-15 PROCEDURE — 6370000000 HC RX 637 (ALT 250 FOR IP): Performed by: THORACIC SURGERY (CARDIOTHORACIC VASCULAR SURGERY)

## 2024-02-15 PROCEDURE — 2580000003 HC RX 258: Performed by: INTERNAL MEDICINE

## 2024-02-15 PROCEDURE — 99291 CRITICAL CARE FIRST HOUR: CPT | Performed by: INTERNAL MEDICINE

## 2024-02-15 PROCEDURE — 6360000002 HC RX W HCPCS: Performed by: INTERNAL MEDICINE

## 2024-02-15 PROCEDURE — 94640 AIRWAY INHALATION TREATMENT: CPT

## 2024-02-15 PROCEDURE — 3700000001 HC ADD 15 MINUTES (ANESTHESIA): Performed by: THORACIC SURGERY (CARDIOTHORACIC VASCULAR SURGERY)

## 2024-02-15 PROCEDURE — 80048 BASIC METABOLIC PNL TOTAL CA: CPT

## 2024-02-15 PROCEDURE — 2709999900 HC NON-CHARGEABLE SUPPLY: Performed by: THORACIC SURGERY (CARDIOTHORACIC VASCULAR SURGERY)

## 2024-02-15 PROCEDURE — 3700000000 HC ANESTHESIA ATTENDED CARE: Performed by: THORACIC SURGERY (CARDIOTHORACIC VASCULAR SURGERY)

## 2024-02-15 PROCEDURE — 2580000003 HC RX 258: Performed by: PHYSICIAN ASSISTANT

## 2024-02-15 PROCEDURE — 94003 VENT MGMT INPAT SUBQ DAY: CPT

## 2024-02-15 PROCEDURE — 02HA0RZ INSERTION OF SHORT-TERM EXTERNAL HEART ASSIST SYSTEM INTO HEART, OPEN APPROACH: ICD-10-PCS | Performed by: THORACIC SURGERY (CARDIOTHORACIC VASCULAR SURGERY)

## 2024-02-15 PROCEDURE — 85347 COAGULATION TIME ACTIVATED: CPT

## 2024-02-15 PROCEDURE — 85025 COMPLETE CBC W/AUTO DIFF WBC: CPT

## 2024-02-15 PROCEDURE — 83735 ASSAY OF MAGNESIUM: CPT

## 2024-02-15 PROCEDURE — 82803 BLOOD GASES ANY COMBINATION: CPT

## 2024-02-15 PROCEDURE — 2580000003 HC RX 258: Performed by: THORACIC SURGERY (CARDIOTHORACIC VASCULAR SURGERY)

## 2024-02-15 PROCEDURE — 37799 UNLISTED PX VASCULAR SURGERY: CPT

## 2024-02-15 PROCEDURE — 82962 GLUCOSE BLOOD TEST: CPT

## 2024-02-15 PROCEDURE — 71045 X-RAY EXAM CHEST 1 VIEW: CPT

## 2024-02-15 PROCEDURE — 3600000018 HC SURGERY OHS ADDTL 15MIN: Performed by: THORACIC SURGERY (CARDIOTHORACIC VASCULAR SURGERY)

## 2024-02-15 PROCEDURE — 94761 N-INVAS EAR/PLS OXIMETRY MLT: CPT

## 2024-02-15 PROCEDURE — 6360000002 HC RX W HCPCS: Performed by: THORACIC SURGERY (CARDIOTHORACIC VASCULAR SURGERY)

## 2024-02-15 PROCEDURE — 6360000002 HC RX W HCPCS: Performed by: PHYSICIAN ASSISTANT

## 2024-02-15 PROCEDURE — 2580000003 HC RX 258: Performed by: NURSE ANESTHETIST, CERTIFIED REGISTERED

## 2024-02-15 PROCEDURE — 93005 ELECTROCARDIOGRAM TRACING: CPT | Performed by: PHYSICIAN ASSISTANT

## 2024-02-15 PROCEDURE — 6370000000 HC RX 637 (ALT 250 FOR IP): Performed by: PHYSICIAN ASSISTANT

## 2024-02-15 PROCEDURE — X2HX0F9 INSERTION OF CONDUIT TO SHORT-TERM EXTERNAL HEART ASSIST SYSTEM INTO THORACIC AORTA, ASCENDING, OPEN APPROACH, NEW TECHNOLOGY GROUP 9: ICD-10-PCS | Performed by: THORACIC SURGERY (CARDIOTHORACIC VASCULAR SURGERY)

## 2024-02-15 PROCEDURE — 2500000003 HC RX 250 WO HCPCS: Performed by: NURSE ANESTHETIST, CERTIFIED REGISTERED

## 2024-02-15 PROCEDURE — 6360000002 HC RX W HCPCS: Performed by: NURSE ANESTHETIST, CERTIFIED REGISTERED

## 2024-02-15 PROCEDURE — 2700000000 HC OXYGEN THERAPY PER DAY

## 2024-02-15 PROCEDURE — 3600000008 HC SURGERY OHS BASE: Performed by: THORACIC SURGERY (CARDIOTHORACIC VASCULAR SURGERY)

## 2024-02-15 PROCEDURE — 93010 ELECTROCARDIOGRAM REPORT: CPT | Performed by: INTERNAL MEDICINE

## 2024-02-15 PROCEDURE — 5A0221D ASSISTANCE WITH CARDIAC OUTPUT USING IMPELLER PUMP, CONTINUOUS: ICD-10-PCS | Performed by: THORACIC SURGERY (CARDIOTHORACIC VASCULAR SURGERY)

## 2024-02-15 PROCEDURE — 2500000003 HC RX 250 WO HCPCS: Performed by: PHYSICIAN ASSISTANT

## 2024-02-15 PROCEDURE — A4216 STERILE WATER/SALINE, 10 ML: HCPCS | Performed by: PHYSICIAN ASSISTANT

## 2024-02-15 PROCEDURE — 2100000001 HC CVICU R&B

## 2024-02-15 RX ORDER — SENNA AND DOCUSATE SODIUM 50; 8.6 MG/1; MG/1
2 TABLET, FILM COATED ORAL 2 TIMES DAILY
Status: DISCONTINUED | OUTPATIENT
Start: 2024-02-15 | End: 2024-02-22

## 2024-02-15 RX ORDER — OXYCODONE HYDROCHLORIDE AND ACETAMINOPHEN 5; 325 MG/1; MG/1
1 TABLET ORAL EVERY 4 HOURS PRN
Status: DISCONTINUED | OUTPATIENT
Start: 2024-02-15 | End: 2024-02-15

## 2024-02-15 RX ORDER — POTASSIUM CHLORIDE 7.45 MG/ML
10 INJECTION INTRAVENOUS
Status: ACTIVE | OUTPATIENT
Start: 2024-02-15 | End: 2024-02-15

## 2024-02-15 RX ORDER — SODIUM CHLORIDE 9 MG/ML
INJECTION, SOLUTION INTRAVENOUS CONTINUOUS PRN
Status: DISCONTINUED | OUTPATIENT
Start: 2024-02-15 | End: 2024-02-15 | Stop reason: SDUPTHER

## 2024-02-15 RX ORDER — NOREPINEPHRINE BITARTRATE 1 MG/ML
INJECTION, SOLUTION INTRAVENOUS PRN
Status: DISCONTINUED | OUTPATIENT
Start: 2024-02-15 | End: 2024-02-15 | Stop reason: SDUPTHER

## 2024-02-15 RX ORDER — POLYETHYLENE GLYCOL 3350 17 G/17G
17 POWDER, FOR SOLUTION ORAL DAILY
Status: DISCONTINUED | OUTPATIENT
Start: 2024-02-16 | End: 2024-02-22

## 2024-02-15 RX ORDER — POLYETHYLENE GLYCOL 3350 17 G/17G
17 POWDER, FOR SOLUTION ORAL DAILY
Status: DISCONTINUED | OUTPATIENT
Start: 2024-02-15 | End: 2024-02-15

## 2024-02-15 RX ORDER — TRAMADOL HYDROCHLORIDE 50 MG/1
100 TABLET ORAL EVERY 6 HOURS PRN
Status: DISCONTINUED | OUTPATIENT
Start: 2024-02-15 | End: 2024-02-22

## 2024-02-15 RX ORDER — CEFAZOLIN SODIUM 1 G/3ML
INJECTION, POWDER, FOR SOLUTION INTRAMUSCULAR; INTRAVENOUS PRN
Status: DISCONTINUED | OUTPATIENT
Start: 2024-02-15 | End: 2024-02-15 | Stop reason: SDUPTHER

## 2024-02-15 RX ORDER — MIDAZOLAM HYDROCHLORIDE 1 MG/ML
INJECTION INTRAMUSCULAR; INTRAVENOUS PRN
Status: DISCONTINUED | OUTPATIENT
Start: 2024-02-15 | End: 2024-02-15 | Stop reason: SDUPTHER

## 2024-02-15 RX ADMIN — Medication 50 MCG: at 06:17

## 2024-02-15 RX ADMIN — TRAMADOL HYDROCHLORIDE 100 MG: 50 TABLET ORAL at 23:13

## 2024-02-15 RX ADMIN — AMIODARONE HYDROCHLORIDE 200 MG: 200 TABLET ORAL at 20:44

## 2024-02-15 RX ADMIN — NOREPINEPHRINE BITARTRATE 16 MCG: 1 SOLUTION INTRAVENOUS at 12:27

## 2024-02-15 RX ADMIN — DEXMEDETOMIDINE 1.1 MCG/KG/HR: 100 INJECTION, SOLUTION, CONCENTRATE INTRAVENOUS at 23:30

## 2024-02-15 RX ADMIN — NOREPINEPHRINE BITARTRATE 8 MCG: 1 SOLUTION INTRAVENOUS at 12:40

## 2024-02-15 RX ADMIN — NOREPINEPHRINE BITARTRATE 8 MCG: 1 SOLUTION INTRAVENOUS at 12:47

## 2024-02-15 RX ADMIN — NOREPINEPHRINE BITARTRATE 8 MCG: 1 SOLUTION INTRAVENOUS at 12:19

## 2024-02-15 RX ADMIN — Medication 50 MCG: at 02:44

## 2024-02-15 RX ADMIN — NOREPINEPHRINE BITARTRATE 8 MCG: 1 SOLUTION INTRAVENOUS at 12:16

## 2024-02-15 RX ADMIN — SODIUM CHLORIDE, PRESERVATIVE FREE 10 ML: 5 INJECTION INTRAVENOUS at 09:28

## 2024-02-15 RX ADMIN — NOREPINEPHRINE BITARTRATE 8 MCG: 1 SOLUTION INTRAVENOUS at 12:22

## 2024-02-15 RX ADMIN — ALBUTEROL SULFATE 2.5 MG: 2.5 SOLUTION RESPIRATORY (INHALATION) at 16:45

## 2024-02-15 RX ADMIN — ALBUTEROL SULFATE 2.5 MG: 2.5 SOLUTION RESPIRATORY (INHALATION) at 08:53

## 2024-02-15 RX ADMIN — NOREPINEPHRINE BITARTRATE 8 MCG: 1 SOLUTION INTRAVENOUS at 12:36

## 2024-02-15 RX ADMIN — SODIUM CHLORIDE, PRESERVATIVE FREE 10 ML: 5 INJECTION INTRAVENOUS at 21:10

## 2024-02-15 RX ADMIN — HEPARIN SODIUM 1200 UNITS/HR: 10000 INJECTION, SOLUTION INTRAVENOUS at 03:59

## 2024-02-15 RX ADMIN — FENTANYL CITRATE 50 MCG/HR: 0.05 INJECTION, SOLUTION INTRAMUSCULAR; INTRAVENOUS at 02:55

## 2024-02-15 RX ADMIN — ASPIRIN 81 MG: 81 TABLET, COATED ORAL at 09:26

## 2024-02-15 RX ADMIN — DEXMEDETOMIDINE 1 MCG/KG/HR: 100 INJECTION, SOLUTION, CONCENTRATE INTRAVENOUS at 19:00

## 2024-02-15 RX ADMIN — CHLORHEXIDINE GLUCONATE 10 ML: 1.2 RINSE ORAL at 09:29

## 2024-02-15 RX ADMIN — CHLORHEXIDINE GLUCONATE 10 ML: 1.2 RINSE ORAL at 20:45

## 2024-02-15 RX ADMIN — CEFEPIME 2000 MG: 2 INJECTION, POWDER, FOR SOLUTION INTRAVENOUS at 20:00

## 2024-02-15 RX ADMIN — DEXMEDETOMIDINE 0.9 MCG/KG/HR: 100 INJECTION, SOLUTION, CONCENTRATE INTRAVENOUS at 05:17

## 2024-02-15 RX ADMIN — MIDAZOLAM 1 MG: 1 INJECTION INTRAMUSCULAR; INTRAVENOUS at 08:14

## 2024-02-15 RX ADMIN — DEXMEDETOMIDINE 1.2 MCG/KG/HR: 100 INJECTION, SOLUTION, CONCENTRATE INTRAVENOUS at 09:45

## 2024-02-15 RX ADMIN — FAMOTIDINE 20 MG: 10 INJECTION, SOLUTION INTRAVENOUS at 20:44

## 2024-02-15 RX ADMIN — NOREPINEPHRINE BITARTRATE 8 MCG: 1 SOLUTION INTRAVENOUS at 12:33

## 2024-02-15 RX ADMIN — Medication 50 MCG: at 04:51

## 2024-02-15 RX ADMIN — ALBUTEROL SULFATE 2.5 MG: 2.5 SOLUTION RESPIRATORY (INHALATION) at 20:15

## 2024-02-15 RX ADMIN — AMIODARONE HYDROCHLORIDE 0.5 MG/MIN: 50 INJECTION, SOLUTION INTRAVENOUS at 22:56

## 2024-02-15 RX ADMIN — ATORVASTATIN CALCIUM 40 MG: 40 TABLET, FILM COATED ORAL at 20:44

## 2024-02-15 RX ADMIN — Medication 50 MCG: at 02:03

## 2024-02-15 RX ADMIN — DOCUSATE SODIUM 50 MG AND SENNOSIDES 8.6 MG 2 TABLET: 8.6; 5 TABLET, FILM COATED ORAL at 20:44

## 2024-02-15 RX ADMIN — ALBUTEROL SULFATE 2.5 MG: 2.5 SOLUTION RESPIRATORY (INHALATION) at 10:59

## 2024-02-15 RX ADMIN — FUROSEMIDE 40 MG: 10 INJECTION, SOLUTION INTRAMUSCULAR; INTRAVENOUS at 06:19

## 2024-02-15 RX ADMIN — CEFEPIME 2000 MG: 2 INJECTION, POWDER, FOR SOLUTION INTRAVENOUS at 04:09

## 2024-02-15 RX ADMIN — Medication 50 MCG: at 01:07

## 2024-02-15 RX ADMIN — SODIUM CHLORIDE: 900 INJECTION INTRAVENOUS at 12:02

## 2024-02-15 RX ADMIN — DOCUSATE SODIUM 100 MG: 50 LIQUID ORAL at 09:26

## 2024-02-15 RX ADMIN — MIDAZOLAM 1 MG: 1 INJECTION INTRAMUSCULAR; INTRAVENOUS at 12:48

## 2024-02-15 RX ADMIN — AMIODARONE HYDROCHLORIDE 0.5 MG/MIN: 50 INJECTION, SOLUTION INTRAVENOUS at 06:44

## 2024-02-15 RX ADMIN — POTASSIUM CHLORIDE 20 MEQ: 400 INJECTION, SOLUTION INTRAVENOUS at 06:27

## 2024-02-15 RX ADMIN — POTASSIUM CHLORIDE 20 MEQ: 400 INJECTION, SOLUTION INTRAVENOUS at 11:38

## 2024-02-15 RX ADMIN — NOREPINEPHRINE BITARTRATE 8 MCG: 1 SOLUTION INTRAVENOUS at 12:25

## 2024-02-15 RX ADMIN — FAMOTIDINE 20 MG: 10 INJECTION, SOLUTION INTRAVENOUS at 09:26

## 2024-02-15 RX ADMIN — NOREPINEPHRINE BITARTRATE 8 MCG: 1 SOLUTION INTRAVENOUS at 12:32

## 2024-02-15 RX ADMIN — MIDAZOLAM 1 MG: 1 INJECTION INTRAMUSCULAR; INTRAVENOUS at 12:06

## 2024-02-15 RX ADMIN — CEFEPIME 2000 MG: 2 INJECTION, POWDER, FOR SOLUTION INTRAVENOUS at 11:42

## 2024-02-15 RX ADMIN — CEFAZOLIN SODIUM 2 G: 1 INJECTION, POWDER, FOR SOLUTION INTRAMUSCULAR; INTRAVENOUS at 12:25

## 2024-02-15 ASSESSMENT — PAIN DESCRIPTION - LOCATION: LOCATION: GENERALIZED

## 2024-02-15 ASSESSMENT — PAIN SCALES - GENERAL
PAINLEVEL_OUTOF10: 4
PAINLEVEL_OUTOF10: 0

## 2024-02-15 ASSESSMENT — PULMONARY FUNCTION TESTS
PIF_VALUE: 23
PIF_VALUE: 25
PIF_VALUE: 21
PIF_VALUE: 23
PIF_VALUE: 22

## 2024-02-15 NOTE — BRIEF OP NOTE
Brief Postoperative Note      Patient: Dionisio Gordon Jr.  YOB: 1940  MRN: [id number]    Date of Procedure: 2/15/2024    Pre-Op Diagnosis Codes:     * Coronary artery disease with angina pectoris, unspecified vessel or lesion type, unspecified whether native or transplanted heart (HCC) [I25.119]    Post-Op Diagnosis: Same       Procedure(s):  IMPELLA EXPLANT    Surgeon(s):  Yony Shi MD    Assistant:  Surgical Assistant: Kamilah Daniels    Anesthesia: General    Estimated Blood Loss (mL): Minimal    Complications: None    Specimens:   * No specimens in log *    Implants:  * No implants in log *      Drains:   NG/OG/NJ/NE Tube Nasogastric 12 fr Left nostril (Active)   Surrounding Skin Clean, dry & intact 02/15/24 1100   Securement device Tape 02/15/24 1100   Status Clamped 02/15/24 1100   Placement Verified External Catheter Length 02/15/24 1100   NG/OG/NJ/NE External Measurement (cm) 75 cm 02/15/24 1100   Free Water/Flush (mL) 40 mL 02/14/24 2054   Action Taken Other (comment) 02/14/24 2054       Urinary Catheter 02/09/24 Dang (Active)   Catheter Indications Need for fluid volume management of the critically ill patient in a critical care setting 02/15/24 1100   Site Assessment No urethral drainage 02/15/24 1100   Urine Color Yellow 02/15/24 1100   Urine Appearance Clear 02/15/24 1100   Collection Container Standard 02/15/24 1100   Securement Method Securing device (Describe) 02/15/24 1100   Catheter Care  Perineal wipes 02/15/24 0700   Catheter Best Practices  Drainage tube clipped to bed;Catheter secured to thigh;Tamper seal intact;Bag below bladder;Bag not on floor;Lack of dependent loop in tubing;Drainage bag less than half full 02/15/24 0300   Status Draining;Patent 02/15/24 0300   Output (mL) 500 mL 02/15/24 1100       [REMOVED] Chest Tube Left Pleural 1 (Removed)   Chest Tube Airleak No 02/10/24 1115   Status Gravity 02/10/24 1115   Suction -20 cm H2O 02/10/24 0715   Y Connector Used

## 2024-02-15 NOTE — INTERDISCIPLINARY ROUNDS
Multi-D Rounds/Checklist (leapfrog):  Lines: can any be removed?: None   ETT  (Active)     NG/OG/NJ/NE Tube Nasogastric 12 fr Left nostril (Active)       Urinary Catheter 02/09/24 Dang (Active)     Introducer 02/06/24 Internal jugular Right (Active)       Arterial Line 02/13/24 Radial (Active)       CVC Double Lumen 02/06/24 Right Internal jugular (Active)       Extended Dwell Peripherial IV 02/10/24 Right Basilic (Active)     Art line day 3  CVC double day 10      DVT Prophylaxis: Ordered- heparin   Vent: HOB elevated? Yes ;  mL/kg: N/A ; Vent day 3  Nutrition Ordered/appropriate: Contraindicated- per Dr escalante   Can antibiotics or other drugs be stopped? No /End Date set     Inpat Anti-Infectives (From admission, onward)       Start     Ordered Stop    02/10/24 2000  ceFEPIme (MAXIPIME) 2,000 mg in sodium chloride 0.9 % 100 mL IVPB (mini-bag)  2,000 mg,   IntraVENous,   EVERY 8 HOURS        See Hyperspace for full Linked Orders Report.    02/10/24 1100 02/17/24 1959               Consults needed: None  A: Is pain control adequate? (has PRNs? Stop drip?) Yes  B: Sedation break and SBT? N/A  C: Is sedation choice appropriate? Yes  D: Delirium/CAM-ICU? No  E: Mobility goals/appropriateness? Yes  F: Family update and plan? Sons are surrogate decision maker and is being updated daily by primary attending and nursing staff.    Jhoana Smith, APRN - NP

## 2024-02-15 NOTE — ANESTHESIA PRE PROCEDURE
infusion  0.1-50 Units/hr IntraVENous Continuous Pau Gallardo PA   Stopped at 02/07/24 0924   • insulin lispro (HUMALOG) injection vial 0-12 Units  0-12 Units SubCUTAneous TID WC Pau Gallardo PA   2 Units at 02/12/24 1249   • insulin lispro (HUMALOG) injection vial 0-6 Units  0-6 Units SubCUTAneous Nightly Pau Gallardo PA   1 Units at 02/11/24 2125   • dextrose bolus 10% 125 mL  125 mL IntraVENous PRN Pau Gallardo PA        Or   • dextrose bolus 10% 250 mL  250 mL IntraVENous PRN Pau Gallardo PA       • dextrose 10 % infusion   IntraVENous Continuous PRN Pau Gallardo PA       • dexmedeTOMIDine (PRECEDEX) 400 mcg in sodium chloride 0.9 % 100 mL infusion  0.1-1.5 mcg/kg/hr IntraVENous Continuous Pau Gallardo PA 27.2 mL/hr at 02/15/24 0945 1.2 mcg/kg/hr at 02/15/24 0945   • DOBUTamine (DOBUTREX) 500 mg in dextrose 5 % 250 mL infusion  2.5-10 mcg/kg/min IntraVENous Continuous Yony Shi MD   Stopped at 02/13/24 1303   • acetaminophen (TYLENOL) tablet 650 mg  650 mg Oral Q4H PRN Yony Shi MD   650 mg at 02/11/24 0424   • fentaNYL (SUBLIMAZE) 1,000 mcg in sodium chloride 0.9% 100 mL infusion   mcg/hr IntraVENous Continuous Yony Shi MD 2.5 mL/hr at 02/15/24 0633 25 mcg/hr at 02/15/24 0633   • dextrose 5 % and 0.45 % sodium chloride infusion   IntraVENous Continuous Yony Shi MD   Stopped at 02/06/24 1853       Allergies:  No Known Allergies    Problem List:    Patient Active Problem List   Diagnosis Code   • Typical atrial flutter (HCC) I48.3   • Volume overload E87.70   • Acute on chronic systolic (congestive) heart failure (Formerly [facility]) I50.23   • Atrial fibrillation with RVR (Former[facility]) I48.91   • HFrEF (heart failure with reduced ejection fraction) (Formerly [facility]) I50.20   • 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

## 2024-02-15 NOTE — ACP (ADVANCE CARE PLANNING)
Advance Care Planning     General Advance Care Planning (ACP) Conversation    Date of Conversation: 2/15/2024  Conducted with:  Healthcare Decision Maker: Next of Kin by law (only applies in absence of above) (name) Jitendra Gordon    Healthcare Decision Maker:    Primary Decision Maker: Jitendra Gordon - Child - [phone]    Primary Decision Maker: Herve Gordon - Child - [phone]    Primary Decision Maker: Cleveland Gordon - Child - [phone]  Click here to complete Healthcare Decision Makers including selection of the Healthcare Decision Maker Relationship (ie \"Primary\").   Today we  Pt has ACP documents that are not on file.    Content/Action Overview:  Has ACP document(s) NOT on file - requested patient to provide  Reviewed DNR/DNI and patient elects Full Code (Attempt Resuscitation)  treatment goals, ventilation preferences, hospitalization preferences, and resuscitation preferences  Unable to have conversation with pt - intubated.  No ACP documents on file - follow [place] legal NOK.  Full Code per physician order.    Length of Voluntary ACP Conversation in minutes:  <16 minutes (Non-Billable)    CLARA Glasgow

## 2024-02-15 NOTE — ANESTHESIA POSTPROCEDURE EVALUATION
Department of Anesthesiology  Postprocedure Note    Patient: Dionisio Gordon Jr.  MRN: [id number]  YOB: 1940  Date of evaluation: 2/15/2024    Procedure Summary       Date: 02/15/24 Room / Location: [facility] MAIN OR  CARDIAC / [facility] MAIN OR    Anesthesia Start: 1202 Anesthesia Stop: 1307    Procedure: IMPELLA EXPLANT (Chest) Diagnosis:       Coronary artery disease with angina pectoris, unspecified vessel or lesion type, unspecified whether native or transplanted heart (HCC)      (Coronary artery disease with angina pectoris, unspecified vessel or lesion type, unspecified whether native or transplanted heart (HCC) [I25.119])    Providers: Yony Shi MD Responsible Provider: RODRI Smith MD    Anesthesia Type: General ASA Status: 4            Anesthesia Type: General    Laura Phase I: Laura Score: 4    Laura Phase II:      Anesthesia Post Evaluation    Patient location during evaluation: ICU  Patient participation: complete - patient cannot participate  Level of consciousness: sedated and ventilated  Airway patency: patent  Nausea & Vomiting: no nausea and no vomiting  Cardiovascular status: hemodynamically stable  Respiratory status: acceptable and ventilator  Hydration status: euvolemic  Comments: Blood pressure (!) 158/65, pulse 71, temperature 97.7 °F (36.5 °C), temperature source Skin, resp. rate 14, height 1.778 m (5' 10\"), weight 110.1 kg (242 lb 11.6 oz), SpO2 100 %.    No apparent anesthetic complications.  Multimodal analgesia pain management approach  Pain management: adequate    No notable events documented.

## 2024-02-15 NOTE — OP NOTE
[facility]  OPERATIVE REPORT    Name:  SHREYA ZULUAGA  MR#:  [id number]  :  1940  ACCOUNT #:  [id number]  DATE OF SERVICE:  02/15/2024    PREOPERATIVE DIAGNOSIS:  Cardiogenic shock with Impella 5.5 implantation.    POSTOPERATIVE DIAGNOSIS:  Cardiogenic shock with Impella 5.5 implantation.    PROCEDURE PERFORMED:  Explant of Impella 5.5.    SURGEON:  Yony Shi MD    ASSISTANT:      ANESTHESIA:  Endotracheal.    COMPLICATIONS:  None.    SPECIMENS REMOVED:  .    IMPLANTS:  .    ESTIMATED BLOOD LOSS:  Minimal.    COUNTS:  All instrument and sponge counts correct.    INDICATION:  The patient is well known to me status post CABG maze with Impella 5.5 implantation.  He has subsequently been weaned from support and is now hemodynamically stable and presents for Impella explant.    DESCRIPTION:  The patient was taken to the operating room, placed in supine position.  General anesthesia induced without difficulty, prepped and draped in usual sterile fashion.  Two retention sutures of 3-0 silk were placed on either side of the 10-mm graft.  The previous silk ties were then cut.  The Impella was placed on pulse and then removed without difficulty.  The graft was then clamped and cut to appropriate length.  It was then oversewn with a double layer of 4-0 Prolene.  Clamp was removed.  This was noted to be hemostatic.  The wound was copiously irrigated with antibiotic saline.  The incision was closed with a single staple in the middle and a light dressing was placed.         YONY SHI MD      [id number]  D:  02/15/2024 12:46  T:  02/15/2024 13:23  JOB #:  [id number]

## 2024-02-16 ENCOUNTER — APPOINTMENT (OUTPATIENT)
Dept: GENERAL RADIOLOGY | Age: 84
DRG: 001 | End: 2024-02-16
Attending: THORACIC SURGERY (CARDIOTHORACIC VASCULAR SURGERY)
Payer: MEDICARE

## 2024-02-16 PROBLEM — J98.11 ATELECTASIS: Status: ACTIVE | Noted: 2024-02-16

## 2024-02-16 LAB
ANION GAP SERPL CALC-SCNC: 5 MMOL/L (ref 2–11)
BACTERIA SPEC CULT: ABNORMAL
BASOPHILS # BLD: 0 K/UL (ref 0–0.2)
BASOPHILS NFR BLD: 0 % (ref 0–2)
BUN SERPL-MCNC: 21 MG/DL (ref 8–23)
CALCIUM SERPL-MCNC: 7.9 MG/DL (ref 8.3–10.4)
CHLORIDE SERPL-SCNC: 111 MMOL/L (ref 103–113)
CO2 SERPL-SCNC: 32 MMOL/L (ref 21–32)
CREAT SERPL-MCNC: 0.8 MG/DL (ref 0.8–1.5)
DIFFERENTIAL METHOD BLD: ABNORMAL
EKG ATRIAL RATE: 67 BPM
EKG DIAGNOSIS: NORMAL
EKG P AXIS: 64 DEGREES
EKG P-R INTERVAL: 192 MS
EKG Q-T INTERVAL: 418 MS
EKG QRS DURATION: 104 MS
EKG QTC CALCULATION (BAZETT): 441 MS
EKG R AXIS: -6 DEGREES
EKG T AXIS: -8 DEGREES
EKG VENTRICULAR RATE: 67 BPM
EOSINOPHIL # BLD: 0.2 K/UL (ref 0–0.8)
EOSINOPHIL NFR BLD: 2 % (ref 0.5–7.8)
ERYTHROCYTE [DISTWIDTH] IN BLOOD BY AUTOMATED COUNT: 17.1 % (ref 11.9–14.6)
GLUCOSE BLD STRIP.AUTO-MCNC: 128 MG/DL (ref 65–100)
GLUCOSE BLD STRIP.AUTO-MCNC: 129 MG/DL (ref 65–100)
GLUCOSE BLD STRIP.AUTO-MCNC: 132 MG/DL (ref 65–100)
GLUCOSE BLD STRIP.AUTO-MCNC: 137 MG/DL (ref 65–100)
GLUCOSE BLD STRIP.AUTO-MCNC: 140 MG/DL (ref 65–100)
GLUCOSE SERPL-MCNC: 166 MG/DL (ref 65–100)
GRAM STN SPEC: ABNORMAL
HCT VFR BLD AUTO: 30.1 % (ref 41.1–50.3)
HGB BLD-MCNC: 9.4 G/DL (ref 13.6–17.2)
IMM GRANULOCYTES # BLD AUTO: 0.4 K/UL (ref 0–0.5)
IMM GRANULOCYTES NFR BLD AUTO: 3 % (ref 0–5)
LYMPHOCYTES # BLD: 1.1 K/UL (ref 0.5–4.6)
LYMPHOCYTES NFR BLD: 9 % (ref 13–44)
MAGNESIUM SERPL-MCNC: 2 MG/DL (ref 1.8–2.4)
MCH RBC QN AUTO: 28.1 PG (ref 26.1–32.9)
MCHC RBC AUTO-ENTMCNC: 31.2 G/DL (ref 31.4–35)
MCV RBC AUTO: 90.1 FL (ref 82–102)
MONOCYTES # BLD: 0.9 K/UL (ref 0.1–1.3)
MONOCYTES NFR BLD: 7 % (ref 4–12)
NEUTS SEG # BLD: 10.4 K/UL (ref 1.7–8.2)
NEUTS SEG NFR BLD: 79 % (ref 43–78)
NRBC # BLD: 0.06 K/UL (ref 0–0.2)
PHOSPHATE SERPL-MCNC: 2 MG/DL (ref 2.3–3.7)
PLATELET # BLD AUTO: 228 K/UL (ref 150–450)
PMV BLD AUTO: 10.8 FL (ref 9.4–12.3)
POTASSIUM SERPL-SCNC: 3.2 MMOL/L (ref 3.5–5.1)
RBC # BLD AUTO: 3.34 M/UL (ref 4.23–5.6)
SERVICE CMNT-IMP: ABNORMAL
SODIUM SERPL-SCNC: 148 MMOL/L (ref 136–146)
WBC # BLD AUTO: 13.1 K/UL (ref 4.3–11.1)

## 2024-02-16 PROCEDURE — 31645 BRNCHSC W/THER ASPIR 1ST: CPT | Performed by: INTERNAL MEDICINE

## 2024-02-16 PROCEDURE — A4216 STERILE WATER/SALINE, 10 ML: HCPCS | Performed by: PHYSICIAN ASSISTANT

## 2024-02-16 PROCEDURE — 87070 CULTURE OTHR SPECIMN AEROBIC: CPT

## 2024-02-16 PROCEDURE — 71045 X-RAY EXAM CHEST 1 VIEW: CPT

## 2024-02-16 PROCEDURE — 6360000002 HC RX W HCPCS: Performed by: PHYSICIAN ASSISTANT

## 2024-02-16 PROCEDURE — 87205 SMEAR GRAM STAIN: CPT

## 2024-02-16 PROCEDURE — 6360000002 HC RX W HCPCS

## 2024-02-16 PROCEDURE — 82962 GLUCOSE BLOOD TEST: CPT

## 2024-02-16 PROCEDURE — 93010 ELECTROCARDIOGRAM REPORT: CPT | Performed by: INTERNAL MEDICINE

## 2024-02-16 PROCEDURE — 2580000003 HC RX 258: Performed by: INTERNAL MEDICINE

## 2024-02-16 PROCEDURE — 2709999900 HC NON-CHARGEABLE SUPPLY: Performed by: INTERNAL MEDICINE

## 2024-02-16 PROCEDURE — 3609027000 HC BRONCHOSCOPY: Performed by: INTERNAL MEDICINE

## 2024-02-16 PROCEDURE — 2580000003 HC RX 258: Performed by: THORACIC SURGERY (CARDIOTHORACIC VASCULAR SURGERY)

## 2024-02-16 PROCEDURE — 2700000000 HC OXYGEN THERAPY PER DAY

## 2024-02-16 PROCEDURE — 80048 BASIC METABOLIC PNL TOTAL CA: CPT

## 2024-02-16 PROCEDURE — 93005 ELECTROCARDIOGRAM TRACING: CPT | Performed by: PHYSICIAN ASSISTANT

## 2024-02-16 PROCEDURE — 6370000000 HC RX 637 (ALT 250 FOR IP): Performed by: PHYSICIAN ASSISTANT

## 2024-02-16 PROCEDURE — 6360000002 HC RX W HCPCS: Performed by: INTERNAL MEDICINE

## 2024-02-16 PROCEDURE — 2100000001 HC CVICU R&B

## 2024-02-16 PROCEDURE — 94761 N-INVAS EAR/PLS OXIMETRY MLT: CPT

## 2024-02-16 PROCEDURE — 85025 COMPLETE CBC W/AUTO DIFF WBC: CPT

## 2024-02-16 PROCEDURE — 0B978ZZ DRAINAGE OF LEFT MAIN BRONCHUS, VIA NATURAL OR ARTIFICIAL OPENING ENDOSCOPIC: ICD-10-PCS | Performed by: INTERNAL MEDICINE

## 2024-02-16 PROCEDURE — 99291 CRITICAL CARE FIRST HOUR: CPT | Performed by: INTERNAL MEDICINE

## 2024-02-16 PROCEDURE — 36592 COLLECT BLOOD FROM PICC: CPT

## 2024-02-16 PROCEDURE — 6370000000 HC RX 637 (ALT 250 FOR IP): Performed by: THORACIC SURGERY (CARDIOTHORACIC VASCULAR SURGERY)

## 2024-02-16 PROCEDURE — 2500000003 HC RX 250 WO HCPCS: Performed by: THORACIC SURGERY (CARDIOTHORACIC VASCULAR SURGERY)

## 2024-02-16 PROCEDURE — 2500000003 HC RX 250 WO HCPCS: Performed by: PHYSICIAN ASSISTANT

## 2024-02-16 PROCEDURE — 2580000003 HC RX 258: Performed by: PHYSICIAN ASSISTANT

## 2024-02-16 PROCEDURE — 84100 ASSAY OF PHOSPHORUS: CPT

## 2024-02-16 PROCEDURE — 83735 ASSAY OF MAGNESIUM: CPT

## 2024-02-16 PROCEDURE — 94640 AIRWAY INHALATION TREATMENT: CPT

## 2024-02-16 RX ORDER — FENTANYL CITRATE 50 UG/ML
50 INJECTION, SOLUTION INTRAMUSCULAR; INTRAVENOUS ONCE
Status: DISCONTINUED | OUTPATIENT
Start: 2024-02-16 | End: 2024-02-20

## 2024-02-16 RX ORDER — MIDAZOLAM HYDROCHLORIDE 2 MG/2ML
2 INJECTION, SOLUTION INTRAMUSCULAR; INTRAVENOUS ONCE
Status: DISCONTINUED | OUTPATIENT
Start: 2024-02-16 | End: 2024-02-20

## 2024-02-16 RX ORDER — FUROSEMIDE 10 MG/ML
40 INJECTION INTRAMUSCULAR; INTRAVENOUS ONCE
Status: COMPLETED | OUTPATIENT
Start: 2024-02-16 | End: 2024-02-16

## 2024-02-16 RX ORDER — FUROSEMIDE 10 MG/ML
40 INJECTION INTRAMUSCULAR; INTRAVENOUS DAILY
Status: COMPLETED | OUTPATIENT
Start: 2024-02-16 | End: 2024-02-19

## 2024-02-16 RX ADMIN — FAMOTIDINE 20 MG: 10 INJECTION, SOLUTION INTRAVENOUS at 21:41

## 2024-02-16 RX ADMIN — DOCUSATE SODIUM 50 MG AND SENNOSIDES 8.6 MG 2 TABLET: 8.6; 5 TABLET, FILM COATED ORAL at 21:46

## 2024-02-16 RX ADMIN — DOCUSATE SODIUM 50 MG AND SENNOSIDES 8.6 MG 2 TABLET: 8.6; 5 TABLET, FILM COATED ORAL at 10:05

## 2024-02-16 RX ADMIN — FAMOTIDINE 20 MG: 10 INJECTION, SOLUTION INTRAVENOUS at 10:05

## 2024-02-16 RX ADMIN — INSULIN LISPRO 1 UNITS: 100 INJECTION, SOLUTION INTRAVENOUS; SUBCUTANEOUS at 21:53

## 2024-02-16 RX ADMIN — DEXMEDETOMIDINE 1.5 MCG/KG/HR: 100 INJECTION, SOLUTION, CONCENTRATE INTRAVENOUS at 05:51

## 2024-02-16 RX ADMIN — CHLORHEXIDINE GLUCONATE 10 ML: 1.2 RINSE ORAL at 21:41

## 2024-02-16 RX ADMIN — ATORVASTATIN CALCIUM 40 MG: 40 TABLET, FILM COATED ORAL at 21:41

## 2024-02-16 RX ADMIN — DEXMEDETOMIDINE 1.5 MCG/KG/HR: 100 INJECTION, SOLUTION, CONCENTRATE INTRAVENOUS at 19:14

## 2024-02-16 RX ADMIN — ALBUTEROL SULFATE 2.5 MG: 2.5 SOLUTION RESPIRATORY (INHALATION) at 15:33

## 2024-02-16 RX ADMIN — POTASSIUM CHLORIDE 20 MEQ: 400 INJECTION, SOLUTION INTRAVENOUS at 11:24

## 2024-02-16 RX ADMIN — SODIUM CHLORIDE, PRESERVATIVE FREE 10 ML: 5 INJECTION INTRAVENOUS at 21:47

## 2024-02-16 RX ADMIN — CEFEPIME 2000 MG: 2 INJECTION, POWDER, FOR SOLUTION INTRAVENOUS at 21:49

## 2024-02-16 RX ADMIN — DEXMEDETOMIDINE 1.5 MCG/KG/HR: 100 INJECTION, SOLUTION, CONCENTRATE INTRAVENOUS at 15:45

## 2024-02-16 RX ADMIN — SODIUM CHLORIDE, PRESERVATIVE FREE 10 ML: 5 INJECTION INTRAVENOUS at 10:06

## 2024-02-16 RX ADMIN — CEFEPIME 2000 MG: 2 INJECTION, POWDER, FOR SOLUTION INTRAVENOUS at 03:54

## 2024-02-16 RX ADMIN — POLYETHYLENE GLYCOL 3350 17 G: 17 POWDER, FOR SOLUTION ORAL at 10:05

## 2024-02-16 RX ADMIN — FUROSEMIDE 40 MG: 10 INJECTION, SOLUTION INTRAMUSCULAR; INTRAVENOUS at 10:05

## 2024-02-16 RX ADMIN — POTASSIUM CHLORIDE 20 MEQ: 400 INJECTION, SOLUTION INTRAVENOUS at 13:38

## 2024-02-16 RX ADMIN — SODIUM PHOSPHATE, MONOBASIC, MONOHYDRATE AND SODIUM PHOSPHATE, DIBASIC, ANHYDROUS 15 MMOL: 276; 142 INJECTION, SOLUTION INTRAVENOUS at 14:47

## 2024-02-16 RX ADMIN — ALBUTEROL SULFATE 2.5 MG: 2.5 SOLUTION RESPIRATORY (INHALATION) at 20:42

## 2024-02-16 RX ADMIN — ASPIRIN 81 MG: 81 TABLET, COATED ORAL at 10:05

## 2024-02-16 RX ADMIN — DEXMEDETOMIDINE 1.5 MCG/KG/HR: 100 INJECTION, SOLUTION, CONCENTRATE INTRAVENOUS at 22:23

## 2024-02-16 RX ADMIN — DEXMEDETOMIDINE 1.5 MCG/KG/HR: 100 INJECTION, SOLUTION, CONCENTRATE INTRAVENOUS at 09:17

## 2024-02-16 RX ADMIN — ALBUTEROL SULFATE 2.5 MG: 2.5 SOLUTION RESPIRATORY (INHALATION) at 08:20

## 2024-02-16 RX ADMIN — FUROSEMIDE 40 MG: 10 INJECTION, SOLUTION INTRAMUSCULAR; INTRAVENOUS at 03:57

## 2024-02-16 RX ADMIN — CHLORHEXIDINE GLUCONATE 10 ML: 1.2 RINSE ORAL at 10:07

## 2024-02-16 RX ADMIN — AMIODARONE HYDROCHLORIDE 1 MG/MIN: 50 INJECTION, SOLUTION INTRAVENOUS at 14:48

## 2024-02-16 RX ADMIN — CEFEPIME 2000 MG: 2 INJECTION, POWDER, FOR SOLUTION INTRAVENOUS at 13:39

## 2024-02-16 RX ADMIN — DEXMEDETOMIDINE 1.5 MCG/KG/HR: 100 INJECTION, SOLUTION, CONCENTRATE INTRAVENOUS at 02:36

## 2024-02-16 RX ADMIN — ALBUTEROL SULFATE 2.5 MG: 2.5 SOLUTION RESPIRATORY (INHALATION) at 11:17

## 2024-02-16 RX ADMIN — DEXMEDETOMIDINE 1.5 MCG/KG/HR: 100 INJECTION, SOLUTION, CONCENTRATE INTRAVENOUS at 12:25

## 2024-02-16 ASSESSMENT — PAIN SCALES - GENERAL: PAINLEVEL_OUTOF10: 0

## 2024-02-16 NOTE — INTERVAL H&P NOTE
Update History & Physical    The patient's History and Physical of February 16, 2024 was reviewed with the patient and I examined the patient. There was no change. The surgical site was confirmed by the patient and me.     Plan: The risks, benefits, expected outcome, and alternative to the recommended procedure have been discussed with the patient. Patient understands and wants to proceed with the procedure.     Electronically signed by Neri Hayward MD on 2/16/2024 at 9:09 AM

## 2024-02-16 NOTE — CONSULTS
Comprehensive Nutrition Assessment    Type and Reason for Visit: Initial, Consult  Tube Feeding Management (Cardiothoracic Surgery). Recommendations only, provider to manage    Nutrition Recommendations/Plan:   Enteral Nutrition:   Enteral Access: Orogastric  Recommend  Formula: Peptide Based (Vital AF 1.2 Sherman)  Goal Rate: Continuous 10 ml/hr  Recommend  Water flush  60 ml every 8 hours  Modulars: None not indicated at this time   Above regimen: Trophic enteral nutrition (defined as 10-20 kcal/hr or up to 500 kcal/day), not intended to meet macronutrient needs  IV Fluids:  Not applicable  Labs:   EN labs: Recommended BMP daily, Mg daily x 3 days at initiation then MWF and Phos daily x 3 days at initiation then MWF.     POC Glucoses/SSI Active  Nutrition Related Medication Management:  Electrolyte Replacement Protocol PRN Deferred due to TF recs only  for Deferred    Thiamine Not indicated  Bowel Regimen Active prn        Malnutrition Assessment:2/8  Malnutrition Status: At risk for malnutrition (Comment) (No nutrtition since 2/6)  NFPE: No fat or muscle loss      Nutrition Assessment:  Nutrition History: Unable to obtain on vent        Weight History:   2/5/2024 205 lbs 14 oz Standing scale   1/24/2024 201 lbs 10 oz -   1/17/2024 192 lbs Stated   12/21/2023 197 lbs 8 oz Cardiology OV   11/22/2023 219 lbs Standing scale   11/21/2023 214 lbs Bedside scale   11/20/2023 216 lbs 5 oz  208 lbs Standing scale  Stated   11/9/2023 206 lbs Stated   11/7/2023 209# FM OV   8/9/2023 200# FM OV   7/13/2023 204# FM OV     Nutrition Background:       PMH: TRINA,CAD.Initially developed fatigue and insomnia around October 2023. He was treated for sinusitis but continued to feel poorly. He presented to the ER with progressive dyspnea. He was treated for pneumonia. Once again, he did not improve with treatment and presented back to the ER. He was found to be in atrial flutter with RVR. Chest xray showed pulmonary vascular congestion. He 
Comprehensive Nutrition Assessment    Type and Reason for Visit: Reassess  Tube Feeding Management (Cardiothoracic Surgery).Order from yesterday has dropped off. Was on trickle feeds peptide based 10ml/h and 30ml flushes every 8 hours. Please evaluate. (RN generated). Re-consulted for TF management 2/13.  Re-consult for TF 2/16 (cardio thoracic surgery)    Nutrition Recommendations/Plan:   Enteral Nutrition:   Enteral Access: Nasogastric  Initiate  Formula: Peptide Based (Vital AF 1.2 Sherman)  Goal Rate: Continuous 65 ml/hr  Initiate  Water flush  100 ml every 6 hours  Modulars: None not indicated at this time   Enteral regimen at above goal to provide per 24 hours:  1716 calories, 107 grams protein and 1560 ml free fluid.    Above regimen: Intended to meet macronutrient goals Restricted fluid provisions related to  volume status  IV Fluids:  Not applicable  Labs:   EN labs: BMP daily, Mg daily x 3 days at initiation then MWF and Phos daily x 3 days at initiation then MWF.     POC Glucoses/SSI Active  Nutrition Related Medication Management:  Electrolyte Replacement Protocol PRN Continue for Potassium, Phosphorus, and Magnesium  Thiamine 100 mg daily x 7 days (EOT 2/22)  Bowel Regimen Per MD  Meals and Snacks:  Continue current diet. NPO pending SLP evaluation     Malnutrition Assessment:2/8  Malnutrition Status: At risk for malnutrition (Comment) (prolonged admission with limited nutrition)  NFPE: No fat or muscle loss      Nutrition Assessment:  Nutrition History: 2/8: Unable to obtain on vent  2/9: Unable to obtain, recent extubation  2/11: Pt denies any problems with appetite or intake PTA.        Weight History: 7/13/23 209#, 8/9/23 200#, 11/7/23 209#, 2/5/24 205#    Nutrition Background:       PMH: TRINA,CAD.Initially developed fatigue and insomnia around October 2023. He was treated for sinusitis but continued to feel poorly. He presented to the ER with progressive dyspnea. He was treated for pneumonia. Once 
Comprehensive Nutrition Assessment    Type and Reason for Visit: Reassess, Consult  Tube Feeding Management (Cardiothoracic Surgery).    Nutrition Recommendations/Plan:   Enteral Nutrition:   Enteral Access: Orogastric just removed. Possible NGT.  Initiate  Formula: Peptide Based (Vital AF 1.2 Sherman)  Goal Rate: Continuous 10 ml/hr  Recommend  Water flush  60 ml every 8 hours  Modulars: None not indicated at this time   Above regimen: Trophic enteral nutrition (defined as 10-20 kcal/hr or up to 500 kcal/day), not intended to meet macronutrient needs  IV Fluids:  Not applicable  Labs:   EN labs: Defer while awaitng pOC concerning TF. If TF started then add BMP daily, Mg daily x 3 days at initiation then MWF and Phos daily x 3 days at initiation then MWF.     POC Glucoses/SSI Active  Nutrition Related Medication Management:  Electrolyte Replacement Protocol PRN Deferred due to TF POC pending  for Deferred    Thiamine Not indicated  Bowel Regimen Active prn        Malnutrition Assessment:2/8  Malnutrition Status: At risk for malnutrition (Comment) (No nutrtition since 2/6)  NFPE: No fat or muscle loss      Nutrition Assessment:  Nutrition History: 2/8: Unable to obtain on vent  2/9: Unable to obtain, recent extubation        Weight History:   2/5/2024 205 lbs 14 oz Standing scale   1/24/2024 201 lbs 10 oz -   1/17/2024 192 lbs Stated   12/21/2023 197 lbs 8 oz Cardiology OV   11/22/2023 219 lbs Standing scale   11/21/2023 214 lbs Bedside scale   11/20/2023 216 lbs 5 oz  208 lbs Standing scale  Stated   11/9/2023 206 lbs Stated   11/7/2023 209# FM OV   8/9/2023 200# FM OV   7/13/2023 204# FM OV     Nutrition Background:       PMH: TRINA,CAD.Initially developed fatigue and insomnia around October 2023. He was treated for sinusitis but continued to feel poorly. He presented to the ER with progressive dyspnea. He was treated for pneumonia. Once again, he did not improve with treatment and presented back to the ER. He was found 
and insomnia around October 2023. He was treated for sinusitis but continued to feel poorly. He presented to the ER with progressive dyspnea. He was treated for pneumonia. Once again, he did not improve with treatment and presented back to the ER. He was found to be in atrial flutter with RVR. Chest xray showed pulmonary vascular congestion. He was admitted and evaluated by cardiology. Echocardiogram showed a severely reduced LV EF of 20-25% with moderate MR. He underwent atrial flutter ablation. A repeat limited echo showed LV EF unchanged with mild MR. Ischemic evaluation was then planned. He underwent cardiac catheterization that showed severe multivessel disease with occluded LAD.   Admitted S/P CABG 2/6, on impella and vent post-op. Returned to surgery 2/7 for evacuation of clot.  Nutrition Interval:  OGT placed 2/6. Extubated and OGT removed 2/9. NGT placed 2/9 and trophic TF started (Vital 1.2 AF at 10 ml/hr). 2/10: SLP eval-NPO recommended. 2/11: SLP re eval-SB6 recommended  Discussed with HUEY Vásquez. She reports Dr Cole was just in and wants to hold po diet today but OK with sips of liquids po as Impella remains in use and on low dose dopamine. [place] explant Impella this week but RN does not expect that to occur tomorrow. Pt seen sitting in bed. TF infusing at 10 ml/hr. He says he has no appetite, denies constipation and is unclear if he has nausea. He as not received any zofran. Remains TF dependent until able to start and tolerate po intake >60% of estimated needs., Abdominal Status (last documented by RN):   Last BM (including prior to admit):  (pre op)Abdomen Inspection: Obese, Rounded, RUQ Bowel Sounds: Active, LUQ Bowel Sounds: Active, RLQ Bowel Sounds: Active, LLQ Bowel Sounds: Active  Pertinent Medications: maxipime, pepcid. SSI  (4 units yesterday and 2 units thus far today), vancomycin  20 mg lasix 2/7 and 2/8, 40 mg lasix 2/11  Continuous: amio, heparin,  levophed  and dopamine (low 
mild MR. Ischemic evaluation was then planned. He underwent cardiac catheterization that showed severe multivessel disease with occluded LAD.   Admitted S/P CABG 2/6, on impella and vent post-op. Returned to surgery 2/7 for evacuation of clot.  Nutrition Interval:  OGT placed 2/6. Extubated and OGT removed 2/9. NGT placed 2/9 and trophic TF started (Vital 1.2 AF at 10 ml/hr). 2/10: SLP eval-NPO recommended. 2/11: SLP re eval-SB6 initiated, TF advanced to 30 ml/hr. 2/13: TF held s/p emesis with aspiration, then TF order and diet order cancelled for OR.     Explant attempted 2/13 but aborted due to low BP. S/p bronch with mucus plugging and TF suctioned out 2/13. Remains on vent post bronch. Patient self d/c NGT yesterday prior to explant attempt. NGT replaced but coiled in esophagus and removal recommended. Discussed with Davon Rader and Cassandra, RNs. Have attempted NGT replacement several times today but patient fighting even on sedation as well as nose bleeding on heparin.    Abdominal Status (last documented by RN):   Last BM (including prior to admit):  (pre op)Abdomen Inspection: Soft, Rounded, RUQ Bowel Sounds: Active, LUQ Bowel Sounds: Active, RLQ Bowel Sounds: Active, LLQ Bowel Sounds: Active  Pertinent Medications: maxipime, colace, pepcid, lasix, SSI  2/13 albumin  Continuous: amio, precedex, heparin  IVF: none  Electrolyte Replacement: 2/11: 20 meq KCL, 2/13 40 meq Kcl, 2/14 20 meq Kcl and 15 mmol KPO4   Pertinent administered PRN: Zofran (last admin 2/12)  Pertinent Labs:   Lab Results   Component Value Date/Time     02/14/2024 02:14 AM    K 3.8 02/14/2024 02:14 AM     02/14/2024 02:14 AM    CO2 28 02/14/2024 02:14 AM    BUN 20 02/14/2024 02:14 AM    CREATININE 0.80 02/14/2024 02:14 AM    GLUCOSE 135 02/14/2024 02:14 AM    CALCIUM 7.7 02/14/2024 02:14 AM    PHOS 1.5 02/14/2024 02:14 AM    MG 2.1 02/14/2024 02:14 AM      Lab Results   Component Value Date/Time    POCGLU 118 02/14/2024 12:53 PM 
ventricular mass. Findings consistent with severe concentric hypertrophy. Severe global hypokinesis present. Grade III diastolic dysfunction with increased LAP.    Aortic Valve: Not assessed. Moderate sclerosis of the aortic valve cusp.    Mitral Valve: Mild annular calcification of the mitral valve. Mild regurgitation.    Tricuspid Valve: Mildly elevated RVSP. The estimated RVSP is 35 mmHg.    Interatrial Septum: PFO present with a left to right shunt visible by spectral Doppler and color Doppler.    Image quality is fair.    Signed by: Roderick Ma III, MD on 1/8/2024  1:15 PM    Assessment and Plan :  (Medical Decision Making)   Impression: 83 y.o. y/o male s/p CV surgery for CAD, multiple vessel    Encounter for weaning from Ventilator:  Post op ABG and CXR reviewed.  Will keep the patient on full vent support overnight and reassess in a.m. based on his hemodynamic monitoring parameters.    Hypoxemia:   Once weaned from ventilator will work on IS, mobility and weaning O2. Bronchodilators per protocol.    Atelectasis:   IS, mobility after extubated.    S/P Cardiovascular surgery: CABG X 3,  MAZE PROCEDURE; LEFT ATRIAL APPENDAGE CLIPPING; currently on epinephrine and nitroglycerin.  Monitor chest tube output, removal per surgery.    Ischemic Cardiomyopathy, status post Impella 5.5 LV assist device insertion.  His hemodynamic monitor including Montezuma-Jaja parameters and he is on P8 of Impella device.  Will continue to monitor hemodynamics per CVICU protocol.        More than 50% of the time documented was spent in face-to-face contact with the patient and in the care of the patient on the floor/unit where the patient is located.         Thank you for this referral.  We appreciate the opportunity to participate in this patient's care.  Will follow along with you.    Matt Maier MD

## 2024-02-16 NOTE — INTERDISCIPLINARY ROUNDS
Multi-D Rounds/Checklist (leapfrog):  Lines: can any be removed?: None      NG/OG/NJ/NE Tube Nasogastric 12 fr Left nostril (Active)       Urinary Catheter 02/09/24 Dang (Active)     Introducer 02/06/24 Internal jugular Right (Active)       CVC Double Lumen 02/06/24 Right Internal jugular (Active)     CVC double day 11    DVT Prophylaxis: Ordered- heparin   Vent: N/A  Nutrition Ordered/appropriate: Contraindicated- planning on TF   Can antibiotics or other drugs be stopped? No /End Date set     Inpat Anti-Infectives (From admission, onward)       Start     Ordered Stop    02/10/24 2000  ceFEPIme (MAXIPIME) 2,000 mg in sodium chloride 0.9 % 100 mL IVPB (mini-bag)  2,000 mg,   IntraVENous,   EVERY 8 HOURS        See Kimmy for full Linked Orders Report.    02/10/24 1100 02/17/24 1959               Consults needed: None  A: Is pain control adequate? (has PRNs? Stop drip?) Yes  B: Sedation break and SBT? N/A  C: Is sedation choice appropriate? Yes  D: Delirium/CAM-ICU? No  E: Mobility goals/appropriateness? Yes  F: Family update and plan? Sons are surrogate decision maker and is being updated daily by primary attending and nursing staff.    GILBERT Burroughs - NP

## 2024-02-16 NOTE — OP NOTE
Operative Note      Patient: Dionisio Gordon Jr.  YOB: 1940  MRN: [id number]    Date of Procedure: 2/16/2024    Pre-Op Diagnosis Codes:     * Infiltrate noted on imaging study [R93.89]    Post-Op Diagnosis: Same       Procedure(s):  BRONCHOSCOPY    Surgeon(s):  Neri Meza MD    Assistant:   * No surgical staff found *    Anesthesia: IV Sedation    Estimated Blood Loss (mL): Minimal    Complications: None    Specimens:   ID Type Source Tests Collected by Time Destination   1 : Bronch wash Respiratory Bronchial Washing CULTURE, RESPIRATORY Neri Meza MD 2/16/2024 0851        Implants:  * No implants in log *      Drains:   NG/OG/NJ/NE Tube Nasogastric 12 fr Left nostril (Active)   Surrounding Skin Clean, dry & intact 02/16/24 0300   Securement device Tape 02/16/24 0300   Status Continuous feeding 02/16/24 0300   Placement Verified External Catheter Length 02/16/24 0300   NG/OG/NJ/NE External Measurement (cm) 75 cm 02/16/24 0300   Tube Feeding Peptide Based 02/16/24 0300   Tube feeding/verify rate (mL/hr) 15 mL/hr 02/15/24 1915   Tube Feeding Intake (mL) 186 ml 02/16/24 0700   Free Water/Flush (mL) 40 mL 02/14/24 2054   Action Taken Placement verified (comment) 02/16/24 0300       Urinary Catheter 02/09/24 Dang (Active)   Catheter Indications Need for fluid volume management of the critically ill patient in a critical care setting 02/16/24 0300   Site Assessment No urethral drainage 02/16/24 0300   Urine Color Yellow 02/16/24 0300   Urine Appearance Clear 02/16/24 0300   Collection Container Standard 02/16/24 0300   Securement Method Securing device (Describe) 02/16/24 0300   Catheter Care  Perineal wipes 02/15/24 1915   Catheter Best Practices  Drainage tube clipped to bed;Catheter secured to thigh;Tamper seal intact;Bag below bladder;Bag not on floor;Lack of dependent loop in tubing;Drainage bag less than half full 02/16/24 0300   Status Draining;Patent 02/16/24 0300   Output (mL) 350

## 2024-02-17 ENCOUNTER — APPOINTMENT (OUTPATIENT)
Dept: GENERAL RADIOLOGY | Age: 84
DRG: 001 | End: 2024-02-17
Attending: THORACIC SURGERY (CARDIOTHORACIC VASCULAR SURGERY)
Payer: MEDICARE

## 2024-02-17 LAB
ANION GAP SERPL CALC-SCNC: 5 MMOL/L (ref 2–11)
BASOPHILS # BLD: 0 K/UL (ref 0–0.2)
BASOPHILS NFR BLD: 0 % (ref 0–2)
BUN SERPL-MCNC: 19 MG/DL (ref 8–23)
CALCIUM SERPL-MCNC: 7.9 MG/DL (ref 8.3–10.4)
CHLORIDE SERPL-SCNC: 111 MMOL/L (ref 103–113)
CO2 SERPL-SCNC: 33 MMOL/L (ref 21–32)
CREAT SERPL-MCNC: 0.7 MG/DL (ref 0.8–1.5)
DIFFERENTIAL METHOD BLD: ABNORMAL
EKG ATRIAL RATE: 66 BPM
EKG DIAGNOSIS: NORMAL
EKG P AXIS: 44 DEGREES
EKG P-R INTERVAL: 194 MS
EKG Q-T INTERVAL: 498 MS
EKG QRS DURATION: 100 MS
EKG QTC CALCULATION (BAZETT): 522 MS
EKG R AXIS: -21 DEGREES
EKG T AXIS: 25 DEGREES
EKG VENTRICULAR RATE: 66 BPM
EOSINOPHIL # BLD: 0.2 K/UL (ref 0–0.8)
EOSINOPHIL NFR BLD: 1 % (ref 0.5–7.8)
ERYTHROCYTE [DISTWIDTH] IN BLOOD BY AUTOMATED COUNT: 16.5 % (ref 11.9–14.6)
GLUCOSE BLD STRIP.AUTO-MCNC: 110 MG/DL (ref 65–100)
GLUCOSE BLD STRIP.AUTO-MCNC: 115 MG/DL (ref 65–100)
GLUCOSE BLD STRIP.AUTO-MCNC: 126 MG/DL (ref 65–100)
GLUCOSE BLD STRIP.AUTO-MCNC: 165 MG/DL (ref 65–100)
GLUCOSE SERPL-MCNC: 136 MG/DL (ref 65–100)
HCT VFR BLD AUTO: 29.1 % (ref 41.1–50.3)
HGB BLD-MCNC: 9.2 G/DL (ref 13.6–17.2)
IMM GRANULOCYTES # BLD AUTO: 0.4 K/UL (ref 0–0.5)
IMM GRANULOCYTES NFR BLD AUTO: 2 % (ref 0–5)
LYMPHOCYTES # BLD: 1.1 K/UL (ref 0.5–4.6)
LYMPHOCYTES NFR BLD: 7 % (ref 13–44)
MCH RBC QN AUTO: 28.6 PG (ref 26.1–32.9)
MCHC RBC AUTO-ENTMCNC: 31.6 G/DL (ref 31.4–35)
MCV RBC AUTO: 90.4 FL (ref 82–102)
MONOCYTES # BLD: 1.1 K/UL (ref 0.1–1.3)
MONOCYTES NFR BLD: 7 % (ref 4–12)
NEUTS SEG # BLD: 12.3 K/UL (ref 1.7–8.2)
NEUTS SEG NFR BLD: 83 % (ref 43–78)
NRBC # BLD: 0.03 K/UL (ref 0–0.2)
PLATELET # BLD AUTO: 239 K/UL (ref 150–450)
PMV BLD AUTO: 10.4 FL (ref 9.4–12.3)
POTASSIUM SERPL-SCNC: 3.3 MMOL/L (ref 3.5–5.1)
RBC # BLD AUTO: 3.22 M/UL (ref 4.23–5.6)
SERVICE CMNT-IMP: ABNORMAL
SODIUM SERPL-SCNC: 149 MMOL/L (ref 136–146)
WBC # BLD AUTO: 15 K/UL (ref 4.3–11.1)

## 2024-02-17 PROCEDURE — 94669 MECHANICAL CHEST WALL OSCILL: CPT

## 2024-02-17 PROCEDURE — 99232 SBSQ HOSP IP/OBS MODERATE 35: CPT | Performed by: INTERNAL MEDICINE

## 2024-02-17 PROCEDURE — 85025 COMPLETE CBC W/AUTO DIFF WBC: CPT

## 2024-02-17 PROCEDURE — 36592 COLLECT BLOOD FROM PICC: CPT

## 2024-02-17 PROCEDURE — A4216 STERILE WATER/SALINE, 10 ML: HCPCS | Performed by: PHYSICIAN ASSISTANT

## 2024-02-17 PROCEDURE — 97530 THERAPEUTIC ACTIVITIES: CPT

## 2024-02-17 PROCEDURE — 6360000002 HC RX W HCPCS: Performed by: INTERNAL MEDICINE

## 2024-02-17 PROCEDURE — 2500000003 HC RX 250 WO HCPCS: Performed by: PHYSICIAN ASSISTANT

## 2024-02-17 PROCEDURE — 99291 CRITICAL CARE FIRST HOUR: CPT | Performed by: INTERNAL MEDICINE

## 2024-02-17 PROCEDURE — 94761 N-INVAS EAR/PLS OXIMETRY MLT: CPT

## 2024-02-17 PROCEDURE — 93010 ELECTROCARDIOGRAM REPORT: CPT | Performed by: INTERNAL MEDICINE

## 2024-02-17 PROCEDURE — 97162 PT EVAL MOD COMPLEX 30 MIN: CPT

## 2024-02-17 PROCEDURE — 6370000000 HC RX 637 (ALT 250 FOR IP): Performed by: PHYSICIAN ASSISTANT

## 2024-02-17 PROCEDURE — 80048 BASIC METABOLIC PNL TOTAL CA: CPT

## 2024-02-17 PROCEDURE — 6370000000 HC RX 637 (ALT 250 FOR IP): Performed by: THORACIC SURGERY (CARDIOTHORACIC VASCULAR SURGERY)

## 2024-02-17 PROCEDURE — 2100000001 HC CVICU R&B

## 2024-02-17 PROCEDURE — 93005 ELECTROCARDIOGRAM TRACING: CPT | Performed by: PHYSICIAN ASSISTANT

## 2024-02-17 PROCEDURE — 94640 AIRWAY INHALATION TREATMENT: CPT

## 2024-02-17 PROCEDURE — 82962 GLUCOSE BLOOD TEST: CPT

## 2024-02-17 PROCEDURE — 71045 X-RAY EXAM CHEST 1 VIEW: CPT

## 2024-02-17 PROCEDURE — 92610 EVALUATE SWALLOWING FUNCTION: CPT

## 2024-02-17 PROCEDURE — 2580000003 HC RX 258: Performed by: INTERNAL MEDICINE

## 2024-02-17 PROCEDURE — 2700000000 HC OXYGEN THERAPY PER DAY

## 2024-02-17 PROCEDURE — 2580000003 HC RX 258: Performed by: PHYSICIAN ASSISTANT

## 2024-02-17 PROCEDURE — 6360000002 HC RX W HCPCS: Performed by: PHYSICIAN ASSISTANT

## 2024-02-17 RX ORDER — ACETYLCYSTEINE 200 MG/ML
600 SOLUTION ORAL; RESPIRATORY (INHALATION) 2 TIMES DAILY
Status: COMPLETED | OUTPATIENT
Start: 2024-02-17 | End: 2024-02-18

## 2024-02-17 RX ADMIN — DEXMEDETOMIDINE 1.5 MCG/KG/HR: 100 INJECTION, SOLUTION, CONCENTRATE INTRAVENOUS at 05:12

## 2024-02-17 RX ADMIN — ACETYLCYSTEINE 600 MG: 200 SOLUTION ORAL; RESPIRATORY (INHALATION) at 19:18

## 2024-02-17 RX ADMIN — AMIODARONE HYDROCHLORIDE 200 MG: 200 TABLET ORAL at 20:24

## 2024-02-17 RX ADMIN — FAMOTIDINE 20 MG: 10 INJECTION, SOLUTION INTRAVENOUS at 20:23

## 2024-02-17 RX ADMIN — DOCUSATE SODIUM 50 MG AND SENNOSIDES 8.6 MG 2 TABLET: 8.6; 5 TABLET, FILM COATED ORAL at 20:23

## 2024-02-17 RX ADMIN — FAMOTIDINE 20 MG: 20 TABLET, FILM COATED ORAL at 09:06

## 2024-02-17 RX ADMIN — POTASSIUM CHLORIDE 20 MEQ: 400 INJECTION, SOLUTION INTRAVENOUS at 06:36

## 2024-02-17 RX ADMIN — CHLORHEXIDINE GLUCONATE 10 ML: 1.2 RINSE ORAL at 20:28

## 2024-02-17 RX ADMIN — DEXMEDETOMIDINE 1 MCG/KG/HR: 100 INJECTION, SOLUTION, CONCENTRATE INTRAVENOUS at 23:48

## 2024-02-17 RX ADMIN — CEFEPIME 2000 MG: 2 INJECTION, POWDER, FOR SOLUTION INTRAVENOUS at 05:15

## 2024-02-17 RX ADMIN — ALBUTEROL SULFATE 2.5 MG: 2.5 SOLUTION RESPIRATORY (INHALATION) at 07:44

## 2024-02-17 RX ADMIN — ALBUTEROL SULFATE 2.5 MG: 2.5 SOLUTION RESPIRATORY (INHALATION) at 11:29

## 2024-02-17 RX ADMIN — ALBUTEROL SULFATE 2.5 MG: 2.5 SOLUTION RESPIRATORY (INHALATION) at 19:18

## 2024-02-17 RX ADMIN — DEXMEDETOMIDINE 1.3 MCG/KG/HR: 100 INJECTION, SOLUTION, CONCENTRATE INTRAVENOUS at 11:11

## 2024-02-17 RX ADMIN — AMIODARONE HYDROCHLORIDE 0.5 MG/MIN: 50 INJECTION, SOLUTION INTRAVENOUS at 05:58

## 2024-02-17 RX ADMIN — AMIODARONE HYDROCHLORIDE 200 MG: 200 TABLET ORAL at 10:26

## 2024-02-17 RX ADMIN — INSULIN LISPRO 2 UNITS: 100 INJECTION, SOLUTION INTRAVENOUS; SUBCUTANEOUS at 12:12

## 2024-02-17 RX ADMIN — ATORVASTATIN CALCIUM 40 MG: 40 TABLET, FILM COATED ORAL at 20:24

## 2024-02-17 RX ADMIN — DOCUSATE SODIUM 50 MG AND SENNOSIDES 8.6 MG 2 TABLET: 8.6; 5 TABLET, FILM COATED ORAL at 09:06

## 2024-02-17 RX ADMIN — ACETYLCYSTEINE 600 MG: 200 SOLUTION ORAL; RESPIRATORY (INHALATION) at 11:29

## 2024-02-17 RX ADMIN — CEFEPIME 2000 MG: 2 INJECTION, POWDER, FOR SOLUTION INTRAVENOUS at 12:15

## 2024-02-17 RX ADMIN — POLYETHYLENE GLYCOL 3350 17 G: 17 POWDER, FOR SOLUTION ORAL at 09:06

## 2024-02-17 RX ADMIN — SODIUM CHLORIDE, PRESERVATIVE FREE 10 ML: 5 INJECTION INTRAVENOUS at 09:15

## 2024-02-17 RX ADMIN — FUROSEMIDE 40 MG: 10 INJECTION, SOLUTION INTRAMUSCULAR; INTRAVENOUS at 09:15

## 2024-02-17 RX ADMIN — ALBUTEROL SULFATE 2.5 MG: 2.5 SOLUTION RESPIRATORY (INHALATION) at 15:31

## 2024-02-17 RX ADMIN — POTASSIUM CHLORIDE 20 MEQ: 400 INJECTION, SOLUTION INTRAVENOUS at 05:43

## 2024-02-17 RX ADMIN — DEXMEDETOMIDINE 0.8 MCG/KG/HR: 100 INJECTION, SOLUTION, CONCENTRATE INTRAVENOUS at 18:25

## 2024-02-17 RX ADMIN — SODIUM CHLORIDE, PRESERVATIVE FREE 10 ML: 5 INJECTION INTRAVENOUS at 20:25

## 2024-02-17 RX ADMIN — ASPIRIN 81 MG: 81 TABLET, COATED ORAL at 09:06

## 2024-02-17 RX ADMIN — DEXMEDETOMIDINE 1.5 MCG/KG/HR: 100 INJECTION, SOLUTION, CONCENTRATE INTRAVENOUS at 02:17

## 2024-02-17 ASSESSMENT — PAIN SCALES - GENERAL
PAINLEVEL_OUTOF10: 0

## 2024-02-18 ENCOUNTER — APPOINTMENT (OUTPATIENT)
Dept: GENERAL RADIOLOGY | Age: 84
DRG: 001 | End: 2024-02-18
Attending: THORACIC SURGERY (CARDIOTHORACIC VASCULAR SURGERY)
Payer: MEDICARE

## 2024-02-18 LAB
ANION GAP SERPL CALC-SCNC: 2 MMOL/L (ref 2–11)
APPEARANCE FLD: NORMAL
BACTERIA SPEC CULT: ABNORMAL
BACTERIA SPEC CULT: ABNORMAL
BASOPHILS NFR FLD: 1 %
BUN SERPL-MCNC: 17 MG/DL (ref 8–23)
CALCIUM SERPL-MCNC: 7.8 MG/DL (ref 8.3–10.4)
CHLORIDE SERPL-SCNC: 111 MMOL/L (ref 103–113)
CO2 SERPL-SCNC: 35 MMOL/L (ref 21–32)
COLOR FLD: NORMAL
CREAT SERPL-MCNC: 0.6 MG/DL (ref 0.8–1.5)
GLUCOSE BLD STRIP.AUTO-MCNC: 107 MG/DL (ref 65–100)
GLUCOSE BLD STRIP.AUTO-MCNC: 107 MG/DL (ref 65–100)
GLUCOSE BLD STRIP.AUTO-MCNC: 116 MG/DL (ref 65–100)
GLUCOSE BLD STRIP.AUTO-MCNC: 133 MG/DL (ref 65–100)
GLUCOSE FLD-MCNC: 147 MG/DL
GLUCOSE SERPL-MCNC: 135 MG/DL (ref 65–100)
GRAM STN SPEC: ABNORMAL
LDH FLD L TO P-CCNC: 262 U/L
LYMPHOCYTES NFR BRONCH MANUAL: 34 %
MACROPHAGES NFR BRONCH MANUAL: 2 %
MAGNESIUM SERPL-MCNC: 2.2 MG/DL (ref 1.8–2.4)
NEUTROPHILS NFR BRONCH MANUAL: 63 %
NUC CELL # FLD: 131 /CU MM
OTHER CELLS NFR BLD MANUAL: 2
PHOSPHATE SERPL-MCNC: 2.8 MG/DL (ref 2.3–3.7)
POTASSIUM SERPL-SCNC: 3.2 MMOL/L (ref 3.5–5.1)
POTASSIUM SERPL-SCNC: 3.6 MMOL/L (ref 3.5–5.1)
RBC # FLD: NORMAL /CU MM
SERVICE CMNT-IMP: ABNORMAL
SODIUM SERPL-SCNC: 148 MMOL/L (ref 136–146)
SPECIMEN SOURCE FLD: NORMAL

## 2024-02-18 PROCEDURE — 36415 COLL VENOUS BLD VENIPUNCTURE: CPT

## 2024-02-18 PROCEDURE — 6360000002 HC RX W HCPCS: Performed by: INTERNAL MEDICINE

## 2024-02-18 PROCEDURE — 32555 ASPIRATE PLEURA W/ IMAGING: CPT | Performed by: INTERNAL MEDICINE

## 2024-02-18 PROCEDURE — 71045 X-RAY EXAM CHEST 1 VIEW: CPT

## 2024-02-18 PROCEDURE — 6360000002 HC RX W HCPCS: Performed by: THORACIC SURGERY (CARDIOTHORACIC VASCULAR SURGERY)

## 2024-02-18 PROCEDURE — 2580000003 HC RX 258: Performed by: PHYSICIAN ASSISTANT

## 2024-02-18 PROCEDURE — 99232 SBSQ HOSP IP/OBS MODERATE 35: CPT | Performed by: INTERNAL MEDICINE

## 2024-02-18 PROCEDURE — 2580000003 HC RX 258: Performed by: INTERNAL MEDICINE

## 2024-02-18 PROCEDURE — 36592 COLLECT BLOOD FROM PICC: CPT

## 2024-02-18 PROCEDURE — 83735 ASSAY OF MAGNESIUM: CPT

## 2024-02-18 PROCEDURE — 84100 ASSAY OF PHOSPHORUS: CPT

## 2024-02-18 PROCEDURE — 97530 THERAPEUTIC ACTIVITIES: CPT

## 2024-02-18 PROCEDURE — 6370000000 HC RX 637 (ALT 250 FOR IP): Performed by: PHYSICIAN ASSISTANT

## 2024-02-18 PROCEDURE — 87070 CULTURE OTHR SPECIMN AEROBIC: CPT

## 2024-02-18 PROCEDURE — 2700000000 HC OXYGEN THERAPY PER DAY

## 2024-02-18 PROCEDURE — 6370000000 HC RX 637 (ALT 250 FOR IP): Performed by: THORACIC SURGERY (CARDIOTHORACIC VASCULAR SURGERY)

## 2024-02-18 PROCEDURE — 94761 N-INVAS EAR/PLS OXIMETRY MLT: CPT

## 2024-02-18 PROCEDURE — 2500000003 HC RX 250 WO HCPCS: Performed by: PHYSICIAN ASSISTANT

## 2024-02-18 PROCEDURE — 0W9B3ZZ DRAINAGE OF LEFT PLEURAL CAVITY, PERCUTANEOUS APPROACH: ICD-10-PCS | Performed by: THORACIC SURGERY (CARDIOTHORACIC VASCULAR SURGERY)

## 2024-02-18 PROCEDURE — 82945 GLUCOSE OTHER FLUID: CPT

## 2024-02-18 PROCEDURE — 84132 ASSAY OF SERUM POTASSIUM: CPT

## 2024-02-18 PROCEDURE — 93005 ELECTROCARDIOGRAM TRACING: CPT | Performed by: PHYSICIAN ASSISTANT

## 2024-02-18 PROCEDURE — 2100000001 HC CVICU R&B

## 2024-02-18 PROCEDURE — 97165 OT EVAL LOW COMPLEX 30 MIN: CPT

## 2024-02-18 PROCEDURE — 76937 US GUIDE VASCULAR ACCESS: CPT

## 2024-02-18 PROCEDURE — 80048 BASIC METABOLIC PNL TOTAL CA: CPT

## 2024-02-18 PROCEDURE — 87205 SMEAR GRAM STAIN: CPT

## 2024-02-18 PROCEDURE — 97112 NEUROMUSCULAR REEDUCATION: CPT

## 2024-02-18 PROCEDURE — 6360000002 HC RX W HCPCS: Performed by: PHYSICIAN ASSISTANT

## 2024-02-18 PROCEDURE — 94640 AIRWAY INHALATION TREATMENT: CPT

## 2024-02-18 PROCEDURE — 83615 LACTATE (LD) (LDH) ENZYME: CPT

## 2024-02-18 PROCEDURE — 94669 MECHANICAL CHEST WALL OSCILL: CPT

## 2024-02-18 PROCEDURE — 82962 GLUCOSE BLOOD TEST: CPT

## 2024-02-18 PROCEDURE — 89050 BODY FLUID CELL COUNT: CPT

## 2024-02-18 RX ORDER — POTASSIUM CHLORIDE 7.45 MG/ML
10 INJECTION INTRAVENOUS
Status: COMPLETED | OUTPATIENT
Start: 2024-02-18 | End: 2024-02-18

## 2024-02-18 RX ORDER — LANOLIN ALCOHOL/MO/W.PET/CERES
3 CREAM (GRAM) TOPICAL NIGHTLY PRN
Status: DISCONTINUED | OUTPATIENT
Start: 2024-02-18 | End: 2024-02-22

## 2024-02-18 RX ADMIN — POTASSIUM CHLORIDE 10 MEQ: 7.46 INJECTION, SOLUTION INTRAVENOUS at 20:54

## 2024-02-18 RX ADMIN — POLYETHYLENE GLYCOL 3350 17 G: 17 POWDER, FOR SOLUTION ORAL at 09:44

## 2024-02-18 RX ADMIN — POTASSIUM CHLORIDE 20 MEQ: 400 INJECTION, SOLUTION INTRAVENOUS at 10:06

## 2024-02-18 RX ADMIN — AMIODARONE HYDROCHLORIDE 1 MG/MIN: 50 INJECTION, SOLUTION INTRAVENOUS at 18:05

## 2024-02-18 RX ADMIN — DOCUSATE SODIUM 50 MG AND SENNOSIDES 8.6 MG 2 TABLET: 8.6; 5 TABLET, FILM COATED ORAL at 22:02

## 2024-02-18 RX ADMIN — DOCUSATE SODIUM 50 MG AND SENNOSIDES 8.6 MG 2 TABLET: 8.6; 5 TABLET, FILM COATED ORAL at 09:44

## 2024-02-18 RX ADMIN — ACETYLCYSTEINE 600 MG: 200 SOLUTION ORAL; RESPIRATORY (INHALATION) at 07:37

## 2024-02-18 RX ADMIN — ACETYLCYSTEINE 600 MG: 200 SOLUTION ORAL; RESPIRATORY (INHALATION) at 19:16

## 2024-02-18 RX ADMIN — ATORVASTATIN CALCIUM 40 MG: 40 TABLET, FILM COATED ORAL at 22:02

## 2024-02-18 RX ADMIN — ALBUTEROL SULFATE 2.5 MG: 2.5 SOLUTION RESPIRATORY (INHALATION) at 07:37

## 2024-02-18 RX ADMIN — ASPIRIN 81 MG: 81 TABLET, COATED ORAL at 10:09

## 2024-02-18 RX ADMIN — FUROSEMIDE 40 MG: 10 INJECTION, SOLUTION INTRAMUSCULAR; INTRAVENOUS at 09:44

## 2024-02-18 RX ADMIN — POTASSIUM CHLORIDE 10 MEQ: 7.46 INJECTION, SOLUTION INTRAVENOUS at 19:48

## 2024-02-18 RX ADMIN — ALBUTEROL SULFATE 2.5 MG: 2.5 SOLUTION RESPIRATORY (INHALATION) at 19:16

## 2024-02-18 RX ADMIN — POTASSIUM CHLORIDE 10 MEQ: 7.46 INJECTION, SOLUTION INTRAVENOUS at 23:30

## 2024-02-18 RX ADMIN — FAMOTIDINE 20 MG: 20 TABLET, FILM COATED ORAL at 09:44

## 2024-02-18 RX ADMIN — ALBUTEROL SULFATE 2.5 MG: 2.5 SOLUTION RESPIRATORY (INHALATION) at 12:58

## 2024-02-18 RX ADMIN — SODIUM CHLORIDE, PRESERVATIVE FREE 10 ML: 5 INJECTION INTRAVENOUS at 09:45

## 2024-02-18 RX ADMIN — POTASSIUM CHLORIDE 10 MEQ: 7.46 INJECTION, SOLUTION INTRAVENOUS at 22:00

## 2024-02-18 RX ADMIN — SODIUM CHLORIDE, PRESERVATIVE FREE 10 ML: 5 INJECTION INTRAVENOUS at 22:02

## 2024-02-18 RX ADMIN — AMIODARONE HYDROCHLORIDE 200 MG: 200 TABLET ORAL at 09:44

## 2024-02-18 RX ADMIN — FAMOTIDINE 20 MG: 20 TABLET, FILM COATED ORAL at 22:02

## 2024-02-18 RX ADMIN — DEXMEDETOMIDINE 0.7 MCG/KG/HR: 100 INJECTION, SOLUTION, CONCENTRATE INTRAVENOUS at 06:23

## 2024-02-18 RX ADMIN — ALBUTEROL SULFATE 2.5 MG: 2.5 SOLUTION RESPIRATORY (INHALATION) at 15:38

## 2024-02-18 ASSESSMENT — PAIN SCALES - GENERAL
PAINLEVEL_OUTOF10: 0

## 2024-02-18 NOTE — INTERVAL H&P NOTE
Update History & Physical    The patient's History and Physical of February 18, 2024 was reviewed with the patient and I examined the patient. There was no change. The surgical site was confirmed by the patient and me.     Plan: The risks, benefits, expected outcome, and alternative to the recommended procedure have been discussed with the patient. Patient understands and wants to proceed with the procedure.     Electronically signed by Neri Hayward MD on 2/18/2024 at 8:46 AM

## 2024-02-18 NOTE — OP NOTE
Operative Note      Patient: Dionisio Gordon Jr.  YOB: 1940  MRN: [id number]    Date of Procedure: 2/16/2024    Pre-Op Diagnosis Codes:     * Infiltrate noted on imaging study [R93.89]    Post-Op Diagnosis: Same       Procedure(s):  BRONCHOSCOPY    Surgeon(s):  Neri Meza MD    Assistant:   * No surgical staff found *    Anesthesia: IV Sedation    Estimated Blood Loss (mL): Minimal    Complications: None    Specimens:   ID Type Source Tests Collected by Time Destination   1 : Bronch wash Respiratory Bronchial Washing CULTURE, RESPIRATORY Neri Meza MD 2/16/2024 0851        Implants:  * No implants in log *      Drains:   Urinary Catheter 02/09/24 Dang (Active)   Catheter Indications Need for fluid volume management of the critically ill patient in a critical care setting 02/18/24 0705   Site Assessment No urethral drainage 02/18/24 0705   Urine Color Yellow 02/18/24 0705   Urine Appearance Clear 02/18/24 0705   Collection Container Standard 02/18/24 0705   Securement Method Securing device (Describe) 02/18/24 0705   Catheter Care  Perineal wipes 02/18/24 0705   Catheter Best Practices  Drainage tube clipped to bed;Catheter secured to thigh;Tamper seal intact;Bag below bladder;Bag not on floor;Lack of dependent loop in tubing;Drainage bag less than half full 02/18/24 0705   Status Draining;Patent 02/18/24 0705   Output (mL) 375 mL 02/18/24 0600       [REMOVED] Chest Tube Left Pleural 1 (Removed)   Chest Tube Airleak No 02/10/24 1115   Status Gravity 02/10/24 1115   Suction -20 cm H2O 02/10/24 0715   Y Connector Used Yes 02/10/24 1115   Drainage Description Serosanguinous 02/10/24 1115   Dressing Status Clean, dry & intact 02/10/24 1115   Chest Tube Dressing Petroleum Jelly 02/10/24 1115   Site Assessment Clean, dry & intact 02/10/24 1115   Surrounding Skin Clean, dry & intact 02/10/24 1115   Output (ml) 10 ml 02/10/24 1200       [REMOVED] Chest Tube Other (Comment) Mediastinal 2

## 2024-02-19 ENCOUNTER — APPOINTMENT (OUTPATIENT)
Dept: GENERAL RADIOLOGY | Age: 84
DRG: 001 | End: 2024-02-19
Attending: THORACIC SURGERY (CARDIOTHORACIC VASCULAR SURGERY)
Payer: MEDICARE

## 2024-02-19 LAB
ANION GAP SERPL CALC-SCNC: 5 MMOL/L (ref 2–11)
BUN SERPL-MCNC: 16 MG/DL (ref 8–23)
CALCIUM SERPL-MCNC: 8.6 MG/DL (ref 8.3–10.4)
CHLORIDE SERPL-SCNC: 106 MMOL/L (ref 103–113)
CO2 SERPL-SCNC: 35 MMOL/L (ref 21–32)
CREAT SERPL-MCNC: 0.8 MG/DL (ref 0.8–1.5)
EKG ATRIAL RATE: 87 BPM
EKG ATRIAL RATE: 93 BPM
EKG DIAGNOSIS: NORMAL
EKG DIAGNOSIS: NORMAL
EKG P AXIS: 32 DEGREES
EKG P AXIS: 36 DEGREES
EKG P-R INTERVAL: 162 MS
EKG P-R INTERVAL: 162 MS
EKG Q-T INTERVAL: 342 MS
EKG Q-T INTERVAL: 378 MS
EKG QRS DURATION: 100 MS
EKG QRS DURATION: 90 MS
EKG QTC CALCULATION (BAZETT): 425 MS
EKG QTC CALCULATION (BAZETT): 454 MS
EKG R AXIS: -5 DEGREES
EKG R AXIS: 40 DEGREES
EKG T AXIS: 56 DEGREES
EKG T AXIS: 74 DEGREES
EKG VENTRICULAR RATE: 87 BPM
EKG VENTRICULAR RATE: 93 BPM
GLUCOSE BLD STRIP.AUTO-MCNC: 131 MG/DL (ref 65–100)
GLUCOSE BLD STRIP.AUTO-MCNC: 133 MG/DL (ref 65–100)
GLUCOSE BLD STRIP.AUTO-MCNC: 137 MG/DL (ref 65–100)
GLUCOSE SERPL-MCNC: 128 MG/DL (ref 65–100)
MAGNESIUM SERPL-MCNC: 2.4 MG/DL (ref 1.8–2.4)
PHOSPHATE SERPL-MCNC: 3.3 MG/DL (ref 2.3–3.7)
PLATELET # BLD AUTO: 396 K/UL (ref 150–450)
POTASSIUM SERPL-SCNC: 3.6 MMOL/L (ref 3.5–5.1)
POTASSIUM SERPL-SCNC: 3.7 MMOL/L (ref 3.5–5.1)
SERVICE CMNT-IMP: ABNORMAL
SODIUM SERPL-SCNC: 146 MMOL/L (ref 136–146)
WBC # BLD AUTO: 19.7 K/UL (ref 4.3–11.1)

## 2024-02-19 PROCEDURE — 2700000000 HC OXYGEN THERAPY PER DAY

## 2024-02-19 PROCEDURE — 93010 ELECTROCARDIOGRAM REPORT: CPT | Performed by: INTERNAL MEDICINE

## 2024-02-19 PROCEDURE — 84100 ASSAY OF PHOSPHORUS: CPT

## 2024-02-19 PROCEDURE — 99291 CRITICAL CARE FIRST HOUR: CPT | Performed by: INTERNAL MEDICINE

## 2024-02-19 PROCEDURE — 97530 THERAPEUTIC ACTIVITIES: CPT

## 2024-02-19 PROCEDURE — 99233 SBSQ HOSP IP/OBS HIGH 50: CPT | Performed by: INTERNAL MEDICINE

## 2024-02-19 PROCEDURE — 6360000002 HC RX W HCPCS: Performed by: INTERNAL MEDICINE

## 2024-02-19 PROCEDURE — 6360000002 HC RX W HCPCS: Performed by: THORACIC SURGERY (CARDIOTHORACIC VASCULAR SURGERY)

## 2024-02-19 PROCEDURE — 85049 AUTOMATED PLATELET COUNT: CPT

## 2024-02-19 PROCEDURE — 94761 N-INVAS EAR/PLS OXIMETRY MLT: CPT

## 2024-02-19 PROCEDURE — 74230 X-RAY XM SWLNG FUNCJ C+: CPT

## 2024-02-19 PROCEDURE — 2580000003 HC RX 258: Performed by: INTERNAL MEDICINE

## 2024-02-19 PROCEDURE — 71045 X-RAY EXAM CHEST 1 VIEW: CPT

## 2024-02-19 PROCEDURE — 85048 AUTOMATED LEUKOCYTE COUNT: CPT

## 2024-02-19 PROCEDURE — 2100000001 HC CVICU R&B

## 2024-02-19 PROCEDURE — 6370000000 HC RX 637 (ALT 250 FOR IP): Performed by: THORACIC SURGERY (CARDIOTHORACIC VASCULAR SURGERY)

## 2024-02-19 PROCEDURE — 94760 N-INVAS EAR/PLS OXIMETRY 1: CPT

## 2024-02-19 PROCEDURE — 6370000000 HC RX 637 (ALT 250 FOR IP): Performed by: PHYSICIAN ASSISTANT

## 2024-02-19 PROCEDURE — 2500000003 HC RX 250 WO HCPCS: Performed by: THORACIC SURGERY (CARDIOTHORACIC VASCULAR SURGERY)

## 2024-02-19 PROCEDURE — 94669 MECHANICAL CHEST WALL OSCILL: CPT

## 2024-02-19 PROCEDURE — 6370000000 HC RX 637 (ALT 250 FOR IP): Performed by: INTERNAL MEDICINE

## 2024-02-19 PROCEDURE — 93005 ELECTROCARDIOGRAM TRACING: CPT | Performed by: PHYSICIAN ASSISTANT

## 2024-02-19 PROCEDURE — 80048 BASIC METABOLIC PNL TOTAL CA: CPT

## 2024-02-19 PROCEDURE — 2580000003 HC RX 258: Performed by: PHYSICIAN ASSISTANT

## 2024-02-19 PROCEDURE — 83735 ASSAY OF MAGNESIUM: CPT

## 2024-02-19 PROCEDURE — 94640 AIRWAY INHALATION TREATMENT: CPT

## 2024-02-19 PROCEDURE — 36415 COLL VENOUS BLD VENIPUNCTURE: CPT

## 2024-02-19 PROCEDURE — 84132 ASSAY OF SERUM POTASSIUM: CPT

## 2024-02-19 PROCEDURE — 92611 MOTION FLUOROSCOPY/SWALLOW: CPT

## 2024-02-19 PROCEDURE — 82962 GLUCOSE BLOOD TEST: CPT

## 2024-02-19 RX ORDER — POTASSIUM CHLORIDE 20 MEQ/1
20 TABLET, EXTENDED RELEASE ORAL ONCE
Status: COMPLETED | OUTPATIENT
Start: 2024-02-19 | End: 2024-02-19

## 2024-02-19 RX ORDER — POTASSIUM CHLORIDE 7.45 MG/ML
10 INJECTION INTRAVENOUS
Status: COMPLETED | OUTPATIENT
Start: 2024-02-19 | End: 2024-02-19

## 2024-02-19 RX ORDER — TRAZODONE HYDROCHLORIDE 50 MG/1
50 TABLET ORAL NIGHTLY
Status: DISCONTINUED | OUTPATIENT
Start: 2024-02-19 | End: 2024-02-27 | Stop reason: HOSPADM

## 2024-02-19 RX ADMIN — SODIUM CHLORIDE, PRESERVATIVE FREE 10 ML: 5 INJECTION INTRAVENOUS at 20:02

## 2024-02-19 RX ADMIN — FUROSEMIDE 40 MG: 10 INJECTION, SOLUTION INTRAMUSCULAR; INTRAVENOUS at 08:49

## 2024-02-19 RX ADMIN — BARIUM SULFATE 30 ML: 400 PASTE ORAL at 10:18

## 2024-02-19 RX ADMIN — POTASSIUM CHLORIDE 20 MEQ: 1500 TABLET, EXTENDED RELEASE ORAL at 20:01

## 2024-02-19 RX ADMIN — BARIUM SULFATE 15 ML: 400 SUSPENSION ORAL at 10:17

## 2024-02-19 RX ADMIN — AMIODARONE HYDROCHLORIDE 1 MG/MIN: 50 INJECTION, SOLUTION INTRAVENOUS at 11:29

## 2024-02-19 RX ADMIN — POTASSIUM CHLORIDE 10 MEQ: 7.46 INJECTION, SOLUTION INTRAVENOUS at 08:46

## 2024-02-19 RX ADMIN — POTASSIUM CHLORIDE 10 MEQ: 7.46 INJECTION, SOLUTION INTRAVENOUS at 04:19

## 2024-02-19 RX ADMIN — FAMOTIDINE 20 MG: 20 TABLET, FILM COATED ORAL at 20:01

## 2024-02-19 RX ADMIN — BARIUM SULFATE 30 ML: 0.81 POWDER, FOR SUSPENSION ORAL at 10:17

## 2024-02-19 RX ADMIN — FAMOTIDINE 20 MG: 20 TABLET, FILM COATED ORAL at 08:47

## 2024-02-19 RX ADMIN — ALBUTEROL SULFATE 2.5 MG: 2.5 SOLUTION RESPIRATORY (INHALATION) at 19:09

## 2024-02-19 RX ADMIN — ATORVASTATIN CALCIUM 40 MG: 40 TABLET, FILM COATED ORAL at 20:01

## 2024-02-19 RX ADMIN — POTASSIUM CHLORIDE 10 MEQ: 7.46 INJECTION, SOLUTION INTRAVENOUS at 07:28

## 2024-02-19 RX ADMIN — ASPIRIN 81 MG: 81 TABLET, COATED ORAL at 08:47

## 2024-02-19 RX ADMIN — AMIODARONE HYDROCHLORIDE 1 MG/MIN: 50 INJECTION, SOLUTION INTRAVENOUS at 02:36

## 2024-02-19 RX ADMIN — Medication 3 MG: at 23:06

## 2024-02-19 RX ADMIN — SODIUM CHLORIDE, PRESERVATIVE FREE 10 ML: 5 INJECTION INTRAVENOUS at 08:50

## 2024-02-19 RX ADMIN — ALBUTEROL SULFATE 2.5 MG: 2.5 SOLUTION RESPIRATORY (INHALATION) at 11:10

## 2024-02-19 RX ADMIN — ALBUTEROL SULFATE 2.5 MG: 2.5 SOLUTION RESPIRATORY (INHALATION) at 07:20

## 2024-02-19 RX ADMIN — TRAZODONE HYDROCHLORIDE 50 MG: 50 TABLET ORAL at 20:01

## 2024-02-19 RX ADMIN — ALBUTEROL SULFATE 2.5 MG: 2.5 SOLUTION RESPIRATORY (INHALATION) at 15:29

## 2024-02-19 RX ADMIN — POTASSIUM CHLORIDE 10 MEQ: 7.46 INJECTION, SOLUTION INTRAVENOUS at 06:07

## 2024-02-19 ASSESSMENT — PAIN SCALES - GENERAL
PAINLEVEL_OUTOF10: 0

## 2024-02-19 NOTE — INTERDISCIPLINARY ROUNDS
Multi-D Rounds/Checklist (leapfrog):  Lines: can any be removed?: None    Urinary Catheter 02/09/24 Dang (Active)      DVT Prophylaxis: Ordered SAVI hose  Vent: N/A  Nutrition Ordered/appropriate: Per Primary Team  Can antibiotics or other drugs be stopped? Yes/End Date set Yes  Inpat Anti-Infectives (From admission, onward)       Start     Ordered Stop    02/10/24 2000  ceFEPIme (MAXIPIME) 2,000 mg in sodium chloride 0.9 % 100 mL IVPB (mini-bag)  2,000 mg,   IntraVENous,   EVERY 8 HOURS        See Kimmy for full Linked Orders Report.    02/10/24 1100 02/17/24 1959                  Consults needed: None  A: Is pain control adequate? (has PRNs? Stop drip?) Yes  B: Sedation break and SBT? N/A  C: Is sedation choice appropriate? N/A  D: Delirium/CAM-ICU? Yes  E: Mobility goals/appropriateness? Yes  F: Family update and plan? Jitendra is surrogate decision maker and is being updated daily by primary attending and nursing staff.    Madeline Rossi, APRN - CNP

## 2024-02-20 ENCOUNTER — APPOINTMENT (OUTPATIENT)
Dept: GENERAL RADIOLOGY | Age: 84
DRG: 001 | End: 2024-02-20
Attending: THORACIC SURGERY (CARDIOTHORACIC VASCULAR SURGERY)
Payer: MEDICARE

## 2024-02-20 LAB
ANION GAP SERPL CALC-SCNC: 3 MMOL/L (ref 2–11)
BACTERIA SPEC CULT: NORMAL
BUN SERPL-MCNC: 16 MG/DL (ref 8–23)
CALCIUM SERPL-MCNC: 8.6 MG/DL (ref 8.3–10.4)
CHLORIDE SERPL-SCNC: 107 MMOL/L (ref 103–113)
CO2 SERPL-SCNC: 36 MMOL/L (ref 21–32)
CREAT SERPL-MCNC: 0.7 MG/DL (ref 0.8–1.5)
EKG ATRIAL RATE: 86 BPM
EKG DIAGNOSIS: NORMAL
EKG P AXIS: 40 DEGREES
EKG P-R INTERVAL: 144 MS
EKG Q-T INTERVAL: 410 MS
EKG QRS DURATION: 94 MS
EKG QTC CALCULATION (BAZETT): 490 MS
EKG R AXIS: 9 DEGREES
EKG T AXIS: 54 DEGREES
EKG VENTRICULAR RATE: 86 BPM
ERYTHROCYTE [DISTWIDTH] IN BLOOD BY AUTOMATED COUNT: 17.2 % (ref 11.9–14.6)
GLUCOSE SERPL-MCNC: 140 MG/DL (ref 65–100)
GRAM STN SPEC: NORMAL
GRAM STN SPEC: NORMAL
HCT VFR BLD AUTO: 36.1 % (ref 41.1–50.3)
HGB BLD-MCNC: 11 G/DL (ref 13.6–17.2)
MCH RBC QN AUTO: 28.6 PG (ref 26.1–32.9)
MCHC RBC AUTO-ENTMCNC: 30.5 G/DL (ref 31.4–35)
MCV RBC AUTO: 94 FL (ref 82–102)
NRBC # BLD: 0 K/UL (ref 0–0.2)
PLATELET # BLD AUTO: 361 K/UL (ref 150–450)
PMV BLD AUTO: 10.3 FL (ref 9.4–12.3)
POTASSIUM SERPL-SCNC: 3.9 MMOL/L (ref 3.5–5.1)
RBC # BLD AUTO: 3.84 M/UL (ref 4.23–5.6)
SERVICE CMNT-IMP: NORMAL
SODIUM SERPL-SCNC: 146 MMOL/L (ref 136–146)
WBC # BLD AUTO: 16 K/UL (ref 4.3–11.1)

## 2024-02-20 PROCEDURE — 97112 NEUROMUSCULAR REEDUCATION: CPT

## 2024-02-20 PROCEDURE — 2580000003 HC RX 258: Performed by: INTERNAL MEDICINE

## 2024-02-20 PROCEDURE — 36415 COLL VENOUS BLD VENIPUNCTURE: CPT

## 2024-02-20 PROCEDURE — 94760 N-INVAS EAR/PLS OXIMETRY 1: CPT

## 2024-02-20 PROCEDURE — 80048 BASIC METABOLIC PNL TOTAL CA: CPT

## 2024-02-20 PROCEDURE — 6370000000 HC RX 637 (ALT 250 FOR IP): Performed by: PHYSICIAN ASSISTANT

## 2024-02-20 PROCEDURE — 6370000000 HC RX 637 (ALT 250 FOR IP): Performed by: INTERNAL MEDICINE

## 2024-02-20 PROCEDURE — 85027 COMPLETE CBC AUTOMATED: CPT

## 2024-02-20 PROCEDURE — 71045 X-RAY EXAM CHEST 1 VIEW: CPT

## 2024-02-20 PROCEDURE — 36573 INSJ PICC RS&I 5 YR+: CPT

## 2024-02-20 PROCEDURE — 6360000002 HC RX W HCPCS: Performed by: EMERGENCY MEDICINE

## 2024-02-20 PROCEDURE — 92526 ORAL FUNCTION THERAPY: CPT

## 2024-02-20 PROCEDURE — 94761 N-INVAS EAR/PLS OXIMETRY MLT: CPT

## 2024-02-20 PROCEDURE — 93005 ELECTROCARDIOGRAM TRACING: CPT | Performed by: PHYSICIAN ASSISTANT

## 2024-02-20 PROCEDURE — 6360000002 HC RX W HCPCS: Performed by: INTERNAL MEDICINE

## 2024-02-20 PROCEDURE — C1751 CATH, INF, PER/CENT/MIDLINE: HCPCS

## 2024-02-20 PROCEDURE — 6360000002 HC RX W HCPCS: Performed by: THORACIC SURGERY (CARDIOTHORACIC VASCULAR SURGERY)

## 2024-02-20 PROCEDURE — 2100000001 HC CVICU R&B

## 2024-02-20 PROCEDURE — 2700000000 HC OXYGEN THERAPY PER DAY

## 2024-02-20 PROCEDURE — 93010 ELECTROCARDIOGRAM REPORT: CPT | Performed by: INTERNAL MEDICINE

## 2024-02-20 PROCEDURE — 97535 SELF CARE MNGMENT TRAINING: CPT

## 2024-02-20 PROCEDURE — 94640 AIRWAY INHALATION TREATMENT: CPT

## 2024-02-20 PROCEDURE — 99291 CRITICAL CARE FIRST HOUR: CPT | Performed by: INTERNAL MEDICINE

## 2024-02-20 PROCEDURE — 6370000000 HC RX 637 (ALT 250 FOR IP): Performed by: THORACIC SURGERY (CARDIOTHORACIC VASCULAR SURGERY)

## 2024-02-20 PROCEDURE — 99233 SBSQ HOSP IP/OBS HIGH 50: CPT | Performed by: INTERNAL MEDICINE

## 2024-02-20 PROCEDURE — 2580000003 HC RX 258: Performed by: PHYSICIAN ASSISTANT

## 2024-02-20 PROCEDURE — 97530 THERAPEUTIC ACTIVITIES: CPT

## 2024-02-20 RX ORDER — ENOXAPARIN SODIUM 100 MG/ML
1 INJECTION SUBCUTANEOUS EVERY 12 HOURS
Status: DISCONTINUED | OUTPATIENT
Start: 2024-02-20 | End: 2024-02-22

## 2024-02-20 RX ORDER — FUROSEMIDE 10 MG/ML
40 INJECTION INTRAMUSCULAR; INTRAVENOUS ONCE
Status: COMPLETED | OUTPATIENT
Start: 2024-02-20 | End: 2024-02-20

## 2024-02-20 RX ORDER — SODIUM CHLORIDE 0.9 % (FLUSH) 0.9 %
5-40 SYRINGE (ML) INJECTION EVERY 12 HOURS SCHEDULED
Status: DISCONTINUED | OUTPATIENT
Start: 2024-02-20 | End: 2024-02-22

## 2024-02-20 RX ORDER — AMIODARONE HYDROCHLORIDE 200 MG/1
200 TABLET ORAL DAILY
Status: DISCONTINUED | OUTPATIENT
Start: 2024-02-20 | End: 2024-02-22

## 2024-02-20 RX ORDER — SODIUM CHLORIDE 0.9 % (FLUSH) 0.9 %
5-40 SYRINGE (ML) INJECTION PRN
Status: DISCONTINUED | OUTPATIENT
Start: 2024-02-20 | End: 2024-02-22

## 2024-02-20 RX ORDER — CARVEDILOL 3.12 MG/1
3.12 TABLET ORAL 2 TIMES DAILY WITH MEALS
Status: DISCONTINUED | OUTPATIENT
Start: 2024-02-20 | End: 2024-02-27 | Stop reason: HOSPADM

## 2024-02-20 RX ORDER — SODIUM CHLORIDE 9 MG/ML
25 INJECTION, SOLUTION INTRAVENOUS PRN
Status: DISCONTINUED | OUTPATIENT
Start: 2024-02-20 | End: 2024-02-22

## 2024-02-20 RX ADMIN — AMIODARONE HYDROCHLORIDE 0.5 MG/MIN: 50 INJECTION, SOLUTION INTRAVENOUS at 00:24

## 2024-02-20 RX ADMIN — FUROSEMIDE 40 MG: 10 INJECTION, SOLUTION INTRAMUSCULAR; INTRAVENOUS at 23:21

## 2024-02-20 RX ADMIN — ATORVASTATIN CALCIUM 40 MG: 40 TABLET, FILM COATED ORAL at 20:13

## 2024-02-20 RX ADMIN — ENOXAPARIN SODIUM 100 MG: 100 INJECTION SUBCUTANEOUS at 21:00

## 2024-02-20 RX ADMIN — SODIUM CHLORIDE, PRESERVATIVE FREE 10 ML: 5 INJECTION INTRAVENOUS at 20:15

## 2024-02-20 RX ADMIN — FAMOTIDINE 20 MG: 20 TABLET, FILM COATED ORAL at 20:15

## 2024-02-20 RX ADMIN — ALBUTEROL SULFATE 2.5 MG: 2.5 SOLUTION RESPIRATORY (INHALATION) at 19:48

## 2024-02-20 RX ADMIN — ASPIRIN 81 MG: 81 TABLET, COATED ORAL at 09:49

## 2024-02-20 RX ADMIN — CARVEDILOL 3.12 MG: 3.12 TABLET, FILM COATED ORAL at 09:49

## 2024-02-20 RX ADMIN — ALBUTEROL SULFATE 2.5 MG: 2.5 SOLUTION RESPIRATORY (INHALATION) at 12:24

## 2024-02-20 RX ADMIN — ALBUTEROL SULFATE 2.5 MG: 2.5 SOLUTION RESPIRATORY (INHALATION) at 17:27

## 2024-02-20 RX ADMIN — ALBUTEROL SULFATE 2.5 MG: 2.5 SOLUTION RESPIRATORY (INHALATION) at 09:57

## 2024-02-20 RX ADMIN — FAMOTIDINE 20 MG: 20 TABLET, FILM COATED ORAL at 09:49

## 2024-02-20 RX ADMIN — ENOXAPARIN SODIUM 100 MG: 100 INJECTION SUBCUTANEOUS at 09:50

## 2024-02-20 RX ADMIN — Medication 3 MG: at 20:16

## 2024-02-20 RX ADMIN — AMIODARONE HYDROCHLORIDE 200 MG: 200 TABLET ORAL at 09:49

## 2024-02-20 RX ADMIN — TRAZODONE HYDROCHLORIDE 50 MG: 50 TABLET ORAL at 20:16

## 2024-02-20 RX ADMIN — CARVEDILOL 3.12 MG: 3.12 TABLET, FILM COATED ORAL at 17:52

## 2024-02-20 ASSESSMENT — PAIN SCALES - GENERAL
PAINLEVEL_OUTOF10: 0

## 2024-02-20 NOTE — INTERDISCIPLINARY ROUNDS
Multi-D Rounds/Checklist (leapfrog):  Lines: can any be removed?: None    Urinary Catheter 02/09/24 Dang (Active)      DVT Prophylaxis: Ordered  Vent: N/A  Nutrition Ordered/appropriate: Ordered  Can antibiotics or other drugs be stopped? Yes/End Date set Yes  Inpat Anti-Infectives (From admission, onward)       Start     Ordered Stop    02/10/24 2000  ceFEPIme (MAXIPIME) 2,000 mg in sodium chloride 0.9 % 100 mL IVPB (mini-bag)  2,000 mg,   IntraVENous,   EVERY 8 HOURS        See Kimmy for full Linked Orders Report.    02/10/24 1100 02/17/24 1959                  Consults needed: None  A: Is pain control adequate? (has PRNs? Stop drip?) Yes  B: Sedation break and SBT? N/A  C: Is sedation choice appropriate? N/A  D: Delirium/CAM-ICU? Yes  E: Mobility goals/appropriateness? Yes  F: Family update and plan?Jitendra is surrogate decision maker and is being updated daily by primary attending and nursing staff.    Madeline Rossi, APRN - CNP

## 2024-02-21 ENCOUNTER — APPOINTMENT (OUTPATIENT)
Dept: GENERAL RADIOLOGY | Age: 84
DRG: 001 | End: 2024-02-21
Attending: THORACIC SURGERY (CARDIOTHORACIC VASCULAR SURGERY)
Payer: MEDICARE

## 2024-02-21 PROBLEM — J90 PLEURAL EFFUSION: Status: ACTIVE | Noted: 2024-02-21

## 2024-02-21 LAB
ANION GAP SERPL CALC-SCNC: 3 MMOL/L (ref 2–11)
BUN SERPL-MCNC: 14 MG/DL (ref 8–23)
CALCIUM SERPL-MCNC: 8.7 MG/DL (ref 8.3–10.4)
CHLORIDE SERPL-SCNC: 103 MMOL/L (ref 103–113)
CO2 SERPL-SCNC: 40 MMOL/L (ref 21–32)
CREAT SERPL-MCNC: 0.7 MG/DL (ref 0.8–1.5)
EKG ATRIAL RATE: 82 BPM
EKG DIAGNOSIS: NORMAL
EKG P AXIS: 71 DEGREES
EKG P-R INTERVAL: 176 MS
EKG Q-T INTERVAL: 414 MS
EKG QRS DURATION: 102 MS
EKG QTC CALCULATION (BAZETT): 483 MS
EKG R AXIS: -13 DEGREES
EKG T AXIS: 56 DEGREES
EKG VENTRICULAR RATE: 82 BPM
GLUCOSE SERPL-MCNC: 146 MG/DL (ref 65–100)
POTASSIUM SERPL-SCNC: 4 MMOL/L (ref 3.5–5.1)
SODIUM SERPL-SCNC: 146 MMOL/L (ref 136–146)

## 2024-02-21 PROCEDURE — 71045 X-RAY EXAM CHEST 1 VIEW: CPT

## 2024-02-21 PROCEDURE — 76604 US EXAM CHEST: CPT | Performed by: INTERNAL MEDICINE

## 2024-02-21 PROCEDURE — 80048 BASIC METABOLIC PNL TOTAL CA: CPT

## 2024-02-21 PROCEDURE — 6370000000 HC RX 637 (ALT 250 FOR IP): Performed by: THORACIC SURGERY (CARDIOTHORACIC VASCULAR SURGERY)

## 2024-02-21 PROCEDURE — 6360000002 HC RX W HCPCS: Performed by: THORACIC SURGERY (CARDIOTHORACIC VASCULAR SURGERY)

## 2024-02-21 PROCEDURE — 6370000000 HC RX 637 (ALT 250 FOR IP): Performed by: INTERNAL MEDICINE

## 2024-02-21 PROCEDURE — 36592 COLLECT BLOOD FROM PICC: CPT

## 2024-02-21 PROCEDURE — 99233 SBSQ HOSP IP/OBS HIGH 50: CPT | Performed by: INTERNAL MEDICINE

## 2024-02-21 PROCEDURE — 2580000003 HC RX 258: Performed by: INTERNAL MEDICINE

## 2024-02-21 PROCEDURE — 6360000002 HC RX W HCPCS: Performed by: INTERNAL MEDICINE

## 2024-02-21 PROCEDURE — 6370000000 HC RX 637 (ALT 250 FOR IP): Performed by: PHYSICIAN ASSISTANT

## 2024-02-21 PROCEDURE — 97530 THERAPEUTIC ACTIVITIES: CPT

## 2024-02-21 PROCEDURE — 92526 ORAL FUNCTION THERAPY: CPT

## 2024-02-21 PROCEDURE — 94760 N-INVAS EAR/PLS OXIMETRY 1: CPT

## 2024-02-21 PROCEDURE — 94669 MECHANICAL CHEST WALL OSCILL: CPT

## 2024-02-21 PROCEDURE — 93010 ELECTROCARDIOGRAM REPORT: CPT | Performed by: INTERNAL MEDICINE

## 2024-02-21 PROCEDURE — 2100000001 HC CVICU R&B

## 2024-02-21 PROCEDURE — 94640 AIRWAY INHALATION TREATMENT: CPT

## 2024-02-21 PROCEDURE — 2709999900 HC NON-CHARGEABLE SUPPLY: Performed by: INTERNAL MEDICINE

## 2024-02-21 PROCEDURE — 2700000000 HC OXYGEN THERAPY PER DAY

## 2024-02-21 PROCEDURE — 93005 ELECTROCARDIOGRAM TRACING: CPT | Performed by: PHYSICIAN ASSISTANT

## 2024-02-21 PROCEDURE — 2580000003 HC RX 258: Performed by: PHYSICIAN ASSISTANT

## 2024-02-21 PROCEDURE — 99291 CRITICAL CARE FIRST HOUR: CPT | Performed by: INTERNAL MEDICINE

## 2024-02-21 RX ORDER — FUROSEMIDE 10 MG/ML
80 INJECTION INTRAMUSCULAR; INTRAVENOUS ONCE
Status: DISCONTINUED | OUTPATIENT
Start: 2024-02-21 | End: 2024-02-21

## 2024-02-21 RX ADMIN — SODIUM CHLORIDE, PRESERVATIVE FREE 10 ML: 5 INJECTION INTRAVENOUS at 20:23

## 2024-02-21 RX ADMIN — ENOXAPARIN SODIUM 100 MG: 100 INJECTION SUBCUTANEOUS at 20:25

## 2024-02-21 RX ADMIN — ENOXAPARIN SODIUM 100 MG: 100 INJECTION SUBCUTANEOUS at 09:34

## 2024-02-21 RX ADMIN — AMIODARONE HYDROCHLORIDE 200 MG: 200 TABLET ORAL at 09:32

## 2024-02-21 RX ADMIN — ATORVASTATIN CALCIUM 40 MG: 40 TABLET, FILM COATED ORAL at 20:22

## 2024-02-21 RX ADMIN — ALBUTEROL SULFATE 2.5 MG: 2.5 SOLUTION RESPIRATORY (INHALATION) at 20:38

## 2024-02-21 RX ADMIN — CARVEDILOL 3.12 MG: 3.12 TABLET, FILM COATED ORAL at 17:26

## 2024-02-21 RX ADMIN — CARVEDILOL 3.12 MG: 3.12 TABLET, FILM COATED ORAL at 09:32

## 2024-02-21 RX ADMIN — SODIUM CHLORIDE, PRESERVATIVE FREE 10 ML: 5 INJECTION INTRAVENOUS at 09:33

## 2024-02-21 RX ADMIN — FAMOTIDINE 20 MG: 20 TABLET, FILM COATED ORAL at 20:22

## 2024-02-21 RX ADMIN — ALBUTEROL SULFATE 2.5 MG: 2.5 SOLUTION RESPIRATORY (INHALATION) at 12:25

## 2024-02-21 RX ADMIN — ALBUTEROL SULFATE 2.5 MG: 2.5 SOLUTION RESPIRATORY (INHALATION) at 07:11

## 2024-02-21 RX ADMIN — ALBUTEROL SULFATE 2.5 MG: 2.5 SOLUTION RESPIRATORY (INHALATION) at 16:17

## 2024-02-21 RX ADMIN — Medication 3 MG: at 20:22

## 2024-02-21 RX ADMIN — TRAZODONE HYDROCHLORIDE 50 MG: 50 TABLET ORAL at 20:22

## 2024-02-21 RX ADMIN — FAMOTIDINE 20 MG: 20 TABLET, FILM COATED ORAL at 09:32

## 2024-02-21 RX ADMIN — ASPIRIN 81 MG: 81 TABLET, COATED ORAL at 09:32

## 2024-02-21 ASSESSMENT — PAIN SCALES - GENERAL
PAINLEVEL_OUTOF10: 0

## 2024-02-21 NOTE — PROCEDURES
Summary:    After informed consent was obtained, the patient was positioned upright in the usual fashion.  Ultrasound was used to identify the optimal spot for pleural drainage.    Scant effusion on right and even small on left side. No attempt made to drain.     EBL --none    Patient stable post procedure    No samples sent.     Signed By: Geronimo Mills MD

## 2024-02-21 NOTE — INTERDISCIPLINARY ROUNDS
Multi-D Rounds/Checklist (leapfrog):  Lines: can any be removed?: None    Urinary Catheter 02/09/24 Dang (Active)     PICC Double Lumen 02/20/24 Right Basilic (Active)     DVT Prophylaxis: Ordered  Vent: N/A  Nutrition Ordered/appropriate: Ordered  Can antibiotics or other drugs be stopped? Yes/End Date set Yes  Inpat Anti-Infectives (From admission, onward)       Start     Ordered Stop    02/10/24 2000  ceFEPIme (MAXIPIME) 2,000 mg in sodium chloride 0.9 % 100 mL IVPB (mini-bag)  2,000 mg,   IntraVENous,   EVERY 8 HOURS        See Kimmy for full Linked Orders Report.    02/10/24 1100 02/17/24 1959                  Consults needed: None  A: Is pain control adequate? (has PRNs? Stop drip?) Yes  B: Sedation break and SBT? no  C: Is sedation choice appropriate? N/A  D: Delirium/CAM-ICU? Yes  E: Mobility goals/appropriateness? Yes  F: Family update and plan? son is surrogate decision maker and is being updated daily by primary attending and nursing staff.    Madeline Rossi, APRN - CNP

## 2024-02-22 ENCOUNTER — APPOINTMENT (OUTPATIENT)
Dept: GENERAL RADIOLOGY | Age: 84
DRG: 001 | End: 2024-02-22
Attending: THORACIC SURGERY (CARDIOTHORACIC VASCULAR SURGERY)
Payer: MEDICARE

## 2024-02-22 LAB
ANION GAP SERPL CALC-SCNC: ABNORMAL MMOL/L (ref 2–11)
BASOPHILS # BLD: 0 K/UL (ref 0–0.2)
BASOPHILS NFR BLD: 0 % (ref 0–2)
BUN SERPL-MCNC: 17 MG/DL (ref 8–23)
CALCIUM SERPL-MCNC: 8.5 MG/DL (ref 8.3–10.4)
CHLORIDE SERPL-SCNC: 101 MMOL/L (ref 103–113)
CO2 SERPL-SCNC: 42 MMOL/L (ref 21–32)
CREAT SERPL-MCNC: 0.7 MG/DL (ref 0.8–1.5)
DIFFERENTIAL METHOD BLD: ABNORMAL
EKG ATRIAL RATE: 82 BPM
EKG DIAGNOSIS: NORMAL
EKG P AXIS: 47 DEGREES
EKG P-R INTERVAL: 154 MS
EKG Q-T INTERVAL: 448 MS
EKG QRS DURATION: 88 MS
EKG QTC CALCULATION (BAZETT): 523 MS
EKG R AXIS: 19 DEGREES
EKG T AXIS: 43 DEGREES
EKG VENTRICULAR RATE: 82 BPM
EOSINOPHIL # BLD: 0.2 K/UL (ref 0–0.8)
EOSINOPHIL NFR BLD: 1 % (ref 0.5–7.8)
ERYTHROCYTE [DISTWIDTH] IN BLOOD BY AUTOMATED COUNT: 17 % (ref 11.9–14.6)
GLUCOSE BLD STRIP.AUTO-MCNC: 118 MG/DL (ref 65–100)
GLUCOSE BLD STRIP.AUTO-MCNC: 132 MG/DL (ref 65–100)
GLUCOSE SERPL-MCNC: 127 MG/DL (ref 65–100)
HCT VFR BLD AUTO: 31.2 % (ref 41.1–50.3)
HGB BLD-MCNC: 9.2 G/DL (ref 13.6–17.2)
IMM GRANULOCYTES # BLD AUTO: 0.1 K/UL (ref 0–0.5)
IMM GRANULOCYTES NFR BLD AUTO: 1 % (ref 0–5)
LYMPHOCYTES # BLD: 1 K/UL (ref 0.5–4.6)
LYMPHOCYTES NFR BLD: 7 % (ref 13–44)
MCH RBC QN AUTO: 28.1 PG (ref 26.1–32.9)
MCHC RBC AUTO-ENTMCNC: 29.5 G/DL (ref 31.4–35)
MCV RBC AUTO: 95.4 FL (ref 82–102)
MONOCYTES # BLD: 1.2 K/UL (ref 0.1–1.3)
MONOCYTES NFR BLD: 9 % (ref 4–12)
NEUTS SEG # BLD: 10.9 K/UL (ref 1.7–8.2)
NRBC # BLD: 0 K/UL (ref 0–0.2)
PLATELET # BLD AUTO: 364 K/UL (ref 150–450)
PMV BLD AUTO: 10.3 FL (ref 9.4–12.3)
POTASSIUM SERPL-SCNC: 3.9 MMOL/L (ref 3.5–5.1)
RBC # BLD AUTO: 3.27 M/UL (ref 4.23–5.6)
SERVICE CMNT-IMP: ABNORMAL
SERVICE CMNT-IMP: ABNORMAL
SODIUM SERPL-SCNC: 141 MMOL/L (ref 136–146)
WBC # BLD AUTO: 13.4 K/UL (ref 4.3–11.1)

## 2024-02-22 PROCEDURE — 97112 NEUROMUSCULAR REEDUCATION: CPT

## 2024-02-22 PROCEDURE — 82962 GLUCOSE BLOOD TEST: CPT

## 2024-02-22 PROCEDURE — 80048 BASIC METABOLIC PNL TOTAL CA: CPT

## 2024-02-22 PROCEDURE — 2580000003 HC RX 258: Performed by: PHYSICIAN ASSISTANT

## 2024-02-22 PROCEDURE — 6360000002 HC RX W HCPCS: Performed by: PHYSICIAN ASSISTANT

## 2024-02-22 PROCEDURE — 6370000000 HC RX 637 (ALT 250 FOR IP): Performed by: THORACIC SURGERY (CARDIOTHORACIC VASCULAR SURGERY)

## 2024-02-22 PROCEDURE — 97535 SELF CARE MNGMENT TRAINING: CPT

## 2024-02-22 PROCEDURE — 6360000002 HC RX W HCPCS: Performed by: THORACIC SURGERY (CARDIOTHORACIC VASCULAR SURGERY)

## 2024-02-22 PROCEDURE — 93010 ELECTROCARDIOGRAM REPORT: CPT | Performed by: INTERNAL MEDICINE

## 2024-02-22 PROCEDURE — 2140000001 HC CVICU INTERMEDIATE R&B

## 2024-02-22 PROCEDURE — 37799 UNLISTED PX VASCULAR SURGERY: CPT

## 2024-02-22 PROCEDURE — 94640 AIRWAY INHALATION TREATMENT: CPT

## 2024-02-22 PROCEDURE — 6370000000 HC RX 637 (ALT 250 FOR IP): Performed by: PHYSICIAN ASSISTANT

## 2024-02-22 PROCEDURE — 6360000002 HC RX W HCPCS: Performed by: INTERNAL MEDICINE

## 2024-02-22 PROCEDURE — 6370000000 HC RX 637 (ALT 250 FOR IP): Performed by: INTERNAL MEDICINE

## 2024-02-22 PROCEDURE — 2700000000 HC OXYGEN THERAPY PER DAY

## 2024-02-22 PROCEDURE — 36592 COLLECT BLOOD FROM PICC: CPT

## 2024-02-22 PROCEDURE — 93005 ELECTROCARDIOGRAM TRACING: CPT | Performed by: PHYSICIAN ASSISTANT

## 2024-02-22 PROCEDURE — 97530 THERAPEUTIC ACTIVITIES: CPT

## 2024-02-22 PROCEDURE — 85025 COMPLETE CBC W/AUTO DIFF WBC: CPT

## 2024-02-22 PROCEDURE — 99232 SBSQ HOSP IP/OBS MODERATE 35: CPT | Performed by: INTERNAL MEDICINE

## 2024-02-22 PROCEDURE — 94761 N-INVAS EAR/PLS OXIMETRY MLT: CPT

## 2024-02-22 PROCEDURE — 92526 ORAL FUNCTION THERAPY: CPT

## 2024-02-22 PROCEDURE — 71045 X-RAY EXAM CHEST 1 VIEW: CPT

## 2024-02-22 PROCEDURE — 99231 SBSQ HOSP IP/OBS SF/LOW 25: CPT | Performed by: INTERNAL MEDICINE

## 2024-02-22 RX ORDER — ENOXAPARIN SODIUM 100 MG/ML
1 INJECTION SUBCUTANEOUS EVERY 12 HOURS
Status: DISCONTINUED | OUTPATIENT
Start: 2024-02-22 | End: 2024-02-27 | Stop reason: HOSPADM

## 2024-02-22 RX ORDER — LANOLIN ALCOHOL/MO/W.PET/CERES
400 CREAM (GRAM) TOPICAL 4 TIMES DAILY PRN
Status: DISCONTINUED | OUTPATIENT
Start: 2024-02-22 | End: 2024-02-27 | Stop reason: HOSPADM

## 2024-02-22 RX ORDER — LANOLIN ALCOHOL/MO/W.PET/CERES
6 CREAM (GRAM) TOPICAL NIGHTLY
Status: DISCONTINUED | OUTPATIENT
Start: 2024-02-22 | End: 2024-02-27 | Stop reason: HOSPADM

## 2024-02-22 RX ORDER — POTASSIUM CHLORIDE 20 MEQ/1
40 TABLET, EXTENDED RELEASE ORAL 2 TIMES DAILY PRN
Status: DISCONTINUED | OUTPATIENT
Start: 2024-02-22 | End: 2024-02-27 | Stop reason: HOSPADM

## 2024-02-22 RX ORDER — ONDANSETRON 4 MG/1
4 TABLET, ORALLY DISINTEGRATING ORAL EVERY 8 HOURS PRN
Status: DISCONTINUED | OUTPATIENT
Start: 2024-02-22 | End: 2024-02-27 | Stop reason: HOSPADM

## 2024-02-22 RX ORDER — POLYETHYLENE GLYCOL 3350 17 G/17G
17 POWDER, FOR SOLUTION ORAL DAILY PRN
Status: DISCONTINUED | OUTPATIENT
Start: 2024-02-22 | End: 2024-02-27 | Stop reason: HOSPADM

## 2024-02-22 RX ORDER — DEXTROSE MONOHYDRATE 100 MG/ML
INJECTION, SOLUTION INTRAVENOUS CONTINUOUS PRN
Status: DISCONTINUED | OUTPATIENT
Start: 2024-02-22 | End: 2024-02-27 | Stop reason: HOSPADM

## 2024-02-22 RX ORDER — TRAMADOL HYDROCHLORIDE 50 MG/1
100 TABLET ORAL EVERY 6 HOURS PRN
Status: DISCONTINUED | OUTPATIENT
Start: 2024-02-22 | End: 2024-02-27 | Stop reason: HOSPADM

## 2024-02-22 RX ORDER — INSULIN LISPRO 100 [IU]/ML
0-12 INJECTION, SOLUTION INTRAVENOUS; SUBCUTANEOUS
Status: DISCONTINUED | OUTPATIENT
Start: 2024-02-22 | End: 2024-02-25

## 2024-02-22 RX ORDER — LANOLIN ALCOHOL/MO/W.PET/CERES
400 CREAM (GRAM) TOPICAL 3 TIMES DAILY PRN
Status: DISCONTINUED | OUTPATIENT
Start: 2024-02-22 | End: 2024-02-27 | Stop reason: HOSPADM

## 2024-02-22 RX ORDER — POTASSIUM CHLORIDE 20 MEQ/1
20 TABLET, EXTENDED RELEASE ORAL 2 TIMES DAILY PRN
Status: DISCONTINUED | OUTPATIENT
Start: 2024-02-22 | End: 2024-02-27 | Stop reason: HOSPADM

## 2024-02-22 RX ORDER — FUROSEMIDE 40 MG/1
40 TABLET ORAL DAILY
Status: DISCONTINUED | OUTPATIENT
Start: 2024-02-23 | End: 2024-02-23

## 2024-02-22 RX ORDER — POTASSIUM CHLORIDE 750 MG/1
10 TABLET, EXTENDED RELEASE ORAL DAILY
Status: COMPLETED | OUTPATIENT
Start: 2024-02-23 | End: 2024-02-25

## 2024-02-22 RX ORDER — SODIUM CHLORIDE 0.9 % (FLUSH) 0.9 %
5-40 SYRINGE (ML) INJECTION PRN
Status: DISCONTINUED | OUTPATIENT
Start: 2024-02-22 | End: 2024-02-27 | Stop reason: HOSPADM

## 2024-02-22 RX ORDER — FAMOTIDINE 20 MG/1
20 TABLET, FILM COATED ORAL 2 TIMES DAILY
Status: DISCONTINUED | OUTPATIENT
Start: 2024-02-22 | End: 2024-02-27 | Stop reason: HOSPADM

## 2024-02-22 RX ORDER — AMIODARONE HYDROCHLORIDE 200 MG/1
400 TABLET ORAL 2 TIMES DAILY
Status: DISCONTINUED | OUTPATIENT
Start: 2024-02-22 | End: 2024-02-27 | Stop reason: HOSPADM

## 2024-02-22 RX ORDER — SODIUM CHLORIDE 0.9 % (FLUSH) 0.9 %
5-40 SYRINGE (ML) INJECTION EVERY 12 HOURS SCHEDULED
Status: DISCONTINUED | OUTPATIENT
Start: 2024-02-22 | End: 2024-02-27 | Stop reason: HOSPADM

## 2024-02-22 RX ORDER — INSULIN LISPRO 100 [IU]/ML
0-6 INJECTION, SOLUTION INTRAVENOUS; SUBCUTANEOUS NIGHTLY
Status: DISCONTINUED | OUTPATIENT
Start: 2024-02-22 | End: 2024-02-25

## 2024-02-22 RX ORDER — ONDANSETRON 2 MG/ML
4 INJECTION INTRAMUSCULAR; INTRAVENOUS EVERY 6 HOURS PRN
Status: DISCONTINUED | OUTPATIENT
Start: 2024-02-22 | End: 2024-02-27 | Stop reason: HOSPADM

## 2024-02-22 RX ORDER — ACETAMINOPHEN 325 MG/1
650 TABLET ORAL EVERY 4 HOURS PRN
Status: DISCONTINUED | OUTPATIENT
Start: 2024-02-22 | End: 2024-02-27 | Stop reason: HOSPADM

## 2024-02-22 RX ORDER — SENNA AND DOCUSATE SODIUM 50; 8.6 MG/1; MG/1
1 TABLET, FILM COATED ORAL 2 TIMES DAILY
Status: DISCONTINUED | OUTPATIENT
Start: 2024-02-22 | End: 2024-02-26

## 2024-02-22 RX ORDER — TRAMADOL HYDROCHLORIDE 50 MG/1
50 TABLET ORAL EVERY 6 HOURS PRN
Status: DISCONTINUED | OUTPATIENT
Start: 2024-02-22 | End: 2024-02-27 | Stop reason: HOSPADM

## 2024-02-22 RX ADMIN — CARVEDILOL 3.12 MG: 3.12 TABLET, FILM COATED ORAL at 16:20

## 2024-02-22 RX ADMIN — Medication 1 AMPULE: at 20:13

## 2024-02-22 RX ADMIN — POTASSIUM CHLORIDE 20 MEQ: 400 INJECTION, SOLUTION INTRAVENOUS at 04:19

## 2024-02-22 RX ADMIN — FAMOTIDINE 20 MG: 20 TABLET, FILM COATED ORAL at 20:14

## 2024-02-22 RX ADMIN — CEFAZOLIN SODIUM 2000 MG: 100 INJECTION, POWDER, LYOPHILIZED, FOR SOLUTION INTRAVENOUS at 16:53

## 2024-02-22 RX ADMIN — CARVEDILOL 3.12 MG: 3.12 TABLET, FILM COATED ORAL at 10:29

## 2024-02-22 RX ADMIN — ENOXAPARIN SODIUM 100 MG: 100 INJECTION SUBCUTANEOUS at 09:26

## 2024-02-22 RX ADMIN — ENOXAPARIN SODIUM 100 MG: 100 INJECTION SUBCUTANEOUS at 20:30

## 2024-02-22 RX ADMIN — ALBUTEROL SULFATE 2.5 MG: 2.5 SOLUTION RESPIRATORY (INHALATION) at 19:21

## 2024-02-22 RX ADMIN — FAMOTIDINE 20 MG: 20 TABLET, FILM COATED ORAL at 09:26

## 2024-02-22 RX ADMIN — SODIUM CHLORIDE, PRESERVATIVE FREE 10 ML: 5 INJECTION INTRAVENOUS at 20:15

## 2024-02-22 RX ADMIN — ALBUTEROL SULFATE 2.5 MG: 2.5 SOLUTION RESPIRATORY (INHALATION) at 08:24

## 2024-02-22 RX ADMIN — AMIODARONE HYDROCHLORIDE 200 MG: 200 TABLET ORAL at 09:26

## 2024-02-22 RX ADMIN — ASPIRIN 81 MG: 81 TABLET, COATED ORAL at 09:26

## 2024-02-22 RX ADMIN — ALBUTEROL SULFATE 2.5 MG: 2.5 SOLUTION RESPIRATORY (INHALATION) at 12:18

## 2024-02-22 RX ADMIN — AMIODARONE HYDROCHLORIDE 400 MG: 200 TABLET ORAL at 20:14

## 2024-02-22 RX ADMIN — ATORVASTATIN CALCIUM 40 MG: 40 TABLET, FILM COATED ORAL at 20:15

## 2024-02-22 ASSESSMENT — PAIN SCALES - GENERAL
PAINLEVEL_OUTOF10: 0

## 2024-02-22 NOTE — INTERDISCIPLINARY ROUNDS
Multi-D Rounds/Checklist (leapfrog):  Lines: can any be removed?: Dang  Urinary Catheter 02/09/24 Dang (Active)     PICC Double Lumen 02/20/24 Right Basilic (Active)     DVT Prophylaxis: Ordered  Vent: N/A  Nutrition Ordered/appropriate: Ordered  Can antibiotics or other drugs be stopped? Yes/End Date set Yes  Inpat Anti-Infectives (From admission, onward)       Start     Ordered Stop    02/10/24 2000  ceFEPIme (MAXIPIME) 2,000 mg in sodium chloride 0.9 % 100 mL IVPB (mini-bag)  2,000 mg,   IntraVENous,   EVERY 8 HOURS        See Kimmy for full Linked Orders Report.    02/10/24 1100 02/17/24 1959                      Consults needed: None  A: Is pain control adequate? (has PRNs? Stop drip?) Yes  B: Sedation break and SBT? no  C: Is sedation choice appropriate? N/A  D: Delirium/CAM-ICU? No  E: Mobility goals/appropriateness? Yes  F: Family update and plan? son is surrogate decision maker and is being updated daily by primary attending and nursing staff.    Chen Rm, APRN - CNP

## 2024-02-22 NOTE — PLAN OF CARE
Problem: Chronic Conditions and Co-morbidities  Goal: Patient's chronic conditions and co-morbidity symptoms are monitored and maintained or improved  Outcome: Progressing  Flowsheets  Taken 2/22/2024 0701 by Sadaf Martinez RN  Care Plan - Patient's Chronic Conditions and Co-Morbidity Symptoms are Monitored and Maintained or Improved: Monitor and assess patient's chronic conditions and comorbid symptoms for stability, deterioration, or improvement  Taken 2/21/2024 1915 by Vita Prieto RN  Care Plan - Patient's Chronic Conditions and Co-Morbidity Symptoms are Monitored and Maintained or Improved: Monitor and assess patient's chronic conditions and comorbid symptoms for stability, deterioration, or improvement     Problem: Safety - Adult  Goal: Free from fall injury  Outcome: Progressing  Flowsheets (Taken 2/22/2024 0701)  Free From Fall Injury: Instruct family/caregiver on patient safety     Problem: Pain  Goal: Verbalizes/displays adequate comfort level or baseline comfort level  Outcome: Progressing  Flowsheets  Taken 2/22/2024 0701 by Sadaf Martinez RN  Verbalizes/displays adequate comfort level or baseline comfort level: Encourage patient to monitor pain and request assistance  Taken 2/21/2024 1915 by Vita Prieto RN  Verbalizes/displays adequate comfort level or baseline comfort level: Encourage patient to monitor pain and request assistance     Problem: Discharge Planning  Goal: Discharge to home or other facility with appropriate resources  Outcome: Progressing  Flowsheets  Taken 2/22/2024 0701 by Sadaf Martinez RN  Discharge to home or other facility with appropriate resources: Identify barriers to discharge with patient and caregiver  Taken 2/21/2024 1915 by Vita Prieto RN  Discharge to home or other facility with appropriate resources: Identify barriers to discharge with patient and caregiver     Problem: Skin/Tissue Integrity  Goal: Absence of new skin breakdown  Description: 1.  Monitor for areas of

## 2024-02-23 ENCOUNTER — APPOINTMENT (OUTPATIENT)
Dept: GENERAL RADIOLOGY | Age: 84
DRG: 001 | End: 2024-02-23
Attending: THORACIC SURGERY (CARDIOTHORACIC VASCULAR SURGERY)
Payer: MEDICARE

## 2024-02-23 PROBLEM — Z95.1 S/P CABG X 3: Status: ACTIVE | Noted: 2024-02-06

## 2024-02-23 PROBLEM — I50.22 CHRONIC SYSTOLIC CONGESTIVE HEART FAILURE (HCC): Status: ACTIVE | Noted: 2024-01-24

## 2024-02-23 LAB
ANION GAP SERPL CALC-SCNC: 0 MMOL/L (ref 2–11)
BUN SERPL-MCNC: 16 MG/DL (ref 8–23)
CALCIUM SERPL-MCNC: 8.5 MG/DL (ref 8.3–10.4)
CHLORIDE SERPL-SCNC: 99 MMOL/L (ref 103–113)
CO2 SERPL-SCNC: 40 MMOL/L (ref 21–32)
CREAT SERPL-MCNC: 0.7 MG/DL (ref 0.8–1.5)
ERYTHROCYTE [DISTWIDTH] IN BLOOD BY AUTOMATED COUNT: 17 % (ref 11.9–14.6)
GLUCOSE BLD STRIP.AUTO-MCNC: 110 MG/DL (ref 65–100)
GLUCOSE BLD STRIP.AUTO-MCNC: 119 MG/DL (ref 65–100)
GLUCOSE BLD STRIP.AUTO-MCNC: 125 MG/DL (ref 65–100)
GLUCOSE BLD STRIP.AUTO-MCNC: 149 MG/DL (ref 65–100)
GLUCOSE SERPL-MCNC: 113 MG/DL (ref 65–100)
HCT VFR BLD AUTO: 31.9 % (ref 41.1–50.3)
HGB BLD-MCNC: 9.5 G/DL (ref 13.6–17.2)
MAGNESIUM SERPL-MCNC: 2.2 MG/DL (ref 1.8–2.4)
MCH RBC QN AUTO: 28.2 PG (ref 26.1–32.9)
MCHC RBC AUTO-ENTMCNC: 29.8 G/DL (ref 31.4–35)
MCV RBC AUTO: 94.7 FL (ref 82–102)
NRBC # BLD: 0 K/UL (ref 0–0.2)
PLATELET # BLD AUTO: 378 K/UL (ref 150–450)
PMV BLD AUTO: 10.3 FL (ref 9.4–12.3)
POTASSIUM SERPL-SCNC: 4.3 MMOL/L (ref 3.5–5.1)
RBC # BLD AUTO: 3.37 M/UL (ref 4.23–5.6)
SERVICE CMNT-IMP: ABNORMAL
SODIUM SERPL-SCNC: 139 MMOL/L (ref 136–146)
WBC # BLD AUTO: 15.1 K/UL (ref 4.3–11.1)

## 2024-02-23 PROCEDURE — 94761 N-INVAS EAR/PLS OXIMETRY MLT: CPT

## 2024-02-23 PROCEDURE — 6360000002 HC RX W HCPCS: Performed by: PHYSICIAN ASSISTANT

## 2024-02-23 PROCEDURE — 99024 POSTOP FOLLOW-UP VISIT: CPT | Performed by: PHYSICIAN ASSISTANT

## 2024-02-23 PROCEDURE — 71045 X-RAY EXAM CHEST 1 VIEW: CPT

## 2024-02-23 PROCEDURE — 6360000002 HC RX W HCPCS: Performed by: THORACIC SURGERY (CARDIOTHORACIC VASCULAR SURGERY)

## 2024-02-23 PROCEDURE — 2700000000 HC OXYGEN THERAPY PER DAY

## 2024-02-23 PROCEDURE — 6370000000 HC RX 637 (ALT 250 FOR IP): Performed by: PHYSICIAN ASSISTANT

## 2024-02-23 PROCEDURE — 36592 COLLECT BLOOD FROM PICC: CPT

## 2024-02-23 PROCEDURE — 80048 BASIC METABOLIC PNL TOTAL CA: CPT

## 2024-02-23 PROCEDURE — 94660 CPAP INITIATION&MGMT: CPT

## 2024-02-23 PROCEDURE — 82962 GLUCOSE BLOOD TEST: CPT

## 2024-02-23 PROCEDURE — 2580000003 HC RX 258: Performed by: PHYSICIAN ASSISTANT

## 2024-02-23 PROCEDURE — 99232 SBSQ HOSP IP/OBS MODERATE 35: CPT | Performed by: INTERNAL MEDICINE

## 2024-02-23 PROCEDURE — 2140000001 HC CVICU INTERMEDIATE R&B

## 2024-02-23 PROCEDURE — 85027 COMPLETE CBC AUTOMATED: CPT

## 2024-02-23 PROCEDURE — 92526 ORAL FUNCTION THERAPY: CPT

## 2024-02-23 PROCEDURE — 97112 NEUROMUSCULAR REEDUCATION: CPT

## 2024-02-23 PROCEDURE — 94640 AIRWAY INHALATION TREATMENT: CPT

## 2024-02-23 PROCEDURE — 97535 SELF CARE MNGMENT TRAINING: CPT

## 2024-02-23 PROCEDURE — 83735 ASSAY OF MAGNESIUM: CPT

## 2024-02-23 PROCEDURE — 92523 SPEECH SOUND LANG COMPREHEN: CPT

## 2024-02-23 PROCEDURE — 76604 US EXAM CHEST: CPT | Performed by: INTERNAL MEDICINE

## 2024-02-23 PROCEDURE — 36415 COLL VENOUS BLD VENIPUNCTURE: CPT

## 2024-02-23 PROCEDURE — 6370000000 HC RX 637 (ALT 250 FOR IP): Performed by: THORACIC SURGERY (CARDIOTHORACIC VASCULAR SURGERY)

## 2024-02-23 PROCEDURE — 97530 THERAPEUTIC ACTIVITIES: CPT

## 2024-02-23 PROCEDURE — 76937 US GUIDE VASCULAR ACCESS: CPT

## 2024-02-23 RX ORDER — FUROSEMIDE 10 MG/ML
40 INJECTION INTRAMUSCULAR; INTRAVENOUS DAILY
Status: DISCONTINUED | OUTPATIENT
Start: 2024-02-23 | End: 2024-02-27 | Stop reason: HOSPADM

## 2024-02-23 RX ADMIN — CARVEDILOL 3.12 MG: 3.12 TABLET, FILM COATED ORAL at 16:31

## 2024-02-23 RX ADMIN — FUROSEMIDE 40 MG: 40 TABLET ORAL at 08:28

## 2024-02-23 RX ADMIN — ALBUTEROL SULFATE 2.5 MG: 2.5 SOLUTION RESPIRATORY (INHALATION) at 07:54

## 2024-02-23 RX ADMIN — SODIUM CHLORIDE, PRESERVATIVE FREE 10 ML: 5 INJECTION INTRAVENOUS at 21:20

## 2024-02-23 RX ADMIN — ENOXAPARIN SODIUM 100 MG: 100 INJECTION SUBCUTANEOUS at 08:28

## 2024-02-23 RX ADMIN — INSULIN LISPRO 2 UNITS: 100 INJECTION, SOLUTION INTRAVENOUS; SUBCUTANEOUS at 16:32

## 2024-02-23 RX ADMIN — CEFAZOLIN SODIUM 2000 MG: 100 INJECTION, POWDER, LYOPHILIZED, FOR SOLUTION INTRAVENOUS at 08:33

## 2024-02-23 RX ADMIN — FUROSEMIDE 40 MG: 10 INJECTION, SOLUTION INTRAMUSCULAR; INTRAVENOUS at 08:37

## 2024-02-23 RX ADMIN — FAMOTIDINE 20 MG: 20 TABLET, FILM COATED ORAL at 08:27

## 2024-02-23 RX ADMIN — CEFAZOLIN SODIUM 2000 MG: 100 INJECTION, POWDER, LYOPHILIZED, FOR SOLUTION INTRAVENOUS at 00:37

## 2024-02-23 RX ADMIN — CARVEDILOL 3.12 MG: 3.12 TABLET, FILM COATED ORAL at 08:28

## 2024-02-23 RX ADMIN — ASPIRIN 81 MG: 81 TABLET, COATED ORAL at 08:28

## 2024-02-23 RX ADMIN — Medication 6 MG: at 21:18

## 2024-02-23 RX ADMIN — Medication 1 AMPULE: at 08:29

## 2024-02-23 RX ADMIN — AMIODARONE HYDROCHLORIDE 400 MG: 200 TABLET ORAL at 08:27

## 2024-02-23 RX ADMIN — AMIODARONE HYDROCHLORIDE 400 MG: 200 TABLET ORAL at 21:18

## 2024-02-23 RX ADMIN — CEFAZOLIN SODIUM 2000 MG: 100 INJECTION, POWDER, LYOPHILIZED, FOR SOLUTION INTRAVENOUS at 16:36

## 2024-02-23 RX ADMIN — ATORVASTATIN CALCIUM 40 MG: 40 TABLET, FILM COATED ORAL at 21:18

## 2024-02-23 RX ADMIN — ENOXAPARIN SODIUM 100 MG: 100 INJECTION SUBCUTANEOUS at 21:18

## 2024-02-23 RX ADMIN — FAMOTIDINE 20 MG: 20 TABLET, FILM COATED ORAL at 21:18

## 2024-02-23 RX ADMIN — ALBUTEROL SULFATE 2.5 MG: 2.5 SOLUTION RESPIRATORY (INHALATION) at 12:11

## 2024-02-23 RX ADMIN — SODIUM CHLORIDE, PRESERVATIVE FREE 10 ML: 5 INJECTION INTRAVENOUS at 08:39

## 2024-02-23 RX ADMIN — TRAZODONE HYDROCHLORIDE 50 MG: 50 TABLET ORAL at 22:24

## 2024-02-23 RX ADMIN — ALBUTEROL SULFATE 2.5 MG: 2.5 SOLUTION RESPIRATORY (INHALATION) at 21:36

## 2024-02-23 RX ADMIN — ALBUTEROL SULFATE 2.5 MG: 2.5 SOLUTION RESPIRATORY (INHALATION) at 15:19

## 2024-02-23 RX ADMIN — POTASSIUM CHLORIDE 10 MEQ: 750 TABLET, EXTENDED RELEASE ORAL at 08:28

## 2024-02-23 ASSESSMENT — PAIN SCALES - GENERAL
PAINLEVEL_OUTOF10: 0

## 2024-02-23 NOTE — ICUWATCH
RRT Clinical Rounding Nurse Update    Vitals:    02/23/24 0259 02/23/24 0705 02/23/24 0749 02/23/24 0754   BP: (!) 132/56  129/63    Pulse: 79 77 77 78   Resp: 24 20 20   Temp: 97.7 °F (36.5 °C)  98.4 °F (36.9 °C)    TempSrc: Oral  Oral    SpO2: 98% 98% 97% 92%   Weight:       Height:            DETERIORATION INDEX SCORE: 56    ASSESSMENT:  Previous outreach assessment was reviewed. There have been no significant changes since previous assessment. Patient alert and oriented x 2-3. Up in chair. Respirations unlabored on 2L NC. Denies CP/SOB. Lung sounds diminished bilaterally. Noted LUE +3 edema. Discussed with primary RN.      PLAN:  Will follow per RRT Clinical Rounding Program protocol.    Gianni Irene RN  [facility]: [phone]  [facility]: [phone]

## 2024-02-23 NOTE — ICUWATCH
RRT Clinical Rounding Nurse Progress Report      SUBJECTIVE: Patient assessed secondary to transfer from critical care.      Vitals:    02/22/24 2014 02/22/24 2228 02/22/24 2300 02/23/24 0259   BP:  (!) 145/87  (!) 132/56   Pulse: 79 81  79   Resp:  20  24   Temp:  98.7 °F (37.1 °C)  97.7 °F (36.5 °C)   TempSrc:  Axillary  Oral   SpO2:  100% 98% 98%   Weight:       Height:            DETERIORATION INDEX SCORE: 58    ASSESSMENT:  Pt in bed resting quietly with primary RN at bedside. Oriented to self. Sternal site open to air, C/D/I.  Respirations even and unlabored, bilaterally coarse on 3L HFNC. O2 sat 98%. HR 77. Recent labs/notes/vitals reviewed and pt discussed with primary RN.    PLAN:  Will follow per RRT Clinical Rounding Program protocol.    Ana Woodruff RN  [facility]: [phone]  [facility]: [phone]

## 2024-02-23 NOTE — PROCEDURES
PROCEDURE:   CHEST ULTRASOUND       PRE-OP DIAGNOSIS:  L PLEURAL EFFUSION    POST-OP DIAGNOSIS:  L PLEURAL EFFUSION        A Turbo-M, Sonosite ultrasound with a 5-16 mHz probe was used to image the chest and localize the pleural effusion on the left  chest.    A small anechoic space was seen on the left consistent with an uncomplicated pleural effusion.Still effusion too small to tap, more atelectasis    Gabriella Almonte MD

## 2024-02-23 NOTE — ICUWATCH
RRT Clinical Rounding Nurse Update    Vitals:    02/23/24 1211 02/23/24 1225 02/23/24 1520 02/23/24 1544   BP:  (!) 116/56  120/62   Pulse: 74 75 78 78   Resp: 20 20 18 20   Temp:  97.7 °F (36.5 °C)  98.8 °F (37.1 °C)   TempSrc:  Oral  Oral   SpO2: 97% 96% 96% 97%   Weight:       Height:            DETERIORATION INDEX SCORE: 54    ASSESSMENT:  Previous outreach assessment was reviewed. There have been no significant changes since previous assessment. Patient up in recliner napping. Respirations unlabored. Sitter at bedside.    PLAN:  Will follow per RRT Clinical Rounding Program protocol.    Gianni Irene RN  [facility]: [phone]  [facility]: [phone]

## 2024-02-23 NOTE — ICUWATCH
RRT Clinical Rounding Nurse Update    Vitals:    02/22/24 2014 02/22/24 2228 02/22/24 2300 02/23/24 0259   BP:  (!) 145/87  (!) 132/56   Pulse: 79 81  79   Resp:  20  24   Temp:  98.7 °F (37.1 °C)  97.7 °F (36.5 °C)   TempSrc:  Axillary  Oral   SpO2:  100% 98% 98%   Weight:       Height:            DETERIORATION INDEX SCORE: 60    ASSESSMENT:  Previous outreach assessment was reviewed. There have been no significant changes since previous assessment.    PLAN:  Will follow per RRT Clinical Rounding Program protocol.    Ana Woodruff RN  [facility]: [phone]  [facility]: [phone]

## 2024-02-24 ENCOUNTER — APPOINTMENT (OUTPATIENT)
Dept: GENERAL RADIOLOGY | Age: 84
DRG: 001 | End: 2024-02-24
Attending: THORACIC SURGERY (CARDIOTHORACIC VASCULAR SURGERY)
Payer: MEDICARE

## 2024-02-24 LAB
GLUCOSE BLD STRIP.AUTO-MCNC: 105 MG/DL (ref 65–100)
GLUCOSE BLD STRIP.AUTO-MCNC: 112 MG/DL (ref 65–100)
GLUCOSE BLD STRIP.AUTO-MCNC: 124 MG/DL (ref 65–100)
GLUCOSE BLD STRIP.AUTO-MCNC: 127 MG/DL (ref 65–100)
MAGNESIUM SERPL-MCNC: 2.2 MG/DL (ref 1.8–2.4)
POTASSIUM SERPL-SCNC: 4.2 MMOL/L (ref 3.5–5.1)
SERVICE CMNT-IMP: ABNORMAL

## 2024-02-24 PROCEDURE — 94761 N-INVAS EAR/PLS OXIMETRY MLT: CPT

## 2024-02-24 PROCEDURE — 94640 AIRWAY INHALATION TREATMENT: CPT

## 2024-02-24 PROCEDURE — 2580000003 HC RX 258: Performed by: INTERNAL MEDICINE

## 2024-02-24 PROCEDURE — 6370000000 HC RX 637 (ALT 250 FOR IP): Performed by: PHYSICIAN ASSISTANT

## 2024-02-24 PROCEDURE — 6360000002 HC RX W HCPCS: Performed by: PHYSICIAN ASSISTANT

## 2024-02-24 PROCEDURE — 99232 SBSQ HOSP IP/OBS MODERATE 35: CPT | Performed by: INTERNAL MEDICINE

## 2024-02-24 PROCEDURE — 71045 X-RAY EXAM CHEST 1 VIEW: CPT

## 2024-02-24 PROCEDURE — 84132 ASSAY OF SERUM POTASSIUM: CPT

## 2024-02-24 PROCEDURE — 2700000000 HC OXYGEN THERAPY PER DAY

## 2024-02-24 PROCEDURE — 6370000000 HC RX 637 (ALT 250 FOR IP): Performed by: INTERNAL MEDICINE

## 2024-02-24 PROCEDURE — 83735 ASSAY OF MAGNESIUM: CPT

## 2024-02-24 PROCEDURE — 82962 GLUCOSE BLOOD TEST: CPT

## 2024-02-24 PROCEDURE — 2580000003 HC RX 258: Performed by: PHYSICIAN ASSISTANT

## 2024-02-24 PROCEDURE — 6370000000 HC RX 637 (ALT 250 FOR IP)

## 2024-02-24 PROCEDURE — 6360000002 HC RX W HCPCS: Performed by: NURSE PRACTITIONER

## 2024-02-24 PROCEDURE — 6360000002 HC RX W HCPCS: Performed by: THORACIC SURGERY (CARDIOTHORACIC VASCULAR SURGERY)

## 2024-02-24 PROCEDURE — 6370000000 HC RX 637 (ALT 250 FOR IP): Performed by: THORACIC SURGERY (CARDIOTHORACIC VASCULAR SURGERY)

## 2024-02-24 PROCEDURE — 2140000001 HC CVICU INTERMEDIATE R&B

## 2024-02-24 PROCEDURE — 97530 THERAPEUTIC ACTIVITIES: CPT

## 2024-02-24 PROCEDURE — 36415 COLL VENOUS BLD VENIPUNCTURE: CPT

## 2024-02-24 RX ORDER — SODIUM CHLORIDE FOR INHALATION 3 %
4 VIAL, NEBULIZER (ML) INHALATION
Status: DISCONTINUED | OUTPATIENT
Start: 2024-02-24 | End: 2024-02-27 | Stop reason: HOSPADM

## 2024-02-24 RX ORDER — GUAIFENESIN 600 MG/1
1200 TABLET, EXTENDED RELEASE ORAL 2 TIMES DAILY
Status: DISCONTINUED | OUTPATIENT
Start: 2024-02-24 | End: 2024-02-27 | Stop reason: HOSPADM

## 2024-02-24 RX ORDER — SODIUM CHLORIDE FOR INHALATION 3 %
4 VIAL, NEBULIZER (ML) INHALATION 2 TIMES DAILY
Status: DISCONTINUED | OUTPATIENT
Start: 2024-02-24 | End: 2024-02-24

## 2024-02-24 RX ADMIN — ALBUTEROL SULFATE 2.5 MG: 2.5 SOLUTION RESPIRATORY (INHALATION) at 20:44

## 2024-02-24 RX ADMIN — Medication 1 AMPULE: at 09:15

## 2024-02-24 RX ADMIN — CEFAZOLIN SODIUM 2000 MG: 100 INJECTION, POWDER, LYOPHILIZED, FOR SOLUTION INTRAVENOUS at 17:59

## 2024-02-24 RX ADMIN — ASPIRIN 81 MG: 81 TABLET, COATED ORAL at 09:15

## 2024-02-24 RX ADMIN — FAMOTIDINE 20 MG: 20 TABLET, FILM COATED ORAL at 09:15

## 2024-02-24 RX ADMIN — SACUBITRIL AND VALSARTAN 1 TABLET: 24; 26 TABLET, FILM COATED ORAL at 10:50

## 2024-02-24 RX ADMIN — ATORVASTATIN CALCIUM 40 MG: 40 TABLET, FILM COATED ORAL at 20:01

## 2024-02-24 RX ADMIN — CARVEDILOL 3.12 MG: 3.12 TABLET, FILM COATED ORAL at 09:15

## 2024-02-24 RX ADMIN — DOCUSATE SODIUM 50 MG AND SENNOSIDES 8.6 MG 1 TABLET: 8.6; 5 TABLET, FILM COATED ORAL at 09:15

## 2024-02-24 RX ADMIN — ALBUTEROL SULFATE 2.5 MG: 2.5 SOLUTION RESPIRATORY (INHALATION) at 15:35

## 2024-02-24 RX ADMIN — SACUBITRIL AND VALSARTAN 1 TABLET: 24; 26 TABLET, FILM COATED ORAL at 20:01

## 2024-02-24 RX ADMIN — CEFAZOLIN SODIUM 2000 MG: 100 INJECTION, POWDER, LYOPHILIZED, FOR SOLUTION INTRAVENOUS at 09:13

## 2024-02-24 RX ADMIN — Medication 6 MG: at 20:01

## 2024-02-24 RX ADMIN — SODIUM CHLORIDE SOLN NEBU 3% 4 ML: 3 NEBU SOLN at 20:44

## 2024-02-24 RX ADMIN — POTASSIUM CHLORIDE 10 MEQ: 750 TABLET, EXTENDED RELEASE ORAL at 09:15

## 2024-02-24 RX ADMIN — ENOXAPARIN SODIUM 100 MG: 100 INJECTION SUBCUTANEOUS at 09:16

## 2024-02-24 RX ADMIN — FAMOTIDINE 20 MG: 20 TABLET, FILM COATED ORAL at 20:01

## 2024-02-24 RX ADMIN — ENOXAPARIN SODIUM 100 MG: 100 INJECTION SUBCUTANEOUS at 20:01

## 2024-02-24 RX ADMIN — ALBUTEROL SULFATE 2.5 MG: 2.5 SOLUTION RESPIRATORY (INHALATION) at 08:37

## 2024-02-24 RX ADMIN — Medication 1 AMPULE: at 20:15

## 2024-02-24 RX ADMIN — CEFAZOLIN SODIUM 2000 MG: 100 INJECTION, POWDER, LYOPHILIZED, FOR SOLUTION INTRAVENOUS at 00:41

## 2024-02-24 RX ADMIN — SODIUM CHLORIDE, PRESERVATIVE FREE 10 ML: 5 INJECTION INTRAVENOUS at 20:15

## 2024-02-24 RX ADMIN — GUAIFENESIN 1200 MG: 600 TABLET ORAL at 20:01

## 2024-02-24 RX ADMIN — FUROSEMIDE 40 MG: 10 INJECTION, SOLUTION INTRAMUSCULAR; INTRAVENOUS at 09:15

## 2024-02-24 RX ADMIN — ACETAMINOPHEN 650 MG: 325 TABLET ORAL at 09:13

## 2024-02-24 RX ADMIN — SODIUM CHLORIDE, PRESERVATIVE FREE 10 ML: 5 INJECTION INTRAVENOUS at 09:16

## 2024-02-24 RX ADMIN — TRAZODONE HYDROCHLORIDE 50 MG: 50 TABLET ORAL at 20:01

## 2024-02-24 RX ADMIN — ALBUTEROL SULFATE 2.5 MG: 2.5 SOLUTION RESPIRATORY (INHALATION) at 11:49

## 2024-02-24 RX ADMIN — AMIODARONE HYDROCHLORIDE 400 MG: 200 TABLET ORAL at 09:15

## 2024-02-24 RX ADMIN — AMIODARONE HYDROCHLORIDE 400 MG: 200 TABLET ORAL at 20:01

## 2024-02-24 ASSESSMENT — PAIN DESCRIPTION - LOCATION: LOCATION: INCISION

## 2024-02-24 ASSESSMENT — PAIN SCALES - GENERAL
PAINLEVEL_OUTOF10: 0
PAINLEVEL_OUTOF10: 0
PAINLEVEL_OUTOF10: 3
PAINLEVEL_OUTOF10: 0

## 2024-02-24 ASSESSMENT — PAIN - FUNCTIONAL ASSESSMENT: PAIN_FUNCTIONAL_ASSESSMENT: ACTIVITIES ARE NOT PREVENTED

## 2024-02-24 ASSESSMENT — PAIN DESCRIPTION - ORIENTATION: ORIENTATION: MID

## 2024-02-24 ASSESSMENT — PAIN DESCRIPTION - DESCRIPTORS: DESCRIPTORS: ACHING

## 2024-02-24 NOTE — ICUWATCH
RRT Clinical Rounding Nurse Update    Vitals:    02/24/24 0043 02/24/24 0044 02/24/24 0045 02/24/24 0359   BP:   111/60 111/60   Pulse: 76 77 78 73   Resp:    18   Temp:   98.1 °F (36.7 °C) 98.3 °F (36.8 °C)   TempSrc:   Temporal Temporal   SpO2: 91%  95% 96%   Weight:       Height:            DETERIORATION INDEX SCORE: 50    ASSESSMENT:  Previous outreach assessment was reviewed. There have been no significant changes since previous assessment. Pt A&O x2 with sitter at bedside. )2 sat 99% on 2L NC.    PLAN:  Will discharge from RRT Clinical Rounding Program per protocol. Please call if needed.    Selin Lincoln RN  [facility]: [phone]  [facility]: [phone]

## 2024-02-25 LAB
GLUCOSE BLD STRIP.AUTO-MCNC: 108 MG/DL (ref 65–100)
MAGNESIUM SERPL-MCNC: 2.2 MG/DL (ref 1.8–2.4)
POTASSIUM SERPL-SCNC: 4.3 MMOL/L (ref 3.5–5.1)
SERVICE CMNT-IMP: ABNORMAL

## 2024-02-25 PROCEDURE — 2580000003 HC RX 258: Performed by: PHYSICIAN ASSISTANT

## 2024-02-25 PROCEDURE — 94761 N-INVAS EAR/PLS OXIMETRY MLT: CPT

## 2024-02-25 PROCEDURE — 36415 COLL VENOUS BLD VENIPUNCTURE: CPT

## 2024-02-25 PROCEDURE — 2140000001 HC CVICU INTERMEDIATE R&B

## 2024-02-25 PROCEDURE — 84132 ASSAY OF SERUM POTASSIUM: CPT

## 2024-02-25 PROCEDURE — 6360000002 HC RX W HCPCS: Performed by: PHYSICIAN ASSISTANT

## 2024-02-25 PROCEDURE — 82962 GLUCOSE BLOOD TEST: CPT

## 2024-02-25 PROCEDURE — 6370000000 HC RX 637 (ALT 250 FOR IP): Performed by: THORACIC SURGERY (CARDIOTHORACIC VASCULAR SURGERY)

## 2024-02-25 PROCEDURE — 6370000000 HC RX 637 (ALT 250 FOR IP): Performed by: PHYSICIAN ASSISTANT

## 2024-02-25 PROCEDURE — 99232 SBSQ HOSP IP/OBS MODERATE 35: CPT | Performed by: INTERNAL MEDICINE

## 2024-02-25 PROCEDURE — 6360000002 HC RX W HCPCS: Performed by: THORACIC SURGERY (CARDIOTHORACIC VASCULAR SURGERY)

## 2024-02-25 PROCEDURE — 94640 AIRWAY INHALATION TREATMENT: CPT

## 2024-02-25 PROCEDURE — 2580000003 HC RX 258: Performed by: INTERNAL MEDICINE

## 2024-02-25 PROCEDURE — 97164 PT RE-EVAL EST PLAN CARE: CPT

## 2024-02-25 PROCEDURE — 97530 THERAPEUTIC ACTIVITIES: CPT

## 2024-02-25 PROCEDURE — 6360000002 HC RX W HCPCS: Performed by: NURSE PRACTITIONER

## 2024-02-25 PROCEDURE — 6370000000 HC RX 637 (ALT 250 FOR IP): Performed by: INTERNAL MEDICINE

## 2024-02-25 PROCEDURE — 6370000000 HC RX 637 (ALT 250 FOR IP)

## 2024-02-25 PROCEDURE — 2700000000 HC OXYGEN THERAPY PER DAY

## 2024-02-25 PROCEDURE — 83735 ASSAY OF MAGNESIUM: CPT

## 2024-02-25 RX ADMIN — SODIUM CHLORIDE SOLN NEBU 3% 4 ML: 3 NEBU SOLN at 08:52

## 2024-02-25 RX ADMIN — SACUBITRIL AND VALSARTAN 1 TABLET: 24; 26 TABLET, FILM COATED ORAL at 20:12

## 2024-02-25 RX ADMIN — SODIUM CHLORIDE SOLN NEBU 3% 4 ML: 3 NEBU SOLN at 20:19

## 2024-02-25 RX ADMIN — ENOXAPARIN SODIUM 100 MG: 100 INJECTION SUBCUTANEOUS at 08:41

## 2024-02-25 RX ADMIN — ENOXAPARIN SODIUM 100 MG: 100 INJECTION SUBCUTANEOUS at 20:12

## 2024-02-25 RX ADMIN — POTASSIUM CHLORIDE 10 MEQ: 750 TABLET, EXTENDED RELEASE ORAL at 08:41

## 2024-02-25 RX ADMIN — CEFAZOLIN SODIUM 2000 MG: 100 INJECTION, POWDER, LYOPHILIZED, FOR SOLUTION INTRAVENOUS at 01:11

## 2024-02-25 RX ADMIN — ALBUTEROL SULFATE 2.5 MG: 2.5 SOLUTION RESPIRATORY (INHALATION) at 12:11

## 2024-02-25 RX ADMIN — CARVEDILOL 3.12 MG: 3.12 TABLET, FILM COATED ORAL at 08:41

## 2024-02-25 RX ADMIN — SODIUM CHLORIDE, PRESERVATIVE FREE 10 ML: 5 INJECTION INTRAVENOUS at 20:24

## 2024-02-25 RX ADMIN — ATORVASTATIN CALCIUM 40 MG: 40 TABLET, FILM COATED ORAL at 20:11

## 2024-02-25 RX ADMIN — ALBUTEROL SULFATE 2.5 MG: 2.5 SOLUTION RESPIRATORY (INHALATION) at 20:19

## 2024-02-25 RX ADMIN — GUAIFENESIN 1200 MG: 600 TABLET ORAL at 20:12

## 2024-02-25 RX ADMIN — FUROSEMIDE 40 MG: 10 INJECTION, SOLUTION INTRAMUSCULAR; INTRAVENOUS at 08:44

## 2024-02-25 RX ADMIN — ALBUTEROL SULFATE 2.5 MG: 2.5 SOLUTION RESPIRATORY (INHALATION) at 08:52

## 2024-02-25 RX ADMIN — ACETAMINOPHEN 650 MG: 325 TABLET ORAL at 17:50

## 2024-02-25 RX ADMIN — GUAIFENESIN 1200 MG: 600 TABLET ORAL at 08:41

## 2024-02-25 RX ADMIN — ASPIRIN 81 MG: 81 TABLET, COATED ORAL at 08:41

## 2024-02-25 RX ADMIN — AMIODARONE HYDROCHLORIDE 400 MG: 200 TABLET ORAL at 20:11

## 2024-02-25 RX ADMIN — Medication 1 AMPULE: at 20:23

## 2024-02-25 RX ADMIN — SACUBITRIL AND VALSARTAN 1 TABLET: 24; 26 TABLET, FILM COATED ORAL at 08:41

## 2024-02-25 RX ADMIN — Medication 1 AMPULE: at 08:42

## 2024-02-25 RX ADMIN — FAMOTIDINE 20 MG: 20 TABLET, FILM COATED ORAL at 08:41

## 2024-02-25 RX ADMIN — CEFAZOLIN SODIUM 2000 MG: 100 INJECTION, POWDER, LYOPHILIZED, FOR SOLUTION INTRAVENOUS at 08:42

## 2024-02-25 RX ADMIN — Medication 6 MG: at 20:11

## 2024-02-25 RX ADMIN — AMIODARONE HYDROCHLORIDE 400 MG: 200 TABLET ORAL at 08:41

## 2024-02-25 RX ADMIN — DOCUSATE SODIUM 50 MG AND SENNOSIDES 8.6 MG 1 TABLET: 8.6; 5 TABLET, FILM COATED ORAL at 08:41

## 2024-02-25 RX ADMIN — CEFAZOLIN SODIUM 2000 MG: 100 INJECTION, POWDER, LYOPHILIZED, FOR SOLUTION INTRAVENOUS at 17:29

## 2024-02-25 RX ADMIN — TRAZODONE HYDROCHLORIDE 50 MG: 50 TABLET ORAL at 20:11

## 2024-02-25 RX ADMIN — FAMOTIDINE 20 MG: 20 TABLET, FILM COATED ORAL at 20:12

## 2024-02-25 RX ADMIN — SODIUM CHLORIDE, PRESERVATIVE FREE 10 ML: 5 INJECTION INTRAVENOUS at 08:45

## 2024-02-25 ASSESSMENT — PAIN DESCRIPTION - ORIENTATION: ORIENTATION: MID

## 2024-02-25 ASSESSMENT — PAIN DESCRIPTION - LOCATION: LOCATION: INCISION

## 2024-02-25 ASSESSMENT — PAIN DESCRIPTION - DESCRIPTORS: DESCRIPTORS: ACHING

## 2024-02-25 ASSESSMENT — PAIN SCALES - GENERAL
PAINLEVEL_OUTOF10: 3
PAINLEVEL_OUTOF10: 0

## 2024-02-25 ASSESSMENT — PAIN - FUNCTIONAL ASSESSMENT: PAIN_FUNCTIONAL_ASSESSMENT: ACTIVITIES ARE NOT PREVENTED

## 2024-02-26 ENCOUNTER — APPOINTMENT (OUTPATIENT)
Dept: NON INVASIVE DIAGNOSTICS | Age: 84
DRG: 001 | End: 2024-02-26
Attending: THORACIC SURGERY (CARDIOTHORACIC VASCULAR SURGERY)
Payer: MEDICARE

## 2024-02-26 LAB
ANION GAP SERPL CALC-SCNC: 2 MMOL/L (ref 2–11)
BUN SERPL-MCNC: 13 MG/DL (ref 8–23)
CALCIUM SERPL-MCNC: 8.3 MG/DL (ref 8.3–10.4)
CHLORIDE SERPL-SCNC: 101 MMOL/L (ref 103–113)
CO2 SERPL-SCNC: 37 MMOL/L (ref 21–32)
CREAT SERPL-MCNC: 0.8 MG/DL (ref 0.8–1.5)
ECHO BSA: 2.3 M2
ECHO LV EDV A2C: 164 ML
ECHO LV EDV A4C: 224 ML
ECHO LV EDV INDEX A4C: 104 ML/M2
ECHO LV EDV NDEX A2C: 76 ML/M2
ECHO LV EJECTION FRACTION A2C: 38 %
ECHO LV EJECTION FRACTION A4C: 34 %
ECHO LV EJECTION FRACTION BIPLANE: 36 % (ref 55–100)
ECHO LV ESV A2C: 101 ML
ECHO LV ESV A4C: 147 ML
ECHO LV ESV INDEX A2C: 47 ML/M2
ECHO LV ESV INDEX A4C: 68 ML/M2
ECHO LV FRACTIONAL SHORTENING: 21 % (ref 28–44)
ECHO LV INTERNAL DIMENSION DIASTOLE INDEX: 2.7 CM/M2
ECHO LV INTERNAL DIMENSION DIASTOLIC: 5.8 CM (ref 4.2–5.9)
ECHO LV INTERNAL DIMENSION SYSTOLIC INDEX: 2.14 CM/M2
ECHO LV INTERNAL DIMENSION SYSTOLIC: 4.6 CM
ECHO LV IVSD: 1.2 CM (ref 0.6–1)
ECHO LV MASS 2D: 280.4 G (ref 88–224)
ECHO LV MASS INDEX 2D: 130.4 G/M2 (ref 49–115)
ECHO LV POSTERIOR WALL DIASTOLIC: 1.1 CM (ref 0.6–1)
ECHO LV RELATIVE WALL THICKNESS RATIO: 0.38
ECHO RV INTERNAL DIMENSION: 3.1 CM
ERYTHROCYTE [DISTWIDTH] IN BLOOD BY AUTOMATED COUNT: 17.9 % (ref 11.9–14.6)
GLUCOSE SERPL-MCNC: 116 MG/DL (ref 65–100)
HCT VFR BLD AUTO: 32.7 % (ref 41.1–50.3)
HGB BLD-MCNC: 9.7 G/DL (ref 13.6–17.2)
MAGNESIUM SERPL-MCNC: 2.2 MG/DL (ref 1.8–2.4)
MCH RBC QN AUTO: 28.2 PG (ref 26.1–32.9)
MCHC RBC AUTO-ENTMCNC: 29.7 G/DL (ref 31.4–35)
MCV RBC AUTO: 95.1 FL (ref 82–102)
NRBC # BLD: 0 K/UL (ref 0–0.2)
PLATELET # BLD AUTO: 363 K/UL (ref 150–450)
PMV BLD AUTO: 10.3 FL (ref 9.4–12.3)
POTASSIUM SERPL-SCNC: 4.2 MMOL/L (ref 3.5–5.1)
RBC # BLD AUTO: 3.44 M/UL (ref 4.23–5.6)
SODIUM SERPL-SCNC: 140 MMOL/L (ref 136–146)
WBC # BLD AUTO: 7.3 K/UL (ref 4.3–11.1)

## 2024-02-26 PROCEDURE — 6370000000 HC RX 637 (ALT 250 FOR IP): Performed by: PHYSICIAN ASSISTANT

## 2024-02-26 PROCEDURE — 85027 COMPLETE CBC AUTOMATED: CPT

## 2024-02-26 PROCEDURE — C8924 2D TTE W OR W/O FOL W/CON,FU: HCPCS

## 2024-02-26 PROCEDURE — 92526 ORAL FUNCTION THERAPY: CPT

## 2024-02-26 PROCEDURE — 97530 THERAPEUTIC ACTIVITIES: CPT

## 2024-02-26 PROCEDURE — 36415 COLL VENOUS BLD VENIPUNCTURE: CPT

## 2024-02-26 PROCEDURE — 2580000003 HC RX 258: Performed by: PHYSICIAN ASSISTANT

## 2024-02-26 PROCEDURE — 6360000002 HC RX W HCPCS: Performed by: NURSE PRACTITIONER

## 2024-02-26 PROCEDURE — 99232 SBSQ HOSP IP/OBS MODERATE 35: CPT | Performed by: INTERNAL MEDICINE

## 2024-02-26 PROCEDURE — 6370000000 HC RX 637 (ALT 250 FOR IP): Performed by: INTERNAL MEDICINE

## 2024-02-26 PROCEDURE — 6370000000 HC RX 637 (ALT 250 FOR IP)

## 2024-02-26 PROCEDURE — 94760 N-INVAS EAR/PLS OXIMETRY 1: CPT

## 2024-02-26 PROCEDURE — 6360000004 HC RX CONTRAST MEDICATION: Performed by: THORACIC SURGERY (CARDIOTHORACIC VASCULAR SURGERY)

## 2024-02-26 PROCEDURE — 80048 BASIC METABOLIC PNL TOTAL CA: CPT

## 2024-02-26 PROCEDURE — 2140000001 HC CVICU INTERMEDIATE R&B

## 2024-02-26 PROCEDURE — 6360000002 HC RX W HCPCS: Performed by: THORACIC SURGERY (CARDIOTHORACIC VASCULAR SURGERY)

## 2024-02-26 PROCEDURE — 6360000002 HC RX W HCPCS: Performed by: PHYSICIAN ASSISTANT

## 2024-02-26 PROCEDURE — 94660 CPAP INITIATION&MGMT: CPT

## 2024-02-26 PROCEDURE — 2580000003 HC RX 258: Performed by: THORACIC SURGERY (CARDIOTHORACIC VASCULAR SURGERY)

## 2024-02-26 PROCEDURE — 94640 AIRWAY INHALATION TREATMENT: CPT

## 2024-02-26 PROCEDURE — 2580000003 HC RX 258: Performed by: INTERNAL MEDICINE

## 2024-02-26 PROCEDURE — 83735 ASSAY OF MAGNESIUM: CPT

## 2024-02-26 PROCEDURE — 6370000000 HC RX 637 (ALT 250 FOR IP): Performed by: THORACIC SURGERY (CARDIOTHORACIC VASCULAR SURGERY)

## 2024-02-26 PROCEDURE — 92507 TX SP LANG VOICE COMM INDIV: CPT

## 2024-02-26 PROCEDURE — 94761 N-INVAS EAR/PLS OXIMETRY MLT: CPT

## 2024-02-26 PROCEDURE — 2700000000 HC OXYGEN THERAPY PER DAY

## 2024-02-26 RX ORDER — SENNA AND DOCUSATE SODIUM 50; 8.6 MG/1; MG/1
1 TABLET, FILM COATED ORAL 2 TIMES DAILY PRN
Status: DISCONTINUED | OUTPATIENT
Start: 2024-02-26 | End: 2024-02-27 | Stop reason: HOSPADM

## 2024-02-26 RX ADMIN — ALBUTEROL SULFATE 2.5 MG: 2.5 SOLUTION RESPIRATORY (INHALATION) at 12:46

## 2024-02-26 RX ADMIN — AMIODARONE HYDROCHLORIDE 400 MG: 200 TABLET ORAL at 08:07

## 2024-02-26 RX ADMIN — GUAIFENESIN 1200 MG: 600 TABLET ORAL at 21:13

## 2024-02-26 RX ADMIN — SODIUM CHLORIDE, PRESERVATIVE FREE 0.3 ML: 5 INJECTION INTRAVENOUS at 15:14

## 2024-02-26 RX ADMIN — CEFAZOLIN SODIUM 2000 MG: 100 INJECTION, POWDER, LYOPHILIZED, FOR SOLUTION INTRAVENOUS at 16:22

## 2024-02-26 RX ADMIN — ATORVASTATIN CALCIUM 40 MG: 40 TABLET, FILM COATED ORAL at 21:13

## 2024-02-26 RX ADMIN — ENOXAPARIN SODIUM 100 MG: 100 INJECTION SUBCUTANEOUS at 08:10

## 2024-02-26 RX ADMIN — DOCUSATE SODIUM 50 MG AND SENNOSIDES 8.6 MG 1 TABLET: 8.6; 5 TABLET, FILM COATED ORAL at 08:06

## 2024-02-26 RX ADMIN — CARVEDILOL 3.12 MG: 3.12 TABLET, FILM COATED ORAL at 08:07

## 2024-02-26 RX ADMIN — SACUBITRIL AND VALSARTAN 1 TABLET: 24; 26 TABLET, FILM COATED ORAL at 21:13

## 2024-02-26 RX ADMIN — CEFAZOLIN SODIUM 2000 MG: 100 INJECTION, POWDER, LYOPHILIZED, FOR SOLUTION INTRAVENOUS at 08:19

## 2024-02-26 RX ADMIN — Medication 1 AMPULE: at 08:08

## 2024-02-26 RX ADMIN — SODIUM CHLORIDE SOLN NEBU 3% 4 ML: 3 NEBU SOLN at 07:39

## 2024-02-26 RX ADMIN — SACUBITRIL AND VALSARTAN 1 TABLET: 24; 26 TABLET, FILM COATED ORAL at 09:00

## 2024-02-26 RX ADMIN — ASPIRIN 81 MG: 81 TABLET, COATED ORAL at 08:07

## 2024-02-26 RX ADMIN — SODIUM CHLORIDE, PRESERVATIVE FREE 10 ML: 5 INJECTION INTRAVENOUS at 21:14

## 2024-02-26 RX ADMIN — GUAIFENESIN 1200 MG: 600 TABLET ORAL at 08:07

## 2024-02-26 RX ADMIN — FAMOTIDINE 20 MG: 20 TABLET, FILM COATED ORAL at 21:13

## 2024-02-26 RX ADMIN — TRAZODONE HYDROCHLORIDE 50 MG: 50 TABLET ORAL at 21:13

## 2024-02-26 RX ADMIN — FUROSEMIDE 40 MG: 10 INJECTION, SOLUTION INTRAMUSCULAR; INTRAVENOUS at 08:08

## 2024-02-26 RX ADMIN — CEFAZOLIN SODIUM 2000 MG: 100 INJECTION, POWDER, LYOPHILIZED, FOR SOLUTION INTRAVENOUS at 02:23

## 2024-02-26 RX ADMIN — Medication 6 MG: at 21:12

## 2024-02-26 RX ADMIN — ENOXAPARIN SODIUM 100 MG: 100 INJECTION SUBCUTANEOUS at 21:13

## 2024-02-26 RX ADMIN — ALBUTEROL SULFATE 2.5 MG: 2.5 SOLUTION RESPIRATORY (INHALATION) at 22:17

## 2024-02-26 RX ADMIN — Medication 1 AMPULE: at 21:12

## 2024-02-26 RX ADMIN — SODIUM CHLORIDE SOLN NEBU 3% 4 ML: 3 NEBU SOLN at 22:17

## 2024-02-26 RX ADMIN — CARVEDILOL 3.12 MG: 3.12 TABLET, FILM COATED ORAL at 16:22

## 2024-02-26 RX ADMIN — SODIUM CHLORIDE, PRESERVATIVE FREE 10 ML: 5 INJECTION INTRAVENOUS at 08:09

## 2024-02-26 RX ADMIN — FAMOTIDINE 20 MG: 20 TABLET, FILM COATED ORAL at 08:07

## 2024-02-26 RX ADMIN — ALBUTEROL SULFATE 2.5 MG: 2.5 SOLUTION RESPIRATORY (INHALATION) at 16:20

## 2024-02-26 RX ADMIN — AMIODARONE HYDROCHLORIDE 400 MG: 200 TABLET ORAL at 21:12

## 2024-02-26 RX ADMIN — ALBUTEROL SULFATE 2.5 MG: 2.5 SOLUTION RESPIRATORY (INHALATION) at 07:39

## 2024-02-26 ASSESSMENT — PAIN SCALES - GENERAL
PAINLEVEL_OUTOF10: 0

## 2024-02-26 NOTE — PLAN OF CARE
Problem: Chronic Conditions and Co-morbidities  Goal: Patient's chronic conditions and co-morbidity symptoms are monitored and maintained or improved  Outcome: Progressing  Flowsheets (Taken 2/26/2024 0700)  Care Plan - Patient's Chronic Conditions and Co-Morbidity Symptoms are Monitored and Maintained or Improved:   Monitor and assess patient's chronic conditions and comorbid symptoms for stability, deterioration, or improvement   Collaborate with multidisciplinary team to address chronic and comorbid conditions and prevent exacerbation or deterioration   Update acute care plan with appropriate goals if chronic or comorbid symptoms are exacerbated and prevent overall improvement and discharge     Problem: Pain  Goal: Verbalizes/displays adequate comfort level or baseline comfort level  Outcome: Progressing  Flowsheets (Taken 2/26/2024 6123)  Verbalizes/displays adequate comfort level or baseline comfort level:   Encourage patient to monitor pain and request assistance   Assess pain using appropriate pain scale   Administer analgesics based on type and severity of pain and evaluate response   Implement non-pharmacological measures as appropriate and evaluate response   Notify Licensed Independent Practitioner if interventions unsuccessful or patient reports new pain     Problem: Discharge Planning  Goal: Discharge to home or other facility with appropriate resources  Outcome: Progressing  Flowsheets (Taken 2/26/2024 0700)  Discharge to home or other facility with appropriate resources:   Identify barriers to discharge with patient and caregiver   Arrange for needed discharge resources and transportation as appropriate   Identify discharge learning needs (meds, wound care, etc)   Refer to discharge planning if patient needs post-hospital services based on physician order or complex needs related to functional status, cognitive ability or social support system

## 2024-02-26 NOTE — NURSE NAVIGATOR
CHF teaching completed.CHF background completed. Teaching emphasis on Call Cardiology STAT ,if any of the following are noted:  Short of Breath; with activity or without/ while reclined, Generalize weakness, edema are noted individually or grouped.  Cardiac Diet  and salt/fluid restrictions covered.  Daily WTs emphasized.  Planner with summarization sheet provided with cardiology service and phone number and bathroom scale offered.  Total time @ BS is 1 hour and pt verbalize understanding/past post test.  Pt instructed to call myself, if any questions occur.

## 2024-02-27 VITALS
TEMPERATURE: 97.2 F | HEIGHT: 70 IN | WEIGHT: 214.73 LBS | BODY MASS INDEX: 30.74 KG/M2 | DIASTOLIC BLOOD PRESSURE: 55 MMHG | OXYGEN SATURATION: 94 % | RESPIRATION RATE: 19 BRPM | HEART RATE: 71 BPM | SYSTOLIC BLOOD PRESSURE: 114 MMHG

## 2024-02-27 PROCEDURE — 2700000000 HC OXYGEN THERAPY PER DAY

## 2024-02-27 PROCEDURE — 6370000000 HC RX 637 (ALT 250 FOR IP)

## 2024-02-27 PROCEDURE — 97530 THERAPEUTIC ACTIVITIES: CPT

## 2024-02-27 PROCEDURE — 2580000003 HC RX 258: Performed by: INTERNAL MEDICINE

## 2024-02-27 PROCEDURE — 94640 AIRWAY INHALATION TREATMENT: CPT

## 2024-02-27 PROCEDURE — 97110 THERAPEUTIC EXERCISES: CPT

## 2024-02-27 PROCEDURE — 6370000000 HC RX 637 (ALT 250 FOR IP): Performed by: PHYSICIAN ASSISTANT

## 2024-02-27 PROCEDURE — 99232 SBSQ HOSP IP/OBS MODERATE 35: CPT | Performed by: INTERNAL MEDICINE

## 2024-02-27 PROCEDURE — 6360000002 HC RX W HCPCS: Performed by: NURSE PRACTITIONER

## 2024-02-27 PROCEDURE — 6370000000 HC RX 637 (ALT 250 FOR IP): Performed by: THORACIC SURGERY (CARDIOTHORACIC VASCULAR SURGERY)

## 2024-02-27 PROCEDURE — 2580000003 HC RX 258: Performed by: PHYSICIAN ASSISTANT

## 2024-02-27 PROCEDURE — 6360000002 HC RX W HCPCS: Performed by: PHYSICIAN ASSISTANT

## 2024-02-27 PROCEDURE — 6360000002 HC RX W HCPCS: Performed by: THORACIC SURGERY (CARDIOTHORACIC VASCULAR SURGERY)

## 2024-02-27 RX ORDER — FUROSEMIDE 40 MG/1
TABLET ORAL
Qty: 22 TABLET | Refills: 0
Start: 2024-02-27 | End: 2024-04-04

## 2024-02-27 RX ORDER — ASPIRIN 81 MG/1
81 TABLET ORAL DAILY
Qty: 30 TABLET | Refills: 3 | COMMUNITY
Start: 2024-02-28

## 2024-02-27 RX ORDER — ATORVASTATIN CALCIUM 40 MG/1
40 TABLET, FILM COATED ORAL NIGHTLY
Qty: 90 TABLET | Refills: 3
Start: 2024-02-27

## 2024-02-27 RX ORDER — TRAZODONE HYDROCHLORIDE 50 MG/1
50 TABLET ORAL NIGHTLY
Qty: 30 TABLET | Refills: 0
Start: 2024-02-27

## 2024-02-27 RX ORDER — ACETAMINOPHEN 325 MG/1
650 TABLET ORAL EVERY 6 HOURS PRN
Qty: 120 TABLET | Refills: 3 | COMMUNITY
Start: 2024-02-27

## 2024-02-27 RX ORDER — AMIODARONE HYDROCHLORIDE 200 MG/1
200 TABLET ORAL 2 TIMES DAILY
Qty: 28 TABLET | Refills: 0
Start: 2024-02-27 | End: 2024-03-12

## 2024-02-27 RX ADMIN — SODIUM CHLORIDE SOLN NEBU 3% 4 ML: 3 NEBU SOLN at 07:38

## 2024-02-27 RX ADMIN — ENOXAPARIN SODIUM 100 MG: 100 INJECTION SUBCUTANEOUS at 08:34

## 2024-02-27 RX ADMIN — CARVEDILOL 3.12 MG: 3.12 TABLET, FILM COATED ORAL at 08:36

## 2024-02-27 RX ADMIN — SODIUM CHLORIDE, PRESERVATIVE FREE 10 ML: 5 INJECTION INTRAVENOUS at 08:37

## 2024-02-27 RX ADMIN — ALBUTEROL SULFATE 2.5 MG: 2.5 SOLUTION RESPIRATORY (INHALATION) at 07:37

## 2024-02-27 RX ADMIN — CEFAZOLIN SODIUM 2000 MG: 100 INJECTION, POWDER, LYOPHILIZED, FOR SOLUTION INTRAVENOUS at 08:49

## 2024-02-27 RX ADMIN — GUAIFENESIN 1200 MG: 600 TABLET ORAL at 08:35

## 2024-02-27 RX ADMIN — FAMOTIDINE 20 MG: 20 TABLET, FILM COATED ORAL at 08:35

## 2024-02-27 RX ADMIN — ASPIRIN 81 MG: 81 TABLET, COATED ORAL at 08:35

## 2024-02-27 RX ADMIN — ALBUTEROL SULFATE 2.5 MG: 2.5 SOLUTION RESPIRATORY (INHALATION) at 11:27

## 2024-02-27 RX ADMIN — Medication 1 AMPULE: at 08:35

## 2024-02-27 RX ADMIN — AMIODARONE HYDROCHLORIDE 400 MG: 200 TABLET ORAL at 08:35

## 2024-02-27 RX ADMIN — CEFAZOLIN SODIUM 2000 MG: 100 INJECTION, POWDER, LYOPHILIZED, FOR SOLUTION INTRAVENOUS at 00:43

## 2024-02-27 RX ADMIN — FUROSEMIDE 40 MG: 10 INJECTION, SOLUTION INTRAMUSCULAR; INTRAVENOUS at 08:35

## 2024-02-27 ASSESSMENT — PAIN SCALES - GENERAL: PAINLEVEL_OUTOF10: 0

## 2024-02-27 NOTE — PLAN OF CARE
Problem: Chronic Conditions and Co-morbidities  Goal: Patient's chronic conditions and co-morbidity symptoms are monitored and maintained or improved  2/27/2024 1104 by Brenda Ames RN  Outcome: Adequate for Discharge  Flowsheets (Taken 2/27/2024 0745)  Care Plan - Patient's Chronic Conditions and Co-Morbidity Symptoms are Monitored and Maintained or Improved: Monitor and assess patient's chronic conditions and comorbid symptoms for stability, deterioration, or improvement  2/26/2024 2237 by Idalmis Wright RN  Outcome: Progressing  Flowsheets (Taken 2/26/2024 1915)  Care Plan - Patient's Chronic Conditions and Co-Morbidity Symptoms are Monitored and Maintained or Improved: Monitor and assess patient's chronic conditions and comorbid symptoms for stability, deterioration, or improvement     Problem: Safety - Adult  Goal: Free from fall injury  2/27/2024 1104 by Brenda Ames RN  Outcome: Adequate for Discharge  2/26/2024 2237 by Idalmis Wright RN  Outcome: Progressing  Flowsheets (Taken 2/26/2024 2001)  Free From Fall Injury: Instruct family/caregiver on patient safety     Problem: Pain  Goal: Verbalizes/displays adequate comfort level or baseline comfort level  2/27/2024 1104 by Brenda Ames RN  Outcome: Adequate for Discharge  2/26/2024 2237 by Idalmis Wright RN  Outcome: Progressing     Problem: Discharge Planning  Goal: Discharge to home or other facility with appropriate resources  2/27/2024 1104 by Brenda Ames RN  Outcome: Adequate for Discharge  Flowsheets (Taken 2/27/2024 0745)  Discharge to home or other facility with appropriate resources:   Identify barriers to discharge with patient and caregiver   Arrange for needed discharge resources and transportation as appropriate   Identify discharge learning needs (meds, wound care, etc)   Arrange for interpreters to assist at discharge as needed   Refer to discharge planning if patient needs post-hospital

## 2024-02-27 NOTE — DISCHARGE SUMMARY
Discharge Summary     Patient ID:  Dionisio Gordon Jr.  [id number]  83 y.o.  1940    Admit date: 2/6/2024    Discharge date:  2/27/2024    Admitting Physician: Yony Shi MD     Discharge Physician: DANA Hirsch/Dr. 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 (Formerly [facility]) [I25.110]  PAF (paroxysmal atrial fibrillation) (Formerly [facility]) [I48.0]  Ischemic cardiomyopathy [I25.5]  CAD, multiple vessel [I25.10]    Discharge Diagnoses:   Patient Active Problem List    Diagnosis Date Noted    Pre-op testing 02/06/2024    Pleural effusion 02/21/2024    Atelectasis 02/16/2024    Mucus plugging of bronchi 02/13/2024    Pneumonia due to Klebsiella aerogenes ([facility]) 02/12/2024    Cardiogenic shock ([facility]) 02/10/2024    Acute respiratory failure with hypoxia ([facility]) 02/10/2024    Fever 02/10/2024    CAD, multiple vessel 02/06/2024    Encounter for weaning from ventilator ([facility]) 02/06/2024    Hypoxemia 02/06/2024    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 ([facility]) 01/24/2024    PAF (paroxysmal atrial fibrillation) ([facility]) 01/24/2024    Chronic systolic congestive heart failure ([facility]) 01/24/2024    HFrEF (heart failure with reduced ejection fraction) ([facility]) 12/21/2023    Acute on chronic systolic (congestive) heart failure ([facility]) 11/21/2023    Atrial fibrillation with RVR ([facility]) 11/21/2023    Typical atrial flutter ([facility]) 11/20/2023    Volume overload 11/20/2023       Procedures this admission:   Coronary Artery Bypass Graft Surgery, MAZE, clipping of the left atrial appendage, implantation of Impella, removal of Impella   Consults: Pulmonary/Intensive Care, Cardiology    Hospital Course: Patient presented for elective CABG surgery. He initially developed fatigue and insomnia around October 2023. He was treated for sinusitis but continued to feel poorly. He presented to the

## 2024-02-27 NOTE — CARE COORDINATION
CM spoke to Tangela from [facility] who stated patient has not been accepted medically to [facility] at this time but she will continue to follow patient.   
Patient will need rehab for discharge plan. Patient and family prefer [facility]. Referral was sent accordingly. Patient awaiting acceptance at this time.     CM to follow up.   
Per PA, patient's sitter will possibly be discontinued tonight and patient will hopefully be ready to go to [facility] mid week. CM contacted Tangela from [facility] to give her the updates.     CM will continue to follow patient for discharge planning needs.   
(Secondary: [employer])   Community Resources None   CM/SW Referral Other (see comment) (N/A)     Social/Functional History  Lives With Alone   Type of Home House   Home Layout One level   Home Access     Entrance Stairs - Number of Steps     Entrance Stairs - Rails     Bathroom Shower/Tub     Bathroom Toilet Standard   Bathroom Equipment Commode   Bathroom Accessibility Accessible   Home Equipment None   Receives Help From Family, Friend(s)   ADL Assistance Independent   Bath     Dressing     Grooming     Feeding     Toileting     Homemaking Assistance Independent   Meal Prep     Laundry     Vacuuming     Cleaning     Gardening     Yard Work     Driving     Shopping          Other (Comment)     Homemaking Responsibilities     Meal Prep Responsibility     Laundry Responsibility     Cleaning Responsibility     Bill Paying/Finance Responsibility     Shopping Responsibility     Dependent Care Responsibility     Health Care Management     Other (Comment)     Ambuation Assistance Independent   Transfer Assisstance Independent   Active  Yes   Patient's  Info     Mode of Transportation Car   Education     Occupation Retired   Type of Occupation       Discharge Planning   Type of Residence Other (Comment) (Undetermined at present time)   Living Arrangements Alone   Support Systems Family Members, Children   Current Services Prior To Admission None   Potential Assistance Needed Other (Comment) (Undetermined at present time)   DME     DME     DME Ordered?     Potential Assistance Purchasing Medications No   Meds-to-Beds: Does the patient want to have any new prescriptions delivered to bedside prior to discharge?     Type of Home Care Services None   Patient expects to be discharged to: Unknown   Follow Up Appointment: Best Day/Time     One/Two Story Residence: One story   # of Interior Steps     Height of Each Step (in)     Interior Rails     Lift Chair Available     History of Falls? No     Services At/After 
Assistance Purchasing Medications No   Meds-to-Beds: Does the patient want to have any new prescriptions delivered to bedside prior to discharge?     Type of Home Care Services None   Patient expects to be discharged to: Unknown   Follow Up Appointment: Best Day/Time     One/Two Story Residence: One story   # of Interior Steps     Height of Each Step (in)     Interior Rails     Lift Chair Available     History of Falls? No     Services At/After Discharge  Transition of Care Consult (CM Consult): Discharge Planning   Internal Home Health     Internal Hospice     Reason Outside Agency Chosen     Partner SNF     Reason Why Partner SNF Not Chosen     Internal Comfort Care     Reason Outside Comfort Care Chosen     Services At/After Discharge      Resource Information Provided? No   Mode of Transport at Discharge Other (see comment) (TBD based on clinical course)   Hospital Transport Time of Discharge     Confirm Follow Up Transport Other (see comment) (TBD based on clinical course)     Condition of Participation: Discharge Planning  The plan for Transition of Care is related to the following treatment goals: Pt to obtain care to become medically stable and to return with a safe transition.   The Patient and/or Patient Representative was provided with a Choice of Provider? Patient, Patient Representative   Name of the Patient Representative who was provided with the Choice of Provider and agrees with the Discharge Plan? Son: Jitendra Marshallersley   The Patient and/or Patient Representative Agree with the Discharge Plan?     Freedom of Choice list was provided with basic dialogue that supports the individualized plan of care/goals, treatment preferences, and shares the quality data associated with the providers? Yes     Documentation for Discharge Appeal  Discharge Appealed by     Date notified by QIO of appeal request:     Time notified by QIO of appeal request:     Detailed Notice of Discharge given to:     Date

## 2024-02-28 ENCOUNTER — HOSPITAL ENCOUNTER (INPATIENT)
Age: 84
LOS: 2 days | Discharge: HOME | End: 2024-03-01
Attending: EMERGENCY MEDICINE | Admitting: FAMILY MEDICINE
Payer: MEDICARE

## 2024-02-28 ENCOUNTER — TELEPHONE (OUTPATIENT)
Age: 84
End: 2024-02-28

## 2024-02-28 DIAGNOSIS — I82.C12 ACUTE EMBOLISM AND THROMBOSIS OF LEFT INTERNAL JUGULAR VEIN (HCC): Primary | ICD-10-CM

## 2024-02-28 PROBLEM — I82.C29 CHRONIC DEEP VEIN THROMBOSIS (DVT) OF INTERNAL JUGULAR VEIN (HCC): Status: ACTIVE | Noted: 2024-02-28

## 2024-02-28 LAB
BASOPHILS # BLD: 0 K/UL (ref 0–0.2)
BASOPHILS NFR BLD: 0 % (ref 0–2)
DIFFERENTIAL METHOD BLD: ABNORMAL
EOSINOPHIL # BLD: 0.2 K/UL (ref 0–0.8)
EOSINOPHIL NFR BLD: 2 % (ref 0.5–7.8)
ERYTHROCYTE [DISTWIDTH] IN BLOOD BY AUTOMATED COUNT: 18.7 % (ref 11.9–14.6)
HCT VFR BLD AUTO: 27.8 % (ref 41.1–50.3)
HGB BLD-MCNC: 8.5 G/DL (ref 13.6–17.2)
IMM GRANULOCYTES # BLD AUTO: 0 K/UL (ref 0–0.5)
IMM GRANULOCYTES NFR BLD AUTO: 0 % (ref 0–5)
LYMPHOCYTES # BLD: 1.4 K/UL (ref 0.5–4.6)
LYMPHOCYTES NFR BLD: 19 % (ref 13–44)
MCH RBC QN AUTO: 29.3 PG (ref 26.1–32.9)
MCHC RBC AUTO-ENTMCNC: 30.6 G/DL (ref 31.4–35)
MCV RBC AUTO: 95.9 FL (ref 82–102)
MONOCYTES # BLD: 0.9 K/UL (ref 0.1–1.3)
MONOCYTES NFR BLD: 11 % (ref 4–12)
NEUTS SEG # BLD: 5.1 K/UL (ref 1.7–8.2)
NEUTS SEG NFR BLD: 68 % (ref 43–78)
NRBC # BLD: 0 K/UL (ref 0–0.2)
PLATELET # BLD AUTO: 277 K/UL (ref 150–450)
PMV BLD AUTO: 10.9 FL (ref 9.4–12.3)
RBC # BLD AUTO: 2.9 M/UL (ref 4.23–5.6)
WBC # BLD AUTO: 7.6 K/UL (ref 4.3–11.1)

## 2024-02-28 PROCEDURE — 85025 COMPLETE CBC W/AUTO DIFF WBC: CPT

## 2024-02-28 PROCEDURE — 80053 COMPREHEN METABOLIC PANEL: CPT

## 2024-02-28 PROCEDURE — 1100000003 HC PRIVATE W/ TELEMETRY

## 2024-02-28 PROCEDURE — 99285 EMERGENCY DEPT VISIT HI MDM: CPT

## 2024-02-28 RX ORDER — ASPIRIN 81 MG/1
81 TABLET ORAL DAILY
Status: DISCONTINUED | OUTPATIENT
Start: 2024-02-29 | End: 2024-03-01 | Stop reason: HOSPADM

## 2024-02-28 RX ORDER — SODIUM CHLORIDE 0.9 % (FLUSH) 0.9 %
5-40 SYRINGE (ML) INJECTION PRN
Status: DISCONTINUED | OUTPATIENT
Start: 2024-02-28 | End: 2024-03-01 | Stop reason: HOSPADM

## 2024-02-28 RX ORDER — ACETAMINOPHEN 650 MG/1
650 SUPPOSITORY RECTAL EVERY 6 HOURS PRN
Status: DISCONTINUED | OUTPATIENT
Start: 2024-02-28 | End: 2024-03-01 | Stop reason: HOSPADM

## 2024-02-28 RX ORDER — MAGNESIUM HYDROXIDE/ALUMINUM HYDROXICE/SIMETHICONE 120; 1200; 1200 MG/30ML; MG/30ML; MG/30ML
30 SUSPENSION ORAL EVERY 6 HOURS PRN
Status: DISCONTINUED | OUTPATIENT
Start: 2024-02-28 | End: 2024-03-01 | Stop reason: HOSPADM

## 2024-02-28 RX ORDER — BISACODYL 10 MG
10 SUPPOSITORY, RECTAL RECTAL DAILY PRN
Status: DISCONTINUED | OUTPATIENT
Start: 2024-02-28 | End: 2024-03-01 | Stop reason: HOSPADM

## 2024-02-28 RX ORDER — ATORVASTATIN CALCIUM 40 MG/1
40 TABLET, FILM COATED ORAL NIGHTLY
Status: DISCONTINUED | OUTPATIENT
Start: 2024-02-28 | End: 2024-03-01 | Stop reason: HOSPADM

## 2024-02-28 RX ORDER — MAGNESIUM SULFATE IN WATER 40 MG/ML
2000 INJECTION, SOLUTION INTRAVENOUS PRN
Status: DISCONTINUED | OUTPATIENT
Start: 2024-02-28 | End: 2024-03-01 | Stop reason: HOSPADM

## 2024-02-28 RX ORDER — POTASSIUM CHLORIDE 7.45 MG/ML
10 INJECTION INTRAVENOUS PRN
Status: DISCONTINUED | OUTPATIENT
Start: 2024-02-28 | End: 2024-03-01 | Stop reason: HOSPADM

## 2024-02-28 RX ORDER — CARVEDILOL 3.12 MG/1
3.12 TABLET ORAL 2 TIMES DAILY WITH MEALS
Status: DISCONTINUED | OUTPATIENT
Start: 2024-02-29 | End: 2024-03-01 | Stop reason: HOSPADM

## 2024-02-28 RX ORDER — SODIUM CHLORIDE 9 MG/ML
INJECTION, SOLUTION INTRAVENOUS PRN
Status: DISCONTINUED | OUTPATIENT
Start: 2024-02-28 | End: 2024-03-01 | Stop reason: HOSPADM

## 2024-02-28 RX ORDER — POLYETHYLENE GLYCOL 3350 17 G/17G
17 POWDER, FOR SOLUTION ORAL DAILY PRN
Status: DISCONTINUED | OUTPATIENT
Start: 2024-02-28 | End: 2024-03-01 | Stop reason: HOSPADM

## 2024-02-28 RX ORDER — TRAZODONE HYDROCHLORIDE 50 MG/1
50 TABLET ORAL NIGHTLY
Status: DISCONTINUED | OUTPATIENT
Start: 2024-02-28 | End: 2024-03-01 | Stop reason: HOSPADM

## 2024-02-28 RX ORDER — SODIUM CHLORIDE 0.9 % (FLUSH) 0.9 %
5-40 SYRINGE (ML) INJECTION EVERY 12 HOURS SCHEDULED
Status: DISCONTINUED | OUTPATIENT
Start: 2024-02-28 | End: 2024-03-01 | Stop reason: HOSPADM

## 2024-02-28 RX ORDER — FAMOTIDINE 20 MG/1
10 TABLET, FILM COATED ORAL DAILY PRN
Status: DISCONTINUED | OUTPATIENT
Start: 2024-02-28 | End: 2024-03-01 | Stop reason: HOSPADM

## 2024-02-28 RX ORDER — ACETAMINOPHEN 325 MG/1
650 TABLET ORAL EVERY 6 HOURS PRN
Status: DISCONTINUED | OUTPATIENT
Start: 2024-02-28 | End: 2024-03-01 | Stop reason: HOSPADM

## 2024-02-28 RX ORDER — AMIODARONE HYDROCHLORIDE 200 MG/1
200 TABLET ORAL 2 TIMES DAILY
Status: DISCONTINUED | OUTPATIENT
Start: 2024-02-28 | End: 2024-03-01 | Stop reason: HOSPADM

## 2024-02-28 RX ORDER — POTASSIUM CHLORIDE 20 MEQ/1
40 TABLET, EXTENDED RELEASE ORAL PRN
Status: DISCONTINUED | OUTPATIENT
Start: 2024-02-28 | End: 2024-03-01 | Stop reason: HOSPADM

## 2024-02-28 ASSESSMENT — PAIN - FUNCTIONAL ASSESSMENT: PAIN_FUNCTIONAL_ASSESSMENT: NONE - DENIES PAIN

## 2024-02-28 NOTE — TELEPHONE ENCOUNTER
Care Transitions Initial Follow Up Call    Call within 2 business days of discharge: Yes     Patient: Dionisio Gordon Jr. Patient : 1940 MRN: [id number]    [unfilled]    RARS: Readmission Risk Score: 17.2       Spoke with: Luis Gordon    Discharge department/facility: [facility]    Non-face-to-face services provided:  . Spoke with pt educated her on Heart healthy diet, Low salt diet. Activities as tolerated. Advised against all heavy lifting, and any straining of the upper arms or chest until clear by doctor. No driving until f/u by doctor. Avoid soaking for 7-10 days. Education given for site care and signs of infection. Educated when to call/when to go to ER. Confirmed listed of medications. Education of blood thinners and precautions.       Pt is currently at inpatient rehab facility    Follow Up  Future Appointments   Date Time Provider Department Center   3/12/2024  2:00 PM Mason Diallo MD [facility]   3/27/2024  2:00 PM Yony Shi MD [facility]       LALA SMART LPN

## 2024-02-28 NOTE — PROGRESS NOTES
[facility] CARDIOLOGY PROGRESS NOTE           2/7/2024 7:12 AM    Admit Date: 2/6/2024    Subjective:   Patient remains intubated and sedated.  Hemodynamics have improved following surgical exploration to control bleeding.  Patient remained stable on Impella support.  Remains on low-dose dobutamine.  No other pressors at this time.      ROS:  UNABLE TO OBTAIN DUE TO INABILITY OF PATIENT TO COMMUNICATE SECONDARY TO CURRENT INTUBATION AND NEED FOR MECHANICAL VENTILATION.    Objective:      Vitals:    02/07/24 0600 02/07/24 0615 02/07/24 0630 02/07/24 0645   BP:       Pulse: 80 85 86 86   Resp: 18 18 18 18   Temp: 98.7 °F (37.1 °C)      TempSrc: Core      SpO2: 100% 100% 100%    Weight:   105.5 kg (232 lb 9.4 oz)    Height:   1.803 m (5' 10.98\")        Physical Exam:  General-Intubated.    Neck- supple, no JVD  CV- regular rate and rhythm no MRG  Lung- clear bilaterally  Abd- soft, nontender, nondistended  Ext- no edema bilaterally.  Skin- warm and dry  Psychiatric:  Unable to accurately assess due to intubated and sedated status.  Neurologic:  Unable to accurately assess due to intubated and sedated status.      Data Review:   Recent Labs     02/06/24  1353 02/06/24  1512 02/06/24  2246 02/07/24  0206     --   --  146   K 4.4   < > 5.1 4.9   MG 3.1*   < > 2.2 2.1   BUN 18  --   --  17   WBC 19.5*   < > 10.2 8.5   HGB 12.5*   < > 9.2* 11.4*   HCT 39.0*   < > 28.0* 34.2*      < > 209 132*   INR 1.3  --   --  1.7    < > = values in this interval not displayed.       TELEMETRY:  SR    Assessment/Plan:     Principal Problem:    CAD, multiple vessel  Plan: Patient status post multivessel coronary bypass grafting with LIMA-LAD, SVG-RPL, SVG-PDA.  Patient with known severe LV systolic dysfunction status post direct aortic Impella 5.5.  Recommend maximal support for an additional 24 hours prior to initiating wean.  Active Problems:    Pre-op testing  Plan: Stable laboratory information   
                       [facility] CARDIOLOGY PROGRESS NOTE           2/18/2024 8:31 AM    Admit Date: 2/6/2024    Admit Diagnosis: 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 (HCC) [I25.110]  PAF (paroxysmal atrial fibrillation) ([facility]) [I48.0]  Ischemic cardiomyopathy [I25.5]  CAD, multiple vessel [I25.10]      Subjective:   Awake, alert, answering questions    Interval History: (History of pertinent interval events obtained from nursing staff)    ROS:  GEN:  No fever or chills  Cardiovascular:  As noted above  Pulmonary:  As noted above  Neuro:  No new focal motor or sensory loss      Objective:     Vitals:    02/18/24 0705 02/18/24 0715 02/18/24 0730 02/18/24 0739   BP: 130/63  (!) 148/70    Pulse: 74 75 78 78   Resp: 23 23 (!) 33 18   Temp: 98.7 °F (37.1 °C)      TempSrc: Oral      SpO2: 95% 96% 94% 96%   Weight:       Height:           Physical Exam:  General- NAD.  Neck- supple, no JVD  CV- regular rate and rhythm no MRG  Lung- clear bilaterally  Abd- soft, nontender, nondistended  Ext- no edema bilaterally.  Skin- warm and dry    Current Facility-Administered Medications   Medication Dose Route Frequency    acetylcysteine (MUCOMYST) 20 % solution 600 mg  600 mg Inhalation BID    fentaNYL (SUBLIMAZE) injection 50 mcg  50 mcg IntraVENous Once    midazolam PF (VERSED) injection 2 mg  2 mg IntraVENous Once    furosemide (LASIX) injection 40 mg  40 mg IntraVENous Daily    traMADol (ULTRAM) tablet 100 mg  100 mg Oral Q6H PRN    sennosides-docusate sodium (SENOKOT-S) 8.6-50 MG tablet 2 tablet  2 tablet Oral BID    polyethylene glycol (GLYCOLAX) packet 17 g  17 g Per NG tube Daily    albuterol (PROVENTIL) (2.5 MG/3ML) 0.083% nebulizer solution 2.5 mg  2.5 mg Nebulization 4x Daily RT    sodium phosphate 15 mmol in sodium chloride 0.9 % 250 mL IVPB  15 mmol IntraVENous PRN    guaiFENesin (ROBITUSSIN) 100 MG/5ML liquid 200 mg  200 mg Per NG tube Q4H PRN    norepinephrine (LEVOPHED) 4 
                 [facility] CARDIOLOGY PROGRESS NOTE           2/26/2024 8:03 AM    Admit Date: 2/6/2024    Brief history:  Patient is a 83-year-old male with severe multivessel coronary artery disease and chronic systolic heart failure with severely reduced LV systolic function with EF 20%.  Admitted 2/6/2024 and underwent three-vessel coronary artery bypass grafting with maze/left atrial appendage occlusion and placement of a 5.5 Impella.  Postoperatively required reexploration due to bleeding on 2/7. Post op course complicated by intermittent afib treated with amiodarone. Had aspiration event 2/12 with pneumonia and worsening hypoxia. Condition improved and Impella removed 2/15.  Multiple bronchoscopies for mucous plugging and thoracentesis was done for pleural effusion. Condition now improving.  Transferred to floor 2/22. Echo 2/12 EF 25-30%.     Subjective:   Patient alert.   On room air. .  Denies any chest pain or  dyspnea.  Currently on room air.   BP low but tolerating CHF therapy.   Currently in sinus rhythm on telemetry.       ROS:  Cardiovascular:  As noted above    Objective:      Vitals:    02/26/24 0226 02/26/24 0615 02/26/24 0737 02/26/24 0740   BP: (!) 106/55  (!) 97/52    Pulse: 76  76 80   Resp: 19  22 18   Temp: 98.5 °F (36.9 °C)  98.2 °F (36.8 °C)    TempSrc: Temporal  Oral    SpO2: 95%  92% 94%   Weight:  97.5 kg (214 lb 15.2 oz)     Height:           Physical Exam:  General-Elderly WM in NAD  Neck- supple, no JVD  CV- regular rate and rhythm no MRG  Lung- clear bilaterally  Abd- soft, nontender, nondistended  Ext- Trivial  edema bilaterally.  Skin- warm and dry      Data Review:   Recent Labs     02/26/24  0359 02/23/24  0357 02/22/24  0240   WBC 7.3 15.1* 13.4*   HGB 9.7* 9.5* 9.2*   HCT 32.7* 31.9* 31.2*   MCV 95.1 94.7 95.4    378 364         Recent Labs     02/26/24  0359 02/08/24  0321 02/07/24  0206      < > 146   K 4.2   < > 4.9   *   < > 121*   CO2 37*   < > 21   BUN 13 
                 [facility] CARDIOLOGY PROGRESS NOTE           2/27/2024 9:46 AM    Admit Date: 2/6/2024    Brief history:  Patient is a 83-year-old male with severe multivessel coronary artery disease and chronic systolic heart failure with severely reduced LV systolic function with EF 20%.  Admitted 2/6/2024 and underwent three-vessel coronary artery bypass grafting with maze/left atrial appendage occlusion and placement of a 5.5 Impella.  Postoperatively required reexploration due to bleeding on 2/7. Post op course complicated by intermittent afib treated with amiodarone. Had aspiration event 2/12 with pneumonia and worsening hypoxia. Condition improved and Impella removed 2/15.  Multiple bronchoscopies for mucous plugging and thoracentesis was done for pleural effusion. Condition now improving.  Transferred to floor 2/22. Echo 2/12 EF 25-30%.     Subjective:   Patient alert.   On room air. .  Denies any chest pain or  dyspnea.  Currently on room air. Echo with improved LV function.  Plans for rehab.       ROS:  Cardiovascular:  As noted above    Objective:      Vitals:    02/27/24 0653 02/27/24 0739 02/27/24 0740 02/27/24 0835   BP: (!) 118/57   (!) 110/54   Pulse: 75 78 76 79   Resp: 16 18     Temp: 97.7 °F (36.5 °C)      TempSrc: Temporal      SpO2: 98% 99% 100%    Weight:       Height:           Physical Exam:  General-Elderly WM in NAD  Neck- supple, no JVD  CV- regular rate and rhythm no MRG  Lung- clear bilaterally  Abd- soft, nontender, nondistended  Ext- Trivial  to 1+edema bilaterally.  Skin- warm and dry      Data Review:   Recent Labs     02/26/24  0359 02/23/24  0357 02/22/24  0240   WBC 7.3 15.1* 13.4*   HGB 9.7* 9.5* 9.2*   HCT 32.7* 31.9* 31.2*   MCV 95.1 94.7 95.4    378 364         Recent Labs     02/26/24  0359 02/08/24  0321 02/07/24  0206      < > 146   K 4.2   < > 4.9   *   < > 121*   CO2 37*   < > 21   BUN 13   < > 17   CREATININE 0.80   < > 0.90   GLUCOSE 116*   < > 138* 
                 [facility] CARDIOLOGY PROGRESS NOTE           2/23/2024 9:44 AM    Admit Date: 2/6/2024    Brief history:  Patient is a 83-year-old male with severe multivessel coronary artery disease and chronic systolic heart failure with severely reduced LV systolic function with EF 20%.  Admitted 2/6/2024 and underwent three-vessel coronary artery bypass grafting with maze/left atrial appendage occlusion and placement of a 5.5 Impella.  Postoperatively required reexploration due to bleeding on 2/7. Post op course complicated by intermittent afib treated with amiodarone. Had aspiration event 2/12 with pneumonia and worsening hypoxia. Condition improved and Impella removed 2/15.  Multiple bronchoscopies for mucous plugging and thoracentesis was done for pleural effusion. Condition now improving.  Transferred to floor 2/22. Echo 2/12 EF 25-30%.     Subjective:   Patient mildly confused.  Denies any chest pain or  dyspnea.  Hypoxic last night.  On 2 liters currently  Intermittent afib/aflutter but in sinus overnight and this AM.      ROS:  Cardiovascular:  As noted above    Objective:      Vitals:    02/23/24 0259 02/23/24 0705 02/23/24 0749 02/23/24 0754   BP: (!) 132/56  129/63    Pulse: 79 77 77 78   Resp: 24  20 20   Temp: 97.7 °F (36.5 °C)  98.4 °F (36.9 °C)    TempSrc: Oral  Oral    SpO2: 98% 98% 97% 92%   Weight:       Height:           Physical Exam:  General-Elderly WM in NAD  Neck- supple, no JVD  CV- regular rate and rhythm no MRG  Lung- clear bilaterally  Abd- soft, nontender, nondistended  Ext- 1+  edema bilaterally.  Skin- warm and dry      Data Review:   Recent Labs     02/23/24  0357 02/22/24  0240 02/20/24  0151   WBC 15.1* 13.4* 16.0*   HGB 9.5* 9.2* 11.0*   HCT 31.9* 31.2* 36.1*   MCV 94.7 95.4 94.0    364 361       Recent Labs     02/23/24  0357 02/08/24  0321 02/07/24  0206      < > 146   K 4.3   < > 4.9   CL 99*   < > 121*   CO2 40*   < > 21   BUN 16   < > 17   CREATININE 0.70*   < > 
                 [facility] CARDIOLOGY PROGRESS NOTE           2/24/2024 9:58 AM    Admit Date: 2/6/2024    Brief history:  Patient is a 83-year-old male with severe multivessel coronary artery disease and chronic systolic heart failure with severely reduced LV systolic function with EF 20%.  Admitted 2/6/2024 and underwent three-vessel coronary artery bypass grafting with maze/left atrial appendage occlusion and placement of a 5.5 Impella.  Postoperatively required reexploration due to bleeding on 2/7. Post op course complicated by intermittent afib treated with amiodarone. Had aspiration event 2/12 with pneumonia and worsening hypoxia. Condition improved and Impella removed 2/15.  Multiple bronchoscopies for mucous plugging and thoracentesis was done for pleural effusion. Condition now improving.  Transferred to floor 2/22. Echo 2/12 EF 25-30%.     Subjective:   Patient less confused.  On room air. .  Denies any chest pain or  dyspnea.  Currently on room air.   Hemodynamically stable.  Currently in sinus rhythm on telemetry. CXR yesterday:  Moderate to large left pleural effusion again noted, underlying left lung base  atelectasis or pneumonia is not excluded. Recommend follow-up to document resolution.      ROS:  Cardiovascular:  As noted above    Objective:      Vitals:    02/24/24 0359 02/24/24 0600 02/24/24 0717 02/24/24 0838   BP: 111/60  (!) 120/55    Pulse: 73  74    Resp: 18  22    Temp: 98.3 °F (36.8 °C)  97.1 °F (36.2 °C)    TempSrc: Temporal  Temporal    SpO2: 96%  99% 99%   Weight:  100.3 kg (221 lb 1.9 oz)     Height:           Physical Exam:  General-Elderly WM in NAD  Neck- supple, no JVD  CV- regular rate and rhythm no MRG  Lung- clear bilaterally  Abd- soft, nontender, nondistended  Ext- 1+  edema bilaterally.  Skin- warm and dry      Data Review:   Recent Labs     02/23/24  0357 02/22/24  0240 02/20/24  0151   WBC 15.1* 13.4* 16.0*   HGB 9.5* 9.2* 11.0*   HCT 31.9* 31.2* 36.1*   MCV 94.7 95.4 94.0 
               Dionisio Landindorothy Lazcano.  Admission Date: 2/6/2024         Daily Progress Note: 2/24/2024    The patient's chart is reviewed and the patient is discussed with the staff.    Background:  Patient is seen at the request of Yony Shi MD  for respiratory management status post cardiac surgery.  Patient had MVCAD.   Patient had CABG x 3 including LIMA, Maze procedure, left atrial appendage clipping, Impella 5.5 LVAD insertion.      We have been asked to see in the CV-ICU for mechanical ventilation management and weaning.Had complications after the surgery with bleeding despite correction of coagulopathy and went to OR to for evacuation of retrocardiac clot which caused tamponade .    Subjective:     Looks better today. Still with some confusion. Was able to wear CPAP for about 3 hours last night. US left chest yesterday - no tap needed. Coughed up some \"green\" colored mucus per staff.  Sitting up in the chair at present.     Current Facility-Administered Medications   Medication Dose Route Frequency    sacubitril-valsartan (ENTRESTO) 24-26 MG per tablet 1 tablet  1 tablet Oral BID    furosemide (LASIX) injection 40 mg  40 mg IntraVENous Daily    amiodarone (CORDARONE) tablet 400 mg  400 mg Oral BID    melatonin tablet 6 mg  6 mg Oral Nightly    traMADol (ULTRAM) tablet 50 mg  50 mg Oral Q6H PRN    sodium chloride flush 0.9 % injection 5-40 mL  5-40 mL IntraVENous 2 times per day    sodium chloride flush 0.9 % injection 5-40 mL  5-40 mL IntraVENous PRN    ondansetron (ZOFRAN-ODT) disintegrating tablet 4 mg  4 mg Oral Q8H PRN    Or    ondansetron (ZOFRAN) injection 4 mg  4 mg IntraVENous Q6H PRN    acetaminophen (TYLENOL) tablet 650 mg  650 mg Oral Q4H PRN    potassium chloride (KLOR-CON M) extended release tablet 10 mEq  10 mEq Oral Daily    sennosides-docusate sodium (SENOKOT-S) 8.6-50 MG tablet 1 tablet  1 tablet Oral BID    polyethylene glycol (GLYCOLAX) packet 17 g  17 g Oral Daily PRN    
               Dionisio Landindorothy Lazcano.  Admission Date: 2/6/2024         Daily Progress Note: 2/25/2024    The patient's chart is reviewed and the patient is discussed with the staff.    Background:  Patient is seen at the request of Yony Shi MD  for respiratory management status post cardiac surgery.  Patient had MVCAD.   Patient had CABG x 3 including LIMA, Maze procedure, left atrial appendage clipping, Impella 5.5 LVAD insertion.      We have been asked to see in the CV-ICU for mechanical ventilation management and weaning.Had complications after the surgery with bleeding despite correction of coagulopathy and went to OR to for evacuation of retrocardiac clot which caused tamponade .    Subjective:      On RA now  No complains     Current Facility-Administered Medications   Medication Dose Route Frequency    sacubitril-valsartan (ENTRESTO) 24-26 MG per tablet 1 tablet  1 tablet Oral BID    sodium chloride (Inhalant) 3 % nebulizer solution 4 mL  4 mL Nebulization BID RT    guaiFENesin (MUCINEX) extended release tablet 1,200 mg  1,200 mg Oral BID    furosemide (LASIX) injection 40 mg  40 mg IntraVENous Daily    amiodarone (CORDARONE) tablet 400 mg  400 mg Oral BID    melatonin tablet 6 mg  6 mg Oral Nightly    traMADol (ULTRAM) tablet 50 mg  50 mg Oral Q6H PRN    sodium chloride flush 0.9 % injection 5-40 mL  5-40 mL IntraVENous 2 times per day    sodium chloride flush 0.9 % injection 5-40 mL  5-40 mL IntraVENous PRN    ondansetron (ZOFRAN-ODT) disintegrating tablet 4 mg  4 mg Oral Q8H PRN    Or    ondansetron (ZOFRAN) injection 4 mg  4 mg IntraVENous Q6H PRN    acetaminophen (TYLENOL) tablet 650 mg  650 mg Oral Q4H PRN    sennosides-docusate sodium (SENOKOT-S) 8.6-50 MG tablet 1 tablet  1 tablet Oral BID    polyethylene glycol (GLYCOLAX) packet 17 g  17 g Oral Daily PRN    magnesium hydroxide (MILK OF MAGNESIA) 400 MG/5ML suspension 30 mL  30 mL Oral Daily PRN    famotidine (PEPCID) tablet 20 mg  20 mg 
              [facility] CARDIOLOGY PROGRESS NOTE           2/22/2024 11:41 AM    Admit Date: 2/6/2024      Subjective:   No cp or inc sob    Objective:      Vitals:    02/22/24 1015 02/22/24 1030 02/22/24 1045 02/22/24 1100   BP:    (!) 140/77   Pulse: 83 83 85 84   Resp: 22 25 30 23   Temp:    98.5 °F (36.9 °C)   TempSrc:    Oral   SpO2: 99% 99% 98% 97%   Weight:       Height:           Physical Exam:  General-No Acute Distress, up in a chair    Data Review:   Recent Labs     02/20/24  0151 02/21/24  0336 02/22/24  0240    146 141   K 3.9 4.0 3.9   BUN 16 14 17   WBC 16.0*  --  13.4*   HGB 11.0*  --  9.2*   HCT 36.1*  --  31.2*     --  364       Assessment/Plan:     Principal Problem:    CAD, multiple vessel  Plan:   Active Problems:    Pre-op testing  Plan:     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 ([facility])  Plan:     PAF (paroxysmal atrial fibrillation) ([facility])  Plan:     Ischemic cardiomyopathy  Plan:     Encounter for weaning from ventilator ([facility])  Plan:     Hypoxemia  Plan:     Cardiogenic shock ([facility])  Plan:     Acute respiratory failure with hypoxia ([facility])  Plan:     Fever  Plan:     Pneumonia due to Klebsiella aerogenes ([facility])  Plan:     Mucus plugging of bronchi  Plan:     Atelectasis  Plan:     Pleural effusion  ///   For floor transfer    ALEJANDRO ZACARIAS MD  2/22/2024 11:41 AM   
          A follow up visit was made to the patient. Emotional support, spiritual presence and   prayer were provided for the patient. He was alert and sitting in a recliner. He was thankful for the prayer.      Ferdinand Cooney MDIV, [facility]    
          A follow up visit was made to the patient. Emotional support, spiritual presence and   prayer were provided for the patient. He was awake, alert and responsive. He appreciated the prayer.      Ferdinand Cooney MDIV, [facility]    
          A follow up visit was made to the patient. Emotional support, spiritual presence and   prayer were provided for the patient. The patient was awake, alert and welcoming. He shared that he is doing better. He had a friend with him, Octavio.      Ferdinand Cooney MDIV, [facility]    
          Today's Date: 2/23/2024  Date of Admission: 2/6/2024    Chart Reviewed.  Subjective:     Patient feels tired this morning. Appetite is good.     Medications Reviewed.    Objective:       Vitals:    02/23/24 0259 02/23/24 0705 02/23/24 0749 02/23/24 0754   BP: (!) 132/56  129/63    Pulse: 79 77 77 78   Resp: 24  20 20   Temp: 97.7 °F (36.5 °C)  98.4 °F (36.9 °C)    TempSrc: Oral  Oral    SpO2: 98% 98% 97% 92%   Weight:       Height:           Intake and Output  Current Shift: No intake/output data recorded.   Last 3 Shifts: 02/21 1901 - 02/23 0700  In: 348.4 [P.O.:300]  Out: 1050 [Urine:1050]    Physical Exam:  General: Well Developed, Well Nourished, No Acute Distress, Alert & Oriented x 3, Appropriate mood  Neck: supple, no JVD  Heart: S1S2 with RRR without murmurs or gallops  Lungs: Course bilaterally   Abd: soft, nontender, nondistended, with good bowel sounds  Ext: no edema bilaterally  Sternal incision: clean, dry, and intact  Skin: warm and dry    LABS  Data Review:   Recent Labs     02/22/24  0240 02/23/24  0357    139   K 3.9 4.3   MG  --  2.2   BUN 17 16   WBC 13.4* 15.1*   HGB 9.2* 9.5*   HCT 31.2* 31.9*    378       Estimated Creatinine Clearance: 99 mL/min (A) (based on SCr of 0.7 mg/dL (L)).      Assessment/Plan:       *S/P CABG x 3  On ASA, BB, statin, back on O2 after he was found to be markedly hypoxic yesterday evening, on amiodarone, had some a-fib 2/22, pacing wires removed, started on Ancef yesterday for left arm cellulitis, continue PT/OT    Active Hospital Problems    Pleural effusion  Pulmonary following       Atelectasis  IS and flutter valve      Mucus plugging of bronchi      Pneumonia due to Klebsiella aerogenes (HCC)  Completed antibiotics       Cardiogenic shock (HCC)  resolved      Acute respiratory failure with hypoxia (HCC)  Back on O2 by NC      Fever  resolved      CAD, multiple vessel  S/p CABG      Encounter for weaning from ventilator (HCC)      
          Today's Date: 2/26/2024  Date of Admission: 2/6/2024    Chart Reviewed.  Subjective:     Patient feels much better today.     Medications Reviewed.    Objective:       Vitals:    02/26/24 0615 02/26/24 0737 02/26/24 0740 02/26/24 0929   BP:  (!) 97/52  (!) 92/47   Pulse:  76 80 83   Resp:  22 18    Temp:  98.2 °F (36.8 °C)     TempSrc:  Oral     SpO2:  92% 94% 93%   Weight: 97.5 kg (214 lb 15.2 oz)      Height:           Intake and Output  Current Shift: 02/26 0701 - 02/26 1900  In: 822 [P.O.:822]  Out: 250 [Urine:250]   Last 3 Shifts: 02/24 1901 - 02/26 0700  In: 1270 [P.O.:1270]  Out: 5450 [Urine:5450]    Physical Exam:  General: Well Developed, Well Nourished, No Acute Distress, Alert & Oriented x 3, Appropriate mood  Neck: supple, no JVD  Heart: S1S2 with RRR without murmurs or gallops  Lungs: Clear throughout auscultation bilaterally without adventitious sounds  Abd: soft, nontender, nondistended, with good bowel sounds  Ext: mild edema bilaterally, left arm still edematous but less erythema  Sternal incision: clean, dry, and intact  Skin: warm and dry    LABS  Data Review:   Recent Labs     02/25/24  0411 02/26/24  0359   NA  --  140   K 4.3 4.2   MG 2.2 2.2   BUN  --  13   WBC  --  7.3   HGB  --  9.7*   HCT  --  32.7*   PLT  --  363       Estimated Creatinine Clearance: 85 mL/min (based on SCr of 0.8 mg/dL).      Assessment/Plan:      *S/P CABG x 3  On ASA, BB, statin, currently stable on room air but requiring O2 at night, on amiodarone, no further a-fib noted, pacing wires removed, started on Ancef for left arm cellulitis, continue PT/OT     Active Hospital Problems    Pleural effusion  Pulmonary following        Atelectasis  IS and flutter valve       Mucus plugging of bronchi       Pneumonia due to Klebsiella aerogenes (HCC)  Completed antibiotics        Cardiogenic shock (HCC)  resolved       Acute respiratory failure with hypoxia (HCC)  Intermittently requiring O2        Fever  resolved       
    ACUTE PHYSICAL THERAPY GOALS:   (Developed with and agreed upon by patient and/or caregiver.)  All goals ongoing and revised at current presentation   STG:  (1.)Mr. Gordon will move from supine to sit and sit to supine , scoot up and down, and roll side to side with CONTACT GUARD ASSIST within 4 treatment day(s).    (2.)Mr. Gordon will transfer from bed to chair and chair to bed with CONTACT GUARD ASSIST using the least restrictive device within 4 treatment day(s).    (3.)Mr. Gordon will ambulate with CONTACT GUARD ASSIST for 200 feet with the least restrictive device within 4 treatment day(s).      LTG:  (1.)Mr. Gordon will move from supine to sit and sit to supine , scoot up and down, and roll side to side in bed with STAND BY ASSIST within 7 treatment day(s).    (2.)Mr. Gordon will transfer from bed to chair and chair to bed with STAND BY ASSIST using the least restrictive device within 7 treatment day(s).    (3.)Mr. Gordon will ambulate with STAND BY ASSIST for 300 feet with the least restrictive device within 7 treatment day(s)    PHYSICAL THERAPY: Daily Note AM   (Link to Caseload Tracking: PT Visit Days : 3  Time In/Out PT Charge Capture  Rehab Caseload Tracker  Orders    Dionisio Gordon Jr. is a 83 y.o. male   PRIMARY DIAGNOSIS: S/P CABG x 3  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 (HCC) [I25.110]  PAF (paroxysmal atrial fibrillation) ([facility]) [I48.0]  Ischemic cardiomyopathy [I25.5]  CAD, multiple vessel [I25.10]  Procedure(s):  CHEST ULTRASOUND  4 Days Post-Op  Inpatient: Payor: MEDICARE / Plan: MEDICARE PART A AND B / Product Type: *No Product type* /     ASSESSMENT:     REHAB RECOMMENDATIONS:   Recommendation to date pending progress:  Setting:  Inpatient Rehab Facility    Equipment:    To Be Determined     ASSESSMENT:  Mr. Gordon is sitting in the recliner and agreeable to therapy.  Sit to stand with min assist to the 
   02/23/24 2213   Oxygen Therapy/Pulse Ox   O2 Device PAP (positive airway pressure)  (CPAP via resmed)   O2 Flow Rate (L/min) 6 L/min  (bled in)   Pulse 72   Respirations 16   SpO2 96 %   Skin Assessment Clean, dry, & intact     Patient placed on hospital CPAP with no complications noted at this time. RT will continue to monitor accordingly. RN notified  
   02/26/24 2300   Oxygen Therapy/Pulse Ox   O2 Therapy Oxygen   O2 Device Nasal cannula  (Pt took CPAP off-refusing)   O2 Flow Rate (L/min) 2 L/min   SpO2 96 %      Pt refused to were CPAP after about 30 minutes.  
   [facility] CARDIOLOGY PROGRESS NOTE    2/12/2024 6:03 AM    Admit Date: 2/6/2024        Subjective:   Stable overnight without angina, CHF, or palpitations.  Sinus on telemetry until about 6 AM Sunday a.m., converted back to A-fib with RVR which has been fairly persistent since that time.  Rebolused and increased amiodarone drip, adding IV digoxin.  Remains in A-fib, but transiently in sinus rhythm for a few minutes overnight per nursing.  Still on 1.5 mics dobutamine but blood pressure stable, try to wean this off this morning.  Good forward flow on Impella 5.5.  Access site right chest looks good.  Coarse right-sided breath sounds but otherwise vitals stable and controlled.  Awake and alert and appropriately responsive this morning.  No other complaints overnight. Tolerating meds well.       Objective:      Vitals:    02/12/24 0300 02/12/24 0315 02/12/24 0349 02/12/24 0449   BP:   118/86    Pulse: (!) 122 (!) 120     Resp: (!) 32 29     Temp:       TempSrc:       SpO2: 100% 100%  100%   Weight:       Height:           Physical Exam:  Neck- supple, difficult to assess JVD  CV- irregular rate and rhythm no MRG  Lung-coarse rhonchi anteriorly on the right, fairly clear anteriorly on the left, dull/decreased lateral bases but no crackles or wheezing  Abd- soft, nontender, nondistended  Ext-no distal edema  Skin- warm and dry    Data Review:   Pertinent lab and noninvasive imaging over the past 24 hours reviewed and evaluated.    Recent Labs     02/11/24  0311 02/11/24  0312 02/12/24  0258     --  142   K 3.9  --  4.1   MG  --  2.3 2.4   BUN 21  --  23   WBC  --  8.4 17.2*   HGB  --  9.4* 10.6*   HCT  --  29.4* 34.2*   PLT  --  94* 195       Assessment and Plan:     Active Hospital Problems      Cardiogenic shock (HCC)-volume status stable.  Renal function normal.  Blood pressure marginal but stable on Impella 5.5.  See below.  Recheck labs in the morning.  Wean dobutamine as tolerated today.  Recheck labs in the 
   [facility] CARDIOLOGY PROGRESS NOTE    2/10/2024 7:56 AM    Admit Date: 2/6/2024        Subjective:   Stable overnight without angina, CHF, or palpitations.  Sinus on telemetry.  Decreasing amiodarone drip today.  Still on 1.5 mics dobutamine but blood pressure stable.  Good forward flow on Impella 5.5.  Access site right chest looks good.  Still on low-dose Precedex.  Vitals stable and controlled. No other complaints overnight. Tolerating meds well.       Objective:      Vitals:    02/10/24 0600 02/10/24 0615 02/10/24 0630 02/10/24 0645   BP:       Pulse: 71 72 74 67   Resp:   24    Temp:       TempSrc:       SpO2: 100% 100% 100% 100%   Weight:       Height:           Physical Exam:  Neck- supple, difficult to assess JVD  CV- regular rate and rhythm no MRG  Lung- clear bilaterally anteriorly, dull/decreased lateral bases but no crackles or wheezing  Abd- soft, nontender, nondistended  Ext-trace distal edema  Skin- warm and dry    Data Review:   Pertinent lab and noninvasive imaging over the past 24 hours reviewed and evaluated.    Recent Labs     02/08/24  0321 02/08/24  1804 02/09/24  0257 02/09/24  1738 02/10/24  0224     --  143  --  141   K 4.0  --  4.4  --  4.0   MG 2.0  --  2.0  --   --    BUN 16  --  24*  --  24*   WBC 8.2  --  7.4 6.6 6.3   HGB 9.7*   < > 9.2* 9.8* 9.2*   HCT 28.1*   < > 28.2* 29.7* 28.5*   *  --  87* 77* 74*    < > = values in this interval not displayed.       Assessment and Plan:     Active Hospital Problems      Cardiogenic shock (HCC)-volume status stable.  Renal function normal.  Blood pressure stable and PA pressure stable in the 30s on Impella 5.5.  See below.  Recheck labs in the morning.  Wean dobutamine as tolerated today.      *CAD, multiple vessel      Encounter for weaning from ventilator (HCC)-stable, extubated, comfortable, titrate O2 as needed.      Hypoxemia-extubated and fairly comfortable.  Titrate O2 as needed.  IV Lasix as needed.      PAF (paroxysmal 
   [facility] CARDIOLOGY PROGRESS NOTE    2/13/2024 7:26 AM    Admit Date: 2/6/2024        Subjective:   Patient has converted to sinus rhythm.  He is developing worsening hypoxia in the face of worsening right middle lobe pneumonia.      Objective:      Vitals:    02/13/24 0615 02/13/24 0630 02/13/24 0645 02/13/24 0700   BP: 121/72 (!) 113/56 117/70 (!) 141/65   Pulse: (!) 114 (!) 114 (!) 119 87   Resp: 10 30 20 29   Temp:       TempSrc:       SpO2: 100% 91% 96% 90%   Weight:       Height:           Physical Exam:  Neck- supple, difficult to assess JVD  CV-regular  Lung-coarse rhonchi anteriorly on the right, fairly clear anteriorly on the left, dull/decreased lateral bases but no crackles or wheezing  Abd- soft, nontender, nondistended  Ext-no distal edema  Skin- warm and dry    Data Review:   Pertinent lab and noninvasive imaging over the past 24 hours reviewed and evaluated.    Recent Labs     02/11/24  0311 02/11/24  0312 02/11/24  0312 02/12/24  0258 02/13/24  0442 02/13/24  0702     --   --  142  --   --    K 3.9  --   --  4.1  --   --    MG  --  2.3  --  2.4  --   --    BUN 21  --   --  23  --   --    WBC  --  8.4   < > 17.2*  --  12.7*   HGB  --  9.4*   < > 10.6*  --  10.2*   HCT  --  29.4*   < > 34.2*  --  32.5*   PLT  --  94*   < > 195 163 236    < > = values in this interval not displayed.         Assessment and Plan:     Active Hospital Problems      Cardiogenic shock (HCC)-volume status stable.  Continue IV furosemide.  Awaiting renal function today.  Impella has been weaned to P2.  Pending patient's clinical status with worsening hypoxia can evaluate for possible explant today.      *CAD, multiple vessel -status post CABG      Encounter for weaning from ventilator (HCC)-stable, extubated, comfortable, titrate O2 as needed.      Hypoxemia-extubated and fairly comfortable.  Titrate O2 as needed.  IV Lasix as needed.  Appears to have worsening right middle lobe pneumonia likely related to 
   [facility] CARDIOLOGY PROGRESS NOTE    2/11/2024 7:36 AM    Admit Date: 2/6/2024        Subjective:   Stable overnight without angina, CHF, or palpitations.  Sinus on telemetry until about 6 AM, converted back to A-fib with RVR.  Rebolusing and increasing amiodarone drip today.  Still on 1.5 mics dobutamine but blood pressure stable, try to wean this off this morning.  Good forward flow on Impella 5.5.  Access site right chest looks good.  Still on low-dose Precedex.  Vitals stable and controlled.  Awake and alert and appropriately responsive this morning.  No other complaints overnight. Tolerating meds well.       Objective:      Vitals:    02/11/24 0630 02/11/24 0645 02/11/24 0701 02/11/24 0715   BP:       Pulse: (!) 111 (!) 117 (!) 109    Resp: 23 22 23    Temp:   100.2 °F (37.9 °C) 97 °F (36.1 °C)   TempSrc:   Core Axillary   SpO2: 100% 99% 100%    Weight:       Height:           Physical Exam:  Neck- supple, difficult to assess JVD  CV- irregular rate and rhythm no MRG  Lung- clear bilaterally anteriorly, dull/decreased lateral bases but no crackles or wheezing  Abd- soft, nontender, nondistended  Ext-trace distal edema  Skin- warm and dry    Data Review:   Pertinent lab and noninvasive imaging over the past 24 hours reviewed and evaluated.    Recent Labs     02/09/24  0257 02/09/24  1738 02/10/24  0224 02/11/24  0311 02/11/24  0312     --  141 144  --    K 4.4  --  4.0 3.9  --    MG 2.0  --   --   --  2.3   BUN 24*  --  24* 21  --    WBC 7.4   < > 6.3  --  8.4   HGB 9.2*   < > 9.2*  --  9.4*   HCT 28.2*   < > 28.5*  --  29.4*   PLT 87*   < > 74*  --  94*    < > = values in this interval not displayed.       Assessment and Plan:     Active Hospital Problems      Cardiogenic shock (HCC)-volume status stable.  Renal function normal.  Blood pressure marginal but stable on Impella 5.5.  See below.  Recheck labs in the morning.  Wean dobutamine as tolerated today.  Recheck labs in the morning and keep 
   [facility] CARDIOLOGY PROGRESS NOTE    2/15/2024 6:26 AM    Admit Date: 2/6/2024        Subjective:   Remains intubated and critically ill but hemodynamically fairly stable overnight.  Impella 5.5 set at P4 with systolics in the 120s.  Still on very low-dose Levophed but probably able to stop this this morning.  Awake and obeying commands on the vent.  Diffuse edema on exam today.  + 6L in>out    Objective:      Vitals:    02/15/24 0515 02/15/24 0530 02/15/24 0545 02/15/24 0619   BP:    (!) 147/72   Pulse: 78 57 56    Resp: 18 17 18    Temp:       TempSrc:       SpO2: 99% 100% 100%    Weight:   110.1 kg (242 lb 11.6 oz)    Height:           Physical Exam:  Neck- supple, difficult to assess JVD  CV- regular rate and rhythm no MRG  Lung-coarse anterolaterally bilaterally  Abd- soft, nontender, nondistended  Ext-diffuse edema to the waist, thigh pitting bilaterally, upper extremity pitting bilaterally  Skin- warm and dry    Data Review:   Pertinent lab and noninvasive imaging over the past 24 hours reviewed and evaluated.    Recent Labs     02/14/24  0214 02/15/24  0316 02/15/24  0319     --  147*   K 3.8  --  3.6   MG 2.1  --  2.5*   BUN 20  --  21   WBC 11.4* 13.2*  --    HGB 8.0* 9.5*  --    HCT 25.6* 30.3*  --     212  --        Assessment and Plan:     Active Hospital Problems       Cardiogenic shock (HCC)-diffuse edema this morning, giving Lasix orally, repeat as needed/tolerated.  Renal function normal.  Blood pressure  stable on Impella 5.5 at P4 this morning.  See below.  Recheck labs in the morning.  Wean Levophed as tolerated today.  Recheck labs in the morning and keep hemoglobin greater than 8       *CAD, multiple vessel-status post CABG.  Continue supportive care       Encounter for weaning from ventilator (HCC)-stable, extubated, comfortable, titrate O2 as needed.       Hypoxemia-reintubated, comfortable on vent but still with coarse breath sounds and diffuse edema.  IV Lasix this morning.  
  Mauri Ashraf/[facility] Critical Care Note:: 2/16/2024  Dionisio Gordon Jr.  Admission Date: 2/6/2024     Length of Stay: 10 days    Background:   Patient is seen at the request of Yony Shi MD  for respiratory management status post cardiac surgery.  Patient had CAD, multiple vessel 2/6/24.  Patient had CABG x 3 including LIMA, Maze procedure, left atrial appendage clipping, Impella 5.5 LVAD insertion.  Currently is sedated in CV-ICU and orally intubated receiving  mechanical ventilation.  We have been asked to see in the CV-ICU for mechanical ventilation management and weaning.Had complications after the surgery with bleeding despite correction of coagulopathy and went to OR to for evacuation of retrocardiac clot which caused tamponade .    Notable PMH:  has a past medical history of 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 ([facility]), CAD (coronary artery disease), Former pipe smoker, History of atrial flutter, History of bilateral knee replacement, History of kidney stones, PAF (paroxysmal atrial fibrillation) ([facility]), Pneumonia, and Uses LifeVest defibrillator.    24 Hour events:     LVAD explanted yesterday, extubated  on Airvo CXR with L sided whiteout        Review of Systems: Comprehensive ROS negative except in HPI    Lines: (insertion date)      NG/OG/NJ/NE Tube Nasogastric 12 fr Left nostril (Active)       Urinary Catheter 02/09/24 Dang (Active)     Introducer 02/06/24 Internal jugular Right (Active)       CVC Double Lumen 02/06/24 Right Internal jugular (Active)     Drips: current dose (range)  Dose (mcg/kg/hr) Dexmedetomidine: 1.5 mcg/kg/hr  Dose (mcg/hr) Fentanyl: 25 mcg/hr  Dose (mcg/min) Epinephrine: 0 mcg/min  Dose (mcg/min) Norepinephrine: 2.102 mcg/min  Dose (mcg/min) Phenylephrine : 0 mcg/min  Dose (mcg/kg/min) Dobutamine: 0 mcg/kg/min  Dose (units/hr) Heparin: 0 Units/hr  Dose (units/hr) Insulin : 0 Units/hr  Dose (mg/min) Amiodarone: 0.5 
  [facility]auri Ashraf/[facility] Critical Care Note:: 2/12/2024  Dionisio Gordon Jr.  Admission Date: 2/6/2024     Length of Stay: 6 days    Background:   Patient is seen at the request of Yony Shi MD  for respiratory management status post cardiac surgery.  Patient had CAD, multiple vessel 2/6/24.  Patient had CABG x 3 including LIMA, Maze procedure, left atrial appendage clipping, Impella 5.5 LVAD insertion.  Currently is sedated in CV-ICU and orally intubated receiving  mechanical ventilation.  We have been asked to see in the CV-ICU for mechanical ventilation management and weaning.Had complications after the surgery with bleeding despite correction of coagulopathy and went to OR to for evacuation of retrocardiac clot which caused tamponade .    Notable PMH:  has a past medical history of 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 ([facility]), CAD (coronary artery disease), Former pipe smoker, History of atrial flutter, History of bilateral knee replacement, History of kidney stones, PAF (paroxysmal atrial fibrillation) ([facility]), Pneumonia, and Uses LifeVest defibrillator.    24 Hour events:   Planning on LVAD explant however his respiratory status is tenuous, he has just come off noninvasive positive pressure ventilation which she required yesterday due to hypoxemia, he is currently on Airvo and was on 80% on the noninvasive positive pressure ventilation.  His right lower lobe infiltrate is worse.  Anesthesia has seen the patient and he is going to be intubated for explantation of the left ventricular assist device I think he will remain intubated thereafter.  Amio gtt still in RVR and AFIB, beta-blockers do not seem to be effective, he already received digoxin.  Had an aspiration event, tube feeds were suctioned from the patient's lungs, consequently he went from oxygen via nasal cannula at 3 L to Airvo and subsequently to noninvasive positive pressure ventilation 
  Mauri Ashraf/[facility] Critical Care Note:: 2/11/2024  Dionisio Gordon Jr.  Admission Date: 2/6/2024     Length of Stay: 5 days    Background:   Patient is seen at the request of Yony Shi MD  for respiratory management status post cardiac surgery.  Patient had CAD, multiple vessel 2/6/24.  Patient had CABG x 3 including LIMA, Maze procedure, left atrial appendage clipping, Impella 5.5 LVAD insertion.  Currently is sedated in CV-ICU and orally intubated receiving  mechanical ventilation.  We have been asked to see in the CV-ICU for mechanical ventilation management and weaning.Had complications after the surgery with bleeding despite correction of coagulopathy and went to OR to for evacuation of retrocardiac clot which caused tamponade .    Notable PMH:  has a past medical history of 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 ([facility]), CAD (coronary artery disease), Former pipe smoker, History of atrial flutter, History of bilateral knee replacement, History of kidney stones, PAF (paroxysmal atrial fibrillation) ([facility]), Pneumonia, and Uses LifeVest defibrillator.    24 Hour events:   On 3L NC  No fevers  On dobutamine and amio gtt  Off precedex  On impella.         Review of Systems: Comprehensive ROS negative except in HPI    Lines: (insertion date)    NG/OG/NJ/NE Tube Nasogastric 12 fr Right nostril (Active)       Urinary Catheter 02/09/24 Dang (Active)     Introducer 02/06/24 Internal jugular Right (Active)       Arterial Line 02/06/24 Radial (Active)       CVC Double Lumen 02/06/24 Right Internal jugular (Active)       Extended Dwell Peripherial IV 02/10/24 Right Basilic (Active)       Pulmonary Artery Cath Single 02/06/24 Right Internal jugular (Active)     Drips: current dose (range)  Dose (mcg/kg/hr) Dexmedetomidine: 0 mcg/kg/hr  Dose (mcg/hr) Fentanyl: 0 mcg/hr  Dose (mcg/min) Epinephrine: 0 mcg/min  Dose (mcg/min) Norepinephrine: 0 mcg/min  Dose 
  Mauri Ashraf/[facility] Critical Care Note:: 2/14/2024  Dionisio Gordon Jr.  Admission Date: 2/6/2024     Length of Stay: 8 days    Background:   Patient is seen at the request of Yony Shi MD  for respiratory management status post cardiac surgery.  Patient had CAD, multiple vessel 2/6/24.  Patient had CABG x 3 including LIMA, Maze procedure, left atrial appendage clipping, Impella 5.5 LVAD insertion.  Currently is sedated in CV-ICU and orally intubated receiving  mechanical ventilation.  We have been asked to see in the CV-ICU for mechanical ventilation management and weaning.Had complications after the surgery with bleeding despite correction of coagulopathy and went to OR to for evacuation of retrocardiac clot which caused tamponade .    Notable PMH:  has a past medical history of 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 ([facility]), CAD (coronary artery disease), Former pipe smoker, History of atrial flutter, History of bilateral knee replacement, History of kidney stones, PAF (paroxysmal atrial fibrillation) ([facility]), Pneumonia, and Uses LifeVest defibrillator.    24 Hour events:   Planning on LVAD explant however his respiratory status is tenuous, he has just come off noninvasive positive pressure ventilation which she required yesterday due to hypoxemia, he is currently on Airvo and was on 80% on the noninvasive positive pressure ventilation.  His right lower lobe infiltrate is worse.  Anesthesia has seen the patient and he is going to be intubated for explantation of the left ventricular assist device I think he will remain intubated thereafter.  Amio gtt still in RVR and AFIB, beta-blockers do not seem to be effective, he already received digoxin.  Had an aspiration event, tube feeds were suctioned from the patient's lungs, consequently he went from oxygen via nasal cannula at 3 L to Airvo and subsequently to noninvasive positive pressure ventilation 
  Mauri Ashraf/[facility] Critical Care Note:: 2/18/2024  Dionisio Gordon Jr.  Admission Date: 2/6/2024     Length of Stay: 12 days    Background:   Patient is seen at the request of Yony Shi MD  for respiratory management status post cardiac surgery.  Patient had CAD, multiple vessel 2/6/24.  Patient had CABG x 3 including LIMA, Maze procedure, left atrial appendage clipping, Impella 5.5 LVAD insertion.  Currently is sedated in CV-ICU and orally intubated receiving  mechanical ventilation.  We have been asked to see in the CV-ICU for mechanical ventilation management and weaning.Had complications after the surgery with bleeding despite correction of coagulopathy and went to OR to for evacuation of retrocardiac clot which caused tamponade .    Notable PMH:  has a past medical history of 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 ([facility]), CAD (coronary artery disease), Former pipe smoker, History of atrial flutter, History of bilateral knee replacement, History of kidney stones, PAF (paroxysmal atrial fibrillation) ([facility]), Pneumonia, and Uses LifeVest defibrillator.    24 Hour events:   Looks and feels better, L lung still \"kristy out\", some pleural effusion noted on bedside US , bronchoscopy done day before yesterday with removal of mucus.          Review of Systems: Comprehensive ROS negative except in HPI    Lines: (insertion date)      Urinary Catheter 02/09/24 Dang (Active)     Introducer 02/06/24 Internal jugular Right (Active)       CVC Double Lumen 02/06/24 Right Internal jugular (Active)     Drips: current dose (range)  Dose (mcg/kg/hr) Dexmedetomidine: 0.6 mcg/kg/hr  Dose (mcg/hr) Fentanyl: 25 mcg/hr  Dose (mcg/min) Epinephrine: 0 mcg/min  Dose (mcg/min) Norepinephrine: 2.102 mcg/min  Dose (mcg/min) Phenylephrine : 0 mcg/min  Dose (mcg/kg/min) Dobutamine: 0 mcg/kg/min  Dose (units/hr) Heparin: 0 Units/hr  Dose (units/hr) Insulin : 0 Units/hr  Dose 
  Mauri Ashraf/[facility] Critical Care Note:: 2/10/2024  Dionisio Gordon Jr.  Admission Date: 2/6/2024     Length of Stay: 4 days    Background:   Patient is seen at the request of Yony Shi MD  for respiratory management status post cardiac surgery.  Patient had CAD, multiple vessel 2/6/24.  Patient had CABG x 3 including LIMA, Maze procedure, left atrial appendage clipping, Impella 5.5 LVAD insertion.  Currently is sedated in CV-ICU and orally intubated receiving  mechanical ventilation.  We have been asked to see in the CV-ICU for mechanical ventilation management and weaning.Had complications after the surgery with bleeding despite correction of coagulopathy and went to OR to for evacuation of retrocardiac clot which caused tamponade .    Notable PMH:  has a past medical history of 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 ([facility]), CAD (coronary artery disease), Former pipe smoker, History of atrial flutter, History of bilateral knee replacement, History of kidney stones, PAF (paroxysmal atrial fibrillation) ([facility]), Pneumonia, and Uses LifeVest defibrillator.    24 Hour events:   Pt is on airvo 40%.   Currently on dobutamine as well as precedex and amio.   On impella.   Chest tubes to be removed today by CT surgery.   Febrile to 102.8 yesterday afternoon. Blood and urine 2/9 ngtd. Sputum 2/7 normal sal.  Has only gotten post op ancef. WBC wnl 2/10.         Review of Systems: Comprehensive ROS negative except in HPI    Lines: (insertion date)    Chest Tube Left Pleural 1 (Active)       Chest Tube Other (Comment) Mediastinal 2 (Active)       Chest Tube Right Pleural 3 (Active)       NG/OG/NJ/NE Tube Nasogastric 12 fr Right nostril (Active)       Urinary Catheter 02/09/24 Dang (Active)     Introducer 02/06/24 Internal jugular Right (Active)       Arterial Line 02/06/24 Radial (Active)       CVC Double Lumen 02/06/24 Right Internal jugular (Active)     
  Mauri Ashraf/[facility] Critical Care Note:: 2/19/2024  Dionisio Gordon Jr.  Admission Date: 2/6/2024     Length of Stay: 13 days    Background:   Patient is seen at the request of Yony Shi MD  for respiratory management status post cardiac surgery.  Patient had CAD, multiple vessel 2/6/24.  Patient had CABG x 3 including LIMA, Maze procedure, left atrial appendage clipping, Impella 5.5 LVAD insertion.  Currently is sedated in CV-ICU and orally intubated receiving  mechanical ventilation.  We have been asked to see in the CV-ICU for mechanical ventilation management and weaning.Had complications after the surgery with bleeding despite correction of coagulopathy and went to OR to for evacuation of retrocardiac clot which caused tamponade .    Notable PMH:  has a past medical history of 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 ([facility]), CAD (coronary artery disease), Former pipe smoker, History of atrial flutter, History of bilateral knee replacement, History of kidney stones, PAF (paroxysmal atrial fibrillation) ([facility]), Pneumonia, and Uses LifeVest defibrillator.    24 Hour events:   Looks and feels better, since tapped. On NC now. Using vest and using yankower to help clear secretions.       Review of Systems: Comprehensive ROS negative except in HPI    Lines: (insertion date)      Urinary Catheter 02/09/24 Dang (Active)          Drips: current dose (range)  Dose (mcg/kg/hr) Dexmedetomidine: 0 mcg/kg/hr  Dose (mcg/hr) Fentanyl: 25 mcg/hr  Dose (mcg/min) Epinephrine: 0 mcg/min  Dose (mcg/min) Norepinephrine: 2.102 mcg/min  Dose (mcg/min) Phenylephrine : 0 mcg/min  Dose (mcg/kg/min) Dobutamine: 0 mcg/kg/min  Dose (units/hr) Heparin: 0 Units/hr  Dose (units/hr) Insulin : 0 Units/hr  Dose (mg/min) Amiodarone: 1 mg/min     Pertinent Exam:         Blood pressure 135/68, pulse 90, temperature 98.4 °F (36.9 °C), temperature source Oral, resp. rate 18, height 1.778 m 
  Mauri Ashraf/[facility] Critical Care Note:: 2/20/2024  Dionisio Gordon Jr.  Admission Date: 2/6/2024     Length of Stay: 14 days    Background:   Patient is seen at the request of Yony Shi MD  for respiratory management status post cardiac surgery.  Patient had CAD, multiple vessel 2/6/24.  Patient had CABG x 3 including LIMA, Maze procedure, left atrial appendage clipping, Impella 5.5 LVAD insertion.  Currently is sedated in CV-ICU and orally intubated receiving  mechanical ventilation.  We have been asked to see in the CV-ICU for mechanical ventilation management and weaning.Had complications after the surgery with bleeding despite correction of coagulopathy and went to OR to for evacuation of retrocardiac clot which caused tamponade .    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    24 Hour events:   On 2L NC    Review of Systems: Comprehensive ROS negative except in HPI    Lines: (insertion date)      Urinary Catheter 02/09/24 Dang (Active)          Drips: current dose (range)  Dose (mcg/kg/hr) Dexmedetomidine: 0 mcg/kg/hr  Dose (mcg/hr) Fentanyl: 25 mcg/hr  Dose (mcg/min) Epinephrine: 0 mcg/min  Dose (mcg/min) Norepinephrine: 2.102 mcg/min  Dose (mcg/min) Phenylephrine : 0 mcg/min  Dose (mcg/kg/min) Dobutamine: 0 mcg/kg/min  Dose (units/hr) Heparin: 0 Units/hr  Dose (units/hr) Insulin : 0 Units/hr  Dose (mg/min) Amiodarone: 0.5 mg/min     Pertinent Exam:         Blood pressure 126/81, pulse 95, temperature 97.8 °F (36.6 °C), temperature source Oral, resp. rate 22, height 1.778 m (5' 10\"), weight 102.2 kg (225 lb 5 oz), SpO2 97 %.   Intake/Output Summary (Last 24 hours) at 
  Mauri Ashraf/[facility] Critical Care Note:: 2/21/2024  Dionisio Gordon Jr.  Admission Date: 2/6/2024     Length of Stay: 15 days    Background:   Patient is seen at the request of Yony Shi MD  for respiratory management status post cardiac surgery.  Patient had CAD, multiple vessel 2/6/24.  Patient had CABG x 3 including LIMA, Maze procedure, left atrial appendage clipping, Impella 5.5 LVAD insertion.  Currently is sedated in CV-ICU and orally intubated receiving  mechanical ventilation.  We have been asked to see in the CV-ICU for mechanical ventilation management and weaning.Had complications after the surgery with bleeding despite correction of coagulopathy and went to OR to for evacuation of retrocardiac clot which caused tamponade .    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), CAD (coronary artery disease), Former pipe smoker, History of atrial flutter, History of bilateral knee replacement, History of kidney stones, PAF (paroxysmal atrial fibrillation) ([facility]), Pneumonia, and Uses LifeVest defibrillator.    24 Hour events:   On 3L NC  Up in a chair     Review of Systems: Comprehensive ROS negative except in HPI    Lines: (insertion date)      Urinary Catheter 02/09/24 Dang (Active)     PICC Double Lumen 02/20/24 Right Basilic (Active)       Drips: current dose (range)  Dose (mcg/kg/hr) Dexmedetomidine: 0 mcg/kg/hr  Dose (mcg/hr) Fentanyl: 25 mcg/hr  Dose (mcg/min) Epinephrine: 0 mcg/min  Dose (mcg/min) Norepinephrine: 2.102 mcg/min  Dose (mcg/min) Phenylephrine : 0 mcg/min  Dose (mcg/kg/min) Dobutamine: 0 mcg/kg/min  Dose (units/hr) Heparin: 0 Units/hr  Dose (units/hr) Insulin : 0 Units/hr  Dose (mg/min) Amiodarone: 0 mg/min     Pertinent Exam:         Blood pressure (!) 117/92, pulse 85, temperature 97.5 °F (36.4 °C), temperature source Oral, resp. rate 15, height 1.778 m (5' 10\"), weight 93.1 kg 
  Mauri Ashraf/[facility] Critical Care Note:: 2/8/2024  Dionisio Gordon Jr.  Admission Date: 2/6/2024     Length of Stay: 2 days    Background:  Patient is seen at the request of Yony Shi MD  for respiratory management status post cardiac surgery.  Patient had CAD, multiple vessel.  Patient had CABG x 3 including LIMA, Maze procedure, left atrial appendage clipping, Impella 5.5 LVAD insertion.  Currently is sedated in CV-ICU and orally intubated receiving  mechanical ventilation.  We have been asked to see in the CV-ICU for mechanical ventilation management and weaning.Had complications after the surgery with bleeding despite correction of coagulopathy and went to OR to for evacuation of retrocardiac clot which caused tamponade .    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    24 Hour events:     On vent, sedated  Pressors- levo and dobutamine, hemodynamically unstable now in a fib RVR - on amiodarone    Impella in place     Review of Systems: Unable to obtain due to patient factors.     Lines: (insertion date)   ETT  (Active)     Chest Tube Left Pleural 1 (Active)       Chest Tube Other (Comment) Mediastinal 2 (Active)       Chest Tube Right Pleural 3 (Active)       NG/OG/NJ/NE Tube Orogastric 18 fr (Active)       Urinary Catheter 02/06/24 2 Way;Dang-Temperature (Active)     Introducer 02/06/24 Internal jugular Right (Active)       Arterial Line 02/06/24 Radial (Active)       CVC Double Lumen 02/06/24 Right Internal jugular (Active)       Pulmonary Artery Cath Single 02/06/24 Right Internal jugular (Active)     Drips: current dose (range)  Dose (mcg/kg/hr) Dexmedetomidine: 0.8 
  Mauri Ashraf/[facility] Critical Care Note:: 2/17/2024  Dionisio Gordon Jr.  Admission Date: 2/6/2024     Length of Stay: 11 days    Background:   Patient is seen at the request of Yony Shi MD  for respiratory management status post cardiac surgery.  Patient had CAD, multiple vessel 2/6/24.  Patient had CABG x 3 including LIMA, Maze procedure, left atrial appendage clipping, Impella 5.5 LVAD insertion.  Currently is sedated in CV-ICU and orally intubated receiving  mechanical ventilation.  We have been asked to see in the CV-ICU for mechanical ventilation management and weaning.Had complications after the surgery with bleeding despite correction of coagulopathy and went to OR to for evacuation of retrocardiac clot which caused tamponade .    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    24 Hour events:     LVAD explanted yesterday, extubated  on Airvo CXR with L sided whiteout, had bronchoscopy yesterday, mucus was removed successfully, chest x-ray looked better but still abnormal on the left.  Chest x-ray repeated today again shows complete whiteout of the left chest with loss of volume.  The patient is hemodynamically stable, he is comfortable, oxygenating well and in fact looks the best that I have seen him the whole week        Review of Systems: Comprehensive ROS negative except in HPI    Lines: (insertion date)      NG/OG/NJ/NE Tube Nasogastric 12 fr Left nostril (Active)       Urinary Catheter 02/09/24 Dang (Active)     Introducer 02/06/24 Internal jugular Right (Active)       CVC Double Lumen 02/06/24 Right Internal jugular (Active)     Drips: current dose (range)  Dose 
  SPEECH LANGUAGE PATHOLOGY: ATTEMPT     NAME: Dionisio Gordon Jr.  : 1940  MRN: [id number]    ADMISSION DATE: 2024  PRIMARY DIAGNOSIS: CAD, multiple vessel    Speech Therapy attempted. Pt heading to OR for procedure. NPO at this time, will re-attempt as medically appropriate.      MERON YA  2024 12:18 PM    
  SPEECH LANGUAGE PATHOLOGY: ATTEMPT   NAME: Dionisio Gordon Jr.  : 1940  MRN: [id number]    ADMISSION DATE: 2024  PRIMARY DIAGNOSIS: CAD, multiple vessel    ICD-10: Treatment Diagnosis: R13.12 Dysphagia, Oropharyngeal Phase    Speech Therapy attempted. Pt is not appropriate to be seen at this time due to respiratory status and high risk for aspiration. Per RN, patient vomiting NGT feeds earlier this date. Will hold PO at this time and re-attempt when patient is medically stable.     Isabel Knott M.S., CCC-SLP   2024 12:30 PM    
  TIMEOUT performed prior to extubation on Dionisio Gordon Jr. with RT, primary RN, and charge RN present on 2/15/2024 at 4:20 PM.     Per anesthesia team on admission, patient's intubation was no acute fracture or dislocation    ABG results as follows:  No results found for: \"IBD\"      The patient is hemodynamically stable and can demonstrate the following on a consistent basis:  follow commands , elevate head off the pillow, nods appropriately to questions.      Dr. Victor notified of weaning parameters and order to extubate obtained.     Patient extubated by (RT name) ksenia to (Type of O2 Device) Pooja at (Amount of of O2) airvo 50L 45% without complication.    
  TIMEOUT performed prior to extubation on Dionisio Gordon Jr. with RT, primary RN, and charge RN present on 2/9/2024 at 2:34 PM.     Per anesthesia team on admission, patient's intubation was no acute fracture or dislocation    ABG results as follows:    Lab Results   Component Value Date/Time    IBD 2.6 02/09/2024 11:56 AM         The patient is hemodynamically stable and can demonstrate the following on a consistent basis:  follow commands , elevate head off the pillow, nods appropriately to questions.      Dr. Almonte notified of weaning parameters and order to extubate obtained.     Patient extubated by (RT name) Oc to (Type of O2 Device) airvo at (Amount of of O2) 40L 40% without complication.    
 completed follow up visit.  Visit was brief as PT was beginning to work with pt as  arrived.  Patient expressed a positive outlook.   provided pastoral presence, prayer and empathetic listening.  Peace be with you,  Signed by  AZEB [employer]   [phone]  
2 Peripheral IVs in upper left extremity removed per redness and swelling. Redness and swelling noted at upper L groin incision site. MD notified. See orders.   
4 Eyes Skin Assessment     NAME:  Dionisio Gordon Jr.  YOB: 1940  MEDICAL RECORD NUMBER:  [id number]    The patient is being assessed for  Transfer to New Unit    I agree that at least one RN has performed a thorough Head to Toe Skin Assessment on the patient. ALL assessment sites listed below have been assessed.      Areas assessed by both nurses:    Sacrum. Buttock, Coccyx, Ischium and Legs. Feet and Heels. Allevyn placed on sacrum, blanchable pink.        Does the Patient have a Wound? No noted wound(s)       Dk Prevention initiated by RN: Yes  Wound Care Orders initiated by RN: No    Pressure Injury (Stage 3,4, Unstageable, DTI, NWPT, and Complex wounds) if present, place Wound referral order by RN under : No    New Ostomies, if present place, Ostomy referral order under : No     Nurse 1 eSignature: Electronically signed by Brenda Ames RN on 2/22/24 at 6:13 PM EST    **SHARE this note so that the co-signing nurse can place an eSignature**    Nurse 2 eSignature: {Esignature:[id number]}   
ACUTE OCCUPATIONAL THERAPY GOALS:   (Developed with and agreed upon by patient and/or caregiver.)    1. Patient will complete lower body bathing and dressing with MIN A and adaptive equipment as needed.   2.Patient will complete upper body bathing and dressing with SUPERVISION and adaptive equipment as needed.  3. Patient will complete toileting with MIN A.   4. Patient will tolerate 30 minutes of OT treatment with 1-2 rest breaks to increase activity tolerance for ADLs.   5. Patient will complete functional transfers with CGA and adaptive equipment as needed.   6. Patient will complete functional activity with SUPERVISION and adaptive equipment as needed.  7. Patient will complete simple grooming task sitting unsupported with SUPERVISION.     Timeframe: 7 visits      OCCUPATIONAL THERAPY: Daily Note AM   OT Visit Days: 2   Time In/Out  OT Charge Capture  Rehab Caseload Tracker  OT Orders    Dionisio Gordon Jr. is a 83 y.o. 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 Gordon continues to present with deficits in overall strength, activity tolerance, ADL performance, and functional mobility. Remains in CVICU; resting on 4L 02. Alert however mild confusion persists. Mod A x 2 for fx sit <> stand transfers; min to mod A x 2 x CW for short distance mobility; overall poor standing balance with short shuffled steps, forward flexed posture, narrow KIMBERLYN, and increased postural sway 
ACUTE OCCUPATIONAL THERAPY GOALS:   (Developed with and agreed upon by patient and/or caregiver.)  1. Patient will complete lower body bathing and dressing with MIN A and adaptive equipment as needed.   2.Patient will complete upper body bathing and dressing with SUPERVISION and adaptive equipment as needed.  3. Patient will complete toileting with MIN A.   4. Patient will tolerate 30 minutes of OT treatment with 1-2 rest breaks to increase activity tolerance for ADLs.   5. Patient will complete functional transfers with CGA and adaptive equipment as needed.   6. Patient will complete functional activity with SUPERVISION and adaptive equipment as needed.  7. Patient will complete simple grooming task sitting unsupported with SUPERVISION.    Timeframe: 7 visits      OCCUPATIONAL THERAPY Initial Assessment and Daily Note       OT Visit Days: 1  Acknowledge Orders  Time  OT Charge Capture  Rehab Caseload Tracker      Dionisio Gordon Jr. is a 83 y.o. 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 (HCC) [I25.110]  PAF (paroxysmal atrial fibrillation) (HCC) [I48.0]  Ischemic cardiomyopathy [I25.5]  CAD, multiple vessel [I25.10]  Procedure(s) (LRB):  BRONCHOSCOPY (N/A)  2 Days Post-Op  Reason for Referral: Generalized Muscle Weakness (M62.81)  Other lack of cordination (R27.8)  Difficulty in walking, Not elsewhere classified (R26.2)  Inpatient: Payor: MEDICARE / Plan: MEDICARE PART A AND B / Product Type: *No Product type* /     ASSESSMENT:     REHAB RECOMMENDATIONS:   Recommendation to date pending progress:  Setting:  Short-term Rehab    Equipment:    To Be Determined     ASSESSMENT:  Mr. Gordon presented to the hospital with CABG x3. Pt now on hospital day 12. Pt has had a complicated hospitalization and remains in the CVICU. At baseline, pt lives alone and is independent for bADLs, IADLs, and mobility.   Today, pt was 
ACUTE PHYSICAL THERAPY GOALS:   (Developed with and agreed upon by patient and/or caregiver.)  All goals ongoing and revised at current presentation   STG:  (1.)Mr. Gordon will move from supine to sit and sit to supine , scoot up and down, and roll side to side with CONTACT GUARD ASSIST within 4 treatment day(s).    (2.)Mr. Gordon will transfer from bed to chair and chair to bed with CONTACT GUARD ASSIST using the least restrictive device within 4 treatment day(s).    (3.)Mr. Gordon will ambulate with CONTACT GUARD ASSIST for 200 feet with the least restrictive device within 4 treatment day(s).      LTG:  (1.)Mr. Gordon will move from supine to sit and sit to supine , scoot up and down, and roll side to side in bed with STAND BY ASSIST within 7 treatment day(s).    (2.)Mr. Gordon will transfer from bed to chair and chair to bed with STAND BY ASSIST using the least restrictive device within 7 treatment day(s).    (3.)Mr. Gordon will ambulate with STAND BY ASSIST for 300 feet with the least restrictive device within 7 treatment day(s).    PHYSICAL THERAPY: Daily Note and 7th visit reeval  PM   (Link to Caseload Tracking: PT Visit Days : 1  Time In/Out PT Charge Capture  Rehab Caseload Tracker  Orders    Dionisio Gordon Jr. is a 83 y.o. 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 Gordon is sitting in the recliner on contact, agreeable to therapy.  He reports edema and 
ACUTE PHYSICAL THERAPY GOALS:   (Developed with and agreed upon by patient and/or caregiver.)  STG:  (1.)Mr. Gordon will move from supine to sit and sit to supine , scoot up and down, and roll side to side with CONTACT GUARD ASSIST within 4 treatment day(s).    (2.)Mr. Gordon will transfer from bed to chair and chair to bed with CONTACT GUARD ASSIST using the least restrictive device within 4 treatment day(s).    (3.)Mr. Gordon will ambulate with CONTACT GUARD ASSIST for 250 feet with the least restrictive device within 4 treatment day(s).      LTG:  (1.)Mr. Gordon will move from supine to sit and sit to supine , scoot up and down, and roll side to side in bed with STAND BY ASSIST within 7 treatment day(s).    (2.)Mr. Gordon will transfer from bed to chair and chair to bed with STAND BY ASSIST using the least restrictive device within 7 treatment day(s).    (3.)Mr. Gordon will ambulate with STAND BY ASSIST for 500 feet with the least restrictive device within 7 treatment day(s).    PHYSICAL THERAPY: Daily Note AM   (Link to Caseload Tracking: PT Visit Days : 2  Time In/Out PT Charge Capture  Rehab Caseload Tracker  Orders    Dionisio Gordon Jr. is a 83 y.o. 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 Gordon is sitting with the bed in the chair position.  Agreeable to therapy.  Seems a bit anxious.  Patient is assisted to the edge of the chair in 
ACUTE PHYSICAL THERAPY GOALS:   (Developed with and agreed upon by patient and/or caregiver.)  STG:  (1.)Mr. Gordon will move from supine to sit and sit to supine , scoot up and down, and roll side to side with CONTACT GUARD ASSIST within 4 treatment day(s).    (2.)Mr. Gordon will transfer from bed to chair and chair to bed with CONTACT GUARD ASSIST using the least restrictive device within 4 treatment day(s).    (3.)Mr. Gordon will ambulate with CONTACT GUARD ASSIST for 250 feet with the least restrictive device within 4 treatment day(s).      LTG:  (1.)Mr. Gordon will move from supine to sit and sit to supine , scoot up and down, and roll side to side in bed with STAND BY ASSIST within 7 treatment day(s).    (2.)Mr. Gordon will transfer from bed to chair and chair to bed with STAND BY ASSIST using the least restrictive device within 7 treatment day(s).    (3.)Mr. Gordon will ambulate with STAND BY ASSIST for 500 feet with the least restrictive device within 7 treatment day(s).    PHYSICAL THERAPY: Daily Note AM   (Link to Caseload Tracking: PT Visit Days : 2  Time In/Out PT Charge Capture  Rehab Caseload Tracker  Orders    Dionisio Gordon Jr. is a 83 y.o. 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 Gordon was supine upon contact and agreeable to PT. Patient is on airvo 40L/40%. Patient presents pleasantly confused. Patient was transitioned into chair 
ACUTE PHYSICAL THERAPY GOALS:   (Developed with and agreed upon by patient and/or caregiver.)  STG:  (1.)Mr. Gordon will move from supine to sit and sit to supine , scoot up and down, and roll side to side with CONTACT GUARD ASSIST within 4 treatment day(s).    (2.)Mr. Gordon will transfer from bed to chair and chair to bed with CONTACT GUARD ASSIST using the least restrictive device within 4 treatment day(s).    (3.)Mr. Gordon will ambulate with CONTACT GUARD ASSIST for 250 feet with the least restrictive device within 4 treatment day(s).      LTG:  (1.)Mr. Gordon will move from supine to sit and sit to supine , scoot up and down, and roll side to side in bed with STAND BY ASSIST within 7 treatment day(s).    (2.)Mr. Gordon will transfer from bed to chair and chair to bed with STAND BY ASSIST using the least restrictive device within 7 treatment day(s).    (3.)Mr. Gordon will ambulate with STAND BY ASSIST for 500 feet with the least restrictive device within 7 treatment day(s).    PHYSICAL THERAPY: Daily Note AM   (Link to Caseload Tracking: PT Visit Days : 4  Time In/Out PT Charge Capture  Rehab Caseload Tracker  Orders    Dionisio Gordon Jr. is a 83 y.o. 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 Gordon is sitting in the recliner.  Agreeable to therapy.  Son visits.   Sit to stand with mod assist x 2 to the cardiac walker.  His posture is forward 
ACUTE PHYSICAL THERAPY GOALS:   (Developed with and agreed upon by patient and/or caregiver.)  STG:  (1.)Mr. Gordon will move from supine to sit and sit to supine , scoot up and down, and roll side to side with CONTACT GUARD ASSIST within 4 treatment day(s).    (2.)Mr. Gordon will transfer from bed to chair and chair to bed with CONTACT GUARD ASSIST using the least restrictive device within 4 treatment day(s).    (3.)Mr. Gordon will ambulate with CONTACT GUARD ASSIST for 250 feet with the least restrictive device within 4 treatment day(s).      LTG:  (1.)Mr. Gordon will move from supine to sit and sit to supine , scoot up and down, and roll side to side in bed with STAND BY ASSIST within 7 treatment day(s).    (2.)Mr. Gordon will transfer from bed to chair and chair to bed with STAND BY ASSIST using the least restrictive device within 7 treatment day(s).    (3.)Mr. Gordon will ambulate with STAND BY ASSIST for 500 feet with the least restrictive device within 7 treatment day(s).    PHYSICAL THERAPY: Daily Note PM   (Link to Caseload Tracking: PT Visit Days : 3  Time In/Out PT Charge Capture  Rehab Caseload Tracker  Orders    Dionisio Gordon Jr. is a 83 y.o. 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 Gordon is sitting in the recliner.  Agreeable to therapy.  OT present.  Son visits.   Patient is assisted to the edge of the chair in preparation for sit 
ACUTE PHYSICAL THERAPY GOALS:   (Developed with and agreed upon by patient and/or caregiver.)  STG:  (1.)Mr. Gordon will move from supine to sit and sit to supine , scoot up and down, and roll side to side with CONTACT GUARD ASSIST within 4 treatment day(s).    (2.)Mr. Gordon will transfer from bed to chair and chair to bed with CONTACT GUARD ASSIST using the least restrictive device within 4 treatment day(s).    (3.)Mr. Gordon will ambulate with CONTACT GUARD ASSIST for 250 feet with the least restrictive device within 4 treatment day(s).      LTG:  (1.)Mr. Gordon will move from supine to sit and sit to supine , scoot up and down, and roll side to side in bed with STAND BY ASSIST within 7 treatment day(s).    (2.)Mr. Gordon will transfer from bed to chair and chair to bed with STAND BY ASSIST using the least restrictive device within 7 treatment day(s).    (3.)Mr. Gordon will ambulate with STAND BY ASSIST for 500 feet with the least restrictive device within 7 treatment day(s).    PHYSICAL THERAPY: Daily Note PM   (Link to Caseload Tracking: PT Visit Days : 4  Time In/Out PT Charge Capture  Rehab Caseload Tracker  Orders    Dionisio Gordon Jr. is a 83 y.o. 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 Gordon is sitting in the recliner.  Agreeable to therapy.  Son visits.   Sit to stand with mod assist x 2 to the cardiac walker.  His posture is forward 
ACUTE PHYSICAL THERAPY GOALS:   (Developed with and agreed upon by patient and/or caregiver.)  STG:  (1.)Mr. Gordon will move from supine to sit and sit to supine , scoot up and down, and roll side to side with CONTACT GUARD ASSIST within 4 treatment day(s).    (2.)Mr. Gordon will transfer from bed to chair and chair to bed with CONTACT GUARD ASSIST using the least restrictive device within 4 treatment day(s).    (3.)Mr. Gordon will ambulate with CONTACT GUARD ASSIST for 250 feet with the least restrictive device within 4 treatment day(s).      LTG:  (1.)Mr. Gordon will move from supine to sit and sit to supine , scoot up and down, and roll side to side in bed with STAND BY ASSIST within 7 treatment day(s).    (2.)Mr. Gordon will transfer from bed to chair and chair to bed with STAND BY ASSIST using the least restrictive device within 7 treatment day(s).    (3.)Mr. Gordon will ambulate with STAND BY ASSIST for 500 feet with the least restrictive device within 7 treatment day(s).    PHYSICAL THERAPY: Daily Note PM   (Link to Caseload Tracking: PT Visit Days : 5  Time In/Out PT Charge Capture  Rehab Caseload Tracker  Orders    Dionisio Gordon Jr. is a 83 y.o. 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 Gordon is sitting in the recliner.  Agreeable to therapy.  Son visits at the end of the treatment session.   Sit to stand with mod assist x 2 to the 
ACUTE PHYSICAL THERAPY GOALS:   STERNAL PRECAUTIONS :   (Developed with and agreed upon by patient and/or caregiver.)    STG:  (1.)Mr. Gordon will move from supine to sit and sit to supine , scoot up and down, and roll side to side with CONTACT GUARD ASSIST within 4 treatment day(s).    (2.)Mr. Gordon will transfer from bed to chair and chair to bed with CONTACT GUARD ASSIST using the least restrictive device within 4 treatment day(s).    (3.)Mr. Gordon will ambulate with CONTACT GUARD ASSIST for 250 feet with the least restrictive device within 4 treatment day(s).     LTG:  (1.)Mr. Gordon will move from supine to sit and sit to supine , scoot up and down, and roll side to side in bed with STAND BY ASSIST within 7 treatment day(s).    (2.)Mr. Gordon will transfer from bed to chair and chair to bed with STAND BY ASSIST using the least restrictive device within 7 treatment day(s).    (3.)Mr. Gordon will ambulate with STAND BY ASSIST for 500 feet with the least restrictive device within 7 treatment day(s).  ________________________________________________________________________________________________     PHYSICAL THERAPY Initial Assessment and AM  (Link to Caseload Tracking: PT Visit Days : 1  Acknowledge Orders  Time In/Out  PT Charge Capture  Rehab Caseload Tracker    Dionisio Gordon Jr. is a 83 y.o. 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 
ACUTE PHYSICAL THERAPY GOALS:   STERNAL PRECAUTIONS :   (Developed with and agreed upon by patient and/or caregiver.)  All goals ongoing 2/17/2024  STG:  (1.)Mr. Gordon will move from supine to sit and sit to supine , scoot up and down, and roll side to side with CONTACT GUARD ASSIST within 4 treatment day(s).    (2.)Mr. Gordon will transfer from bed to chair and chair to bed with CONTACT GUARD ASSIST using the least restrictive device within 4 treatment day(s).    (3.)Mr. Gordon will ambulate with CONTACT GUARD ASSIST for 250 feet with the least restrictive device within 4 treatment day(s).     LTG:  (1.)Mr. Gordon will move from supine to sit and sit to supine , scoot up and down, and roll side to side in bed with STAND BY ASSIST within 7 treatment day(s).    (2.)Mr. Gordon will transfer from bed to chair and chair to bed with STAND BY ASSIST using the least restrictive device within 7 treatment day(s).    (3.)Mr. Gordon will ambulate with STAND BY ASSIST for 500 feet with the least restrictive device within 7 treatment day(s).  ________________________________________________________________________________________________     PHYSICAL THERAPY Daily Note and PM  (Link to Caseload Tracking: PT Visit Days : 1  Acknowledge Orders  Time In/Out  PT Charge Capture  Rehab Caseload Tracker    Dionisio Gordon Jr. is a 83 y.o. 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 
CTS- H&P current.  
CTS- continued bleeding despite correction of coagulopathy, tampanode at bedside requiring opening incision at bedside, to OR for exploration   
CV Progress Note    Admit Date: 2/6/2024    POD 1    Subjective:     Patient present conditions: Awake, Alert and Cooperative.    Review of Systems   b0  Cardiac: Vital signs stable. Lines in  Respiratory: Chest x-ray clear.  Minimal chest tube drainage.  extubated  Neuro: Moves all four extremities.  Incision: Dry.  GI: Taking liquids.    Objective:     Vitals:  Blood pressure 115/68, pulse (!) 125, temperature 99.6 °F (37.6 °C), temperature source Core, resp. rate 20, height 1.803 m (5' 10.98\"), weight 110.5 kg (243 lb 9.7 oz), SpO2 99 %.    I/O:  02/11 0701 - 02/11 1900  In: 1228.1 [I.V.:708.4]  Out: 370 [Urine:370]  02/09 1901 - 02/11 0700  In: 2800.7 [I.V.:1813.5]  Out: 1755 [Urine:1485]    Heart: No Murmur.  Lung: Working with IS.   Neuro: Cooperative.  Incisions: Dry.  arECG/Telemetry: Unchanged from Pre-Op    Labs:  Recent Results (from the past 12 hour(s))   POCT activated clotting time    Collection Time: 02/11/24 12:22 AM   Result Value Ref Range    Activated Clotting Time 163 (H) 70 - 128 SECS   Basic Metabolic Panel    Collection Time: 02/11/24  3:11 AM   Result Value Ref Range    Sodium 144 136 - 146 mmol/L    Potassium 3.9 3.5 - 5.1 mmol/L    Chloride 118 (H) 103 - 113 mmol/L    CO2 22 21 - 32 mmol/L    Anion Gap 4 2 - 11 mmol/L    Glucose 134 (H) 65 - 100 mg/dL    BUN 21 8 - 23 MG/DL    Creatinine 0.60 (L) 0.8 - 1.5 MG/DL    Est, Glom Filt Rate >60 >60 ml/min/1.73m2    Calcium 7.9 (L) 8.3 - 10.4 MG/DL   CBC with Auto Differential    Collection Time: 02/11/24  3:12 AM   Result Value Ref Range    WBC 8.4 4.3 - 11.1 K/uL    RBC 3.33 (L) 4.23 - 5.6 M/uL    Hemoglobin 9.4 (L) 13.6 - 17.2 g/dL    Hematocrit 29.4 (L) 41.1 - 50.3 %    MCV 88.3 82 - 102 FL    MCH 28.2 26.1 - 32.9 PG    MCHC 32.0 31.4 - 35.0 g/dL    RDW 16.4 (H) 11.9 - 14.6 %    Platelets 94 (L) 150 - 450 K/uL    MPV 12.1 9.4 - 12.3 FL    nRBC 0.06 0.0 - 0.2 K/uL    Neutrophils % 74 43 - 78 %    Lymphocytes % 9 (L) 13 - 44 %    Monocytes % 14 
CV Progress Note    Admit Date: 2/6/2024    POD 1    Subjective:     Patient present conditions: Awake, Alert and Cooperative.    Review of Systems   b0  Cardiac: Vital signs stable. Lines in  Respiratory: Chest x-ray clear.  Minimal chest tube drainage.  extubated  Neuro: Moves all four extremities.  Incision: Dry.  GI: Taking tube feedings    Objective:     Vitals:  Blood pressure 102/75, pulse 70, temperature 100.4 °F (38 °C), resp. rate 24, height 1.803 m (5' 10.98\"), weight 110.5 kg (243 lb 9.7 oz), SpO2 100 %.    I/O:  02/10 0701 - 02/10 1900  In: 334.6 [I.V.:334.6]  Out: -   02/08 1901 - 02/10 0700  In: 3494.9 [I.V.:3139.9]  Out: 2625 [Urine:1885]    Heart: No Murmur.  Lung: Working with IS.   Neuro: Cooperative.  Incisions: Dry.  arECG/Telemetry: Unchanged from Pre-Op  Remains in sinus     Labs:  Recent Results (from the past 12 hour(s))   POCT activated clotting time    Collection Time: 02/09/24 10:10 PM   Result Value Ref Range    Activated Clotting Time 174 (H) 70 - 128 SECS   PLEASE READ & DOCUMENT PPD TEST IN 72 HRS    Collection Time: 02/09/24 11:30 PM   Result Value Ref Range    PPD, (POC) Negative     mm Induration 0 0 - 5 mm   CBC with Auto Differential    Collection Time: 02/10/24  2:24 AM   Result Value Ref Range    WBC 6.3 4.3 - 11.1 K/uL    RBC 3.28 (L) 4.23 - 5.6 M/uL    Hemoglobin 9.2 (L) 13.6 - 17.2 g/dL    Hematocrit 28.5 (L) 41.1 - 50.3 %    MCV 86.9 82 - 102 FL    MCH 28.0 26.1 - 32.9 PG    MCHC 32.3 31.4 - 35.0 g/dL    RDW 16.3 (H) 11.9 - 14.6 %    Platelets 74 (L) 150 - 450 K/uL    MPV 11.4 9.4 - 12.3 FL    nRBC 0.05 0.0 - 0.2 K/uL    Differential Type AUTOMATED      Neutrophils % 71 43 - 78 %    Lymphocytes % 13 13 - 44 %    Monocytes % 15 (H) 4.0 - 12.0 %    Eosinophils % 0 (L) 0.5 - 7.8 %    Basophils % 0 0.0 - 2.0 %    Immature Granulocytes 1 0.0 - 5.0 %    Neutrophils Absolute 4.4 1.7 - 8.2 K/UL    Lymphocytes Absolute 0.8 0.5 - 4.6 K/UL    Monocytes Absolute 1.0 0.1 - 1.3 K/UL    
Cardiac Rehab: Spoke with patient regarding referral to cardiac rehab. Patient meets admission criteria based on CABGx3 (2/6/24).  Written information about Cardiac Rehab given and reviewed with patient. Discussed lifestyle modifications to promote cardiac wellness. Patient indicated that he may want to participate in the cardiac rehab program after he is discharged from [facility]. We will follow up with him after he is discharged to home from [facility] as requested. His Cardiologist is Dr. Ma.      Thank you,  JASMINA FloresN, RN  Cardiopulmonary Rehabilitation Nurse Liaison  [facility]    
Comprehensive Nutrition Assessment    Type and Reason for Visit: Reassess  Tube Feeding Management (Cardiothoracic Surgery).Order from yesterday has dropped off. Was on trickle feeds peptide based 10ml/h and 30ml flushes every 8 hours. Please evaluate. (RN generated). Re-consulted for TF management 2/13.    Nutrition Recommendations/Plan:   Meals and Snacks:  Diet: Continue NPO and advance as medically appropriate  Nutrition Supplement Therapy:  Medical food supplement therapy:  None  NPO  If to remain NPO, will need to replace EN access vs consider PN for primary needs.     Malnutrition Assessment:2/8  Malnutrition Status: At risk for malnutrition (Comment) (prolonged admission with limited nutrition)  NFPE: No fat or muscle loss      Nutrition Assessment:  Nutrition History: 2/8: Unable to obtain on vent  2/9: Unable to obtain, recent extubation  2/11: Pt denies any problems with appetite or intake PTA.        Weight History: 7/13/23 209#, 8/9/23 200#, 11/7/23 209#, 2/5/24 205#    Nutrition Background:       PMH: TRINA,CAD.Initially developed fatigue and insomnia around October 2023. He was treated for sinusitis but continued to feel poorly. He presented to the ER with progressive dyspnea. He was treated for pneumonia. Once again, he did not improve with treatment and presented back to the ER. He was found to be in atrial flutter with RVR. Chest xray showed pulmonary vascular congestion. He was admitted and evaluated by cardiology. Echocardiogram showed a severely reduced LV EF of 20-25% with moderate MR. He underwent atrial flutter ablation. A repeat limited echo showed LV EF unchanged with mild MR. Ischemic evaluation was then planned. He underwent cardiac catheterization that showed severe multivessel disease with occluded LAD.   Admitted S/P CABG 2/6, on impella and vent post-op. Returned to surgery 2/7 for evacuation of clot. S/p bronch with mucus plugging and TF suctioned out 2/14.   Nutrition Interval:  OGT 
Comprehensive Nutrition Assessment    Type and Reason for Visit: Reassess  Tube Feeding Management (Cardiothoracic Surgery).Order from yesterday has dropped off. Was on trickle feeds peptide based 10ml/h and 30ml flushes every 8 hours. Please evaluate. (RN generated). Re-consulted for TF management 2/13.  Re-consult for TF 2/16 (cardio thoracic surgery)    Nutrition Recommendations/Plan:   Enteral Nutrition:   Enteral Access: Nasogastric  Initiate  Formula: Peptide Based (Vital AF 1.2 [place])  Goal Rate: Continuous 65 ml/hr  Initiate  Water flush  100 ml every 6 hours  Modulars: None not indicated at this time   Enteral regimen at above goal to provide per 24 hours:  1716 calories, 107 grams protein and 1560 ml free fluid.    Above regimen: Intended to meet macronutrient goals Restricted fluid provisions related to  volume status  IV Fluids:  Not applicable  Labs:   EN labs: BMP daily, Mg daily x 3 days at initiation then MWF and Phos daily x 3 days at initiation then MWF.     POC Glucoses/SSI Active  Nutrition Related Medication Management:  Electrolyte Replacement Protocol PRN Continue for Potassium, Phosphorus, and Magnesium  Thiamine 100 mg daily x 7 days (EOT 2/22)  Bowel Regimen Per MD  Meals and Snacks:  Continue current diet. NPO pending SLP evaluation     Malnutrition Assessment:2/8  Malnutrition Status: At risk for malnutrition (Comment) (prolonged admission with limited nutrition)  NFPE: No fat or muscle loss      Nutrition Assessment:  Nutrition History: 2/8: Unable to obtain on vent  2/9: Unable to obtain, recent extubation  2/11: Pt denies any problems with appetite or intake PTA.        Weight History: 7/13/23 209#, 8/9/23 200#, 11/7/23 209#, 2/5/24 205#    Nutrition Background:       PMH: TRINA,CAD.Initially developed fatigue and insomnia around October 2023. He was treated for sinusitis but continued to feel poorly. He presented to the ER with progressive dyspnea. He was treated for pneumonia. Once 
Comprehensive Nutrition Assessment    Type and Reason for Visit: Reassess  Tube Feeding Management (Cardiothoracic Surgery).Order from yesterday has dropped off. Was on trickle feeds peptide based 10ml/h and 30ml flushes every 8 hours. Please evaluate. (RN generated). Re-consulted for TF management 2/13.  Re-consult for TF 2/16 (cardio thoracic surgery)    Nutrition Recommendations/Plan:   Meals and Snacks:  Diet: Continue current order  Nutrition Supplement Therapy:  Medical food supplement therapy:  Initiate Ensure High Protein three times per day (this provides 160 kcal and 16 grams protein per bottle) and Magic Cup three times per day (this provides 290 kcal and 9 grams protein per serving)  Tube feeding discontinued.      Malnutrition Assessment:2/8  Malnutrition Status: At risk for malnutrition (Comment) (prolonged admission with limited nutrition)  NFPE: No fat or muscle loss      Nutrition Assessment:  Nutrition History: 2/8: Unable to obtain on vent  2/9: Unable to obtain, recent extubation  2/11: Pt denies any problems with appetite or intake PTA.        Weight History: 7/13/23 209#, 8/9/23 200#, 11/7/23 209#, 2/5/24 205#    Nutrition Background:       PMH: TRINA,CAD.Initially developed fatigue and insomnia around October 2023. He was treated for sinusitis but continued to feel poorly. He presented to the ER with progressive dyspnea. He was treated for pneumonia. Once again, he did not improve with treatment and presented back to the ER. He was found to be in atrial flutter with RVR. Chest xray showed pulmonary vascular congestion. He was admitted and evaluated by cardiology. Echocardiogram showed a severely reduced LV EF of 20-25% with moderate MR. He underwent atrial flutter ablation. A repeat limited echo showed LV EF unchanged with mild MR. Ischemic evaluation was then planned. He underwent cardiac catheterization that showed severe multivessel disease with occluded LAD.   Admitted S/P CABG 2/6, on 
Discharge instructions, follow up appointments and prescriptions reviewed with patient and family. Both verbalize understanding. All personal belongings taken with patient. Pt left via transport. Patient is stable at discharge.      Iv removed  
Discharge instructions, follow up appointments and prescriptions reviewed with patient and family. Both verbalize understanding. All personal belongings taken with patient. Pt left via transport. Patient is stable at discharge.      Iv removed    Awaiting call back from Lamar ARZATE at [facility].  
Dobutamine was started before going to the OR for impella removal, and he started to have some hypotension and the dobutamine was stopped.  He was intubated successfully and remained tachycardic, hypotensive, so impella removal was aborted.  HR is now 127.  T    here are now R-sided rhonchi and he is requiring 70% FiO2 with an O2 sat of 90%.    Precedex is ongoing for sedation, but he is very awake.  It is being uptitrated.      Patient is on appropriate antibiotics currently, but having more hemodynamic instability and hypoxia.    Will perform cleanout bronch and send cultures from bronch wash.      Maddy Victor MD       
Dr. Pedroza at bedside rounding on patient. Verbal order received to administer 0900 dose of furosemide 40mg IV at this time due to pt edema.   
Emergent Bronch done with Cultures sent. See MD notes  
GOALS:  LTG: Patient will maintain adequate hydration/nutrition with optimum safety and efficiency of swallowing function with PO intake without overt signs or symptoms of aspiration for the highest appropriate diet level.  STG:  Patient will consume soft and bite-sized textures and thin liquids without overt signs or symptoms of airway compromise.  Patient will perform small bites and sips, no straws, and slow rate of PO intake to improve swallow safety with minimal cues 100% of the time.  Patient will safely ingest diet trials during therapeutic feedings with SLP without overt signs or symptoms of respiratory compromise in efforts to advance diet.  Patient will complete a Modified Barium Swallow study to fully assess physiology and anatomy of the swallow and determine safest appropriate diet and/or rehabilitation strategies, as medically indicated. COMPLETED 24    SPEECH LANGUAGE PATHOLOGY:   Daily Note #1    Acknowledge Order  I  Therapy Time  I   Charges     I  Rehab Caseload Tracker  NAME: Dionisio Gordon Jr.  : 1940  MRN: [id number]    ADMISSION DATE: 2024  PRIMARY DIAGNOSIS: CAD, multiple vessel    ICD-10: Treatment Diagnosis: R13.12 Dysphagia, Oropharyngeal Phase    RECOMMENDATIONS   Diet:    Soft and Bite-Sized  Thin Liquids    Medication: whole floated in puree or applesauce   Compensatory Swallowing Strategies:   Fully awake/alert  Upright for all PO  Small bites and sips  Slow rate of PO intake  Cup/sip  No straws  Remain upright for 20-30 min after any PO   Therapeutic Intervention:   Patient/family education  Dysphagia treatment  RN educated on recommendations    Patient continues to require skilled intervention:  Yes. Recommend ongoing speech therapy services during this hospitalization.     Anticipated Discharge Needs: Ongoing speech therapy is recommended at next level of care.      ASSESSMENT    Patient continues to present with mild to moderate oropharyngeal dysphagia 
GOALS:  LTG: Patient will maintain adequate hydration/nutrition with optimum safety and efficiency of swallowing function with PO intake without overt signs or symptoms of aspiration for the highest appropriate diet level.  STG:  Patient will consume soft and bite-sized textures and thin liquids without overt signs or symptoms of airway compromise.  Patient will perform small bites and sips, no straws, and slow rate of PO intake to improve swallow safety with minimal cues 100% of the time.  Patient will safely ingest diet trials during therapeutic feedings with SLP without overt signs or symptoms of respiratory compromise in efforts to advance diet.  Patient will complete a Modified Barium Swallow study to fully assess physiology and anatomy of the swallow and determine safest appropriate diet and/or rehabilitation strategies, as medically indicated. COMPLETED 24  Patient will improve breath support via use of diaphragmatic breathing with verbal/visual cues and 100% accuracy. NEW 24     LTG: Patient will increase neuro-linguistic abilities to increase safety and awareness in functional living environment. NEW 24  STG: Patient will utilize memory compensatory strategies to improve recall of functional information with 80% accuracy given moderate assistance  STG: Patient will utilize orientation compensatory strategies (visual aids/calendars) to increase awareness of current situation/environment with 80% accuracy given moderate assistance.   STG: Patient will participate in cognitve-linguistic rehabilitation to address deficits in orientation, memory, and problem solving with 75% accuracy given moderate assistance.     SPEECH LANGUAGE PATHOLOGY: COMBINED DYSPHAGIA and COGNITIVE-COMMUNICATIVE Initial Assessment and Daily Note #4    Acknowledge Order  I  Therapy Time  I   Charges     I  Rehab Caseload Tracker    NAME: Dionisio Gordon JrOly  : 1940  MRN: [id number]    ADMISSION DATE: 
GOALS:  LTG: Patient will maintain adequate hydration/nutrition with optimum safety and efficiency of swallowing function with PO intake without overt signs or symptoms of aspiration for the highest appropriate diet level.  STG:  Patient will consume soft and bite-sized textures and thin liquids without overt signs or symptoms of airway compromise.  Patient will perform small bites and sips, no straws, and slow rate of PO intake to improve swallow safety with minimal cues 100% of the time.  Patient will safely ingest diet trials during therapeutic feedings with SLP without overt signs or symptoms of respiratory compromise in efforts to advance diet.  Patient will complete a Modified Barium Swallow study to fully assess physiology and anatomy of the swallow and determine safest appropriate diet and/or rehabilitation strategies, as medically indicated. COMPLETED 24  Patient will improve breath support via use of diaphragmatic breathing with verbal/visual cues and 100% accuracy. NEW 24    LTG: Patient will increase neuro-linguistic abilities to increase safety and awareness in functional living environment. NEW 24  STG: Patient will utilize memory compensatory strategies to improve recall of functional information with 80% accuracy given moderate assistance  STG: Patient will utilize orientation compensatory strategies (visual aids/calendars) to increase awareness of current situation/environment with 80% accuracy given moderate assistance.        SPEECH LANGUAGE PATHOLOGY:   Daily Note #3    Acknowledge Order  I  Therapy Time  I   Charges     I  Rehab Caseload Tracker  NAME: Dionisio Gordon Jr.  : 1940  MRN: [id number]    ADMISSION DATE: 2024  PRIMARY DIAGNOSIS: CAD, multiple vessel    ICD-10: Treatment Diagnosis: R13.12 Dysphagia, Oropharyngeal Phase    RECOMMENDATIONS   Diet:    Soft and Bite-Sized  Thin Liquids    Medication: whole floated in puree or applesauce   Compensatory Swallowing 
GOALS:  LTG: Patient will maintain adequate hydration/nutrition with optimum safety and efficiency of swallowing function with PO intake without overt signs or symptoms of aspiration for the highest appropriate diet level.  STG:  Patient will consume soft and bite-sized textures and thin liquids without overt signs or symptoms of airway compromise.  Patient will perform small bites and sips, no straws, and slow rate of PO intake to improve swallow safety with minimal cues 100% of the time.  Patient will safely ingest diet trials during therapeutic feedings with SLP without overt signs or symptoms of respiratory compromise in efforts to advance diet.  Patient will complete a Modified Barium Swallow study to fully assess physiology and anatomy of the swallow and determine safest appropriate diet and/or rehabilitation strategies, as medically indicated. COMPLETED 24  Patient will improve breath support via use of diaphragmatic breathing with verbal/visual cues and 100% accuracy. NEW 24    SPEECH LANGUAGE PATHOLOGY:   Daily Note #2    Acknowledge Order  I  Therapy Time  I   Charges     I  Rehab Caseload Tracker  NAME: Dionisio Gordon Jr.  : 1940  MRN: [id number]    ADMISSION DATE: 2024  PRIMARY DIAGNOSIS: CAD, multiple vessel    ICD-10: Treatment Diagnosis: R13.12 Dysphagia, Oropharyngeal Phase    RECOMMENDATIONS   Diet:    Soft and Bite-Sized  Thin Liquids    Medication: whole floated in puree or applesauce   Compensatory Swallowing Strategies:   Fully awake/alert  Upright for all PO  Small bites and sips  Slow rate of PO intake  Cup/sip  No straws  Remain upright for 20-30 min after any PO   Therapeutic Intervention:   Patient/family education  Dysphagia treatment  RN educated on recommendations    Patient continues to require skilled intervention:  Yes. Recommend ongoing speech therapy services during this hospitalization.     Anticipated Discharge Needs: Ongoing speech therapy is recommended 
GOALS:  LTG: Patient will tolerate safest, least restrictive oral diet without signs or symptoms of airway compromise.    STG: Patient will tolerate ongoing po trials in efforts to advance diet.  STG: Patient will participate in instrumental swallowing assessment to objectively assess oropharyngeal swallow if clinically indicated.    SPEECH LANGUAGE PATHOLOGY: DYSPHAGIA Initial Assessment    Acknowledge Order  I  Therapy Time  I   Charges     I  Rehab Caseload Tracker      NAME: Dionisio Gordon Jr.  : 1940  MRN: [id number]    ADMISSION DATE: 2024  PRIMARY DIAGNOSIS: CAD, multiple vessel    ICD-10: Treatment Diagnosis: R13.12 Dysphagia, Oropharyngeal Phase    RECOMMENDATIONS   Diet:    NPO       Medication: non-oral   Compensatory Swallowing Strategies:   Upright as possible for all oral intake   Therapeutic Intervention:   Patient/family education  Dysphagia treatment   Patient continues to require skilled intervention:  Yes. Recommend ongoing speech therapy services during this hospitalization.     Anticipated Discharge Needs: Do not anticipate ongoing speech therapy needs upon discharge.      ASSESSMENT    Patient presents with decreased mental status impacting safe swallowing and participation in assessment. Delayed bolus manipulation with significant oral holding (15+ seconds) with ice chip and puree. Multiple swallows in attempt to clear. No overt s/sx, however overall decreased response places patient at high risk for aspiration. Per nursing, working to wean precedex. Will follow for increased alertness and safety with PO attempts.    Recommend continue NPO with SLP to follow for PO trials/diet advancement.    GENERAL    Subjective: lethargic, awakens briefly to voice and answers general questions correctly.     Recent Information/Background: Patient is seen at the request of Yony Shi MD  for respiratory management status post cardiac surgery.  Patient had CAD, multiple vessel 24.  
GOALS:  LTG: Patient will tolerate safest, least restrictive oral diet without signs or symptoms of airway compromise.  STG: Patient will tolerate soft/bite size diet with thin liquids without overt s/sx of intolerance. ADDED 2024  STG: Patient will tolerate ongoing po trials in efforts to advance diet.PROGRESSING 2024   STG: Patient will participate in instrumental swallowing assessment to objectively assess oropharyngeal swallow if clinically indicated.    SPEECH LANGUAGE PATHOLOGY: DYSPHAGIA Daily Note #1    Acknowledge Order  I  Therapy Time  I   Charges     I  Rehab Caseload Tracker      NAME: Dionisio Gordon Jr.  : 1940  MRN: [id number]    ADMISSION DATE: 2024  PRIMARY DIAGNOSIS: CAD, multiple vessel    ICD-10: Treatment Diagnosis: R13.12 Dysphagia, Oropharyngeal Phase    RECOMMENDATIONS   Diet:    Soft and Bite-Sized  Thin Liquids    Medication: as tolerated and presented one at a time   Compensatory Swallowing Strategies:   Alternate solids and liquids  Assist feed  Slow rate of intake  Remain upright for 30-45 minutes after meals  Small bites/sips  Upright as possible for all oral intake   Therapeutic Intervention:   Patient/family education  Dysphagia treatment   Patient continues to require skilled intervention:  Yes. Recommend ongoing speech therapy services during this hospitalization.     Anticipated Discharge Needs: Do not anticipate ongoing speech therapy needs upon discharge.      ASSESSMENT    Patient presents with increased ability to participate in swallowing assessment. Tolerated all consistencies without overt s/sx of aspiration/penetration. Mildly increased work of breathing requiring slowed rate of presentation. Increased mastication time with textures, adequate oral clearance.     Recommend soft/bite size diet with thin liquids. SLP to follow for diet tolerance/advancement.    GENERAL    Subjective:More alert, increased processing time but answering questions 
L lung diminished, R lung coarse crackles. RT and Xray called. O2 sat 84%.Nasally suctioned, large brown thick secretions. Bipap applied. Dr. Wilkerson at bedside to read Xray. Dr. Shi updated, orders for 40mg of lasix per [facility].   
LTG: Patient will tolerate least restrictive oral diet without overt s/sx of airway compromise.   STG: Patient will tolerated minced and minced and moist with nectar thick liquids without overt s/sx of airway compromise.   STG Pt will participate in a MBS  STG: Pt will complete a voice assessment/treatment.      SPEECH LANGUAGE PATHOLOGY: DYSPHAGIA Initial Assessment    Acknowledge Order  I  Therapy Time  I   Charges     I  Rehab Caseload Tracker      NAME: Dionisio Gordon Jr.  : 1940  MRN: [id number]    ADMISSION DATE: 2024  PRIMARY DIAGNOSIS: CAD, multiple vessel    ICD-10: Treatment Diagnosis: R13.12 Dysphagia, Oropharyngeal Phase    RECOMMENDATIONS   Diet:    Minced and Moist  Mildly Thick Liquids (Nectar)    Medication: whole with thickened liquids   Compensatory Swallowing Strategies:   Alternate solids and liquids  Slow rate of intake  Remain upright for 30-45 minutes after meals  Small bites/sips  Upright as possible for all oral intake   Therapeutic Intervention:   Patient/family education  Instrumental swallow assessment  Dysphagia treatment   Patient continues to require skilled intervention:  Yes. Recommend ongoing speech therapy services during this hospitalization.     Anticipated Discharge Needs: Ongoing speech therapy is recommended at next level of care.      ASSESSMENT    Patient presents with positive signs/sx of aspiration with thin liquids; weak cough. Needed suction. Pt tolerated nectar thick via cup and straw, pureed and mixed with no overt signs/sx of aspiration. Pt with a weak cough. Pt is aphonic. Pt is at increased risk of aspiration. Pt would benefit from a MBS to fully assess pharyngeal swallow and rule out aspiration.    Recommend minced and moist with no mixed with nectar thick liquids. Medication with nectar thick or whole in pureed. Will follow for dysphagia management and Voice assessment/further treatment.    GENERAL    Subjective: Pt was resting well when SLP first 
Notified Dr Cole that pt flipped into a-fib RVR after turning patient and that a-fib protocol orders (diltiazem 10mg IV push and diltiazem gtt) were followed however patient BP is not tolerating diltiazem and asked provider for recommendations. Orders received to start amiodarone gtt @ 1mg/min, and norephiephrine gtt for BP support.   
Orders per Dr. Cole to re-start systemic heparin this afternoon at 1500 after chest tubes are out.  
Oxygen saturation reading 85%. Nasal cannula turned up to 6L. O2 sat did not change. Non-rebreather applied. RT called. O2 sating 88%. Airvo applied 50%/ 50L.  
PICC Placement Note    PRE-PROCEDURE VERIFICATION    Correct Procedure: yes  Time out completed with assistant Daria Landry RN, [facility] and all persons present in agreement with time out.  Risks and benefits reviewed with patient via telephone) and informed consent obtained prior to assessment and procedure.     Correct Site: yes  Temperature: Temp: 97.8 °F (36.6 °C), Temperature Source:    Recent Labs     02/20/24  0151   BUN 16      WBC 16.0*     Allergies: Patient has no known allergies.  Education materials for PICC Care given to patient or family.    PROCEDURE DETAIL  A double lumen PICC line was started for Irritant/vesicant medication. The following documentation is in addition to the PICC properties in the lines/airways flowsheet:    Lot #: [id number]  Xylocaine used: Yes  Mid-Arm Circumference: 27 (cm)  Internal Catheter Length: 44 (cm)  External Catheter Length: 0 (cm)  Total Catheter Length: 44 (cm)  Vein Selection for PICC: right basilic  Central Line Insertion Bundle followed: Yes  Complication Related to Insertion: none    Both the insertion guidewire and ECG guidewire were removed intact all ports have positive blood return and were flush well with normal saline.    The location of the tip of the PICC is verified using ECG technology.  The tip is in the SVC per ECG reading.  See image below.     Line is okay to use: yes              Ericka Madden RN, PCCN, [facility]  
PICC team contacted and voicemail left for placement of PICC line.   
PICC team contacted for need of PICC line for infusion of vesicant. Peripheral IV x2 infiltrated and removed.   
POD 1 Day Post-Op    Procedure:  Procedure(s):  BRONCHOSCOPY      Subjective:     Patient has No significant medical complaints      Objective:     Patient Vitals for the past 8 hrs:   BP Temp Temp src Pulse Resp SpO2   24 0915 117/60 -- -- -- -- --   24 0800 122/68 -- -- 66 24 92 %   24 0745 -- -- -- 62 22 97 %   24 0730 125/63 97.7 °F (36.5 °C) Axillary 63 26 96 %   2415 -- -- -- 61 25 97 %   24 -- -- -- 66 27 97 %   2445 -- -- -- 64 28 97 %   2430 -- -- -- 67 28 98 %   2415 -- -- -- 69 24 97 %   24 0600 -- -- -- 66 28 97 %   24 0545 -- -- -- 66 24 98 %   24 0530 -- -- -- 65 19 95 %   24 0515 -- -- -- 66 25 99 %   24 0500 -- -- -- 66 -- 99 %   24 0445 -- -- -- 68 -- 98 %   24 0430 138/71 -- -- 68 25 99 %   24 0415 138/71 -- -- 66 (!) 32 97 %   24 0400 135/63 -- -- 67 25 97 %   24 0345 (!) 140/69 -- -- 66 26 97 %   24 0330 (!) 151/80 -- -- 75 24 98 %   24 0315 (!) 148/71 -- -- 78 25 96 %   24 0300 135/60 -- -- 78 (!) 43 93 %   24 0245 136/68 -- -- 75 27 98 %   24 0230 129/65 -- -- 68 25 97 %   24 0215 136/68 -- -- 69 24 96 %     Temp (24hrs), Av.4 °F (36.9 °C), Min:97.7 °F (36.5 °C), Max:98.7 °F (37.1 °C)        Hemodynamics    PAP    CO    CI     07 - 1900  In: 100 [P.O.:100]  Out: -   02/15 190 -  0700  In: 3033.5 [I.V.:1448.5]  Out: 5050 [Urine:5050]    CT Drainage              total of all CT's    Heart:  regular rate and rhythm, S1, S2 normal, no murmur, click, rub or gallop  Lung:  clear to auscultation bilaterally  Neuro: Grossly non focal  Incisions: Clean, dry, and intact    Labs:  Recent Results (from the past 24 hour(s))   POCT Glucose    Collection Time: 24  1:32 PM   Result Value Ref Range    POC Glucose 132 (H) 65 - 100 mg/dL    Performed by: Isaías    POCT Glucose    Collection Time: 24  
POD 1 Day Post-Op    Procedure:  Procedure(s):  BRONCHOSCOPY      Subjective:   Intubated, follows commands      Objective:     Patient Vitals for the past 8 hrs:   Temp Temp src Pulse Resp SpO2 Weight   24 -- -- 56 (!) 9 100 % --   24 -- -- 56 18 100 % --   24 -- -- 57 18 100 % --   24 -- -- 57 18 100 % --   24 -- -- 58 18 99 % --   24 -- -- 60 -- 99 % 108.4 kg (238 lb 15.7 oz)   24 0545 -- -- 58 18 99 % --   24 05 -- -- 59 18 99 % --   24 0515 -- -- 60 19 100 % --   24 0500 98.7 °F (37.1 °C) Axillary 59 18 99 % --   24 0445 -- -- 64 17 100 % --   24 0430 -- -- 61 18 100 % --   24 0415 -- -- 61 19 100 % --   24 0400 -- -- 62 19 99 % --   24 0345 -- -- 71 24 97 % --   24 0330 -- -- 65 21 98 % --   24 0315 -- -- 64 18 99 % --   24 0311 -- -- 68 18 98 % --   24 0300 99.2 °F (37.3 °C) Axillary 65 18 98 % --   24 0245 -- -- 65 12 99 % --   24 0230 -- -- 64 -- 99 % --   24 0215 -- -- -- -- 99 % --   24 0200 -- -- 64 -- 99 % --   24 0149 -- -- -- 16 -- --   24 0145 -- -- 65 -- 98 % --   24 0130 -- -- 68 -- 98 % --   24 0115 -- -- 66 14 98 % --   24 0100 -- -- 66 14 98 % --   24 0045 -- -- 67 11 98 % --   24 0030 -- -- 67 19 98 % --   24 0015 -- -- 75 18 97 % --   24 0000 -- -- 67 16 97 % --   24 2345 -- -- 69 17 97 % --     Temp (24hrs), Av.6 °F (37 °C), Min:97.2 °F (36.2 °C), Max:99.4 °F (37.4 °C)        Hemodynamics    PAP    CO    CI    No intake/output data recorded.   1901 -  0700  In: 3247.5 [I.V.:2225.7]  Out: 3635 [Urine:3635]    CT Drainage              total of all CT's    Heart:  regular rate and rhythm, S1, S2 normal, no murmur, click, rub or gallop  Lung:  clear to auscultation bilaterally  Neuro: Grossly non focal  Incisions: Clean, dry, and intact    Labs:  Recent 
POD 1 Day Post-Op    Procedure:  Procedure(s):  IMPELLA EXPLANT      Subjective:   Extubated, follows commands      Objective:     Patient Vitals for the past 8 hrs:   BP Temp Temp src Pulse Resp SpO2   24 0702 132/61 -- -- 66 15 98 %   24 0645 121/62 -- -- 70 27 (!) 69 %   24 0630 (!) 127/58 -- -- 65 22 100 %   24 0615 130/61 -- -- 69 23 99 %   24 0600 127/60 -- -- 67 26 100 %   24 0545 (!) 119/59 -- -- 63 21 98 %   24 0530 (!) 119/56 -- -- 64 23 98 %   24 0515 122/60 -- -- 63 24 100 %   24 0500 (!) 126/59 -- -- 65 25 100 %   24 0445 (!) 124/59 -- -- 65 26 100 %   24 0430 126/65 -- -- 66 22 100 %   24 0415 125/70 -- -- 79 26 100 %   24 0400 118/62 -- -- 67 26 100 %   24 0345 (!) 116/56 -- -- 65 25 100 %   24 0330 (!) 117/57 -- -- 67 27 100 %   24 0315 (!) 117/57 -- -- 66 23 100 %   24 0300 (!) 116/57 98.7 °F (37.1 °C) Axillary 67 27 100 %   24 0245 (!) 113/58 -- -- 69 26 98 %   24 0230 (!) 114/56 -- -- 67 25 99 %   24 0215 (!) 117/57 -- -- 70 24 100 %   24 0200 123/61 -- -- -- -- --   24 0145 -- -- -- 75 -- 99 %   24 0130 -- -- -- 90 21 97 %   24 0115 131/67 -- -- 76 26 98 %   24 0100 132/69 -- -- 78 24 96 %   24 0045 (!) 146/86 -- -- 84 (!) 33 96 %   24 0030 (!) 147/76 -- -- 84 (!) 33 98 %   24 0015 -- -- -- 93 (!) 31 --   24 0000 -- -- -- 85 25 96 %   02/15/24 2345 -- -- -- 90 29 97 %   02/15/24 2343 -- -- -- -- 20 --   02/15/24 2330 -- -- -- 93 30 95 %     Temp (24hrs), Av.1 °F (36.7 °C), Min:97.7 °F (36.5 °C), Max:98.7 °F (37.1 °C)        Hemodynamics    PAP    CO    CI     07 -  190  In: 672.5 [I.V.:491.9]  Out: -    1901 -  0700  In: 2649 [I.V.:1836.4]  Out: 3702 [Urine:3700]    CT Drainage              total of all CT's    Heart:  regular rate and rhythm, S1, S2 normal, no murmur, click, rub or gallop  Lung:  
POD 2 Days Post-Op    Procedure:  Procedure(s):  BRONCHOSCOPY      Subjective:   Intubated, follows commands      Objective:     Patient Vitals for the past 8 hrs:   BP Temp Temp src Pulse Resp SpO2 Weight   02/15/24 0630 -- -- -- 62 16 100 % --   02/15/24 0619 (!) 147/72 -- -- 80 23 99 % --   02/15/24 0615 -- -- -- 71 17 100 % --   02/15/24 0600 -- -- -- 60 17 99 % --   02/15/24 0545 -- -- -- 56 18 100 % 110.1 kg (242 lb 11.6 oz)   02/15/24 0530 -- -- -- 57 17 100 % --   02/15/24 0521 -- -- -- -- 16 -- --   02/15/24 0515 -- -- -- 78 18 99 % --   02/15/24 0500 -- -- -- 60 16 99 % --   02/15/24 0445 -- -- -- (!) 104 17 99 % --   02/15/24 0430 -- -- -- 88 21 99 % --   02/15/24 0415 -- -- -- 64 17 99 % --   02/15/24 0405 -- -- -- 95 16 98 % --   02/15/24 0400 -- -- -- 91 18 98 % --   02/15/24 0345 -- -- -- (!) 102 14 (!) 80 % --   02/15/24 0330 -- -- -- 100 18 99 % --   02/15/24 0315 -- -- -- 94 19 99 % --   02/15/24 0314 -- -- -- -- 16 -- --   02/15/24 0300 -- 97.9 °F (36.6 °C) Axillary 98 23 98 % --   02/15/24 0245 -- -- -- 100 16 99 % --   02/15/24 0233 -- -- -- -- 16 -- --   02/15/24 0230 -- -- -- 69 16 98 % --   02/15/24 0215 -- -- -- 87 16 98 % --   02/15/24 0200 -- -- -- 67 16 99 % --   02/15/24 0145 -- -- -- 62 16 99 % --   02/15/24 0137 -- -- -- -- 16 -- --   02/15/24 0130 -- -- -- 66 16 98 % --   02/15/24 0115 -- -- -- 62 16 98 % --   02/15/24 0100 -- -- -- 73 16 98 % --   02/15/24 0045 -- -- -- 65 16 98 % --   02/15/24 0030 -- -- -- 65 (!) 9 98 % --   02/15/24 0015 -- -- -- 71 17 98 % --   02/15/24 0002 -- -- -- -- 16 -- --   02/15/24 0000 -- -- -- 86 16 98 % --   24 2345 -- -- -- 75 16 98 % --     Temp (24hrs), Av.2 °F (36.8 °C), Min:97.9 °F (36.6 °C), Max:98.4 °F (36.9 °C)        Hemodynamics    PAP    CO    CI    No intake/output data recorded.   1901 - 02/15 0700  In: 3695.9 [I.V.:.8]  Out: 2665 [Urine:2665]    CT Drainage              total of all CT's    Heart:  regular rate and 
POD 3 Days Post-Op    Procedure:  Procedure(s):  BRONCHOSCOPY      Subjective:     Patient has No significant medical complaints      Objective:     Patient Vitals for the past 8 hrs:   BP Temp Temp src Pulse Resp SpO2   24 0800 (!) 145/69 -- -- 87 24 100 %   24 0745 -- -- -- 88 18 100 %   24 0730 -- -- -- 86 29 100 %   24 0724 -- -- -- 87 24 100 %   24 0715 (!) 144/68 98.4 °F (36.9 °C) Oral 87 29 100 %   24 0700 (!) 144/68 -- -- 88 27 99 %   24 0630 -- -- -- 85 24 100 %   24 0600 -- -- -- 85 25 100 %   24 0530 -- -- -- 84 25 100 %   24 0512 (!) 123/90 -- -- (!) 127 27 --   24 0500 -- -- -- (!) 130 26 91 %   24 0430 -- -- -- (!) 122 (!) 33 98 %   24 0407 -- -- -- (!) 124 -- --   24 0400 -- -- -- 89 17 --   24 0330 -- -- -- 86 22 100 %   24 0328 -- 98.5 °F (36.9 °C) Oral -- -- --   24 0300 (!) 143/71 -- -- 88 (!) 35 92 %   24 0230 -- -- -- 90 (!) 34 (!) 89 %   24 0200 139/67 -- -- 87 26 98 %   24 0130 -- -- -- 88 30 94 %     Temp (24hrs), Av.4 °F (36.9 °C), Min:98 °F (36.7 °C), Max:98.7 °F (37.1 °C)        Hemodynamics    PAP    CO    CI    No intake/output data recorded.  1901 -  0700  In: 1495.6 [P.O.:560; I.V.:807.1]  Out: 2925 [Urine:2925]    CT Drainage              total of all CT's    Heart:  regular rate and rhythm, S1, S2 normal, no murmur, click, rub or gallop  Lung:  clear to auscultation bilaterally  Neuro: Grossly non focal  Incisions: Clean, dry, and intact    Labs:  Recent Results (from the past 24 hour(s))   EKG 12 lead    Collection Time: 24 11:01 AM   Result Value Ref Range    Ventricular Rate 93 BPM    Atrial Rate 93 BPM    P-R Interval 162 ms    QRS Duration 90 ms    Q-T Interval 342 ms    QTc Calculation (Bazett) 425 ms    P Axis 32 degrees    R Axis 40 degrees    T Axis 74 degrees    Diagnosis       Normal sinus rhythm  Inferior infarct (cited on or before 
POD 3 Days Post-Op    Procedure:  Procedure(s):  CARDIAC RE-ENTRY EVACUATION CLOT      Subjective:   Intubated but following commands      Objective:     Patient Vitals for the past 8 hrs:   Temp Temp src Pulse Resp SpO2 Weight   24 (!) 102.6 °F (39.2 °C) CORE 77 18 99 % --   24 0800 (!) 102.7 °F (39.3 °C) CORE 75 18 99 % --   24 0730 -- -- 76 (!) 0 100 % --   2415 -- -- 77 18 100 % --   24 0714 -- -- 75 25 100 % --   24 0706 (!) 101.5 °F (38.6 °C) Axillary -- -- -- --   24 07 (!) 102.7 °F (39.3 °C) CORE 75 20 100 % --   24 0600 (!) 102 °F (38.9 °C) CORE (!) 104 -- 100 % --   24 0555 -- -- -- -- -- 110.5 kg (243 lb 9.7 oz)   24 0545 -- -- (!) 103 -- 100 % --   24 0530 -- -- 77 -- 100 % --   24 0515 -- -- 75 21 100 % --   24 0500 -- -- 78 -- 99 % --   24 0445 -- -- 77 -- 100 % --   24 0430 -- -- 84 17 100 % --   24 0415 -- -- 79 16 100 % --   24 0400 (!) 102.4 °F (39.1 °C) CORE 81 -- 100 % --   24 0345 -- -- 76 -- 100 % --   24 0330 -- -- 75 -- 100 % --   24 0315 -- -- 74 -- 100 % --   24 0300 (!) 102.4 °F (39.1 °C) CORE 74 (!) 0 100 % --   24 0248 -- -- 100 23 100 % --   24 0245 -- -- (!) 105 -- 100 % --   24 0230 -- -- 98 -- 100 % --   24 0215 -- -- 75 10 100 % --   24 0200 (!) 102.3 °F (39.1 °C) CORE 79 16 100 % --   24 0145 -- -- 77 -- 100 % --   24 0130 -- -- 76 30 100 % --     Temp (24hrs), Av.9 °F (38.8 °C), Min:101 °F (38.3 °C), Max:103 °F (39.4 °C)        Hemodynamics    PAP    CO    CI    No intake/output data recorded.   1901 -  0700  In: 3994.1 [I.V.:3569.1]  Out: 2275 [Urine:1565]    CT Drainage              total of all CT's    Heart:  regular rate and rhythm, S1, S2 normal, no murmur, click, rub or gallop  Lung:  clear to auscultation bilaterally  Neuro: Grossly non focal  Incisions: Clean, dry, and 
POD 4 Days Post-Op    Procedure:  Procedure(s):  BRONCHOSCOPY      Subjective:     Patient has No significant medical complaints      Objective:     Patient Vitals for the past 8 hrs:   BP Temp Temp src Pulse Resp SpO2 Weight   24 0830 -- -- -- 95 22 97 % --   24 0815 -- -- -- 94 22 97 % --   24 0800 126/81 97.8 °F (36.6 °C) Oral 91 25 98 % --   24 0745 -- -- -- 89 22 91 % --   24 0730 -- -- -- 90 24 95 % --   24 0715 -- -- -- 88 22 91 % --   24 0700 126/64 -- -- 86 25 91 % --   24 0600 115/68 -- -- (!) 117 25 96 % --   24 0557 -- -- -- -- -- -- 102.2 kg (225 lb 5 oz)   24 0500 119/62 -- -- 83 18 96 % --   24 0400 123/74 -- -- (!) 118 23 96 % --   24 0300 125/65 97.4 °F (36.3 °C) Axillary 86 22 97 % --   24 0200 129/62 -- -- 85 21 97 % --   24 0100 123/62 -- -- 85 25 95 % --   24 0045 -- -- -- 89 30 97 % --     Temp (24hrs), Av.1 °F (36.7 °C), Min:97.4 °F (36.3 °C), Max:98.4 °F (36.9 °C)        Hemodynamics    PAP    CO    CI    No intake/output data recorded.  1901 - 700  In: 2043.3 [P.O.:520; I.V.:723.3]  Out: 2855 [Urine:2855]    CT Drainage              total of all CT's    Heart:  regular rate and rhythm, S1, S2 normal, no murmur, click, rub or gallop  Lung:  clear to auscultation bilaterally  Neuro: Grossly non focal  Incisions: Clean, dry, and intact    Labs:  Recent Results (from the past 24 hour(s))   POCT Glucose    Collection Time: 24 12:06 PM   Result Value Ref Range    POC Glucose 137 (H) 65 - 100 mg/dL    Performed by: Jim    Potassium    Collection Time: 24  6:49 PM   Result Value Ref Range    Potassium 3.7 3.5 - 5.1 mmol/L   POCT Glucose    Collection Time: 24  7:07 PM   Result Value Ref Range    POC Glucose 131 (H) 65 - 100 mg/dL    Performed by: Nereida    Basic Metabolic Panel    Collection Time: 24  1:51 AM   Result Value Ref Range    Sodium 146 136 
POD 5 Days Post-Op    Procedure:  Procedure(s):  BRONCHOSCOPY      Subjective:     Patient has No significant medical complaints      Objective:     Patient Vitals for the past 8 hrs:   BP Temp Temp src Pulse Resp SpO2 Weight   24 0800 (!) 117/92 -- -- 85 15 100 % --   24 0745 -- -- -- 85 (!) 44 100 % --   24 0730 -- -- -- 80 19 100 % --   24 0715 -- -- -- 80 23 100 % --   24 0711 -- -- -- 79 21 100 % --   24 0700 135/79 97.5 °F (36.4 °C) Oral 80 22 100 % --   24 0645 -- -- -- 79 20 100 % --   24 0630 -- -- -- 81 27 96 % --   24 0620 138/62 -- -- 81 21 100 % --   24 0615 (!) 156/125 -- -- 81 18 97 % --   24 0605 (!) 194/79 -- -- 81 24 100 % --   24 0600 -- -- -- 80 24 100 % --   24 0545 -- -- -- 76 20 97 % --   24 0530 -- -- -- 77 21 97 % --   24 0515 -- -- -- (!) 107 22 94 % --   24 0500 123/73 -- -- (!) 102 23 98 % 93.1 kg (205 lb 4 oz)   24 0405 (!) 133/54 -- -- (!) 103 21 93 % --   24 0400 (!) 177/88 -- -- (!) 105 19 100 % --   24 0300 (!) 142/61 98.4 °F (36.9 °C) Oral 76 29 99 % --   24 0200 (!) 146/71 -- -- 76 24 97 % --   24 0130 -- -- -- (!) 103 27 95 % --     Temp (24hrs), Av.1 °F (36.7 °C), Min:97.5 °F (36.4 °C), Max:98.4 °F (36.9 °C)        Hemodynamics    PAP    CO    CI    No intake/output data recorded.   1901 -  0700  In: 279.8 [I.V.:279.8]  Out: 3305 [Urine:3305]    CT Drainage              total of all CT's    Heart:  regular rate and rhythm, S1, S2 normal, no murmur, click, rub or gallop  Lung:  clear to auscultation bilaterally  Neuro: Grossly non focal  Incisions: Clean, dry, and intact    Labs:  Recent Results (from the past 24 hour(s))   Basic Metabolic Panel    Collection Time: 24  3:36 AM   Result Value Ref Range    Sodium 146 136 - 146 mmol/L    Potassium 4.0 3.5 - 5.1 mmol/L    Chloride 103 103 - 113 mmol/L    CO2 40 (H) 21 - 32 mmol/L    Anion 
POD 6 Days Post-Op    Procedure:  Procedure(s):  CARDIAC RE-ENTRY EVACUATION CLOT      Subjective:     Patient has No significant medical complaints      Objective:     Patient Vitals for the past 8 hrs:   BP Temp Temp src Pulse Resp SpO2   24 0715 -- -- -- (!) 125 29 98 %   24 0700 -- -- -- (!) 123 14 97 %   24 0645 -- -- -- (!) 124 15 100 %   24 0630 -- 99.5 °F (37.5 °C) CORE (!) 122 10 100 %   2415 -- -- -- (!) 121 19 100 %   24 0600 -- -- -- (!) 121 27 100 %   24 0545 -- -- -- (!) 115 27 100 %   24 0530 -- -- -- (!) 121 (!) 8 100 %   24 0515 -- -- -- (!) 122 27 100 %   24 0500 -- -- -- (!) 123 25 100 %   24 0449 -- -- -- -- -- 100 %   24 0445 -- -- -- (!) 127 25 100 %   24 0430 -- -- -- (!) 124 (!) 9 97 %   24 0415 -- -- -- (!) 127 30 100 %   24 0400 -- -- -- (!) 123 25 100 %   24 0349 118/86 -- -- -- -- --   24 0345 -- -- -- (!) 124 27 100 %   24 0330 -- -- -- (!) 122 25 100 %   24 0315 -- -- -- (!) 120 29 100 %   24 0300 -- -- -- (!) 122 (!) 32 100 %   24 0255 -- -- -- -- -- 96 %   24 0245 -- -- -- (!) 128 26 (!) 84 %   24 0230 -- -- -- (!) 129 18 (!) 88 %   24 0215 -- -- -- (!) 127 25 91 %   24 0200 -- -- -- (!) 124 22 93 %   24 0145 -- -- -- (!) 126 17 92 %   24 0130 -- -- -- (!) 127 (!) 35 93 %   24 0115 -- -- -- (!) 123 27 94 %   24 0100 -- -- -- (!) 127 25 (!) 88 %   24 0045 -- -- -- (!) 128 15 96 %   24 0030 -- -- -- (!) 127 (!) 64 96 %   24 0015 -- -- -- (!) 127 28 98 %   24 0000 -- -- -- (!) 126 17 96 %   24 2348 116/76 -- -- (!) 131 -- --   24 2345 -- -- -- (!) 123 20 95 %   24 2330 -- -- -- (!) 127 25 96 %     Temp (24hrs), Av.5 °F (37.5 °C), Min:99.2 °F (37.3 °C), Max:99.8 °F (37.7 °C)        Hemodynamics    PAP    CO    CI    No intake/output data recorded.  02/10 1901 -  
POD 7 Days Post-Op    Procedure:  Procedure(s):  CARDIAC RE-ENTRY EVACUATION CLOT      Subjective:     Patient has No significant medical complaints      Objective:     Patient Vitals for the past 8 hrs:   BP Pulse Resp SpO2 Weight   24 0700 (!) 141/65 87 29 90 % --   24 0645 117/70 (!) 119 20 96 % --   24 0630 (!) 113/56 (!) 114 30 91 % --   24 0615 121/72 (!) 114 10 100 % --   24 0600 (!) 119/57 (!) 108 13 100 % 109.1 kg (240 lb 8.4 oz)   24 0545 (!) 121/51 92 10 98 % --   24 0530 114/72 99 25 (!) 89 % --   24 0515 111/60 (!) 102 -- 92 % --   24 0500 (!) 122/59 (!) 119 16 94 % --   24 0454 119/65 (!) 117 -- -- --   24 0445 -- 87 12 94 % --   24 0430 -- 88 -- 100 % --   24 0415 -- 85 -- 97 % --   24 0400 -- 86 24 96 % --   24 0345 -- 91 (!) 32 (!) 82 % --   24 0215 -- 85 29 99 % --   24 0200 -- 84 28 98 % --   24 0145 -- 84 26 98 % --   24 0130 -- 85 23 97 % --   24 0115 -- 84 30 98 % --   24 0100 -- 84 22 97 % --   24 0045 -- 85 29 98 % --   24 0030 -- 91 -- 99 % --   24 0023 (!) 144/65 93 -- -- --   24 0015 -- 92 26 97 % --   24 0000 -- 95 23 96 % --   24 2345 -- 96 24 98 % --   24 2330 -- 92 -- 97 % --     Temp (24hrs), Av.5 °F (36.9 °C), Min:97.4 °F (36.3 °C), Max:99.8 °F (37.7 °C)        Hemodynamics    PAP    CO    CI    No intake/output data recorded.   1901 -  0700  In: 2156 [I.V.:1469.6]  Out: 4335 [Urine:4335]    CT Drainage              total of all CT's    Heart:  regular rate and rhythm, S1, S2 normal, no murmur, click, rub or gallop  Lung:  clear to auscultation bilaterally  Neuro: Grossly non focal  Incisions: Clean, dry, and intact    Labs:  Recent Results (from the past 24 hour(s))   POCT activated clotting time    Collection Time: 24 10:12 AM   Result Value Ref Range    Activated Clotting Time 163 (H) 70 - 128 
Patient had morning chest x-ray nurse requested evaluation of chest x-ray due to changes.  Patient had Impella removed and was extubated yesterday.  Chest x-ray shows increased pulmonary markings appear to be pulmonary edema however left pleural effusion is increased.  Patient is maintaining SaO2 of 100% on Airvo 50L and 50% mildly tachypneic.  LS diminished in both bases crackles more prominent on the left.  No significant pedal edema.  Will give additional dose of Lasix this morning and have pulmonology reevaluate in the a.m.  
Patient has a persistent cough with moderate amounts of thick sputum. Patient states he does not want to wear his cpap as it makes him feel like he's choking due to the sputum.     Tried to convince patient to wear cpap around 0300, as patient started having more frequent periods of apnea. Patient refused after about 5 minutes on the mask, stating \"its too cold\".  
Patient out from operating room and placed on the ventilator on documented settings. Patient is orally intubated with a # 8.0 ETT secured at the 24 cm brittney at the lip. Breath sounds are rhonchi. Trachea is midline. Chest excursion is symmetric.   Patient is also Negative for cyanosis.  Patient has a Left Radial arterial line. All alarms are set and audible. Resuscitation bag is  at the head of the bed.      Ventilator Settings  Mode FIO2 Rate Tidal Volume Pressure PEEP I:E Ratio     AC  80 20 400  8 1:2.3     Peak airway pressure:   39  Minute ventilation:   7.9        Sonu Forde RCP   
Patient remains oriented to person and time, able to follow commands appropriately. Patient wore CPAP from 10 pm to 1:30 am. Patient refused to keep using CPAP at 1:30 am even with explaining importance and redirecting patient, he did not want it placed back on. Nasal cannula placed due to decreased oxygen saturation in the 80s. Sitter remains in room to help prevent patient from removing important equipment/lines out. Patient denies any pain or discomfort at this time. Will continue to monitor closely.   
Patient unable to amubulate, but orders received from Dr. Cole to remove chest tubes. Negative for air leak with chest tube to gravity for >1 hour. Instruction provided to patient. Chest tubes removed per order. Purse strings tied. Vaseline gauze with dry dressing over applied to sites. Patient tolerated well. Chest expansion symmetrical. Lung sounds equal bilaterally. No subcutaneous air palpated. SpO2 unchanged. Ongoing monitoring.       
Patient's heart rate now sustaining 130-134 bpm aflutter. Spoke with Dr. Pedroza and discussed patient's vitals, hemodynamics, and medications. Orders received to give 250 mcg of ivp digoxin now and another 250 mcg ivp digoxin in 6 hours. Orders received to try and wean dobutamine.  
Patient's heart rhythm flipped into Afib at 0600; Dr. Pedroza at bedside at 0630; Received orders to bolus amiodarone 150mg then increase continuous gtt to 1mg/min.  
Physical Therapy Note:    Attempted to see patient this AM for physical therapy evaluation session. Patient remains inappropriate for PT.  Will discontinue, please reconsult when appropriate.  Thank you,    SANDY Valadez, PT     Rehab Caseload Tracker    
Physical Therapy Note:    Attempted to see patient this AM for physical therapy treatment  session. Patient is currently going off the floor to [facility].  Treatment deferred at this time. Will follow and re-attempt as schedule permits/patient available. Thank you,    Adilia Dao-Benedicto, [facility]     Rehab Caseload Tracker    
Physical Therapy Note:    Patient remain intubated this AM so evaluation will be deferred. Will follow and re-attempt as schedule permits/patient available. Thank you,    Hillary Redman, PT     Rehab Caseload Tracker   
Physical Therapy Note:    Patient s/p CABG x 3, went for clot evacuation early this am, remains on vent at this time. Will HOLD today per RN.  Will follow and re-attempt as schedule permits/patient available. Thank you,    Valerie Garcia, PT     Rehab Caseload Tracker   
Pre-Surgery Prayer. PT was in bed preparing for surgery. Two Sons were at bedside. PT stated more Family was in waiting room.CH introduced self. PT shared about surgery including concerns and hopes.  PT expressed importance of pardeep and prayer. 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 CH offered prayer. CH offered additional support if needed.    Rev. Aditi Bentley M.Div.    
Progress Note    2024 8:28 AM          POD#   Mr. Gordon is a 83-year-old gentleman with recent coronary bypass surgery left atrial maze and left atrial appendage clipping was 5.5 Impella support.  Postoperatively he required reexploration for bleeding and tamponade and also had intermittent atrial fibrillation.  This also had pneumonia aspiration and required multiple thoracenteses and bronchoscopies.  Patient was transferred to the floor 3 days ago initially had some shortness of breath but over the last 2448 hrs. has steadily improved.    Subjective:  Mr. Gordon is sitting up in a chair without complaints awake and alert    OBJECTIVE:    PULSE OXIMETRY RANGE: SpO2  Av.2 %  Min: 96 %  Max: 100 %  SUPPLEMENTAL O2: O2 Flow Rate (L/min): 3 L/min   Vital Signs: BP (!) 106/55   Pulse 78   Temp 97.3 °F (36.3 °C) (Temporal)   Resp 20   Ht 1.778 m (5' 10\")   Wt 98.7 kg (217 lb 9.5 oz)   SpO2 98%   BMI 31.22 kg/m²    Temperature Range:   Temp: 97.3 °F (36.3 °C)   Temp  Av.1 °F (36.7 °C)  Min: 97.3 °F (36.3 °C)  Max: 98.4 °F (36.9 °C)      Exam:   General appearance:   Neck:   Lungs: Still markedly diminished breath sounds in the left lung field  Sternum: Sternal incision is dry and intact  Heart: Blood pressure is 106/55 heart rate 78 is in normal sinus rhythm  Abdomen: Abdomen slightly distended but not tender  Extremities:   Leg Wounds:                  Rhythm:     Labs:   ABG:  No results found for: \"PHART\", \"PO2ART\", \"YWE7BVB\", \"K2CQADLP\", \"CZE9EIT\", \"THGBART\", \"NTY3TAQ\", \"BEART\"  CBC:   Recent Labs     24  0357   WBC 15.1*   HGB 9.5*   HCT 31.9*   MCV 94.7        BMP:   Recent Labs     24  0357 24  0429 24  0411     --   --    K 4.3 4.2 4.3   CL 99*  --   --    CO2 40*  --   --    BUN 16  --   --    CREATININE 0.70*  --   --      PT/INR: No results for input(s): \"PROTIME\", \"INR\" in the last 72 hours.  APTT: No results for input(s): \"APTT\" in the last 
Progress Note    2024 9:15 AM          POD#   this is an 83-year-old gentleman with very complicated postoperative course.  Patient had a low ejection fraction 20% admitted 2024 underwent a three-vessel coronary bypass grafting with a maze left atrial appendage clip and placement of a 5.5 Impella.  Postoperatively required reexploration for bleeding on 2024 and also postop course was complicated by intermittent atrial fibrillation.  Has had aspiration events 2024 with pneumonia and worsening hypoxia.  Impella was removed 2/15/2024.  Patient is also required multiple bronchoscopies for mucous plugging thoracentesis was done for pleural effusion he was transferred to the floor 2024 and echo 2024 showed ejection fraction around 25 to 30%.  Patient's had's some shortness of breath yesterday on the floor although overnight has been much more stable.  Chest x-ray yesterday showed extensive consolidation possible effusion on the left side.  Ultrasound was done and it showed mild effusion.    Subjective:  Mr. Gordon is appears to be much better today much more alert and communicating.  Appears to be less confused.    OBJECTIVE:    PULSE OXIMETRY RANGE: SpO2  Av.9 %  Min: 91 %  Max: 100 %  SUPPLEMENTAL O2: O2 Flow Rate (L/min): 2 L/min   Vital Signs: BP (!) 120/55   Pulse 74   Temp 97.1 °F (36.2 °C) (Temporal)   Resp 22   Ht 1.778 m (5' 10\")   Wt 100.3 kg (221 lb 1.9 oz)   SpO2 99%   BMI 31.73 kg/m²    Temperature Range:   Temp: 97.1 °F (36.2 °C)   Temp  Av.2 °F (36.8 °C)  Min: 97.1 °F (36.2 °C)  Max: 98.9 °F (37.2 °C)      Exam:   General appearance: 83-year-old gentleman sitting in a chair in no acute distress.  Neck:   Lungs: Diminished breath sounds in the left base.  Sternum: Sternal incision dry and intact  Heart: Blood pressure 120/55 sinus rhythm heart rate 79 bpm  Abdomen: Abdomen is slightly tense but nontender  Extremities:   Leg Wounds:                  Rhythm: 
Pt able to remain up in chair throughout the day. Frequently ambulating in room to BSC with assist. Ambulated in kevin x1 with assist + walker. Showered with assist. UOP 2550 mL on dayshift. Able to feed self. Alert and oriented though impulsive when ready to ambulate to restroom. Tolerating RA with Sp02 >=90% throughout day. Continuous SpO2 monitor in place.  
Pt emergently reintubated due to cuff not inflating.  After inspecting tube, the  balloon tubing was kinked, not allowing any air into cuff.  Pt was reintubated with a #7 tube, 23 @ lip.  
Pt extubated to Airvo 50L and 50%.  Negative for stridor.       02/15/24 1625   Weaning Parameters   Spontaneous Breathing Trial Complete Yes   Respiratory Rate Observed 20   Ve 8.84      RSBI 45   NIF -22   VC 1201       
Pt in w/c , stable for transfer, on O2, tele box with pct and sitter, family aware of transfer-christina Ham  
Pt is back from OR for evacuation of retrocardiac clot which caused tamponade. Current vent settings SIMV rate 18, Vt 530, PEEP 8 on 50% O2 with peak pressure 24 with O2 sat 100%. ABG 7.33/37.9/113/20.  
Pt now alert enough to receive PO medications utilizing SLP instructions.  
Pt sat up on side of bed for thoracentesis.   Time out performed. Pts vitals monitored throughout procedure.  Left ultrasound done and pic taken of pleural fluid.  Not enough fluid to perform thoracentesis at this time per md.  Pt tolerated procedure well with no adverse rxn.  Report given to pts RN.   Ultrasound findings reviewed by MD.   
Respiratory Mechanics completed and are as follows: NIF -28     Patient extubated to 40L and 40% HHFNC. Patient is able to communicate and is negative for stridor. Breath sounds are clear. No complications with extubation.     MADDIE HUERTAS RCP  
SPEECH PATHOLOGY NOTE    Per RT: Patient is orally intubated with a # 8.0 ETT secured at the 24 cm brittney at the lip.   Pt is not appropriate for ST at this time, will sign off. Please re-consult when medically appropriate.     Isabel Knott M.S., CCC-SLP  Speech Language Pathologist  Acute Rehabilitation Services  Contact: Zac        
Spiritual Care Visit, initial visit attempted.    Attempted to visited with patient at bedside.    Patient was about to have an ultrasound.    Visit by Isidro Amado, Staff . Ian., Arash.ANA LUISA., B.A.  
Spiritual Care Visit, initial visit.    Visited with patient at bedside.    Prayed for patient's healing and health.    Visit by Isidro Amado, Staff . Ian., Arash.ANA LUISA., B.A.  
Spiritual Care visit. Initial Visit, Pre Surgery Consult.    Visit and prayer before patient goes to surgery.    Visit by Eugenio Amado M.Ed., Th.B. ,Staff    
Spontaneous conversion into NSR.   
TRANSFER - IN REPORT:    Verbal report received from CVOR Luke GIMENEZ (name) on Dionisio Gordon Jr.  being received from [facility] (unit) for routine post-op      Report consisted of patient’s Situation, Background, Assessment and   Recommendations(SBAR).     Information from the following report(s) SBAR, Kardex, OR Summary, Intake/Output, MAR, and Cardiac Rhythm afib  was reviewed with the receiving nurse.      Per anesthesia on admission patient intubation Normal    Collaborative team agrees of surgeon, anesthesia, RT, and RN agrees to proceed with Intubation until order obtained        Opportunity for questions and clarification was provided.      Assessment completed upon patient’s arrival to unit and care assumed by HUEY Trujillo.    
TRANSFER - IN REPORT:    Verbal report received from Dr Inman and CRNA on Dionisioalvarez Gordon Jr. being received from OR for routine post-op      Report consisted of patient’s Situation, Background, Assessment and Recommendations(SBAR).     Information from the following report(s) SBAR, OR Summary, and Intake/Output, and blood product administration intra-op was reviewed with the receiving nurse.    Opportunity for questions and clarification was provided.      Assessment completed upon patient’s arrival to unit and care assumed.     
TRANSFER - OUT REPORT:    Verbal report given to Davon RAZATE(name) on Dionisio Gordon Jr. being transferred to [facility](unit) for ordered procedure       Report consisted of patient's Situation, Background, Assessment and   Recommendations(SBAR).     Information from the following report(s) MAR and Cardiac Rhythm AFIB  was reviewed with the receiving nurse.    Opportunity for questions and clarification was provided.      Patient transported with:   Registered Nurse    
TRANSFER - OUT REPORT:    Verbal report given to Dr Inman on Dionisio Landinley .  being transferred to OR for urgent transfer       Report consisted of patient’s Situation, Background, Assessment and Recommendations(SBAR).     Information from the following report(s) SBAR and MAR was reviewed with the receiving nurse.    Opportunity for questions and clarification was provided.      
This RN entered pt room for 1900 VS, pt found to be satting 60% on RA. High flow NC initiated @ 5L, pt saturating 98%. Respiratory therapist called, breathing tx given. Continuous pulse oximetry initiated. Currently pt lethargic with response to voice, pt on high flow NC @ 3L with an oxygen saturation of 95%.     Plan of care continues  
This RN noted afib/ a flutter. Lopressor 25mg given per EMAR. HR noted to be 100s-120s. Isabel Lozada NP notified. No new orders.  
This is a follow-up visit to the patient, providing a spiritual presence, emotional support and prayer. The patient appears to be resting.    Ferdinand Cooney MDIV, [facility]     
This is a follow-up visit to the patient, providing a spiritual presence, emotional support and prayer. The patient appears to be resting.    Ferdinand Cooney MDIV, [facility]     
This is a follow-up visit to the patient, providing a spiritual presence, emotional support and prayer. The patient appears to be resting.    Ferdinand Cooney MDIV, [facility]     
US Guided PIV access-    Ultrasound was used to find the vein which was compressible and without any ultrasound features of an artery or nerve bundle. Skin was cleaned and disinfected prior to IV puncture.  Under real-time ultrasound guidance peripheral access was obtained in the left cephalic using 20 G 1.75\" Peripheral IV catheter after 1 attempt(s). Blood return was present and IV flushed without difficulty with no clinical signs of infiltration. IV dressing applied and no immediate complications noted. Patient tolerated the procedure well.      
US Guided PIV access-    Ultrasound was used to find the vein which was compressible and without any ultrasound features of an artery or nerve bundle. Skin was cleaned and disinfected prior to IV puncture.  Under real-time ultrasound guidance peripheral access was obtained in the left forearm using 20 G 1.75\" Peripheral IV catheter after 1 attempt(s). Blood return was present and IV flushed without difficulty with no clinical signs of infiltration. IV dressing applied and no immediate complications noted. Patient tolerated the procedure well.      
US Guided PIV access-    Ultrasound was used to find the vein which was compressible and without any ultrasound features of an artery or nerve bundle. Skin was cleaned and disinfected prior to IV puncture.  Under real-time ultrasound guidance peripheral access was obtained in the right basilic approximately 2\" above the AC using 20 G 2.25\" Peripheral IV catheter after 1 attempt(s). Blood return was present and IV flushed without difficulty with no clinical signs of infiltration. IV dressing applied and no immediate complications noted. Patient tolerated the procedure well.    
US Guided PIV access-    Ultrasound was used to find the vein which was compressible and without any ultrasound features of an artery or nerve bundle. Skin was cleaned and disinfected prior to IV puncture.  Under real-time ultrasound guidance peripheral access was obtained in the right forearm using 20 G 1.75\" Peripheral IV catheter after 1 attempt(s). Blood return was present and IV flushed without difficulty with no clinical signs of infiltration. IV dressing applied and no immediate complications noted. Patient tolerated the procedure well.        
Updated patient's son Jitendra on need for patient to return to OR to determine if patient is bleeding. Provided opportunity for son to ask questions, all questions answered.     Update 2/7/24 0020: Called pt's son Jitendra back to notify him that after hanging up with him, patient lost his pulse and CPR was initiated for approx. 2 minutes at which point patient regained pulse and Dr. Shi was at bedside to open incision and patient was taken back to OR at approx. 0016, son verbalized understanding, states he will be coming to hospital this evening. Updated nursing supervisor.   
VANCO DAILY FOLLOW UP NOTE  [facility]   Pharmacy Pharmacokinetic Monitoring Service - Vancomycin    Consulting Provider: Dr. Zaragoza   Indication: Sepsis of unknown etiology  Target Concentration: Goal AUC/KEVYN 400-600 mg*hr/L  Day of Therapy: 1  Additional Antimicrobials: cefepime    Pertinent Laboratory Values:   Wt Readings from Last 1 Encounters:   02/09/24 110.5 kg (243 lb 9.7 oz)     Temp Readings from Last 1 Encounters:   02/10/24 100.4 °F (38 °C) (Core)     Recent Labs     02/08/24  0321 02/09/24  0257 02/09/24  1125 02/09/24  1738 02/10/24  0224   BUN 16 24*  --   --  24*   CREATININE 0.60* 0.80  --   --  0.50*   WBC 8.2 7.4  --  6.6 6.3   PROCAL  --   --  0.97*  --   --    LACACIDPL 1.5 2.0  --   --   --      Estimated Creatinine Clearance: 147 mL/min (A) (based on SCr of 0.5 mg/dL (L)).    No results found for: \"VANCOTROUGH\", \"VANCORANDOM\"    MRSA Nasal Swab: N/A. Non-respiratory infection    Assessment:  Date/Time Dose Concentration AUC         Note: Serum concentrations collected for AUC dosing may appear elevated if collected in close proximity to the dose administered, this is not necessarily an indication of toxicity    Plan:  Dosing recommendations based on Bayesian software  Start vancomycin 2000 mg x 1 followed by 1500 mg IV every 24 hours  Anticipated AUC of 408 and trough concentration of 10.1 at steady state  Renal labs as indicated   Vancomycin concentrations will be ordered as clinically appropriate   Pharmacy will continue to monitor patient and adjust therapy as indicated    Thank you for the consult,  Adebayo Maloney RPH   
Ventilator check complete; patient has a #8.0 ET tube secured at the 24 at the lip.  Patient is  able to follow commands.  Breath sounds are diminished.  Trachea is midline and chest excursion is symmetric. Patient is Negative for cyanosis.  All alarms are set and audible.  Resuscitation bag is  at the head of the bed.      Ventilator Settings  Mode FIO2 Rate Tidal Volume Pressure PEEP I:E Ratio   SIMV/PRVC  60 % (Found on this setting)   16 520 10 cm H2O  8 1:3.2      Peak airway pressure:   27  Minute ventilation:   8.5        Sonu Forde RCP   
Ventilator check complete; patient has a #8.0 ET tube secured at the 24 at the lip.  Patient is  able to follow commands.  Breath sounds are diminished.  Trachea is midline and chest excursion is symmetric. Patient is Negative for cyanosis.  All alarms are set and audible.  Resuscitation bag is  at the head of the bed.      Ventilator Settings  Mode FIO2 Rate Tidal Volume Pressure PEEP I:E Ratio   SIMV/PRVC  70 %   18 520 10 cm H2O  8 1:2.7      Peak airway pressure:   23  Minute ventilation:     10.2      Sonu Forde RCP   
Ventilator check complete; patient has a #8.0 ET tube secured at the 24 at the lip.  Patient is  sedated.  Patient is  able to follow commands.  Breath sounds are crackles and diminished.  Trachea is midline, Negative for subcutaneous air, and chest excursion is symmetric. Patient is also Negative for cyanosis and is Negative for pitting edema.  All alarms are set and audible.  Resuscitation bag is  at the head of the bed.      Ventilator Settings  Mode FIO2 Rate Tidal Volume Pressure PEEP I:E Ratio   SIMV/PRVC  30 %   15   530 cc 10 cm H2O    8 cm H2O 1:2.5      Peak airway pressure:   20  Minute ventilation:   10.1          brynn griffith, DAVID    
Ventilator check complete; patient has a #8.0 ET tube secured at the 24 at the lip.  Patient is  sedated.  Patient is not able to follow commands.  Breath sounds are clear and diminished.  Trachea is midline, Negative for subcutaneous air, and chest excursion is symmetric. Patient is also Negative for cyanosis and is Negative for pitting edema.  All alarms are set and audible.  Resuscitation bag is  at the head of the bed.      Ventilator Settings  Mode FIO2 Rate Tidal Volume Pressure PEEP I:E Ratio   SIMV/PRVC  40 %   16   500 10 cm H2O    8 cm H2O 1:2.4      Peak airway pressure: 19    Minute ventilation:   14      brynn griffith, DAVID    
Ventilator check complete; patient has a #8.0 ET tube secured at the 24 at the lip.  Patient is  sedated.  Patient is not able to follow commands.  Breath sounds are clear and diminished.  Trachea is midline, Negative for subcutaneous air, and chest excursion is symmetric. Patient is also Negative for cyanosis and is Negative for pitting edema.  All alarms are set and audible.  Resuscitation bag is  at the head of the bed.      Ventilator Settings  Mode FIO2 Rate Tidal Volume Pressure PEEP I:E Ratio   SIMV/PRVC  70 %   18   520 10 cm H2O    8 cm H2O 1:2.7      Peak airway pressure:   31  Minute ventilation:   10.2          byrnn griffith, CAROLINAP    
Ventilator check complete; patient has a #8.0 ET tube secured at the 24 at the lip.  Patient is  sedated.  Patient is not able to follow commands.  Breath sounds are coarse and diminished.  Trachea is midline, Negative for subcutaneous air, and chest excursion is symmetric. Patient is also Negative for cyanosis and is Negative for pitting edema.  All alarms are set and audible.  Resuscitation bag is  at the head of the bed.      Ventilator Settings  Mode FIO2 Rate Tidal Volume Pressure PEEP I:E Ratio   AC/PRVC  30 %   16   500 14 cm H2O    8 cm H2O 1:2.8      Peak airway pressure:  26   Minute ventilation:   8.36          brynn griffith, CAROLINAP    
Ventilator check complete; patient has a #8.0 ET tube secured at the 24 at the lip.  Patient is  sedated.  Patient is not able to follow commands.  Breath sounds are coarse and diminished.  Trachea is midline, Negative for subcutaneous air, and chest excursion is symmetric. Patient is also Negative for cyanosis and is Negative for pitting edema.  All alarms are set and audible.  Resuscitation bag is  at the head of the bed.      Ventilator Settings  Mode FIO2 Rate Tidal Volume Pressure PEEP I:E Ratio   SIMV/PRVC  40 %   18     500 cc 10 cm H2O    8 cm H2O 1:1.4      Peak airway pressure:  28  Minute ventilation:   8.91      rbynn griffith, CAROLINAP    
Ventilator check complete; patient has a #8.0 ET tube secured at the 24 at the lip.  Patient is  sedated.  Patient is not able to follow commands.  Breath sounds are coarse and expiratory wheezes.  Trachea is midline, Negative for subcutaneous air, and chest excursion is symmetric. Patient is also Negative for cyanosis and is Negative for pitting edema.  All alarms are set and audible.  Resuscitation bag is  at the head of the bed.      Ventilator Settings  Mode FIO2 Rate Tidal Volume Pressure PEEP I:E Ratio   SIMV/PRVC  30 %   18   500 10 cm H2O    8 1:2.7            Carl Golden RCP  
Ventilator check complete; patient has a #8.0 ET tube secured at the 24 at the lip.  Patient is  sedated.  Patient is not able to follow commands.  Breath sounds are diminished.  Trachea is midline, Negative for subcutaneous air, and chest excursion is symmetric. Patient is also Negative for cyanosis and is Negative for pitting edema.  All alarms are set and audible.  Resuscitation bag is  at the head of the bed.      Ventilator Settings  Mode FIO2 Rate Tidal Volume Pressure PEEP I:E Ratio   AC/PRVC  30 %   16   500    8 1:2.8          Carl Golden RCP  
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 Telemetry showed normal sinus rhythm. Yesterday, patient went into AFib and was started on PO amiodarone.    Objective:    BP (!) 143/71   Pulse (!) 124   Temp 98.5 °F (36.9 °C) (Oral)   Resp 17   Ht 1.778 m (5' 10\")   Wt 104.9 kg (231 lb 4.2 oz)   SpO2 100%   BMI 33.18 kg/m²     ROS:  General ROS: negative for - chills  Hematological and Lymphatic ROS: negative for - blood clots or jaundice  Respiratory ROS: no cough, shortness of breath, or wheezing  Cardiovascular ROS: no chest pain or dyspnea on exertion  Gastrointestinal ROS: no abdominal pain, change in bowel habits, or black or bloody stools  Neurological ROS: no TIA or stroke symptoms    Physical Exam:      Physical Examination: General appearance - Appearance: alert, well appearing, and in no distress.   Neck/lymph - supple, no significant adenopathy  Chest/CV - clear to auscultation, no wheezes, rales or rhonchi, symmetric air entry  Heart - normal rate, regular rhythm, normal S1, S2, no murmurs, rubs, clicks or gallops  Abdomen/GI - soft, nontender, nondistended, no masses or organomegaly   Musculoskeletal - no joint tenderness, deformity or swelling  Extremities - peripheral pulses normal, no pedal edema, no clubbing or cyanosis  Skin - normal coloration and turgor, no rashes, no suspicious skin lesions noted    Current Facility-Administered Medications   Medication Dose Route Frequency    potassium chloride 10 mEq/100 mL IVPB (Peripheral Line)  10 mEq 
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 He complains of right hand bruising and bilateral hand edema, otherwise no other complaints. Telemetry showed normal sinus rhythm.     Objective:    /64   Pulse 86   Temp 97.4 °F (36.3 °C) (Axillary)   Resp 25   Ht 1.778 m (5' 10\")   Wt 102.2 kg (225 lb 5 oz)   SpO2 91%   BMI 32.33 kg/m²     ROS:  General ROS: negative for - chills  Hematological and Lymphatic ROS: negative for - blood clots or jaundice  Respiratory ROS: no cough, shortness of breath, or wheezing  Cardiovascular ROS: no chest pain or dyspnea on exertion  Gastrointestinal ROS: no abdominal pain, change in bowel habits, or black or bloody stools  Neurological ROS: no TIA or stroke symptoms    Physical Exam:      Physical Examination: General appearance - Appearance: alert, well appearing, and in no distress and chronically ill appearing.   Neck/lymph - supple, no significant adenopathy  Chest/CV - clear to auscultation, no wheezes, rales or rhonchi, symmetric air entry  Heart - normal rate, regular rhythm, normal S1, S2, no murmurs, rubs, clicks or gallops  Abdomen/GI - soft, nontender, nondistended, no masses or organomegaly   Musculoskeletal - no joint tenderness, deformity or swelling  Extremities - bilateral upper extremity edema, with bruising of the R arm due to IV access.  Skin - normal coloration and turgor, no rashes, no suspicious skin lesions noted    Current Facility-Administered Medications   Medication Dose Route Frequency    traZODone 
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  Impella is in place multiple chest tubes in place multiple lines and drains in place    Objective:    /70   Pulse (!) 133   Temp (!) 101.3 °F (38.5 °C) (Core)   Resp 16   Ht 1.803 m (5' 10.98\")   Wt 105.1 kg (231 lb 11.3 oz)   SpO2 100%   BMI 32.33 kg/m²     ROS:  intubated    Physical Exam:    Physical Examination:   General appearance - intubated  Mental status - sedated  Chest/CV -irregularly irregular tachycardic with noticeable friction rub worse with expiration  Heart -coarse breath sounds noted throughout  Abdomen/GI - soft, nontender, nondistended, no masses or organomegaly  Musculoskeletal - no joint tenderness, deformity or swelling  Extremities -edema present in the lower extremities  Skin - normal coloration and turgor, no rashes, no suspicious skin lesions noted    Current Facility-Administered Medications   Medication Dose Route Frequency    dilTIAZem 100 mg in sodium chloride 0.9 % 100 mL infusion ([facility])  2.5-15 mg/hr IntraVENous Continuous    amiodarone (CORDARONE) 450 mg in dextrose 5 % 250 mL infusion ([id number])  1 mg/min IntraVENous Continuous    norepinephrine (LEVOPHED) 4 mg in sodium chloride 0.9 % 250 mL infusion  0.01-3.3 mcg/kg/min IntraVENous Continuous    fentaNYL (SUBLIMAZE) bolus from bag  50 mcg IntraVENous Q30 Min PRN    dextrose 5 % and 0.45 % NaCl with KCl 20 mEq infusion   IntraVENous Continuous    
Collection Time: 02/22/24  2:40 AM   Result Value Ref Range    Sodium 141 136 - 146 mmol/L    Potassium 3.9 3.5 - 5.1 mmol/L    Chloride 101 (L) 103 - 113 mmol/L    CO2 42 (H) 21 - 32 mmol/L    Anion Gap NEG 2 2 - 11 mmol/L    Glucose 127 (H) 65 - 100 mg/dL    BUN 17 8 - 23 MG/DL    Creatinine 0.70 (L) 0.8 - 1.5 MG/DL    Est, Glom Filt Rate >60 >60 ml/min/1.73m2    Calcium 8.5 8.3 - 10.4 MG/DL   CBC with Auto Differential    Collection Time: 02/22/24  2:40 AM   Result Value Ref Range    WBC 13.4 (H) 4.3 - 11.1 K/uL    RBC 3.27 (L) 4.23 - 5.6 M/uL    Hemoglobin 9.2 (L) 13.6 - 17.2 g/dL    Hematocrit 31.2 (L) 41.1 - 50.3 %    MCV 95.4 82 - 102 FL    MCH 28.1 26.1 - 32.9 PG    MCHC 29.5 (L) 31.4 - 35.0 g/dL    RDW 17.0 (H) 11.9 - 14.6 %    Platelets 364 150 - 450 K/uL    MPV 10.3 9.4 - 12.3 FL    nRBC 0.00 0.0 - 0.2 K/uL    Differential Type AUTOMATED      Neutrophils % 82 (H) 43 - 78 %    Lymphocytes % 7 (L) 13 - 44 %    Monocytes % 9 4.0 - 12.0 %    Eosinophils % 1 0.5 - 7.8 %    Basophils % 0 0.0 - 2.0 %    Immature Granulocytes 1 0.0 - 5.0 %    Neutrophils Absolute 10.9 (H) 1.7 - 8.2 K/UL    Lymphocytes Absolute 1.0 0.5 - 4.6 K/UL    Monocytes Absolute 1.2 0.1 - 1.3 K/UL    Eosinophils Absolute 0.2 0.0 - 0.8 K/UL    Basophils Absolute 0.0 0.0 - 0.2 K/UL    Absolute Immature Granulocyte 0.1 0.0 - 0.5 K/UL   EKG 12 lead    Collection Time: 02/22/24  6:43 AM   Result Value Ref Range    Ventricular Rate 82 BPM    Atrial Rate 82 BPM    P-R Interval 154 ms    QRS Duration 88 ms    Q-T Interval 448 ms    QTc Calculation (Bazett) 523 ms    P Axis 47 degrees    R Axis 19 degrees    T Axis 43 degrees    Diagnosis       Sinus rhythm with Premature atrial complexes  Low voltage QRS  Cannot rule out Anteroseptal infarct (cited on or before 07-FEB-2024)  Abnormal ECG  When compared with ECG of 21-FEB-2024 07:01,  No significant change was found         Assessment:     Principal Problem:    CAD, multiple vessel  Active 
bathroom at end of ambulation.  He did require increased cues for more upright posture, guidance, and safety with ambulation and getting to the shower seat.  He was left with PCT assisting with shower.  He ambulates with forward flexed posture.  Good session with overall slow  progress demonstrated  towards PT goals. Will continue PT efforts.  STR at d/c      SUBJECTIVE:   Mr. Gordon states, \"Where are we going?\"     Social/Functional Lives With: Alone  Type of Home: House  Home Layout: One level  Bathroom Toilet: Standard  Bathroom Equipment: Commode  Bathroom Accessibility: Accessible  Home Equipment: None  Receives Help From: Family, Friend(s)  ADL Assistance: Independent  Homemaking Assistance: Independent  Ambulation Assistance: Independent  Transfer Assistance: Independent  Active : Yes  Mode of Transportation: Car  Occupation: Retired  OBJECTIVE:     PAIN: VITALS / O2: PRECAUTION / LINES / DRAINS:   Pre Treatment: 0/10         Post Treatment: 0/10 Vitals        Oxygen 2L    Continuous Pulse Oximetry, Telemetry , and O2    RESTRICTIONS/PRECAUTIONS:  Restrictions/Precautions  Restrictions/Precautions: Cardiac  Restrictions/Precautions: Cardiac     MOBILITY: I Mod I S SBA CGA Min Mod Max Total  NT x2 Comments:   Bed Mobility    Rolling [] [] [] [] [] [] [] [] [] [x] []    Supine to Sit [] [] [] [] [] [] [] [] [] [x] []    Scooting [] [] [] [] [] [] [] [] [] [x] []    Sit to Supine [] [] [] [] [] [] [] [] [] [x] []    Transfers    Sit to Stand [] [] [] [] [] [x] [x] [] [] [] [x]    Bed to Chair [] [] [] [] [] [] [] [] [] [x] []    Stand to Sit [] [] [] [] [] [x] [x] [] [] [] [x]     [] [] [] [] [] [] [] [] [] [] []    I=Independent, Mod I=Modified Independent, S=Supervision, SBA=Standby Assistance, CGA=Contact Guard Assistance,   Min=Minimal Assistance, Mod=Moderate Assistance, Max=Maximal Assistance, Total=Total Assistance, NT=Not Tested    BALANCE: Good Fair+ Fair Fair- Poor NT Comments   Sitting 
better  correction with cues today but needs constant cues and correction is only temporary.  Patient ambulates  30 feet w/rolling walker with chair follow with much encouragement  and additional time for completion, and one sitting rest break. Posture is forward flexed   At the end of the session he is left in the recliner and  he was  positioned for comfort with needs within reach.  Good session with  Overall slow  progress demonstrated  towards PT goals. Will continue PT efforts.  STR at d/c   PM note:  Patient is agreeable.  Gait within the room with hand held assist within the room as the patient participate in functional mobility activities  Additional staff needed to ensure safety.   Review of exercises except the patient keep falling asleep.    Has sitter.  Continue PT efforts.      SUBJECTIVE:   Mr. Gordon states, \"What are we going to do?\"     Social/Functional Lives With: Alone  Type of Home: House  Home Layout: One level  Bathroom Toilet: Standard  Bathroom Equipment: Commode  Bathroom Accessibility: Accessible  Home Equipment: None  Receives Help From: Family, Friend(s)  ADL Assistance: Independent  Homemaking Assistance: Independent  Ambulation Assistance: Independent  Transfer Assistance: Independent  Active : Yes  Mode of Transportation: Car  Occupation: Retired  OBJECTIVE:     PAIN: VITALS / O2: PRECAUTION / LINES / DRAINS:   Pre Treatment: 0/10         Post Treatment: 0/10 Vitals        Oxygen 2L    Continuous Pulse Oximetry, Telemetry , and O2    RESTRICTIONS/PRECAUTIONS:  Restrictions/Precautions  Restrictions/Precautions: Cardiac  Restrictions/Precautions: Cardiac     MOBILITY: I Mod I S SBA CGA Min Mod Max Total  NT x2 Comments:   Bed Mobility    Rolling [] [] [] [] [] [] [] [] [] [x] []    Supine to Sit [] [] [] [] [] [] [] [] [] [x] []    Scooting [] [] [] [] [] [] [x] [] [] [] [x]    Sit to Supine [] [] [] [] [] [] [] [] [] [x] []    Transfers    Sit to Stand [] [] [] [] [] [] [x] 
dextrose 5 % 250 mL infusion ([id number])  1 mg/min IntraVENous Continuous    norepinephrine (LEVOPHED) 4 mg in sodium chloride 0.9 % 250 mL infusion  0.01-3.3 mcg/kg/min IntraVENous Continuous    0.9 % sodium chloride infusion   IntraVENous PRN    midazolam PF (VERSED) injection 5 mg  5 mg IntraVENous Once    fentaNYL (SUBLIMAZE) bolus from bag  50 mcg IntraVENous Q30 Min PRN    dextrose 5 % and 0.45 % NaCl with KCl 20 mEq infusion   IntraVENous Continuous    sodium chloride flush 0.9 % injection 5-40 mL  5-40 mL IntraVENous 2 times per day    sodium chloride flush 0.9 % injection 5-40 mL  5-40 mL IntraVENous PRN    0.9 % sodium chloride infusion   IntraVENous PRN    ondansetron (ZOFRAN) injection 4 mg  4 mg IntraVENous Q4H PRN    aspirin EC tablet 81 mg  81 mg Oral Daily    oxyCODONE-acetaminophen (PERCOCET) 5-325 MG per tablet 1 tablet  1 tablet Oral Q4H PRN    morphine sulfate (PF) injection 3 mg  3 mg IntraVENous Q2H PRN    Or    morphine sulfate (PF) injection 4 mg  4 mg IntraVENous Q2H PRN    amiodarone (CORDARONE) tablet 200 mg  200 mg Oral BID    chlorhexidine (PERIDEX) 0.12 % solution 10 mL  10 mL Mouth/Throat BID    midazolam PF (VERSED) injection 1 mg  1 mg IntraVENous Q1H PRN    atorvastatin (LIPITOR) tablet 40 mg  40 mg Oral Nightly    famotidine (PEPCID) tablet 20 mg  20 mg Oral BID    Or    famotidine (PEPCID) 20 mg in sodium chloride (PF) 0.9 % 10 mL injection  20 mg IntraVENous BID    potassium chloride 20 mEq/50 mL IVPB (Central Line)  20 mEq IntraVENous PRN    magnesium sulfate 1000 mg in dextrose 5% 100 mL IVPB  1,000 mg IntraVENous PRN    phenylephrine (ALISSA-SYNEPHRINE) 50 mg in sodium chloride 0.9 % 250 mL infusion ([id number])   mcg/min IntraVENous Continuous PRN    EPINEPHrine 5 mg in sodium chloride 0.9 % 250 ml infusion  0.01-0.1 mcg/kg/min IntraVENous Continuous PRN    nitroGLYCERIN 200 mcg/mL in dextrose 5%  0-100 mcg/min IntraVENous Continuous PRN    insulin regular (HUMULIN R;NOVOLIN 
focal  Incisions: Clean, dry, and intact    Labs:  Recent Results (from the past 24 hour(s))   POCT Blood Gas & Electrolytes    Collection Time: 02/06/24  7:53 AM   Result Value Ref Range    POC pH 7.36 7.35 - 7.45      POC pCO2 45.7 (H) 35 - 45 MMHG    POC PO2 473 (H) 75 - 100 MMHG    POC Sodium 143 136 - 145 MMOL/L    POC Potassium 4.4 3.5 - 5.1 MMOL/L    POC Ionized Calcium 1.16 1.12 - 1.32 mmol/L    POC Glucose 109 (H) 65 - 100 MG/DL    BASE DEFICIT (POC) 0.3 mmol/L    POC HCO3 25.7 22 - 26 MMOL/L    POC TCO2 25 (H) 13 - 23 MMOL/L    POC O2  %    Source ARTERIAL      Performed by: Alonzo ([facility])     Anion Gap, POC Cannot be calculated  Cannot be calculated   mmol/L    eGFR, POC Cannot be calculated >60 ml/min/1.73m2   POC Heparin Assay    Collection Time: 02/06/24  8:05 AM   Result Value Ref Range    [place] 102      Projected heparin concentration 2.4 mg/kg    Baseline  sec    Heparin Bolus-Patient 24,622 units    Heparin Bolus-Pump 0 units    Heparin Bolus-Total 24,622 units   POC Heparin Assay    Collection Time: 02/06/24  8:05 AM   Result Value Ref Range    [place] 102      Heparin Test Concentrate 2.5 mg/kg    Projected heparin concentration 2.4 mg/kg    Baseline  sec    Heparin Bolus-Patient 25,490 units    Heparin Bolus-Pump 0 units    Heparin Bolus-Total 25,490 units   POCT Blood Gas & Electrolytes    Collection Time: 02/06/24  9:55 AM   Result Value Ref Range    POC pH 7.40 7.35 - 7.45      POC pCO2 39.6 35 - 45 MMHG    POC PO2 429 (H) 75 - 100 MMHG    POC Sodium 139 136 - 145 MMOL/L    POC Potassium 5.0 3.5 - 5.1 MMOL/L    POC Ionized Calcium 1.10 (L) 1.12 - 1.32 mmol/L    POC Glucose 153 (H) 65 - 100 MG/DL    BASE DEFICIT (POC) 0.5 mmol/L    POC HCO3 24.3 22 - 26 MMOL/L    POC TCO2 24 (H) 13 - 23 MMOL/L    POC O2  %    Source ARTERIAL      Performed by: Alonzo ([facility])     Anion Gap, POC Cannot be calculated  Cannot be calculated   mmol/L    eGFR, POC Cannot be 
then planned. He underwent cardiac catheterization that showed severe multivessel disease with occluded LAD.   Admitted S/P CABG 2/6, on impella and vent post-op. Returned to surgery 2/7 for evacuation of clot.  Nutrition Interval:  OGT placed 2/6. Extubated and OGT removed 2/9. NGT placed 2/9 and trophic TF started (Vital 1.2 AF at 10 ml/hr). 2/10: SLP eval-NPO recommended. 2/11: SLP re eval-SB6 initiated, TF advanced to 30 ml/hr.    Patient sitting up in bed awake and alert. Noted emesis yesterday with likely aspiration on CT. Plan for explant today so TF order and diet order cancelled and patient NPO. He states that he has no appetite. Explained RD to f/u tomorrow.      Abdominal Status (last documented by RN):   Last BM (including prior to admit):  (pre op)Abdomen Inspection: Obese, Rounded, Soft, RUQ Bowel Sounds: Active, LUQ Bowel Sounds: Active, RLQ Bowel Sounds: Active, LLQ Bowel Sounds: Active  Pertinent Medications: maxipime, colace, pepcid, lasix, SSI  Continuous: amio, heparin  IVF: none  Electrolyte Replacement: 2/11: 20 meq KCL   Pertinent administered PRN: Zofran (last admin 2/12)  Pertinent Labs:   Lab Results   Component Value Date/Time     02/13/2024 07:00 AM    K 3.5 02/13/2024 07:00 AM     02/13/2024 07:00 AM    CO2 30 02/13/2024 07:00 AM    BUN 21 02/13/2024 07:00 AM    CREATININE 0.80 02/13/2024 07:00 AM    GLUCOSE 144 02/13/2024 07:00 AM    CALCIUM 7.9 02/13/2024 07:00 AM    PHOS 1.9 02/13/2024 07:00 AM    MG 2.1 02/13/2024 07:00 AM      Lab Results   Component Value Date/Time    POCGLU 131 02/13/2024 07:40 AM    POCGLU 137 02/12/2024 08:24 PM    POCGLU 136 02/12/2024 05:42 PM    POCGLU 141 02/12/2024 12:41 PM    POCGLU 153 02/12/2024 06:49 AM    POCGLU 176 02/11/2024 09:11 PM     Hemoglobin A1C   Date Value Ref Range Status   02/05/2024 6.1 (H) 4.8 - 5.6 % Final     Remarkable for: Moderately elevated glucoses, K low normal    Current Nutrition Therapies:  Diet NPO    Current 
walker, balance and posture has improved.  Gait training with rolling walker x 125 feet with slow hilda  but did need some verbal cues to improve posture as it became more forward flexed as we got to the end of the ambulation portion of the treatment.  After a rest break in the recliner with patient performed therapeutic exercises , tolerated well but required cues to stay on task.  Good session with progress demonstrated.  Continue PT efforts.  STR at d/c  Nice man.      SUBJECTIVE:   Mr. Gordon states, \"Where have you been?\"     Social/Functional Lives With: Alone  Type of Home: House  Home Layout: One level  Bathroom Toilet: Standard  Bathroom Equipment: Commode  Bathroom Accessibility: Accessible  Home Equipment: None  Receives Help From: Family, Friend(s)  ADL Assistance: Independent  Homemaking Assistance: Independent  Ambulation Assistance: Independent  Transfer Assistance: Independent  Active : Yes  Mode of Transportation: Car  Occupation: Retired  OBJECTIVE:     PAIN: VITALS / O2: PRECAUTION / LINES / DRAINS:   Pre Treatment: 0/10         Post Treatment: 0/10 Vitals        Oxygen    Telemetry     RESTRICTIONS/PRECAUTIONS:  Restrictions/Precautions  Restrictions/Precautions: Cardiac  Restrictions/Precautions: Cardiac     MOBILITY: I Mod I S SBA CGA Min Mod Max Total  NT x2 Comments:   Bed Mobility    Rolling [] [] [] [] [] [] [] [] [] [x] []    Supine to Sit [] [] [] [] [] [] [] [] [] [x] []    Scooting [] [] [] [] [] [x] [] [] [] [] []    Sit to Supine [] [] [] [] [] [] [] [] [] [x] []    Transfers    Sit to Stand [] [] [] [] [] [x] [] [] [] [] []    Bed to Chair [] [] [] [] [] [] [] [] [] [x] []    Stand to Sit [] [] [] [] [] [x] [] [] [] [] []     [] [] [] [] [] [] [] [] [] [] []    I=Independent, Mod I=Modified Independent, S=Supervision, SBA=Standby Assistance, CGA=Contact Guard Assistance,   Min=Minimal Assistance, Mod=Moderate Assistance, Max=Maximal Assistance, Total=Total Assistance, 
      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)    Ischemic cardiomyopathy    Encounter for weaning from ventilator ([facility])    Hypoxemia    Cardiogenic shock ([facility])    Acute respiratory failure with hypoxia ([facility])    Fever    Pneumonia due to Klebsiella aerogenes ([facility])    Mucus plugging of bronchi    Atelectasis  Resolved Problems:    * No resolved hospital problems. *      Plan/Recommendations/Medical Decision Making:   Stable, taking po, supportive care    See orders  
0.9 % 250 ml infusion  0.01-0.1 mcg/kg/min IntraVENous Continuous PRN    nitroGLYCERIN 200 mcg/mL in dextrose 5%  0-100 mcg/min IntraVENous Continuous PRN    insulin regular (HUMULIN R;NOVOLIN R) 100 Units in sodium chloride 0.9 % 100 mL infusion  0.1-50 Units/hr IntraVENous Continuous    dextrose bolus 10% 125 mL  125 mL IntraVENous PRN    Or    dextrose bolus 10% 250 mL  250 mL IntraVENous PRN    dextrose 10 % infusion   IntraVENous Continuous PRN    dexmedeTOMIDine (PRECEDEX) 400 mcg in sodium chloride 0.9 % 100 mL infusion  0.1-1.5 mcg/kg/hr IntraVENous Continuous    acetaminophen (TYLENOL) tablet 650 mg  650 mg Oral Q4H PRN    dextrose 5 % and 0.45 % sodium chloride infusion   IntraVENous Continuous       Data Review: data included in this note has been independently reviewed by the author     TELEMETRY: NSR.    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  - Continue current regimen (PO amiodarone 200 mg, PO aspirin 81 mg).     CAD s/p CABG  - With complicated postoperative course, including bedside reexploration due to bleeding and cardiac tamponade. Continue current care and hemodynamic support.     Ischemic Cardiomyopathy  - EF 25-30%, continue supportive care, Impella removed. Started 
128* 138*      ECHO:   ECHO (TTE) LIMITED (PRN CONTRAST/BUBBLE/STRAIN/3D) 01/05/2024    Interpretation Summary    Left Ventricle: Severely reduced left ventricular systolic function. EF by 2D Simpsons Biplane is 24%. Left ventricle size is normal. Increased wall thickness. Increased ventricular mass. Findings consistent with severe concentric hypertrophy. Severe global hypokinesis present. Grade III diastolic dysfunction with increased LAP.    Aortic Valve: Not assessed. Moderate sclerosis of the aortic valve cusp.    Mitral Valve: Mild annular calcification of the mitral valve. Mild regurgitation.    Tricuspid Valve: Mildly elevated RVSP. The estimated RVSP is 35 mmHg.    Interatrial Septum: PFO present with a left to right shunt visible by spectral Doppler and color Doppler.    Image quality is fair.    Antibiotics:  Inpat Anti-Infectives (From admission, onward)       Start     Ordered Stop    02/06/24 2000  ceFAZolin (ANCEF) 2000 mg in sterile water 20 mL IV syringe  2,000 mg,   IntraVENous,   EVERY 8 HOURS        Note to Pharmacy: Default Settings:Auto-dosed by Weight Q8h x 2 doses  Auto-dosed: Pt Wt<119.9kg-2gm, >120kg-3gm    02/06/24 1322 02/08/24 1159                  Microbiology:   Results       Procedure Component Value Units Date/Time    MRSA/Staph Aureas DNA - Maggie [[id number]] Collected: 02/05/24 0811    Order Status: Completed Specimen: Nasal Swab Updated: 02/05/24 1714     MRSA by PCR Not detected        Comment: A positive test result does not necessarily indicate the presence of a viable organism. A positive result is indicative of the presence of SA or MRSA DNA.  The BD Max StaphSR assay is intended to aid in the prevention and control of MRSA and SA infections in healthcare settings.  It is not intended to diagnose MRSA or SA infections nor guide or monitor treatment for MRSA/SA infections. A negative result does not preclude nasal colonization.          SA by PCR Not detected         
Assistance, Mod=Moderate Assistance, Max=Maximal Assistance, Total=Total Assistance, NT=Not Tested    BALANCE: Good Fair+ Fair Fair- Poor NT Comments   Sitting Static [] [x] [] [] [] []    Sitting Dynamic [] [] [x] [] [] []              Standing Static [] [] [] [x] [] []    Standing Dynamic [] [] [] [] [] [x]      GAIT: I Mod I S SBA CGA Min Mod Max Total  NT x2 Comments:   Level of Assistance [] [] [] [] [] [] [x] [] [] [] [x]    Distance  70 feet    DME Cardiac Walker    Gait Quality Decreased hilda , Decreased step clearance, Decreased step length, and Shuffling     Weightbearing Status      Stairs      I=Independent, Mod I=Modified Independent, S=Supervision, SBA=Standby Assistance, CGA=Contact Guard Assistance,   Min=Minimal Assistance, Mod=Moderate Assistance, Max=Maximal Assistance, Total=Total Assistance, NT=Not Tested    PLAN:   FREQUENCY AND DURATION: BID for duration of hospital stay or until stated goals are met, whichever comes first.    TREATMENT:   TREATMENT:   Therapeutic Activity (25 Minutes): Therapeutic activity included Scooting, Transfer Training, Ambulation on level ground, Sitting balance , and Standing balance to improve functional Activity tolerance, Balance, Coordination, Mobility, and Strength.    TREATMENT GRID:     Date:  2/19/24 Date:   Date:     ACTIVITY/EXERCISE AM PM AM PM AM PM   LAQ 2        Shoulder shrugs 0        Gluteal sets 0        marching 0        Ankle pumps 0        abduction 0                 B = bilateral; AA = active assistive; A = active; P = passive    AFTER TREATMENT PRECAUTIONS: Alarm Activated, Bed/Chair Locked, Call light within reach, Chair, Needs within reach, RN at bedside, and Visitors at bedside    INTERDISCIPLINARY COLLABORATION:  RN/ PCT, PT/ PTA, and Rehab Attendant    EDUCATION:  review POC and importance of mobility in the recovery process    TIME IN/OUT:  Time In: 0935  Time Out: 1000  Minutes: 25    Adilia Kennedy PTA    
Assistance, Mod=Moderate Assistance, Max=Maximal Assistance, Total=Total Assistance, NT=Not Tested    BALANCE: Good Fair+ Fair Fair- Poor NT Comments   Sitting Static [] [x] [] [] [] []    Sitting Dynamic [] [] [x] [] [] []              Standing Static [] [] [] [x] [] []    Standing Dynamic [] [] [] [] [] [x]      GAIT: I Mod I S SBA CGA Min Mod Max Total  NT x2 Comments:   Level of Assistance [] [] [] [] [] [] [x] [] [] [] [x]    Distance  70 feet    DME Rolling Walker    Gait Quality Decreased hilda , Decreased step clearance, Decreased step length, and Shuffling     Weightbearing Status      Stairs      I=Independent, Mod I=Modified Independent, S=Supervision, SBA=Standby Assistance, CGA=Contact Guard Assistance,   Min=Minimal Assistance, Mod=Moderate Assistance, Max=Maximal Assistance, Total=Total Assistance, NT=Not Tested    PLAN:   FREQUENCY AND DURATION: BID for duration of hospital stay or until stated goals are met, whichever comes first.    TREATMENT:   TREATMENT:   Therapeutic Activity (25 Minutes): Therapeutic activity included Scooting, Transfer Training, Ambulation on level ground, Sitting balance , and Standing balance to improve functional Activity tolerance, Balance, Coordination, Mobility, and Strength.    TREATMENT GRID:     Date:  2/19/24 Date:   Date:     ACTIVITY/EXERCISE AM PM AM PM AM PM   LAQ 2        Shoulder shrugs 0        Gluteal sets 0        marching 0        Ankle pumps 0        abduction 0                 B = bilateral; AA = active assistive; A = active; P = passive    AFTER TREATMENT PRECAUTIONS: Alarm Activated, Bed/Chair Locked, Call light within reach, Chair, Needs within reach, RN notified, and has sitter    INTERDISCIPLINARY COLLABORATION:  RN/ PCT, PT/ PTA, and Rehab Attendant    EDUCATION:  review POC and importance of mobility in the recovery process    TIME IN/OUT:  Time In: 0957  Time Out: 1022  Minutes: 25    Adilia Kennedy PTA    
Assistance, Total=Total Assistance, NT=Not Tested    ACTIVITIES OF DAILY LIVING: I Mod I S SBA CGA Min Mod Max Total NT Comments   BASIC ADLs:              Upper Body   Bathing [] [] [] [] [] [x] [] [] [] []     Lower Body Bathing [] [] [] [] [] [] [] [] [] []     Toileting [] [] [] [] [] [] [] [] [] []    Upper Body Dressing [] [] [] [] [] [x] [] [] [] [] UB dressing    Lower Body Dressing [] [] [] [] [] [] [] [] [] []    Feeding [] [] [] [] [] [] [] [] [] []    Grooming [] [] [] [] [] [] [] [] [] []    Personal Device Care [] [] [] [] [] [] [] [] [] []    Functional Mobility [] [] [] [] [] [x] [x] [] [] [] X 2 x CW   I=Independent, Mod I=Modified Independent, S=Supervision/Setup, SBA=Standby Assistance, CGA=Contact Guard Assistance, Min=Minimal Assistance, Mod=Moderate Assistance, Max=Maximal Assistance, Total=Total Assistance, NT=Not Tested    BALANCE: Good Fair+ Fair Fair- Poor NT Comments   Sitting Static [] [] [x] [] [] []    Sitting Dynamic [] [] [x] [] [] []              Standing Static [] [] [] [x] [] []    Standing Dynamic [] [] [] [x] [] [] X CW       PLAN:     FREQUENCY/DURATION   OT Plan of Care: 3 times/week for duration of hospital stay or until stated goals are met, whichever comes first.    TREATMENT:     TREATMENT:   Co-Treatment PT/OT necessary due to patient's decreased overall endurance/tolerance levels, as well as need for high level skilled assistance to complete functional transfers/mobility and functional tasks  Neuromuscular Re-education (13 Minutes): Patient participated in neuromuscular re-education including functional reaching, weight shifting, postural training, midline training, standing tolerance activity , and sitting balance activity   with moderate and need for assistance, verbal cues, and tactile cues to improve sitting balance, standing balance, posture, coordination, static balance, dynamic balance, and activity tolerance in order to prepare for functional task, prepare for 
Edema: +1, Pitting    BMI: 32.2, Obese Class 1 (BMI 30.0-34.9)  Admission Body Weight: 90.7 kg (199 lb 15.3 oz) (standing scale)  Ideal Body Weight (Kg) (Calculated): 75 kg (166 lbs), 139.3 %  BMI Category Obese Class 1 (BMI 30.0-34.9)  Estimated Daily Nutrient Needs:  Energy (kcal/day): 8970-4279 (18-23 kcal/kg) (overweight on admission, advanced age, s/p surgery and now extubated) (Kcal/kg Weight used: 90.7 kg Admission  Protein (g/day):  (1-1.2 g/kg) Weight Used: (Admission) 90.7 kg  Fluid (ml/day):   (1 ml/kcal)    Nutrition Diagnosis:   Inadequate oral intake related to cognitive or neurological impairment (suspected post op decline in appetite) as evidenced by  (variable intake as above)  Nutrition Interventions:   Food and/or Nutrient Delivery: Continue Current Diet, Continue Oral Nutrition Supplement     Coordination of Nutrition Care: Continue to monitor while inpatient      Goals:   Previous Goal Met: Goal(s) Achieved  Active Goal:  (Continue to meet at least 75% needs with meals and ONS)       Nutrition Monitoring and Evaluation:      Food/Nutrient Intake Outcomes: Food and Nutrient Intake, Supplement Intake  Physical Signs/Symptoms Outcomes: Meal Time Behavior, Weight    Discharge Planning:    Too soon to determine    ASHLEY FELIX, RD      
Information/Background: Several attempts to see patient over the last several weeks, however PO intake held and NGT in place due to respiratory issues resulting in Airvo, as well as heart procedures for which patient was NPO. MBSS performed 2/19/24 with recommendation for thin liquids via cup, soft & bite sized solids, upright positioning, and assistance with PO due to general weakness. RN states that patient is tolerating meds well.      History of Present Injury/Illness: Mr. Gordon  has a past medical history of 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 (HCC), CAD (coronary artery disease), Former pipe smoker, History of atrial flutter, History of bilateral knee replacement, History of kidney stones, PAF (paroxysmal atrial fibrillation) ([facility]), Pneumonia, and Uses LifeVest defibrillator.. He also  has a past surgical history that includes ep device procedure (N/A, 11/21/2023); Cardiac procedure (N/A, 01/17/2024); Total knee arthroplasty (Bilateral); Wrist surgery (Right); Colonoscopy; Coronary artery bypass graft (N/A, 2/6/2024); transesophageal echocardiogram (N/A, 2/6/2024); Cardiac procedure (N/A, 2/6/2024); Cardiac surgery (N/A, 2/6/2024); Cardiac surgery (N/A, 2/13/2024); bronchoscopy (N/A, 2/13/2024); Wound Exploration (N/A, 2/15/2024); and bronchoscopy (N/A, 2/16/2024).  Precautions/Allergies: Patient has no known allergies.     Observations:  Alertness: Alert  Voice: WFL  Speech: Intelligible    Prior Dysphagia History:  Pt seen 2/10/24 NPO,   2/11/24 with recommendation of SBS/thin; Patient presents with increased ability to participate in swallowing assessment. Tolerated all consistencies without overt s/sx of aspiration/penetration. Mildly increased work of breathing requiring slowed rate of presentation. Increased mastication time with textures, adequate oral clearance.    2/12/24 attempted however patient was inappropriate  2/17/24 with recommendation of 
Mod=Moderate Assistance, Max=Maximal Assistance, Total=Total Assistance, NT=Not Tested    BALANCE: Good Fair+ Fair Fair- Poor NT Comments   Sitting Static [] [] [x] [] [] []    Sitting Dynamic [] [] [] [x] [] []              Standing Static [] [] [] [] [x] []    Standing Dynamic [] [] [] [] [] [x]      GAIT: I Mod I S SBA CGA Min Mod Max Total  NT x2 Comments:   Level of Assistance [] [] [] [] [] [] [x] [] [] [] [x]    Distance  30 feet    DME Cardiac Walker    Gait Quality Decreased hilda , Decreased step clearance, Decreased step length, and Shuffling     Weightbearing Status      Stairs      I=Independent, Mod I=Modified Independent, S=Supervision, SBA=Standby Assistance, CGA=Contact Guard Assistance,   Min=Minimal Assistance, Mod=Moderate Assistance, Max=Maximal Assistance, Total=Total Assistance, NT=Not Tested    PLAN:   FREQUENCY AND DURATION: BID for duration of hospital stay or until stated goals are met, whichever comes first.    TREATMENT:   TREATMENT:   Co-Treatment PT/OT necessary due to patient's decreased overall endurance/tolerance levels, as well as need for high level skilled assistance to complete functional transfers/mobility and functional tasks  Therapeutic Activity (26 Minutes): Therapeutic activity included Scooting, Transfer Training, Ambulation on level ground, Sitting balance , and Standing balance to improve functional Activity tolerance, Balance, Coordination, Mobility, and Strength.    TREATMENT GRID:     Date:  2/19/24 Date:   Date:     ACTIVITY/EXERCISE AM PM AM PM AM PM   LAQ 2        Shoulder shrugs 0        Gluteal sets 0        marching 0        Ankle pumps 0        abduction 0                 B = bilateral; AA = active assistive; A = active; P = passive    AFTER TREATMENT PRECAUTIONS: Alarm Activated, Bed/Chair Locked, Call light within reach, Chair, Needs within reach, and RN notified    INTERDISCIPLINARY COLLABORATION:  RN/ PCT, PT/ PTA, and OT/ VOGT    EDUCATION:  review POC 
PF (VERSED) injection 1 mg  1 mg IntraVENous Q1H PRN    atorvastatin (LIPITOR) tablet 40 mg  40 mg Oral Nightly    famotidine (PEPCID) tablet 20 mg  20 mg Oral BID    Or    famotidine (PEPCID) 20 mg in sodium chloride (PF) 0.9 % 10 mL injection  20 mg IntraVENous BID    potassium chloride 20 mEq/50 mL IVPB (Central Line)  20 mEq IntraVENous PRN    magnesium sulfate 1000 mg in dextrose 5% 100 mL IVPB  1,000 mg IntraVENous PRN    phenylephrine (ALISSA-SYNEPHRINE) 50 mg in sodium chloride 0.9 % 250 mL infusion ([id number])   mcg/min IntraVENous Continuous PRN    EPINEPHrine 5 mg in sodium chloride 0.9 % 250 ml infusion  0.01-0.1 mcg/kg/min IntraVENous Continuous PRN    nitroGLYCERIN 200 mcg/mL in dextrose 5%  0-100 mcg/min IntraVENous Continuous PRN    insulin regular (HUMULIN R;NOVOLIN R) 100 Units in sodium chloride 0.9 % 100 mL infusion  0.1-50 Units/hr IntraVENous Continuous    insulin lispro (HUMALOG) injection vial 0-12 Units  0-12 Units SubCUTAneous TID WC    insulin lispro (HUMALOG) injection vial 0-6 Units  0-6 Units SubCUTAneous Nightly    dextrose bolus 10% 125 mL  125 mL IntraVENous PRN    Or    dextrose bolus 10% 250 mL  250 mL IntraVENous PRN    dextrose 10 % infusion   IntraVENous Continuous PRN    dexmedeTOMIDine (PRECEDEX) 400 mcg in sodium chloride 0.9 % 100 mL infusion  0.1-1.5 mcg/kg/hr IntraVENous Continuous    DOBUTamine (DOBUTREX) 500 mg in dextrose 5 % 250 mL infusion  2.5-10 mcg/kg/min IntraVENous Continuous    acetaminophen (TYLENOL) tablet 650 mg  650 mg Oral Q4H PRN    fentaNYL (SUBLIMAZE) 1,000 mcg in sodium chloride 0.9% 100 mL infusion   mcg/hr IntraVENous Continuous    dextrose 5 % and 0.45 % sodium chloride infusion   IntraVENous Continuous       Data Review: data included in this note has been independently reviewed by the author     TELEMETRY:    Assessment/Plan:     Patient Active Problem List   Diagnosis    Typical atrial flutter (HCC)    Volume overload    Acute on chronic 
Pleural     Culture, Body Fluid [[id number]] Collected: 02/18/24 0847    Order Status: Completed Specimen: Body Fluid from Pleural Fluid Updated: 02/20/24 0654     Special Requests NO SPECIAL REQUESTS        Gram stain OCCASIONAL WBCS SEEN         NO DEFINITE ORGANISM SEEN        Culture NO GROWTH 2 DAYS       Culture, Respiratory [[id number]]  (Abnormal) Collected: 02/16/24 0852    Order Status: Completed Specimen: BAL- Bronch. Lavage Updated: 02/18/24 1015     Special Requests NO SPECIAL REQUESTS        Gram stain MANY WBCS SEEN               MODERATE EPITHELIAL CELLS SEEN            MANY Gram positive cocci         MANY Gram negative rods         FEW GRAM POSITIVE RODS        Culture       MODERATE NORMAL RESPIRATORY FAUZIA                  MODERATE BETA STREP,NOT GROUP A,B,C,F OR G          Culture, Respiratory [[id number]] Collected: 02/16/24 0851    Order Status: Sent Specimen: Respiratory from Bronchial Washing     Culture, Respiratory [[id number]]  (Abnormal) Collected: 02/13/24 1405    Order Status: Completed Specimen: Bronchial Washing Updated: 02/16/24 1152     Special Requests NO SPECIAL REQUESTS        Gram stain MANY WBCS SEEN         NO EPITHELIAL CELLS SEEN               MODERATE Gram positive cocci                  OCCASIONAL GRAM POSITIVE RODS           Culture       MODERATE BETA STREP,NOT GROUP A,B,C,F OR G                  LIGHT NORMAL RESPIRATORY FAUZIA            MODERATE YEAST       MRSA/Staph Aureas DNA [[id number]] Collected: 02/10/24 1250    Order Status: Completed Specimen: Nasal Swab Updated: 02/11/24 1401     MRSA by PCR Not detected        Comment: A positive test result does not necessarily indicate the presence of a viable organism. A positive result is indicative of the presence of SA or MRSA DNA.  The BD Max StaphSR assay is intended to aid in the prevention and control of MRSA and SA infections in healthcare settings.  It is not intended to diagnose MRSA or SA infections nor guide 
Specimen type: ARTERIAL      Performed by: Britt     Respiratory Comment: [id number]    Basic Metabolic Panel    Collection Time: 02/08/24  3:21 AM   Result Value Ref Range    Sodium 143 136 - 146 mmol/L    Potassium 4.0 3.5 - 5.1 mmol/L    Chloride 118 (H) 103 - 113 mmol/L    CO2 24 21 - 32 mmol/L    Anion Gap 1 (L) 2 - 11 mmol/L    Glucose 157 (H) 65 - 100 mg/dL    BUN 16 8 - 23 MG/DL    Creatinine 0.60 (L) 0.8 - 1.5 MG/DL    Est, Glom Filt Rate >60 >60 ml/min/1.73m2    Calcium 7.7 (L) 8.3 - 10.4 MG/DL   CBC    Collection Time: 02/08/24  3:21 AM   Result Value Ref Range    WBC 8.2 4.3 - 11.1 K/uL    RBC 3.34 (L) 4.23 - 5.6 M/uL    Hemoglobin 9.7 (L) 13.6 - 17.2 g/dL    Hematocrit 28.1 (L) 41.1 - 50.3 %    MCV 84.1 82 - 102 FL    MCH 29.0 26.1 - 32.9 PG    MCHC 34.5 31.4 - 35.0 g/dL    RDW 15.8 (H) 11.9 - 14.6 %    Platelets 101 (L) 150 - 450 K/uL    MPV 11.1 9.4 - 12.3 FL    nRBC 0.00 0.0 - 0.2 K/uL   Magnesium    Collection Time: 02/08/24  3:21 AM   Result Value Ref Range    Magnesium 2.0 1.8 - 2.4 mg/dL   Lactic Acid    Collection Time: 02/08/24  3:21 AM   Result Value Ref Range    Lactic Acid, Plasma 1.5 0.4 - 2.0 MMOL/L   Brain Natriuretic Peptide    Collection Time: 02/08/24  3:21 AM   Result Value Ref Range    NT Pro-BNP 2,152 (H) <450 PG/ML   Venous Blood Gas, POC    Collection Time: 02/08/24  3:28 AM   Result Value Ref Range    DEVICE ADULT VENT      FIO2 70 %    PH, VENOUS (POC) 7.40 7.32 - 7.42      PCO2, Azul, POC 35.9 (L) 41 - 51 MMHG    PO2, VENOUS (POC) 34 mmHg    HCO3, Venous 22.2 (L) 23 - 28 MMOL/L    SO2, VENOUS (POC) 65.1 65 - 88 %    Base Deficit, Venous 2.3 mmol/L    Mode SIMV      POC TIDAL VOLUME 520 ml    POC PEEP 8 cmH2O    POC Pressure Support 10 cmH2O    POC Saeid's Test NOT APPLICABLE      Site [facility]      Specimen type: MIXED VENOUS      Performed by: Britt     Critical Value Read Back JAJA    EKG 12 lead    Collection Time: 02/08/24  5:34 AM   Result Value Ref Range 
[] [] [] [x] []    Feeding [] [] [] [] [] [] [] [] [] [x]    Grooming [] [] [] [] [] [] [] [] [] [x]    Personal Device Care [] [] [] [] [] [] [] [] [] [x]    Functional Mobility [] [] [] [] [] [] [x] [x] [] [] X 2 x CW   I=Independent, Mod I=Modified Independent, S=Supervision/Setup, SBA=Standby Assistance, CGA=Contact Guard Assistance, Min=Minimal Assistance, Mod=Moderate Assistance, Max=Maximal Assistance, Total=Total Assistance, NT=Not Tested    BALANCE: Good Fair+ Fair Fair- Poor NT Comments   Sitting Static [] [] [x] [] [] []    Sitting Dynamic [] [] [x] [] [] []              Standing Static [] [] [] [x] [] []    Standing Dynamic [] [] [] [x] [] [] X CW       PLAN:     FREQUENCY/DURATION   OT Plan of Care: 3 times/week for duration of hospital stay or until stated goals are met, whichever comes first.    TREATMENT:     TREATMENT:   Co-Treatment PT/OT necessary due to patient's decreased overall endurance/tolerance levels, as well as need for high level skilled assistance to complete functional transfers/mobility and functional tasks  Neuromuscular Re-education (13 Minutes): Patient participated in neuromuscular re-education including functional reaching, weight shifting, postural training, midline training, standing tolerance activity , and sitting balance activity   with moderate and maximal assistance, verbal cues, and tactile cues to improve sitting balance, standing balance, posture, coordination, static balance, and dynamic balance in order to prepare for functional task, prepare for seated ADLs, prepare for standing ADLs, prepare for functional transfer, prepare for discharge home , and prepare for self care..   Self Care (25 minutes): Patient participated in upper body bathing, lower body bathing, upper body dressing, and lower body dressing ADLs in supported sitting and unsupported sitting with maximal verbal, manual, and tactile cueing to increase independence, decrease assistance required, increase 
moist  Respiratory: symmetric chest rise. Crackles from posterior. Wearing 2 liter cannula  Cardiovascular:  RRR without M,G,R. There is 1+ lower extremity edema.  Gastrointestinal: soft and non-tender; with positive bowel sounds.  Musculoskeletal: warm without cyanosis. Normal muscle tone.   Skin:  no jaundice or rashes, sternal and leg wounds. Left arm edema/erythema  Neurologic: symmetric strength, fluent speech  Psychiatric:  calm, appropriate, oriented x 4    Imaging: I performed an independent interpretation of the patient's images.  CXR:    2/22 2/21      LAB:  Recent Labs     02/22/24  0240 02/23/24  0357   WBC 13.4* 15.1*   HGB 9.2* 9.5*   HCT 31.2* 31.9*    378     Recent Labs     02/21/24 0336 02/22/24 0240 02/23/24  0357    141 139   K 4.0 3.9 4.3    101* 99*   CO2 40* 42* 40*   BUN 14 17 16   CREATININE 0.70* 0.70* 0.70*   MG  --   --  2.2     No results for input(s): \"TROPHS\", \"NTPROBNP\", \"CRP\", \"ESR\" in the last 72 hours.  Recent Labs     02/21/24 0336 02/22/24  0240 02/23/24  0357   GLUCOSE 146* 127* 113*      Microbiology:   No results for input(s): \"CULTURE\" in the last 72 hours.  ECHO: 02/06/24    ECHO (TTE) LIMITED (PRN CONTRAST/BUBBLE/STRAIN/3D) 02/13/2024  1:36 PM (Final)    Interpretation Summary    Left Ventricle: Severely reduced left ventricular systolic function with a visually estimated EF of 25 - 30%. Left ventricle is moderately dilated. Normal wall thickness. Severe global hypokinesis present.    Image quality is adequate. Contrast used: Definity.    Impella measures 5.5 cm deep.    Signed by: Shine Hdz MD on 2/13/2024  1:36 PM    Assessment and Plan:  (Medical Decision Making)   Impression:   83 y.o. male  s/p  CABG x 3 including LIMA, Maze procedure, left atrial appendage clipping, Impella 5.5 LVAD insertion. Had complications after the surgery with bleeding despite 
      MODERATE NORMAL RESPIRATORY FAUZIA                  CULTURE IN PROGRESS,FURTHER UPDATES TO FOLLOW          Culture, Urine [[id number]] Collected: 02/07/24 1610    Order Status: Completed Specimen: Urine Updated: 02/09/24 0830     Special Requests NO SPECIAL REQUESTS        Culture NO GROWTH 1 DAY       MRSA/Staph Aureas DNA - Maggie [[id number]] Collected: 02/05/24 0811    Order Status: Completed Specimen: Nasal Swab Updated: 02/05/24 1714     MRSA by PCR Not detected        Comment: A positive test result does not necessarily indicate the presence of a viable organism. A positive result is indicative of the presence of SA or MRSA DNA.  The BD Max StaphSR assay is intended to aid in the prevention and control of MRSA and SA infections in healthcare settings.  It is not intended to diagnose MRSA or SA infections nor guide or monitor treatment for MRSA/SA infections. A negative result does not preclude nasal colonization.          SA by PCR Not detected             Ventilator Settings Ideal body weight: 75.3 kg (165 lb 14.8 oz)  Adjusted ideal body weight: 89.4 kg (197 lb)  Mode FIO2 Rate Tidal Volume Pressure   (S) CPAP/PS    50 %  16 bpm         8     Peak airway pressure:     Minute ventilation:    ABG:  No results for input(s): \"PHAPOC\", \"OTG0KNIM\", \"HA0JDAJ\", \"GNT1BGU\", \"BE\" in the last 72 hours.    Assessment and Plan:  (Medical Decision Making)   Impression: 83 y.o. male with  83 y.o. male s/p  CABG x 3 including LIMA, Maze procedure, left atrial appendage clipping, Impella 5.5 LVAD insertion .Had complications after the surgery with bleeding despite correction of coagulopathy and went to OR to for evacuation of retrocardiac clot which caused tamponade ., on impella support and pressors .    NEURO:   Sedation:  on precedx gtt  Analgesia: Fentanyl gtt  CV:   Volume Status:    cardiogennic shock:   impella in place , dobutamine  S/p CABG x 3-  CT  per surgery   - A fib with RVR-  on Amiodarone gtt  PULM: 
COLLABORATION:  RN/ PCT, PT/ PTA, and Sitter    EDUCATION:  review POC and importance of mobility in the recovery process    TIME IN/OUT:  Time In: 1324  Time Out: 1334  Minutes: 10    Adilia Kennedy PTA    
including bedside reexploration due to bleeding and cardiac tamponade.  Continue current care and hemodynamic support     Cardiogenic Shock  - Volume status stable. Renal function stable.  - Continue IV furosemide PRN.  Patient has increased urine output this morning after a dose of intravenous Lasix  - Patient is now off of Impella.     Atrial Fibrillation with RVR  - Impella out, patient is on pressors.  - Continue amiodarone gtt for rhythm control.     Hypoxemia  - Stable, extubated, comfortable, titrate O2 as needed.  - Continue IV furosemide for now left-sided pneumonia.     Patient is critically ill and quite dynamic and is at high risk for acute decompensation.    TANIYA NORMAN  I have personally seen and evaluated the patient and reviewed the students note and agree with the following assessment and plan and findings. I was present for and observed the key components of this note.  Any appropriate additions or editing of the information have been done by me.    Augustus Dominguez MD, FACC  Cardiology   
restricted.   [] 3 Moderate Dysphagia: Total assistance, supervision, or strategies.       Two or more diet consistencies restricted.   [] 2 Moderate-Severe Dysphagia: Maximum assistance or maximum use     of strategies with partial po intake   [] 1 Severe dysphagia- NPO. Unable to tolerate any po safely       PLAN    Duration/Frequency: Continue to follow patient 3x/week for duration of hospitalization and/or until goals met    Rehabilitation Potential For Stated Goals: Good    Interdisciplinary Collaboration: MD/ PA/ NP  and RN/ PCT    Medical Necessity    Patient is expected to demonstrate progress in swallow strength, diet tolerance, and swallow safety to improve swallow safety, work toward diet advancement, and decrease aspiration risk.    Education:   Patient educated on Results of evaluation, Speech therapy recommendations, Role of speech therapy, SLP plan, and Diet recommendations  Education provided to Patient, MD, and RN  Education response: Needs reinforcement    Safety:   RN remains with patient.  Results and recommendations communicated with staff.    Therapy Time:  Time In: 1000  Time Out: 1030  Minutes: 30    MERON YA  2/19/2024 11:37 AM    
IV amio, transition to PO amiodarone  CAD s/p CABG: complicated post op course, bedside re exploration due to bleeding, tamponade, supportive care per CTS, cont ASA, atorvastatin  Ischemic cardiomyopathy: EF 25-30%, cont supportive care, impella removed  CVA protection: no currently an OAC candidate    Cleveland Ma MD  Cardiology/Electrophysiology   
failed yesterday, continues to be on LVAD support off pressors however   CAD: s/p cabg x 3 on 2/6 with return to OR for evacuation of retrocardiac clot.   A fib with RVR: amio drip now rate controlled, heart rate in the 60s    PULM:   Acute hypoxemic respiratory failure: Oxygen requirement back down, therapeutic bronchoscopy performed yesterday with removal of moderate amount of mucus.  pO2 88 on 40% FiO2.  Switch to pressure support ventilation during rounds but was very apneic, had received fentanyl earlier earlier to facilitate Dobbhoff tube placement.  Seems calm not tachypneic as he was yesterday however he is also on Precedex and had received opiates.  Will withhold opiates after NG tube was placed await wearing off of the opioid effect and try pressure support ventilation to see whether he can be extubated today.  Discussed with nursing    RENAL:  - no issues     GI:   Nutrition: Dobbhoff tube to be placed, he aspirates, will eventually need a formal evaluation once improved    HEME:   Anemia: 9.4.>>>8.0 to receive a unit of blood today continue to monitor.   Thrombocytopenia: 94,>>>205 coming up Monitor.   Anticoagulation: none.     ID:   Fevers: Had recurrent and high for the last few days. Wbc up prior to initiation of cefepime. Temp max afebrile past 24 hours.  On cefepime, heavy growth of gram-negative rods with Klebsiella aerogenes and beta-hemolytic streptococci MRSA PCR negative cefepime is adequate and should cover both pathogens and should be continued a total of 7 days his fever has normalized and he has been afebrile for the past 48 hours  ENDO:   DM: ssi.     Skin: no decub, turns, preventive care  Prophy: scd's. Ppi        Full Code    The patient is critically ill with respiratory failure, circulatory failure and requires high complexity decision making for assessment and support including frequent ventilator adjustment, frequent evaluation and titration of therapies , application of advanced 
of advanced monitoring technologies and extensive interpretation of multiple databases    Cumulative time devoted to patient critical care care services by me for day of service is 30 mins.  The patient is high risk for further decompensation and needing urgent/emergent reintubation and mechanical ventilation, he will need a swallowing evaluation at some point probably.       Neri Hayward MD

## 2024-02-29 ENCOUNTER — APPOINTMENT (OUTPATIENT)
Dept: INTERVENTIONAL RADIOLOGY/VASCULAR | Age: 84
End: 2024-02-29
Attending: FAMILY MEDICINE
Payer: MEDICARE

## 2024-02-29 LAB
ALBUMIN SERPL-MCNC: 2.6 G/DL (ref 3.2–4.6)
ALBUMIN/GLOB SERPL: 0.7 (ref 0.4–1.6)
ALP SERPL-CCNC: 83 U/L (ref 50–136)
ALT SERPL-CCNC: 14 U/L (ref 12–65)
ANION GAP SERPL CALC-SCNC: 1 MMOL/L (ref 2–11)
ANION GAP SERPL CALC-SCNC: 1 MMOL/L (ref 2–11)
AST SERPL-CCNC: 40 U/L (ref 15–37)
BASOPHILS # BLD: 0 K/UL (ref 0–0.2)
BASOPHILS NFR BLD: 0 % (ref 0–2)
BILIRUB SERPL-MCNC: 0.7 MG/DL (ref 0.2–1.1)
BUN SERPL-MCNC: 12 MG/DL (ref 8–23)
BUN SERPL-MCNC: 15 MG/DL (ref 8–23)
CALCIUM SERPL-MCNC: 8.3 MG/DL (ref 8.3–10.4)
CALCIUM SERPL-MCNC: 8.5 MG/DL (ref 8.3–10.4)
CHLORIDE SERPL-SCNC: 103 MMOL/L (ref 103–113)
CHLORIDE SERPL-SCNC: 104 MMOL/L (ref 103–113)
CO2 SERPL-SCNC: 34 MMOL/L (ref 21–32)
CO2 SERPL-SCNC: 35 MMOL/L (ref 21–32)
CREAT SERPL-MCNC: 0.8 MG/DL (ref 0.8–1.5)
CREAT SERPL-MCNC: 0.9 MG/DL (ref 0.8–1.5)
DIFFERENTIAL METHOD BLD: ABNORMAL
EOSINOPHIL # BLD: 0.2 K/UL (ref 0–0.8)
EOSINOPHIL NFR BLD: 3 % (ref 0.5–7.8)
ERYTHROCYTE [DISTWIDTH] IN BLOOD BY AUTOMATED COUNT: 19 % (ref 11.9–14.6)
GLOBULIN SER CALC-MCNC: 3.6 G/DL (ref 2.8–4.5)
GLUCOSE SERPL-MCNC: 113 MG/DL (ref 65–100)
GLUCOSE SERPL-MCNC: 95 MG/DL (ref 65–100)
HCT VFR BLD AUTO: 32.4 % (ref 41.1–50.3)
HGB BLD-MCNC: 9.8 G/DL (ref 13.6–17.2)
IMM GRANULOCYTES # BLD AUTO: 0 K/UL (ref 0–0.5)
IMM GRANULOCYTES NFR BLD AUTO: 0 % (ref 0–5)
LYMPHOCYTES # BLD: 1.4 K/UL (ref 0.5–4.6)
LYMPHOCYTES NFR BLD: 17 % (ref 13–44)
MCH RBC QN AUTO: 28.8 PG (ref 26.1–32.9)
MCHC RBC AUTO-ENTMCNC: 30.2 G/DL (ref 31.4–35)
MCV RBC AUTO: 95.3 FL (ref 82–102)
MONOCYTES # BLD: 0.9 K/UL (ref 0.1–1.3)
MONOCYTES NFR BLD: 12 % (ref 4–12)
NEUTS SEG # BLD: 5.3 K/UL (ref 1.7–8.2)
NEUTS SEG NFR BLD: 68 % (ref 43–78)
NRBC # BLD: 0 K/UL (ref 0–0.2)
PLATELET # BLD AUTO: 287 K/UL (ref 150–450)
PMV BLD AUTO: 10.4 FL (ref 9.4–12.3)
POTASSIUM SERPL-SCNC: 4.1 MMOL/L (ref 3.5–5.1)
POTASSIUM SERPL-SCNC: 4.7 MMOL/L (ref 3.5–5.1)
PROT SERPL-MCNC: 6.2 G/DL (ref 6.3–8.2)
RBC # BLD AUTO: 3.4 M/UL (ref 4.23–5.6)
SODIUM SERPL-SCNC: 139 MMOL/L (ref 136–146)
SODIUM SERPL-SCNC: 139 MMOL/L (ref 136–146)
WBC # BLD AUTO: 7.8 K/UL (ref 4.3–11.1)

## 2024-02-29 PROCEDURE — 37252 INTRVASC US NONCORONARY 1ST: CPT

## 2024-02-29 PROCEDURE — 6370000000 HC RX 637 (ALT 250 FOR IP): Performed by: FAMILY MEDICINE

## 2024-02-29 PROCEDURE — 99152 MOD SED SAME PHYS/QHP 5/>YRS: CPT | Performed by: RADIOLOGY

## 2024-02-29 PROCEDURE — 85025 COMPLETE CBC W/AUTO DIFF WBC: CPT

## 2024-02-29 PROCEDURE — 2500000003 HC RX 250 WO HCPCS: Performed by: FAMILY MEDICINE

## 2024-02-29 PROCEDURE — 6360000004 HC RX CONTRAST MEDICATION: Performed by: RADIOLOGY

## 2024-02-29 PROCEDURE — B51N1ZZ FLUOROSCOPY OF LEFT UPPER EXTREMITY VEINS USING LOW OSMOLAR CONTRAST: ICD-10-PCS | Performed by: RADIOLOGY

## 2024-02-29 PROCEDURE — 99222 1ST HOSP IP/OBS MODERATE 55: CPT | Performed by: RADIOLOGY

## 2024-02-29 PROCEDURE — 36010 PLACE CATHETER IN VEIN: CPT | Performed by: RADIOLOGY

## 2024-02-29 PROCEDURE — 2580000003 HC RX 258: Performed by: FAMILY MEDICINE

## 2024-02-29 PROCEDURE — 37248 TRLUML BALO ANGIOP 1ST VEIN: CPT | Performed by: RADIOLOGY

## 2024-02-29 PROCEDURE — 36415 COLL VENOUS BLD VENIPUNCTURE: CPT

## 2024-02-29 PROCEDURE — 2140000001 HC CVICU INTERMEDIATE R&B

## 2024-02-29 PROCEDURE — 05743ZZ DILATION OF LEFT INNOMINATE VEIN, PERCUTANEOUS APPROACH: ICD-10-PCS | Performed by: RADIOLOGY

## 2024-02-29 PROCEDURE — 6360000002 HC RX W HCPCS: Performed by: RADIOLOGY

## 2024-02-29 PROCEDURE — 37187 VENOUS MECH THROMBECTOMY: CPT

## 2024-02-29 PROCEDURE — 2500000003 HC RX 250 WO HCPCS: Performed by: RADIOLOGY

## 2024-02-29 PROCEDURE — 80048 BASIC METABOLIC PNL TOTAL CA: CPT

## 2024-02-29 PROCEDURE — 37252 INTRVASC US NONCORONARY 1ST: CPT | Performed by: RADIOLOGY

## 2024-02-29 PROCEDURE — 6360000002 HC RX W HCPCS: Performed by: PHYSICIAN ASSISTANT

## 2024-02-29 RX ORDER — MIDAZOLAM HYDROCHLORIDE 1 MG/ML
INJECTION INTRAMUSCULAR; INTRAVENOUS PRN
Status: COMPLETED | OUTPATIENT
Start: 2024-02-29 | End: 2024-02-29

## 2024-02-29 RX ORDER — FUROSEMIDE 10 MG/ML
40 INJECTION INTRAMUSCULAR; INTRAVENOUS DAILY
Status: DISCONTINUED | OUTPATIENT
Start: 2024-02-29 | End: 2024-03-01 | Stop reason: HOSPADM

## 2024-02-29 RX ORDER — LIDOCAINE HYDROCHLORIDE 20 MG/ML
INJECTION, SOLUTION INFILTRATION; PERINEURAL PRN
Status: COMPLETED | OUTPATIENT
Start: 2024-02-29 | End: 2024-02-29

## 2024-02-29 RX ORDER — FENTANYL CITRATE 50 UG/ML
INJECTION, SOLUTION INTRAMUSCULAR; INTRAVENOUS PRN
Status: COMPLETED | OUTPATIENT
Start: 2024-02-29 | End: 2024-02-29

## 2024-02-29 RX ADMIN — AMIODARONE HYDROCHLORIDE 200 MG: 200 TABLET ORAL at 21:09

## 2024-02-29 RX ADMIN — TRAZODONE HYDROCHLORIDE 50 MG: 50 TABLET ORAL at 00:01

## 2024-02-29 RX ADMIN — APIXABAN 5 MG: 5 TABLET, FILM COATED ORAL at 00:01

## 2024-02-29 RX ADMIN — ATORVASTATIN CALCIUM 40 MG: 40 TABLET, FILM COATED ORAL at 21:09

## 2024-02-29 RX ADMIN — FENTANYL CITRATE 25 MCG: 50 INJECTION, SOLUTION INTRAMUSCULAR; INTRAVENOUS at 13:12

## 2024-02-29 RX ADMIN — IOPAMIDOL 55 ML: 612 INJECTION, SOLUTION INTRAVENOUS at 13:58

## 2024-02-29 RX ADMIN — CARVEDILOL 3.12 MG: 3.12 TABLET, FILM COATED ORAL at 08:43

## 2024-02-29 RX ADMIN — SODIUM CHLORIDE, PRESERVATIVE FREE 10 ML: 5 INJECTION INTRAVENOUS at 08:44

## 2024-02-29 RX ADMIN — TRAZODONE HYDROCHLORIDE 50 MG: 50 TABLET ORAL at 21:09

## 2024-02-29 RX ADMIN — SODIUM CHLORIDE, PRESERVATIVE FREE 10 ML: 5 INJECTION INTRAVENOUS at 00:02

## 2024-02-29 RX ADMIN — MIDAZOLAM 0.5 MG: 1 INJECTION INTRAMUSCULAR; INTRAVENOUS at 13:12

## 2024-02-29 RX ADMIN — APIXABAN 5 MG: 5 TABLET, FILM COATED ORAL at 21:09

## 2024-02-29 RX ADMIN — APIXABAN 5 MG: 5 TABLET, FILM COATED ORAL at 08:43

## 2024-02-29 RX ADMIN — ASPIRIN 81 MG: 81 TABLET, COATED ORAL at 08:43

## 2024-02-29 RX ADMIN — FUROSEMIDE 40 MG: 10 INJECTION, SOLUTION INTRAMUSCULAR; INTRAVENOUS at 18:18

## 2024-02-29 RX ADMIN — LIDOCAINE HYDROCHLORIDE 5 ML: 20 INJECTION, SOLUTION INFILTRATION; PERINEURAL at 13:19

## 2024-02-29 RX ADMIN — CARVEDILOL 3.12 MG: 3.12 TABLET, FILM COATED ORAL at 18:15

## 2024-02-29 RX ADMIN — AMIODARONE HYDROCHLORIDE 200 MG: 200 TABLET ORAL at 00:02

## 2024-02-29 RX ADMIN — AMIODARONE HYDROCHLORIDE 200 MG: 200 TABLET ORAL at 08:43

## 2024-02-29 RX ADMIN — SODIUM CHLORIDE, PRESERVATIVE FREE 10 ML: 5 INJECTION INTRAVENOUS at 21:10

## 2024-02-29 RX ADMIN — ATORVASTATIN CALCIUM 40 MG: 40 TABLET, FILM COATED ORAL at 00:01

## 2024-02-29 NOTE — PROGRESS NOTES
TRANSFER - OUT REPORT:    Verbal report given to HUEY Webb on Dionisio Gordon Jr.  being transferred to IR prep room 1 for routine post-op       Report consisted of patient's Situation, Background, Assessment and   Recommendations(SBAR).     Information from the following report(s) Surgery Report and MAR was reviewed with the receiving nurse.           Lines:   Peripheral IV 02/28/24 Distal;Right;Anterior Cephalic (Active)   Site Assessment Clean, dry & intact 02/29/24 0702   Line Status Capped;Normal saline locked 02/29/24 0702   Line Care Connections checked and tightened 02/29/24 0702   Phlebitis Assessment No symptoms 02/29/24 0702   Infiltration Assessment 0 02/29/24 0702   Alcohol Cap Used Yes 02/29/24 0702   Dressing Status Clean, dry & intact 02/29/24 0702   Dressing Type Transparent 02/29/24 0702        Opportunity for questions and clarification was provided.      Patient transported with:  O2 @ 1.5lpm

## 2024-02-29 NOTE — CARE COORDINATION
Patient not currently on the floor. CM called patient's son Jitendra ([phone]) to complete assessment. Patient was admitted from [facility] and was recently admitted to [facility] and discharged to [facility] on 2/27/2024. Patient's discharge plan is to return to [facility]. CM spoke to Tangela from [facility] and informed her patient may be ready for discharge tomorrow. CM sent referral accordingly.     No CM needs voiced or noted. CM will continue to follow patient for any other discharge planning needs.        02/29/24 1536   Service Assessment   Patient Orientation Alert and Oriented   Cognition Alert   History Provided By Child/Family   Primary Caregiver Self   Support Systems Children   Patient's Healthcare Decision Maker is: Legal Next of Kin   PCP Verified by CM Yes  (confirmed PCP is Dr. Cam Garza)   Last Visit to PCP Within last 3 months  (last PCP visit Jan. 2024)   Prior Functional Level Independent in ADLs/IADLs   Current Functional Level Other (see comment)  (TBD by clinicals)   Can patient return to prior living arrangement Yes   Ability to make needs known: Good   Family able to assist with home care needs: Yes   Would you like for me to discuss the discharge plan with any other family members/significant others, and if so, who? Yes   Financial Resources Medicare;Other (Comment)  (Medicare Part A and B and BCBS)   Community Resources None   CM/SW Referral Other (see comment)  (discharge planning)   Social/Functional History   Lives With Alone   Type of Home House   Home Layout One level   Home Access Level entry   Bathroom Toilet Standard   Bathroom Equipment None   Bathroom Accessibility Accessible   Home Equipment None   Receives Help From Family   ADL Assistance Independent   Homemaking Assistance Independent   Homemaking Responsibilities Yes   Ambulation Assistance Independent   Transfer Assistance Independent   Active  Yes   Occupation Retired   Discharge Planning   Type of Residence Other  (Comment)  (inpatient rehab)   Living Arrangements Alone   Current Services Prior To Admission Other (Comment)  (Admitted from [facility])   Potential Assistance Needed Other (Comment)  (inpatient rehab)   DME Ordered? No   Potential Assistance Purchasing Medications No  (No barriers reported)   Patient expects to be discharged to: Rehabilitation facility   Follow Up Appointment: Best Day/Time  Monday AM   One/Two Story Residence One story   History of falls? 0   Services At/After Discharge   Transition of Care Consult (CM Consult) Discharge Planning   [place] Resource Information Provided? No   Mode of Transport at Discharge BLS   Confirm Follow Up Transport Family   Condition of Participation: Discharge Planning   The Plan for Transition of Care is related to the following treatment goals: [facility]   The Patient and/or Patient Representative was provided with a Choice of Provider? Patient   The Patient and/Or Patient Representative agree with the Discharge Plan? Yes   Freedom of Choice list was provided with basic dialogue that supports the patient's individualized plan of care/goals, treatment preferences, and shares the quality data associated with the providers?  Yes

## 2024-02-29 NOTE — PRE SEDATION
Sedation Pre-Procedure Note    Patient Name: Dionisio Gordon Jr.   YOB: 1940  Room/Bed: [facility]  Medical Record Number: [id number]  Date: 2/29/2024   Time: 11:32 AM       Indication:  left Upper extremity thrombosis     Consent: I have discussed with the patient and/or the patient representative the indication, alternatives, and the possible risks and/or complications of the planned procedure and the anesthesia methods. The patient and/or patient representative appear to understand and agree to proceed.    Vital Signs:   Vitals:    02/29/24 1129   BP: 135/69   Pulse: 69   Resp: 18   Temp: 98.1 °F (36.7 °C)   SpO2: 92%       Past Medical History:   has a past medical history of 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 (HCC), CAD (coronary artery disease), Former pipe smoker, History of atrial flutter, History of bilateral knee replacement, History of kidney stones, PAF (paroxysmal atrial fibrillation) ([facility]), Pneumonia, and Uses LifeVest defibrillator.    Past Surgical History:   has a past surgical history that includes ep device procedure (N/A, 11/21/2023); Cardiac procedure (N/A, 01/17/2024); Total knee arthroplasty (Bilateral); Wrist surgery (Right); Colonoscopy; Coronary artery bypass graft (N/A, 2/6/2024); transesophageal echocardiogram (N/A, 2/6/2024); Cardiac procedure (N/A, 2/6/2024); Cardiac surgery (N/A, 2/6/2024); Cardiac surgery (N/A, 2/13/2024); bronchoscopy (N/A, 2/13/2024); Wound Exploration (N/A, 2/15/2024); and bronchoscopy (N/A, 2/16/2024).    Medications:   Scheduled Meds:    amiodarone  200 mg Oral BID    aspirin  81 mg Oral Daily    atorvastatin  40 mg Oral Nightly    carvedilol  3.125 mg Oral BID WC    traZODone  50 mg Oral Nightly    tuberculin  5 Units IntraDERmal Once    sodium chloride flush  5-40 mL IntraVENous 2 times per day    apixaban  5 mg Oral BID     Continuous Infusions:    sodium chloride       PRN Meds: sodium chloride

## 2024-02-29 NOTE — ACP (ADVANCE CARE PLANNING)
[facility] Hospitalist Service  At the heart of better care     Advance Care Planning   Admit Date:  2024  8:51 PM   Name:  Dionisio Gordon Jr.   Age:  83 y.o.  Sex:  male  :  1940   MRN:  [id number]   Room:  [place] [address]    Dionisio Gordon Jr. has capacity to make his own decisions:   Yes      Other people present:   Son    Patient / surrogate decision-maker directed code status:  Full Code      Patient or surrogate consented to discussion of the current conditions, workup, management plans, prognosis, and the risk for further deterioration.  Time spent: 17 minutes in direct discussion.      Signed:  JOSÉ MIGUEL AGRAWAL DO

## 2024-02-29 NOTE — H&P
Hospitalist History and Physical   Admit Date:  2024  8:51 PM   Name:  Dionisio Gordon Jr.   Age:  83 y.o.  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 is a 83 y.o. male who presented to the ED for cc DVT via ultrasound from [facility]. Results showed intraluminal thrombus in the left internal jugular vein. He did recently have an extended stay in our hospital after a CABG. Edema to left arm started shortly after discharge.     Hx of recent CABG on 24, paroxysmal a fib, sCHF EF 35-40%,     No labs done in ER  Assessment & Plan:     Active Problems:    Left internal jugular DVT along with possible left brachiocephpalic vein DVT- Unsure how long he has had this, but suspecting due to trauma from central lines from previous hospitalization. On appropriate therapy with Eliquis. IR is to see in AM to ensure no need for thrombectomy in arm. NPO past midnight. Labs ordered. He denies any pain    sCHF - remote tele. BB    Paroxysmal a fib - Amiodarone    Trazodone    ASA, statin      PT/OT evals and PPD ordered?  Therapy and PPD  Diet: ADULT DIET; Regular; Low Fat/Low Chol/High Fiber/2 gm Na  Diet NPO  VTE prophylaxis: Already on anticoagulation  Code status: Full Code      Non-peripheral Lines and Tubes (if present):             Hospital Problems:  Principal Problem:    Chronic deep vein thrombosis (DVT) of internal jugular vein (HCC)  Active Problems:    HFrEF (heart failure with reduced ejection fraction) (HCC)    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 (HCC)    CAD, multiple vessel    S/P CABG x 3  Resolved Problems:    * No resolved hospital problems. *        Objective:   Patient Vitals for the past 24 hrs:   Temp Pulse Resp BP  acetaminophen (TYLENOL) suppository 650 mg    apixaban (ELIQUIS) tablet 5 mg     Order Specific Question:   Indication of Use     Answer:   Treatment-DVT/PE       Prior to Admit Medications:  Current Outpatient Medications   Medication Instructions    acetaminophen (TYLENOL) 650 mg, Oral, EVERY 6 HOURS PRN    amiodarone (CORDARONE) 200 mg, Oral, 2 TIMES DAILY    apixaban (ELIQUIS) 5 mg, Oral, 2 TIMES DAILY    aspirin 81 mg, Oral, DAILY    atorvastatin (LIPITOR) 40 mg, Oral, NIGHTLY    carvedilol (COREG) 3.125 mg, Oral, 2 TIMES DAILY WITH MEALS    furosemide (LASIX) 40 MG tablet Take 1 tablet by mouth daily for 7 days, THEN 0.5 tablets daily.    traZODone (DESYREL) 50 mg, Oral, NIGHTLY       I have personally reviewed labs and tests:  No results found for this or any previous visit (from the past 24 hour(s)).    No results for input(s): \"COVID19\" in the last 72 hours.    No results found.      Signed:  JOSÉ MIGUEL AGRAWAL DO    Part of this note may have been written by using a voice dictation software.  The note has been proof read but may still contain some grammatical/other typographical errors.

## 2024-02-29 NOTE — OR NURSING
Recovery complete. Patient has done well. Dressing to left upper arm dry and intact. On route to room.

## 2024-02-29 NOTE — ED PROVIDER NOTES
Emergency Department Provider Note       PCP: Cam Llanes MD   Age: 83 y.o.   Sex: male     DISPOSITION Decision To Admit 02/28/2024 10:55:46 PM       ICD-10-CM    1. Acute embolism and thrombosis of left internal jugular vein (HCC)  I82.C12           Medical Decision Making     Patient comes to the ED from acute rehab facility for evaluation of left upper extremity swelling, recent Doppler of left upper extremity shows patient with thrombus in the left internal jugular vein with noncompressibility.  Patient currently wearing a compression stocking of left upper extremity secondary to marked edema.  Cap refill is less than 2 seconds, palpable radial pulses.  Edema extends to dorsal left hand.    ED Course as of 02/28/24 2256 Wed Feb 28, 2024 2232 Spoke with Dr. Thomas (IR on-call) regarding pt's doppler report of showing intraluminal thrombus in the L IJ with non compressibility.  He is aware report is from outlying, currently do not have images.  He is aware of pt's LUE edema requiring compression stocking at this time.  He asked that pt be admitted and he will evaluate in the am.  At that time, he will decide if pt needs further imaging.  Will speak with discharging physician to make them aware of presentation.  Pt is currently on Eliquis.   [LE]   2238 Spoke with Dr. Cole, on call for CT Sx, he asked that hospitalist admit pt.   [LE]   2238 Hospitalist contacted to evaluate for admission.  [LE]   2247 Pt and son at bedside made aware of discussions with consulting and admitting physician's and plan for admission with evaluation by IR in the morning.   [LE]      ED Course User Index  [LE] Jewell Hernandez Y, DO     1 or more acute illnesses that pose a threat to life or bodily function.     Prescription drug management performed.  Parental controlled substances given in the ED.  Drug therapy given requiring intensive monitoring for toxicity.  Discussion with external consultants.  Shared medical  Y, DO  02/28/24 2108

## 2024-02-29 NOTE — PROGRESS NOTES
CTS-pt well known to us after recent CABG surgery. He was readmitted from [facility] the day after discharge. Will transfer to CV stepdown and accept on our service. Hospitalist service notified.     Pau Gallardo PA-C

## 2024-02-29 NOTE — PROGRESS NOTES
TRANSFER - OUT REPORT:    Verbal report given to HUEY Gutierrez on Dionisio Gordon Jr.  being transferred to [facility] for routine post-op       Report consisted of patient's Situation, Background, Assessment and   Recommendations(SBAR).     Information from the following report(s) Surgery Report and MAR was reviewed with the receiving nurse.           Lines:   Peripheral IV 02/28/24 Distal;Right;Anterior Cephalic (Active)   Site Assessment Clean, dry & intact 02/29/24 0702   Line Status Capped;Normal saline locked 02/29/24 0702   Line Care Connections checked and tightened 02/29/24 0702   Phlebitis Assessment No symptoms 02/29/24 0702   Infiltration Assessment 0 02/29/24 0702   Alcohol Cap Used Yes 02/29/24 0702   Dressing Status Clean, dry & intact 02/29/24 0702   Dressing Type Transparent 02/29/24 0702        Opportunity for questions and clarification was provided.      Patient transported with:  O2 @ 1lpm

## 2024-02-29 NOTE — CONSULTS
DVT thrombolysis completed 02/29/24  
thoracic spine.    Impression  Moderate to large left pleural effusion again noted, underlying left lung base  atelectasis or pneumonia is not excluded. Recommend follow-up to document  resolution.    Assessment/Plan:      Marked LUE swelling.  Documented LIJV thrombus.  Presumed L Subclavian and/or Brachiocephalic V thrombus.      Detailed discussion about diagnostic imaging (US vs CT vs Venogram) and potential treatments (Anti-coagulation alone vs Thrombolysis vs Thrombectomy).  Questions answered.      Mr Gordon would like to proceed w Diagnostic Venogram and Thrombectomy, if appropriate; sedation.     More than 55 minutes spent in EMR, Imaging, Examination, Discussion.      THIERRY HOGAN MD

## 2024-02-29 NOTE — PROGRESS NOTES
Hospitalist Progress Note   Admit Date:  2024  8:51 PM   Name:  Dionisio Gordon Jr.   Age:  83 y.o.  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 is a 83 y.o. male with medical history of recent CABG on 2024, paroxysmal A-fib on Eliquis, CHF ejection fraction 35 to 40% admitted with intraluminal thrombus in the left internal jugular vein.  On Eliquis.  IR consulted and plan for thrombectomy.    Subjective & 24hr Events:   Patient seen and examined at the bedside.  Reports feeling okay.  Denies chest pain, palpitation, nausea, vomiting or abdominal pain.  Noted with significant left upper extremity swelling.  IR consulted and plan for thrombolysis versus thrombectomy today.    Assessment & Plan:     Left internal jugular DVT along with possible left brachiocephpalic vein DVT:  Unsure how long he has had this, but suspecting due to trauma from central lines from previous hospitalization  Continue Eliquis  IR consulted and plan for thrombolysis versus thrombectomy today   As needed pain control      sCHF   Paroxysmal a fib  CAD s/p CABG  Continue aspirin, statin, beta-blocker and amiodarone    Vascular surgery has assumed care as primary.  Hospitalist will sign off at this time.  Please call us if have any questions    PT/OT evals and PPD ordered?  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

## 2024-02-29 NOTE — PROGRESS NOTES
TRANSFER - OUT REPORT:    Verbal report given to HUEY Gutierrez on Dionisio Gordno Jr.  being transferred to  [facility] for routine progression of patient care       Report consisted of patient's Situation, Background, Assessment and   Recommendations(SBAR).     Information from the following report(s) Nurse Handoff Report, Adult Overview, and Recent Results was reviewed with the receiving nurse.         Lines:   Peripheral IV 02/28/24 Distal;Right;Anterior Cephalic (Active)   Site Assessment Clean, dry & intact 02/29/24 0702   Line Status Capped;Normal saline locked 02/29/24 0702   Line Care Connections checked and tightened 02/29/24 0702   Phlebitis Assessment No symptoms 02/29/24 0702   Infiltration Assessment 0 02/29/24 0702   Alcohol Cap Used Yes 02/29/24 0702   Dressing Status Clean, dry & intact 02/29/24 0702   Dressing Type Transparent 02/29/24 0702        Opportunity for questions and clarification was provided.

## 2024-02-29 NOTE — ED TRIAGE NOTES
Pt arrived via EMS from [facility] with c/o possible DVT in left arm. The facility called after doing an US of left arm and noticed a blood clot. Facility Dr wanted Pt sent to be evaluated. Has cellulitis of the left arm. Pt had heart surgery on 2/06/24, not sure for what reason.     EMS vitals: HR 70s, 95%, 120/66

## 2024-02-29 NOTE — ED NOTES
TRANSFER - OUT REPORT:    Verbal report given to Nikolay on Dionisio Gordon Jr.  being transferred to [facility] for routine progression of patient care       Report consisted of patient's Situation, Background, Assessment and   Recommendations(SBAR).     Information from the following report(s) ED SBAR was reviewed with the receiving nurse.    [place] Assessment:                           Lines:   Peripheral IV 02/28/24 Distal;Right;Anterior Cephalic (Active)        Opportunity for questions and clarification was provided.      Patient transported with:  Registered Nurse          Miranda Etienne RN  02/28/24 0543

## 2024-02-29 NOTE — PROGRESS NOTES
TRANSFER - IN REPORT:    Verbal report received from 6th floor rn on Dionisio Gordon Jr.  being received from IR for routine post-op      Report consisted of patient's Situation, Background, Assessment and   Recommendations(SBAR).     Information from the following report(s) Nurse Handoff Report was reviewed with the receiving nurse.    Opportunity for questions and clarification was provided.      Assessment to be completed upon patient's arrival to unit and care assumed.

## 2024-02-29 NOTE — CARE COORDINATION
CM attempted to interview Mr. Gordon in room 610; however, he is off the floor to IR for a venogram and possible thrombectomy.  He went to [facility] acute inpatient rehab on  and came to the hospital from there. LIS spoke to Ms. Tangela Smith, [facility] liaison and updated her on his status.  She said that he is on a bed hold that will  3/1 at 11:59 pm.

## 2024-03-01 VITALS
HEIGHT: 71 IN | TEMPERATURE: 97.5 F | RESPIRATION RATE: 19 BRPM | HEART RATE: 75 BPM | BODY MASS INDEX: 28.61 KG/M2 | SYSTOLIC BLOOD PRESSURE: 114 MMHG | OXYGEN SATURATION: 93 % | WEIGHT: 204.37 LBS | DIASTOLIC BLOOD PRESSURE: 63 MMHG

## 2024-03-01 LAB
ANION GAP SERPL CALC-SCNC: 5 MMOL/L (ref 2–11)
BASOPHILS # BLD: 0 K/UL (ref 0–0.2)
BASOPHILS NFR BLD: 0 % (ref 0–2)
BUN SERPL-MCNC: 12 MG/DL (ref 8–23)
CALCIUM SERPL-MCNC: 8.7 MG/DL (ref 8.3–10.4)
CHLORIDE SERPL-SCNC: 103 MMOL/L (ref 103–113)
CO2 SERPL-SCNC: 31 MMOL/L (ref 21–32)
CREAT SERPL-MCNC: 0.8 MG/DL (ref 0.8–1.5)
DIFFERENTIAL METHOD BLD: ABNORMAL
EOSINOPHIL # BLD: 0.2 K/UL (ref 0–0.8)
EOSINOPHIL NFR BLD: 3 % (ref 0.5–7.8)
ERYTHROCYTE [DISTWIDTH] IN BLOOD BY AUTOMATED COUNT: 19.6 % (ref 11.9–14.6)
GLUCOSE SERPL-MCNC: 99 MG/DL (ref 65–100)
HCT VFR BLD AUTO: 32 % (ref 41.1–50.3)
HGB BLD-MCNC: 9.7 G/DL (ref 13.6–17.2)
IMM GRANULOCYTES # BLD AUTO: 0 K/UL (ref 0–0.5)
IMM GRANULOCYTES NFR BLD AUTO: 0 % (ref 0–5)
LYMPHOCYTES # BLD: 1.2 K/UL (ref 0.5–4.6)
LYMPHOCYTES NFR BLD: 15 % (ref 13–44)
MCH RBC QN AUTO: 28.5 PG (ref 26.1–32.9)
MCHC RBC AUTO-ENTMCNC: 30.3 G/DL (ref 31.4–35)
MCV RBC AUTO: 94.1 FL (ref 82–102)
MONOCYTES # BLD: 0.9 K/UL (ref 0.1–1.3)
MONOCYTES NFR BLD: 11 % (ref 4–12)
NEUTS SEG # BLD: 5.4 K/UL (ref 1.7–8.2)
NEUTS SEG NFR BLD: 71 % (ref 43–78)
NRBC # BLD: 0 K/UL (ref 0–0.2)
PLATELET # BLD AUTO: 251 K/UL (ref 150–450)
PMV BLD AUTO: 10.2 FL (ref 9.4–12.3)
POTASSIUM SERPL-SCNC: 4.2 MMOL/L (ref 3.5–5.1)
RBC # BLD AUTO: 3.4 M/UL (ref 4.23–5.6)
SODIUM SERPL-SCNC: 139 MMOL/L (ref 136–146)
WBC # BLD AUTO: 7.7 K/UL (ref 4.3–11.1)

## 2024-03-01 PROCEDURE — 36415 COLL VENOUS BLD VENIPUNCTURE: CPT

## 2024-03-01 PROCEDURE — 85025 COMPLETE CBC W/AUTO DIFF WBC: CPT

## 2024-03-01 PROCEDURE — 6370000000 HC RX 637 (ALT 250 FOR IP): Performed by: FAMILY MEDICINE

## 2024-03-01 PROCEDURE — 80048 BASIC METABOLIC PNL TOTAL CA: CPT

## 2024-03-01 PROCEDURE — 99024 POSTOP FOLLOW-UP VISIT: CPT | Performed by: PHYSICIAN ASSISTANT

## 2024-03-01 PROCEDURE — 2580000003 HC RX 258: Performed by: FAMILY MEDICINE

## 2024-03-01 PROCEDURE — 6360000002 HC RX W HCPCS: Performed by: PHYSICIAN ASSISTANT

## 2024-03-01 RX ADMIN — ASPIRIN 81 MG: 81 TABLET, COATED ORAL at 08:17

## 2024-03-01 RX ADMIN — CARVEDILOL 3.12 MG: 3.12 TABLET, FILM COATED ORAL at 08:17

## 2024-03-01 RX ADMIN — FUROSEMIDE 40 MG: 10 INJECTION, SOLUTION INTRAMUSCULAR; INTRAVENOUS at 08:17

## 2024-03-01 RX ADMIN — AMIODARONE HYDROCHLORIDE 200 MG: 200 TABLET ORAL at 08:17

## 2024-03-01 RX ADMIN — APIXABAN 5 MG: 5 TABLET, FILM COATED ORAL at 08:17

## 2024-03-01 RX ADMIN — SODIUM CHLORIDE, PRESERVATIVE FREE 10 ML: 5 INJECTION INTRAVENOUS at 08:19

## 2024-03-01 NOTE — PROGRESS NOTES
Discharge instructions, follow up appointments and prescriptions reviewed with patient. All personal belongings taken with patient. EMS  [facility].  Patient is stable at discharge.

## 2024-03-01 NOTE — CARE COORDINATION
Patient has discharge orders in on this day. Patient's discharge plan is for patient to go to [facility]. Patient and patient's son Jitendra are aware and in agreement with this discharge plan.     CM spoke to Tangela from [facility] who confirmed patient can go to room: 112, report: [phone].     Transport is arranged for 11:00AM.     No CM needs voiced or noted at this time.     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 (HCC) [I82.C29]  Date/Time of Admission: 2/28/2024  8:51 PM  Problem List:  Patient Active Problem List   Diagnosis    Typical atrial flutter ([facility])    Volume overload    Acute on chronic systolic (congestive) heart failure ([facility])    Atrial fibrillation with RVR ([facility])    HFrEF (heart failure with reduced ejection fraction) ([facility])    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 ([facility])    PAF (paroxysmal atrial fibrillation) ([facility])    Chronic systolic congestive heart failure ([facility])    CAD, multiple vessel    Pre-op testing    Encounter for weaning from ventilator ([facility])    Hypoxemia    Cardiogenic shock (HCC)    Acute respiratory failure with hypoxia (HCC)    Fever    Pneumonia due to Klebsiella aerogenes ([facility])    Mucus plugging of bronchi    Atelectasis    Pleural effusion    S/P CABG x 3    Chronic deep vein thrombosis (DVT) of internal jugular vein ([facility])       Service Assessment  Patient Orientation Alert and Oriented   Cognition Alert   History Provided By Child/Family   Primary Caregiver Self   Accompanied By/Relationship     Support Systems Children   Patient's Healthcare Decision Maker is: Legal Next of Kin   PCP Verified by CM Yes (confirmed PCP is Dr. Cam Garza)   Last Visit to PCP Within last 3 months (last PCP visit Jan. 2024)   Prior Functional Level Independent in ADLs/IADLs          Coral Lopes 03/01/24 9:07 AM

## 2024-03-01 NOTE — DISCHARGE SUMMARY
Discharge Summary     Patient ID:  Dionisio Gordon Jr.  [id number]  83 y.o.  1940    Admit date: 2/28/2024    Discharge date:  3/1/2024    Admitting Physician: Maegan Díaz DO     Discharge Physician: DANA Hirsch/Dr. Shi    Admission Diagnoses: Acute embolism and thrombosis of left internal jugular vein (HCC) [I82.C12]  Chronic deep vein thrombosis (DVT) of internal jugular vein ([facility]) [I82.C29]    Discharge Diagnoses:   Patient Active Problem List    Diagnosis Date Noted    Pre-op testing 02/06/2024    Chronic deep vein thrombosis (DVT) of internal jugular vein ([facility]) 02/28/2024    Pleural effusion 02/21/2024    Atelectasis 02/16/2024    Mucus plugging of bronchi 02/13/2024    Pneumonia due to Klebsiella aerogenes ([facility]) 02/12/2024    Cardiogenic shock ([facility]) 02/10/2024    Acute respiratory failure with hypoxia ([facility]) 02/10/2024    Fever 02/10/2024    CAD, multiple vessel 02/06/2024    Encounter for weaning from ventilator ([facility]) 02/06/2024    Hypoxemia 02/06/2024    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 ([facility]) 01/24/2024    PAF (paroxysmal atrial fibrillation) ([facility]) 01/24/2024    Chronic systolic congestive heart failure ([facility]) 01/24/2024    HFrEF (heart failure with reduced ejection fraction) ([facility]) 12/21/2023    Acute on chronic systolic (congestive) heart failure ([facility]) 11/21/2023    Atrial fibrillation with RVR ([facility]) 11/21/2023    Typical atrial flutter ([facility]) 11/20/2023    Volume overload 11/20/2023       Consults: Interventional radiology    Hospital Course: Patient was discharged to [facility] on 2/27 after prolonged admission following CABG surgery with Impella on 2/6/24. He reportedly had increased swelling of left arm at [facility]. US there showed thrombus in the left internal jugular vein. He was referred to the ER and admitted by the hospitalist service. IR was consulted. He was found to have  with RRR without murmurs or gallops  Lungs: Clear throughout auscultation bilaterally without adventitious sounds  Abd: soft, nontender, nondistended, with good bowel sounds  Ext: warm, non-pitting edema in left arm, mild edema in bilateral lower extremities  Sternal incision: clean, dry, and intact  Skin: warm and dry      Recent Results (from the past 24 hour(s))   Basic Metabolic Panel w/ Reflex to MG    Collection Time: 03/01/24  3:51 AM   Result Value Ref Range    Sodium 139 136 - 146 mmol/L    Potassium 4.2 3.5 - 5.1 mmol/L    Chloride 103 103 - 113 mmol/L    CO2 31 21 - 32 mmol/L    Anion Gap 5 2 - 11 mmol/L    Glucose 99 65 - 100 mg/dL    BUN 12 8 - 23 MG/DL    Creatinine 0.80 0.8 - 1.5 MG/DL    Est, Glom Filt Rate >60 >60 ml/min/1.73m2    Calcium 8.7 8.3 - 10.4 MG/DL   CBC with Auto Differential    Collection Time: 03/01/24  3:51 AM   Result Value Ref Range    WBC 7.7 4.3 - 11.1 K/uL    RBC 3.40 (L) 4.23 - 5.6 M/uL    Hemoglobin 9.7 (L) 13.6 - 17.2 g/dL    Hematocrit 32.0 (L) 41.1 - 50.3 %    MCV 94.1 82 - 102 FL    MCH 28.5 26.1 - 32.9 PG    MCHC 30.3 (L) 31.4 - 35.0 g/dL    RDW 19.6 (H) 11.9 - 14.6 %    Platelets 251 150 - 450 K/uL    MPV 10.2 9.4 - 12.3 FL    nRBC 0.00 0.0 - 0.2 K/uL    Differential Type AUTOMATED      Neutrophils % 71 43 - 78 %    Lymphocytes % 15 13 - 44 %    Monocytes % 11 4.0 - 12.0 %    Eosinophils % 3 0.5 - 7.8 %    Basophils % 0 0.0 - 2.0 %    Immature Granulocytes 0 0.0 - 5.0 %    Neutrophils Absolute 5.4 1.7 - 8.2 K/UL    Lymphocytes Absolute 1.2 0.5 - 4.6 K/UL    Monocytes Absolute 0.9 0.1 - 1.3 K/UL    Eosinophils Absolute 0.2 0.0 - 0.8 K/UL    Basophils Absolute 0.0 0.0 - 0.2 K/UL    Absolute Immature Granulocyte 0.0 0.0 - 0.5 K/UL         Patient Instructions:   Current Discharge Medication List        CONTINUE these medications which have NOT CHANGED    Details   amiodarone (CORDARONE) 200 MG tablet Take 1 tablet by mouth 2 times daily for 14 days  Qty: 28 tablet, Refills:

## 2024-03-01 NOTE — PLAN OF CARE
Problem: Safety - Adult  Goal: Free from fall injury  Outcome: Completed  Flowsheets (Taken 2/29/2024 1915 by Redd Cruz, RN)  Free From Fall Injury: Instruct family/caregiver on patient safety     Problem: Discharge Planning  Goal: Discharge to home or other facility with appropriate resources  Outcome: Completed  Flowsheets  Taken 3/1/2024 0701 by Mayra Marie RN  Discharge to home or other facility with appropriate resources: Identify barriers to discharge with patient and caregiver  Taken 2/29/2024 1915 by Redd Cruz, RN  Discharge to home or other facility with appropriate resources:   Identify barriers to discharge with patient and caregiver   Arrange for needed discharge resources and transportation as appropriate   Identify discharge learning needs (meds, wound care, etc)   Refer to discharge planning if patient needs post-hospital services based on physician order or complex needs related to functional status, cognitive ability or social support system     Problem: ABCDS Injury Assessment  Goal: Absence of physical injury  Outcome: Completed  Flowsheets (Taken 2/29/2024 1915 by Redd Cruz, RN)  Absence of Physical Injury: Implement safety measures based on patient assessment

## 2024-03-05 ENCOUNTER — HOME HEALTH ADMISSION (OUTPATIENT)
Dept: HOME HEALTH SERVICES | Facility: HOME HEALTH | Age: 84
End: 2024-03-05

## 2024-03-12 ENCOUNTER — OFFICE VISIT (OUTPATIENT)
Age: 84
End: 2024-03-12
Payer: MEDICARE

## 2024-03-12 VITALS
BODY MASS INDEX: 25.62 KG/M2 | HEART RATE: 87 BPM | HEIGHT: 71 IN | DIASTOLIC BLOOD PRESSURE: 62 MMHG | WEIGHT: 183 LBS | SYSTOLIC BLOOD PRESSURE: 112 MMHG

## 2024-03-12 DIAGNOSIS — Z09 HOSPITAL DISCHARGE FOLLOW-UP: ICD-10-CM

## 2024-03-12 DIAGNOSIS — I25.110 ATHEROSCLEROSIS OF NATIVE CORONARY ARTERY OF NATIVE HEART WITH UNSTABLE ANGINA PECTORIS (HCC): Primary | ICD-10-CM

## 2024-03-12 PROCEDURE — 1123F ACP DISCUSS/DSCN MKR DOCD: CPT | Performed by: INTERNAL MEDICINE

## 2024-03-12 PROCEDURE — G8419 CALC BMI OUT NRM PARAM NOF/U: HCPCS | Performed by: INTERNAL MEDICINE

## 2024-03-12 PROCEDURE — 1111F DSCHRG MED/CURRENT MED MERGE: CPT | Performed by: INTERNAL MEDICINE

## 2024-03-12 PROCEDURE — 99214 OFFICE O/P EST MOD 30 MIN: CPT | Performed by: INTERNAL MEDICINE

## 2024-03-12 PROCEDURE — G8484 FLU IMMUNIZE NO ADMIN: HCPCS | Performed by: INTERNAL MEDICINE

## 2024-03-12 PROCEDURE — 1036F TOBACCO NON-USER: CPT | Performed by: INTERNAL MEDICINE

## 2024-03-12 PROCEDURE — G8427 DOCREV CUR MEDS BY ELIG CLIN: HCPCS | Performed by: INTERNAL MEDICINE

## 2024-03-12 ASSESSMENT — ENCOUNTER SYMPTOMS
GASTROINTESTINAL NEGATIVE: 1
PHOTOPHOBIA: 0
CHEST TIGHTNESS: 0
BACK PAIN: 0
EYE PAIN: 0
SHORTNESS OF BREATH: 0
EYES NEGATIVE: 1
ABDOMINAL PAIN: 0
ALLERGIC/IMMUNOLOGIC NEGATIVE: 1
RESPIRATORY NEGATIVE: 1

## 2024-03-12 NOTE — PROGRESS NOTES
[facility] CARDIOLOGY  [address] [place] [address], [address]  [place] [address]  PHONE: [phone]      24    NAME:  Dionisio Gordon Jr.  : 1940  MRN: [id number]         SUBJECTIVE:   Dionisio Gordon Jr. is a 83 y.o. male seen for follow up of:      Chief Complaint   Patient presents with    Atrial Fibrillation    Congestive Heart Failure    Follow-Up from Hospital        Cardiac Hx (Reviewed and summarized by me):  1) Afib/flutter - seen by Dr Ma with HF felt to be tachy mediated   Flutter ablation - Dr Ma   Afib post op - on amiodarone  2) CAD  Cath 24 - Multivessel disease ([place])   CABG - 24 Jose Guadalupe LIMA to LAD, SVG to pLV, SVG to PDA, MAZE ARACELIS clip   Decompensation with impella placed 2/15/24  3) Echo   24 - LVEF 35-40% (inferior HK)  4) Lipids   23 - HDL 45, , Trig 77, Ratio 3.9     Readmitted  with vein compression as a result of impella access site.    HPI:  Very complex patient.  Originally seen by Dr. Ma for atrial flutter underwent atrial flutter ablation.  He had severe systolic heart failure that was felt to be related to tachycardias.  After his flutter was resolved he continued to have a low ejection fraction he was referred for heart cath where he was found to have multivessel disease.  In early February he underwent coronary artery bypass grafting a Maze procedure and left atrial appendage -ectomy.  That hospitalization was complicated by need for Impella placement for LV support.  He was discharged and sent to rehab where he had some swelling in arm where the Impella was placed and that back in the hospital needing serial dilatations to his left subclavian vein.  Since then he has been doing okay.  His blood pressure is controlled he is on maximum tolerated guideline therapy.  He takes Lasix daily and is on amiodarone.    Past Medical History, Past Surgical History, Family history, Social History, and Medications were all reviewed with

## 2024-03-15 ENCOUNTER — TELEPHONE (OUTPATIENT)
Age: 84
End: 2024-03-15

## 2024-03-15 NOTE — TELEPHONE ENCOUNTER
[employer] rep requested update on patient.     Spoke with son who reports patient stopped wearing [employer]vest on 2/6, the day of his heart surgery. They have it boxed up. I requested they return to [employer] as instructed. Limited Echo on 2/26 EF of 35 - 40%     Gave rep update.

## 2024-03-21 ENCOUNTER — HOSPITAL ENCOUNTER (OUTPATIENT)
Dept: CARDIAC REHAB | Age: 84
Setting detail: RECURRING SERIES
Discharge: HOME | End: 2024-03-24

## 2024-03-21 ASSESSMENT — PATIENT HEALTH QUESTIONNAIRE - PHQ9
5. POOR APPETITE OR OVEREATING: NOT AT ALL
9. THOUGHTS THAT YOU WOULD BE BETTER OFF DEAD, OR OF HURTING YOURSELF: NOT AT ALL
4. FEELING TIRED OR HAVING LITTLE ENERGY: NEARLY EVERY DAY
7. TROUBLE CONCENTRATING ON THINGS, SUCH AS READING THE NEWSPAPER OR WATCHING TELEVISION: NEARLY EVERY DAY
10. IF YOU CHECKED OFF ANY PROBLEMS, HOW DIFFICULT HAVE THESE PROBLEMS MADE IT FOR YOU TO DO YOUR WORK, TAKE CARE OF THINGS AT HOME, OR GET ALONG WITH OTHER PEOPLE: NOT DIFFICULT AT ALL
SUM OF ALL RESPONSES TO PHQ QUESTIONS 1-9: 8
SUM OF ALL RESPONSES TO PHQ QUESTIONS 1-9: 8
SUM OF ALL RESPONSES TO PHQ9 QUESTIONS 1 & 2: 0
SUM OF ALL RESPONSES TO PHQ QUESTIONS 1-9: 8
6. FEELING BAD ABOUT YOURSELF - OR THAT YOU ARE A FAILURE OR HAVE LET YOURSELF OR YOUR FAMILY DOWN: NOT AT ALL
SUM OF ALL RESPONSES TO PHQ QUESTIONS 1-9: 8
1. LITTLE INTEREST OR PLEASURE IN DOING THINGS: NOT AT ALL
8. MOVING OR SPEAKING SO SLOWLY THAT OTHER PEOPLE COULD HAVE NOTICED. OR THE OPPOSITE, BEING SO FIGETY OR RESTLESS THAT YOU HAVE BEEN MOVING AROUND A LOT MORE THAN USUAL: NOT AT ALL
2. FEELING DOWN, DEPRESSED OR HOPELESS: NOT AT ALL
3. TROUBLE FALLING OR STAYING ASLEEP: MORE THAN HALF THE DAYS

## 2024-03-21 NOTE — CARDIO/PULMONARY
Dr. Diallo    Thank you for referring your patient, Dionisio Gordon, (1940), to the Cardiopulmonary Rehabilitation Program at [facility]. He is a good candidate for the Cardiac Rehab Program and should see improvements with regular participation.     We will be addressing appropriate interventions for modifiable risk factors with your patient during the next 12 weeks. We will contact you with any issues or concerns that may arise, or you can follow your patient's progress through [facility] at any time. A final summary will be sent to you when the program is completed.     Again, thank you for the referral. If we can be of further assistance, please feel free to contact the Cardiopulmonary Rehab staff at [phone].     Sincerely,    Eric Tineo  MS, RN

## 2024-03-24 PROBLEM — Z01.818 PRE-OP TESTING: Status: RESOLVED | Noted: 2024-02-06 | Resolved: 2024-03-24

## 2024-03-27 ENCOUNTER — OFFICE VISIT (OUTPATIENT)
Dept: CARDIOTHORACIC SURGERY | Age: 84
End: 2024-03-27

## 2024-03-27 VITALS
BODY MASS INDEX: 27.41 KG/M2 | DIASTOLIC BLOOD PRESSURE: 80 MMHG | SYSTOLIC BLOOD PRESSURE: 139 MMHG | HEART RATE: 90 BPM | HEIGHT: 71 IN | WEIGHT: 195.8 LBS

## 2024-03-27 DIAGNOSIS — Z95.1 S/P CABG X 3: Primary | ICD-10-CM

## 2024-03-27 PROCEDURE — 99024 POSTOP FOLLOW-UP VISIT: CPT | Performed by: THORACIC SURGERY (CARDIOTHORACIC VASCULAR SURGERY)

## 2024-03-27 NOTE — PROGRESS NOTES
[facility] [place] [facility]  [facility]  MD Yony Telles MD          Dionisio Gordon Jr.       [id number]  3/27/2024       1940      Dionisio Gordon Jr. is seen today for follow-up status post CABG surgery with Kehinde on 2/6/24. He had prolonged admission and was discharged to [facility].      He returns today in follow up. He is back home and has HH PT.   he is active and ambulatory.  There is no fever or shortness of breath.    Appetite is good, he is gaining some weight back.    Pain has resolved.   Pt is not sleeping well.    EXAM:  Vitals:  Blood pressure 139/80, pulse 90, height 1.803 m (5' 11\"), weight 88.8 kg (195 lb 12.8 oz).  Lungs:  Clear to auscultation bilaterally.  Heart: Regular rate and rhythm without murmurs.  Incision: Sternum stable. Incision healing well. Leg incisions healing well.     IMPRESSION and PLAN:  Pt advised to try melatonin for insomnia. DC from CT surgery. Pt may drive. Pt is planning to begin cardiac rehab.    Sternal precautions: Pt advised to avoid any heavy lifting for another 4 weeks but can increase activity as tolerated.   Pt has cardiology follow-up with Dr. Diallo.   No need to schedule follow-up at this point but the patient may call or return anytime if issues or questions arise.      Pau Gallardo PA-C  3/27/2024 2:15 PM

## 2024-04-02 ENCOUNTER — HOSPITAL ENCOUNTER (OUTPATIENT)
Dept: CARDIAC REHAB | Age: 84
Setting detail: RECURRING SERIES
Discharge: HOME | End: 2024-04-05
Payer: MEDICARE

## 2024-04-02 VITALS — WEIGHT: 195.1 LBS | BODY MASS INDEX: 27.21 KG/M2

## 2024-04-02 PROCEDURE — 93798 PHYS/QHP OP CAR RHAB W/ECG: CPT

## 2024-04-02 ASSESSMENT — EXERCISE STRESS TEST
PEAK_HR: 120
PEAK_HR: 120
PEAK_BP: 132/78
PEAK_RPE: 10
PEAK_BP: 132/78
PEAK_BP: 132/78
PEAK_METS: 2.2
PEAK_RPE: 10

## 2024-04-03 ENCOUNTER — HOSPITAL ENCOUNTER (OUTPATIENT)
Dept: CARDIAC REHAB | Age: 84
Setting detail: RECURRING SERIES
Discharge: HOME | End: 2024-04-06
Payer: MEDICARE

## 2024-04-03 PROCEDURE — 93798 PHYS/QHP OP CAR RHAB W/ECG: CPT

## 2024-04-03 ASSESSMENT — EXERCISE STRESS TEST
PEAK_METS: 2.2
PEAK_HR: 118
PEAK_BP: 128/60

## 2024-04-05 ENCOUNTER — TELEPHONE (OUTPATIENT)
Age: 84
End: 2024-04-05

## 2024-04-05 ENCOUNTER — HOSPITAL ENCOUNTER (OUTPATIENT)
Dept: CARDIAC REHAB | Age: 84
Setting detail: RECURRING SERIES
Discharge: HOME | End: 2024-04-08
Payer: MEDICARE

## 2024-04-05 PROCEDURE — 93798 PHYS/QHP OP CAR RHAB W/ECG: CPT

## 2024-04-05 ASSESSMENT — EXERCISE STRESS TEST
PEAK_BP: 110/62
PEAK_METS: 2.2
PEAK_HR: 122

## 2024-04-05 NOTE — TELEPHONE ENCOUNTER
MEDICATION REFILL REQUEST      Name of Medication:  Atorvastatin  Dose:  40 mg  Frequency:  QD  Quantity:  90  Days' supply:  90 with refills      Pharmacy Name/Location:  [phone]

## 2024-04-08 ENCOUNTER — HOSPITAL ENCOUNTER (OUTPATIENT)
Dept: CARDIAC REHAB | Age: 84
Setting detail: RECURRING SERIES
Discharge: HOME | End: 2024-04-11
Payer: MEDICARE

## 2024-04-08 PROCEDURE — 93798 PHYS/QHP OP CAR RHAB W/ECG: CPT

## 2024-04-08 ASSESSMENT — EXERCISE STRESS TEST
PEAK_BP: 140/80
PEAK_METS: 2.4
PEAK_HR: 126

## 2024-04-09 ENCOUNTER — PATIENT MESSAGE (OUTPATIENT)
Age: 84
End: 2024-04-09

## 2024-04-10 ENCOUNTER — HOSPITAL ENCOUNTER (OUTPATIENT)
Dept: CARDIAC REHAB | Age: 84
Setting detail: RECURRING SERIES
Discharge: HOME | End: 2024-04-13
Payer: MEDICARE

## 2024-04-10 ENCOUNTER — TELEPHONE (OUTPATIENT)
Age: 84
End: 2024-04-10

## 2024-04-10 VITALS — BODY MASS INDEX: 27.39 KG/M2 | WEIGHT: 196.4 LBS

## 2024-04-10 DIAGNOSIS — J98.11 ATELECTASIS: Primary | ICD-10-CM

## 2024-04-10 DIAGNOSIS — I50.23 ACUTE ON CHRONIC SYSTOLIC (CONGESTIVE) HEART FAILURE (HCC): ICD-10-CM

## 2024-04-10 DIAGNOSIS — I25.5 ISCHEMIC CARDIOMYOPATHY: ICD-10-CM

## 2024-04-10 DIAGNOSIS — I25.110 ATHEROSCLEROSIS OF NATIVE CORONARY ARTERY OF NATIVE HEART WITH UNSTABLE ANGINA PECTORIS (HCC): ICD-10-CM

## 2024-04-10 PROCEDURE — 93798 PHYS/QHP OP CAR RHAB W/ECG: CPT

## 2024-04-10 ASSESSMENT — EXERCISE STRESS TEST
PEAK_HR: 125
PEAK_BP: 126/68
PEAK_METS: 2.4

## 2024-04-10 NOTE — TELEPHONE ENCOUNTER
Regarding: Labs  ----- Message from Kaylee Baker sent at 4/9/2024 12:03 PM EDT -----       ----- Message sent from Kevin Lai MA to Dionisio Gordon Jr. at 4/9/2024  9:28 AM -----   We have received a request to refill your atorvastatin and it appears you have not had any labs to check your lipids and liver function. We need to have this before we send in a prescription.   Thanks,   Kevin

## 2024-04-12 ENCOUNTER — OFFICE VISIT (OUTPATIENT)
Age: 84
End: 2024-04-12
Payer: MEDICARE

## 2024-04-12 ENCOUNTER — HOSPITAL ENCOUNTER (OUTPATIENT)
Dept: CARDIAC REHAB | Age: 84
Setting detail: RECURRING SERIES
Discharge: HOME | End: 2024-04-15
Payer: MEDICARE

## 2024-04-12 VITALS
HEIGHT: 71 IN | DIASTOLIC BLOOD PRESSURE: 68 MMHG | SYSTOLIC BLOOD PRESSURE: 122 MMHG | HEART RATE: 88 BPM | WEIGHT: 195.2 LBS | BODY MASS INDEX: 27.33 KG/M2

## 2024-04-12 DIAGNOSIS — I48.0 PAF (PAROXYSMAL ATRIAL FIBRILLATION) (HCC): ICD-10-CM

## 2024-04-12 DIAGNOSIS — I50.22 CHRONIC SYSTOLIC CONGESTIVE HEART FAILURE (HCC): Primary | ICD-10-CM

## 2024-04-12 DIAGNOSIS — Z95.1 S/P CABG X 3: ICD-10-CM

## 2024-04-12 PROCEDURE — G8419 CALC BMI OUT NRM PARAM NOF/U: HCPCS | Performed by: INTERNAL MEDICINE

## 2024-04-12 PROCEDURE — 1036F TOBACCO NON-USER: CPT | Performed by: INTERNAL MEDICINE

## 2024-04-12 PROCEDURE — G8427 DOCREV CUR MEDS BY ELIG CLIN: HCPCS | Performed by: INTERNAL MEDICINE

## 2024-04-12 PROCEDURE — 93798 PHYS/QHP OP CAR RHAB W/ECG: CPT

## 2024-04-12 PROCEDURE — 1123F ACP DISCUSS/DSCN MKR DOCD: CPT | Performed by: INTERNAL MEDICINE

## 2024-04-12 PROCEDURE — 99214 OFFICE O/P EST MOD 30 MIN: CPT | Performed by: INTERNAL MEDICINE

## 2024-04-12 RX ORDER — CARVEDILOL 3.12 MG/1
3.12 TABLET ORAL 2 TIMES DAILY WITH MEALS
Qty: 60 TABLET | Refills: 11 | Status: SHIPPED | OUTPATIENT
Start: 2024-04-12

## 2024-04-12 ASSESSMENT — ENCOUNTER SYMPTOMS
ALLERGIC/IMMUNOLOGIC NEGATIVE: 1
EYE PAIN: 0
RESPIRATORY NEGATIVE: 1
PHOTOPHOBIA: 0
ABDOMINAL PAIN: 0
BACK PAIN: 0
SHORTNESS OF BREATH: 0
CHEST TIGHTNESS: 0
EYES NEGATIVE: 1
GASTROINTESTINAL NEGATIVE: 1

## 2024-04-12 ASSESSMENT — EXERCISE STRESS TEST
PEAK_METS: 3
PEAK_HR: 118
PEAK_BP: 120/64

## 2024-04-12 NOTE — PROGRESS NOTES
TRANSESOPHAGEAL ECHOCARDIOGRAM performed by Yony Shi MD at [facility] MAIN OR    WOUND EXPLORATION N/A 2/15/2024    IMPELLA EXPLANT performed by Yony Shi MD at [facility] MAIN OR    WRIST SURGERY Right      Family History   Problem Relation Age of Onset    Cancer Mother         Colon CA    Heart Attack Father     Cancer Sister         \"soft tissue CA\"    Other Brother         part of colon removed     Social History     Tobacco Use    Smoking status: Former     Types: Pipe     Start date: 1967     Quit date:      Years since quittin.2    Smokeless tobacco: Never   Substance Use Topics    Alcohol use: Yes     Comment: occasionally       ROS:  Review of Systems   Constitutional: Negative.  Negative for fever.   HENT:  Negative for hearing loss, nosebleeds and tinnitus.    Eyes: Negative.  Negative for photophobia and pain.   Respiratory: Negative.  Negative for chest tightness and shortness of breath.    Cardiovascular: Negative.  Negative for chest pain, palpitations and leg swelling.   Gastrointestinal: Negative.  Negative for abdominal pain.   Endocrine: Negative.  Negative for cold intolerance and heat intolerance.   Genitourinary: Negative.  Negative for dysuria.   Musculoskeletal: Negative.  Negative for back pain and joint swelling.   Skin: Negative.  Negative for rash.   Allergic/Immunologic: Negative.  Negative for immunocompromised state.   Neurological: Negative.  Negative for dizziness, syncope and light-headedness.   Hematological: Negative.  Does not bruise/bleed easily.   Psychiatric/Behavioral: Negative.  Negative for suicidal ideas.            PHYSICAL EXAM:  Physical Exam  Constitutional:       General: He is not in acute distress.     Appearance: He is not ill-appearing.   HENT:      Head: Normocephalic and atraumatic.      Nose: No congestion.      Mouth/Throat:      Mouth: Mucous membranes are moist.   Eyes:      Extraocular Movements: Extraocular movements intact.

## 2024-04-15 ENCOUNTER — HOSPITAL ENCOUNTER (OUTPATIENT)
Dept: CARDIAC REHAB | Age: 84
Setting detail: RECURRING SERIES
Discharge: HOME | End: 2024-04-18
Payer: MEDICARE

## 2024-04-15 PROCEDURE — 93798 PHYS/QHP OP CAR RHAB W/ECG: CPT

## 2024-04-15 ASSESSMENT — EXERCISE STRESS TEST
PEAK_HR: 114
PEAK_METS: 3
PEAK_BP: 120/70

## 2024-04-17 ENCOUNTER — HOSPITAL ENCOUNTER (OUTPATIENT)
Dept: CARDIAC REHAB | Age: 84
Setting detail: RECURRING SERIES
Discharge: HOME | End: 2024-04-20
Payer: MEDICARE

## 2024-04-17 VITALS — WEIGHT: 198 LBS | BODY MASS INDEX: 27.62 KG/M2

## 2024-04-17 PROCEDURE — 93798 PHYS/QHP OP CAR RHAB W/ECG: CPT

## 2024-04-17 ASSESSMENT — EXERCISE STRESS TEST
PEAK_HR: 111
PEAK_METS: 3

## 2024-04-18 RX ORDER — ATORVASTATIN CALCIUM 40 MG/1
40 TABLET, FILM COATED ORAL NIGHTLY
Qty: 90 TABLET | Refills: 0 | Status: SHIPPED | OUTPATIENT
Start: 2024-04-18

## 2024-04-19 ENCOUNTER — HOSPITAL ENCOUNTER (OUTPATIENT)
Dept: CARDIAC REHAB | Age: 84
Setting detail: RECURRING SERIES
Discharge: HOME | End: 2024-04-22
Payer: MEDICARE

## 2024-04-19 PROCEDURE — 93798 PHYS/QHP OP CAR RHAB W/ECG: CPT

## 2024-04-19 ASSESSMENT — EXERCISE STRESS TEST
PEAK_HR: 114
PEAK_METS: 3

## 2024-04-22 ENCOUNTER — HOSPITAL ENCOUNTER (OUTPATIENT)
Dept: CARDIAC REHAB | Age: 84
Setting detail: RECURRING SERIES
Discharge: HOME | End: 2024-04-25
Payer: MEDICARE

## 2024-04-22 PROCEDURE — 93798 PHYS/QHP OP CAR RHAB W/ECG: CPT

## 2024-04-22 ASSESSMENT — EXERCISE STRESS TEST
PEAK_METS: 3
PEAK_BP: 142/60
PEAK_HR: 114

## 2024-04-24 ENCOUNTER — HOSPITAL ENCOUNTER (OUTPATIENT)
Dept: CARDIAC REHAB | Age: 84
Setting detail: RECURRING SERIES
Discharge: HOME | End: 2024-04-27
Payer: MEDICARE

## 2024-04-24 VITALS — WEIGHT: 200 LBS | BODY MASS INDEX: 27.89 KG/M2

## 2024-04-24 PROCEDURE — 93798 PHYS/QHP OP CAR RHAB W/ECG: CPT

## 2024-04-24 ASSESSMENT — EXERCISE STRESS TEST
PEAK_METS: 3
PEAK_HR: 111

## 2024-04-24 NOTE — PROGRESS NOTES
Cardiac Rehabilitation Nutrition Education    Patient attended cardiac rehab nutrition education class.  Topics included: role of nutrition in managing heart disease, War Healthy Eating Plate, Mediterranean diet, principles of a heart healthy diet, foods/beverages effect on metabolic parameters (blood pressure, lipids, glucose, weight), food sources and portion size of carbohydrate, fat, protein, discussed fiber and benefits of a plant-based eating pattern at length, examples of heart healthy meals and snacks, limiting sodium, added sugars, saturated fat, food label reading. Additionally covered importance of adequate sleep, stress management, and aerobic exercise for overall health.     Learners: patient   Method: group class, copy of power point with space to take notes provided.     Encouraged lifestyle modifications and to follow up with any questions for RD during rehab sessions.    Cyndee Watson, MS, RD, LDN   Cardiopulmonary Rehab Dietitian  [facility]

## 2024-04-26 ENCOUNTER — HOSPITAL ENCOUNTER (OUTPATIENT)
Dept: CARDIAC REHAB | Age: 84
Setting detail: RECURRING SERIES
Discharge: HOME | End: 2024-04-29
Payer: MEDICARE

## 2024-04-26 PROCEDURE — 93798 PHYS/QHP OP CAR RHAB W/ECG: CPT

## 2024-04-26 ASSESSMENT — EXERCISE STRESS TEST
PEAK_HR: 119
PEAK_METS: 3

## 2024-04-29 ENCOUNTER — HOSPITAL ENCOUNTER (OUTPATIENT)
Dept: CARDIAC REHAB | Age: 84
Setting detail: RECURRING SERIES
Discharge: HOME | End: 2024-05-02
Payer: MEDICARE

## 2024-04-29 PROCEDURE — 93798 PHYS/QHP OP CAR RHAB W/ECG: CPT

## 2024-04-29 ASSESSMENT — EXERCISE STRESS TEST
PEAK_METS: 3.2
PEAK_BP: 122/68
PEAK_HR: 112

## 2024-04-30 ASSESSMENT — EXERCISE STRESS TEST: PEAK_BP: 122/68

## 2024-05-01 ENCOUNTER — HOSPITAL ENCOUNTER (OUTPATIENT)
Dept: CARDIAC REHAB | Age: 84
Setting detail: RECURRING SERIES
Discharge: HOME | End: 2024-05-04
Payer: MEDICARE

## 2024-05-01 VITALS — WEIGHT: 200 LBS | BODY MASS INDEX: 27.89 KG/M2

## 2024-05-01 PROCEDURE — 93798 PHYS/QHP OP CAR RHAB W/ECG: CPT

## 2024-05-01 ASSESSMENT — EXERCISE STRESS TEST
PEAK_HR: 117
PEAK_METS: 3.2

## 2024-05-03 ENCOUNTER — HOSPITAL ENCOUNTER (OUTPATIENT)
Dept: CARDIAC REHAB | Age: 84
Setting detail: RECURRING SERIES
Discharge: HOME | End: 2024-05-06
Payer: MEDICARE

## 2024-05-03 PROCEDURE — 93798 PHYS/QHP OP CAR RHAB W/ECG: CPT

## 2024-05-03 ASSESSMENT — EXERCISE STRESS TEST
PEAK_METS: 3.2
PEAK_HR: 120

## 2024-05-06 ENCOUNTER — HOSPITAL ENCOUNTER (OUTPATIENT)
Dept: CARDIAC REHAB | Age: 84
Setting detail: RECURRING SERIES
Discharge: HOME | End: 2024-05-09
Payer: MEDICARE

## 2024-05-06 PROCEDURE — 93798 PHYS/QHP OP CAR RHAB W/ECG: CPT

## 2024-05-06 ASSESSMENT — EXERCISE STRESS TEST
PEAK_BP: 130/70
PEAK_HR: 118
PEAK_METS: 3.2

## 2024-05-08 ENCOUNTER — HOSPITAL ENCOUNTER (OUTPATIENT)
Dept: CARDIAC REHAB | Age: 84
Setting detail: RECURRING SERIES
Discharge: HOME | End: 2024-05-11
Payer: MEDICARE

## 2024-05-08 VITALS — BODY MASS INDEX: 27.56 KG/M2 | WEIGHT: 197.6 LBS

## 2024-05-08 PROCEDURE — 93798 PHYS/QHP OP CAR RHAB W/ECG: CPT

## 2024-05-08 ASSESSMENT — EXERCISE STRESS TEST
PEAK_METS: 3.3
PEAK_HR: 120

## 2024-05-10 ENCOUNTER — HOSPITAL ENCOUNTER (OUTPATIENT)
Dept: CARDIAC REHAB | Age: 84
Setting detail: RECURRING SERIES
Discharge: HOME | End: 2024-05-13
Payer: MEDICARE

## 2024-05-10 PROCEDURE — 93798 PHYS/QHP OP CAR RHAB W/ECG: CPT

## 2024-05-10 ASSESSMENT — EXERCISE STRESS TEST
PEAK_METS: 3.3
PEAK_HR: 124

## 2024-05-13 ENCOUNTER — HOSPITAL ENCOUNTER (OUTPATIENT)
Dept: CARDIAC REHAB | Age: 84
Setting detail: RECURRING SERIES
Discharge: HOME | End: 2024-05-16
Payer: MEDICARE

## 2024-05-13 PROCEDURE — 93798 PHYS/QHP OP CAR RHAB W/ECG: CPT

## 2024-05-13 ASSESSMENT — EXERCISE STRESS TEST
PEAK_HR: 124
PEAK_BP: 124/72
PEAK_METS: 3.3

## 2024-05-15 ENCOUNTER — HOSPITAL ENCOUNTER (OUTPATIENT)
Dept: CARDIAC REHAB | Age: 84
Setting detail: RECURRING SERIES
Discharge: HOME | End: 2024-05-18
Payer: MEDICARE

## 2024-05-15 VITALS — BODY MASS INDEX: 27.59 KG/M2 | WEIGHT: 197.8 LBS

## 2024-05-15 PROCEDURE — 93798 PHYS/QHP OP CAR RHAB W/ECG: CPT

## 2024-05-15 ASSESSMENT — EXERCISE STRESS TEST
PEAK_METS: 3.3
PEAK_HR: 126

## 2024-05-17 ENCOUNTER — HOSPITAL ENCOUNTER (OUTPATIENT)
Dept: CARDIAC REHAB | Age: 84
Setting detail: RECURRING SERIES
Discharge: HOME | End: 2024-05-20
Payer: MEDICARE

## 2024-05-17 PROCEDURE — 93798 PHYS/QHP OP CAR RHAB W/ECG: CPT

## 2024-05-17 ASSESSMENT — EXERCISE STRESS TEST
PEAK_METS: 3.3
PEAK_HR: 122

## 2024-05-22 ENCOUNTER — HOSPITAL ENCOUNTER (OUTPATIENT)
Dept: CARDIAC REHAB | Age: 84
Setting detail: RECURRING SERIES
End: 2024-05-22
Payer: MEDICARE

## 2024-05-23 ASSESSMENT — EXERCISE STRESS TEST: PEAK_BP: 124/72

## 2024-05-24 ENCOUNTER — HOSPITAL ENCOUNTER (OUTPATIENT)
Dept: CARDIAC REHAB | Age: 84
Setting detail: RECURRING SERIES
End: 2024-05-24
Payer: MEDICARE

## 2024-05-29 ENCOUNTER — HOSPITAL ENCOUNTER (OUTPATIENT)
Dept: CARDIAC REHAB | Age: 84
Setting detail: RECURRING SERIES
Discharge: HOME | End: 2024-06-01
Payer: MEDICARE

## 2024-05-29 VITALS — WEIGHT: 200.3 LBS | BODY MASS INDEX: 27.94 KG/M2

## 2024-05-29 PROCEDURE — 93798 PHYS/QHP OP CAR RHAB W/ECG: CPT

## 2024-05-29 ASSESSMENT — EXERCISE STRESS TEST
PEAK_METS: 3.3
PEAK_HR: 116
PEAK_BP: 120/60

## 2024-05-31 ENCOUNTER — HOSPITAL ENCOUNTER (OUTPATIENT)
Dept: CARDIAC REHAB | Age: 84
Setting detail: RECURRING SERIES
End: 2024-05-31
Payer: MEDICARE

## 2024-05-31 PROCEDURE — 93798 PHYS/QHP OP CAR RHAB W/ECG: CPT

## 2024-05-31 ASSESSMENT — EXERCISE STRESS TEST
PEAK_METS: 3.3
PEAK_HR: 109

## 2024-06-03 ENCOUNTER — HOSPITAL ENCOUNTER (OUTPATIENT)
Dept: CARDIAC REHAB | Age: 84
Setting detail: RECURRING SERIES
Discharge: HOME | End: 2024-06-06
Payer: MEDICARE

## 2024-06-03 PROCEDURE — 93798 PHYS/QHP OP CAR RHAB W/ECG: CPT

## 2024-06-03 ASSESSMENT — EXERCISE STRESS TEST
PEAK_BP: 120/80
PEAK_METS: 3.3
PEAK_HR: 116

## 2024-06-05 ENCOUNTER — HOSPITAL ENCOUNTER (OUTPATIENT)
Dept: CARDIAC REHAB | Age: 84
Setting detail: RECURRING SERIES
Discharge: HOME | End: 2024-06-08
Payer: MEDICARE

## 2024-06-05 VITALS — BODY MASS INDEX: 28.17 KG/M2 | WEIGHT: 202 LBS

## 2024-06-05 PROCEDURE — 93798 PHYS/QHP OP CAR RHAB W/ECG: CPT

## 2024-06-05 ASSESSMENT — EXERCISE STRESS TEST
PEAK_METS: 3.3
PEAK_HR: 107

## 2024-06-07 ENCOUNTER — HOSPITAL ENCOUNTER (OUTPATIENT)
Dept: CARDIAC REHAB | Age: 84
Setting detail: RECURRING SERIES
Discharge: HOME | End: 2024-06-10
Payer: MEDICARE

## 2024-06-07 PROCEDURE — 93798 PHYS/QHP OP CAR RHAB W/ECG: CPT

## 2024-06-07 ASSESSMENT — EXERCISE STRESS TEST
PEAK_HR: 120
PEAK_METS: 3.5

## 2024-06-10 ENCOUNTER — HOSPITAL ENCOUNTER (OUTPATIENT)
Dept: CARDIAC REHAB | Age: 84
Setting detail: RECURRING SERIES
Discharge: HOME | End: 2024-06-13
Payer: MEDICARE

## 2024-06-10 PROCEDURE — 93798 PHYS/QHP OP CAR RHAB W/ECG: CPT

## 2024-06-10 ASSESSMENT — EXERCISE STRESS TEST
PEAK_BP: 118/62
PEAK_METS: 3.5
PEAK_HR: 112

## 2024-06-12 ENCOUNTER — HOSPITAL ENCOUNTER (OUTPATIENT)
Dept: CARDIAC REHAB | Age: 84
Setting detail: RECURRING SERIES
Discharge: HOME | End: 2024-06-15
Payer: MEDICARE

## 2024-06-12 VITALS — WEIGHT: 203 LBS | BODY MASS INDEX: 28.31 KG/M2

## 2024-06-12 PROCEDURE — 93798 PHYS/QHP OP CAR RHAB W/ECG: CPT

## 2024-06-12 ASSESSMENT — EXERCISE STRESS TEST
PEAK_HR: 109
PEAK_METS: 3.5

## 2024-06-14 ENCOUNTER — HOSPITAL ENCOUNTER (OUTPATIENT)
Dept: CARDIAC REHAB | Age: 84
Setting detail: RECURRING SERIES
Discharge: HOME | End: 2024-06-17
Payer: MEDICARE

## 2024-06-14 PROCEDURE — 93798 PHYS/QHP OP CAR RHAB W/ECG: CPT

## 2024-06-14 ASSESSMENT — EXERCISE STRESS TEST
PEAK_METS: 3.5
PEAK_HR: 104

## 2024-06-17 ENCOUNTER — HOSPITAL ENCOUNTER (OUTPATIENT)
Dept: CARDIAC REHAB | Age: 84
Setting detail: RECURRING SERIES
Discharge: HOME | End: 2024-06-20
Payer: MEDICARE

## 2024-06-17 PROCEDURE — 93798 PHYS/QHP OP CAR RHAB W/ECG: CPT

## 2024-06-17 ASSESSMENT — EXERCISE STRESS TEST
PEAK_HR: 106
PEAK_METS: 3.5
PEAK_BP: 130/70

## 2024-06-19 ENCOUNTER — HOSPITAL ENCOUNTER (OUTPATIENT)
Dept: CARDIAC REHAB | Age: 84
Setting detail: RECURRING SERIES
Discharge: HOME | End: 2024-06-22
Payer: MEDICARE

## 2024-06-19 VITALS — WEIGHT: 202.4 LBS | BODY MASS INDEX: 28.23 KG/M2

## 2024-06-19 PROCEDURE — 93798 PHYS/QHP OP CAR RHAB W/ECG: CPT

## 2024-06-19 ASSESSMENT — EXERCISE STRESS TEST
PEAK_METS: 3.5
PEAK_BP: 130/70
PEAK_HR: 111

## 2024-06-21 ENCOUNTER — HOSPITAL ENCOUNTER (OUTPATIENT)
Dept: CARDIAC REHAB | Age: 84
Setting detail: RECURRING SERIES
Discharge: HOME | End: 2024-06-24
Payer: MEDICARE

## 2024-06-21 VITALS — BODY MASS INDEX: 28.23 KG/M2 | WEIGHT: 202.4 LBS

## 2024-06-21 PROCEDURE — 93798 PHYS/QHP OP CAR RHAB W/ECG: CPT

## 2024-06-21 ASSESSMENT — EXERCISE STRESS TEST
PEAK_METS: 3.5
PEAK_HR: 117

## 2024-06-24 ENCOUNTER — HOSPITAL ENCOUNTER (OUTPATIENT)
Dept: CARDIAC REHAB | Age: 84
Setting detail: RECURRING SERIES
Discharge: HOME | End: 2024-06-27
Payer: MEDICARE

## 2024-06-24 PROCEDURE — 93798 PHYS/QHP OP CAR RHAB W/ECG: CPT

## 2024-06-24 ASSESSMENT — EXERCISE STRESS TEST
PEAK_HR: 122
PEAK_BP: 142/66
PEAK_METS: 3.5

## 2024-06-26 ENCOUNTER — HOSPITAL ENCOUNTER (OUTPATIENT)
Dept: CARDIAC REHAB | Age: 84
Setting detail: RECURRING SERIES
Discharge: HOME | End: 2024-06-29
Payer: MEDICARE

## 2024-06-26 VITALS — WEIGHT: 202.2 LBS | BODY MASS INDEX: 28.2 KG/M2

## 2024-06-26 PROCEDURE — 93798 PHYS/QHP OP CAR RHAB W/ECG: CPT

## 2024-06-26 ASSESSMENT — EXERCISE STRESS TEST
PEAK_METS: 3.5
PEAK_HR: 112

## 2024-06-28 ENCOUNTER — HOSPITAL ENCOUNTER (OUTPATIENT)
Dept: CARDIAC REHAB | Age: 84
Setting detail: RECURRING SERIES
Discharge: HOME | End: 2024-07-01
Payer: MEDICARE

## 2024-06-28 PROCEDURE — 93798 PHYS/QHP OP CAR RHAB W/ECG: CPT

## 2024-06-28 ASSESSMENT — EXERCISE STRESS TEST
PEAK_METS: 3.5
PEAK_HR: 111

## 2024-07-01 ENCOUNTER — HOSPITAL ENCOUNTER (OUTPATIENT)
Dept: CARDIAC REHAB | Age: 84
Setting detail: RECURRING SERIES
Discharge: HOME | End: 2024-07-04
Payer: MEDICARE

## 2024-07-01 PROCEDURE — 93798 PHYS/QHP OP CAR RHAB W/ECG: CPT

## 2024-07-01 ASSESSMENT — EXERCISE STRESS TEST
PEAK_BP: 118/62
PEAK_HR: 111
PEAK_METS: 3.5

## 2024-07-31 RX ORDER — ATORVASTATIN CALCIUM 40 MG/1
40 TABLET, FILM COATED ORAL NIGHTLY
Qty: 30 TABLET | Refills: 10 | Status: SHIPPED | OUTPATIENT
Start: 2024-07-31

## 2024-08-01 ENCOUNTER — OFFICE VISIT (OUTPATIENT)
Age: 84
End: 2024-08-01
Payer: MEDICARE

## 2024-08-01 VITALS
SYSTOLIC BLOOD PRESSURE: 110 MMHG | HEART RATE: 72 BPM | HEIGHT: 71 IN | DIASTOLIC BLOOD PRESSURE: 80 MMHG | WEIGHT: 207.1 LBS | BODY MASS INDEX: 28.99 KG/M2

## 2024-08-01 DIAGNOSIS — I25.10 CORONARY ARTERY DISEASE INVOLVING NATIVE CORONARY ARTERY OF NATIVE HEART WITHOUT ANGINA PECTORIS: Primary | ICD-10-CM

## 2024-08-01 DIAGNOSIS — E78.2 MIXED HYPERLIPIDEMIA: ICD-10-CM

## 2024-08-01 DIAGNOSIS — I50.20 HFREF (HEART FAILURE WITH REDUCED EJECTION FRACTION) (HCC): ICD-10-CM

## 2024-08-01 DIAGNOSIS — J98.11 ATELECTASIS: ICD-10-CM

## 2024-08-01 DIAGNOSIS — I48.0 PAROXYSMAL A-FIB (HCC): ICD-10-CM

## 2024-08-01 DIAGNOSIS — Z95.1 S/P CABG X 3: ICD-10-CM

## 2024-08-01 DIAGNOSIS — J90 PLEURAL EFFUSION: ICD-10-CM

## 2024-08-01 PROBLEM — I50.22 CHRONIC SYSTOLIC CONGESTIVE HEART FAILURE (HCC): Status: RESOLVED | Noted: 2024-01-24 | Resolved: 2024-08-01

## 2024-08-01 PROBLEM — I50.22 CHRONIC SYSTOLIC HEART FAILURE (HCC): Status: ACTIVE | Noted: 2023-11-21

## 2024-08-01 PROBLEM — I50.22 CHRONIC SYSTOLIC HEART FAILURE (HCC): Status: RESOLVED | Noted: 2023-11-21 | Resolved: 2024-08-01

## 2024-08-01 PROBLEM — R57.0 CARDIOGENIC SHOCK (HCC): Status: RESOLVED | Noted: 2024-02-10 | Resolved: 2024-08-01

## 2024-08-01 LAB
CHOLEST SERPL-MCNC: 184 MG/DL (ref 0–200)
HDLC SERPL-MCNC: 56 MG/DL (ref 40–60)
HDLC SERPL: 3.3 (ref 0–5)
LDLC SERPL CALC-MCNC: 105 MG/DL (ref 0–100)
NT PRO BNP: 855 PG/ML (ref 0–450)
TRIGL SERPL-MCNC: 115 MG/DL (ref 0–150)
VLDLC SERPL CALC-MCNC: 23 MG/DL (ref 6–23)

## 2024-08-01 PROCEDURE — 1123F ACP DISCUSS/DSCN MKR DOCD: CPT | Performed by: INTERNAL MEDICINE

## 2024-08-01 PROCEDURE — G8419 CALC BMI OUT NRM PARAM NOF/U: HCPCS | Performed by: INTERNAL MEDICINE

## 2024-08-01 PROCEDURE — G8427 DOCREV CUR MEDS BY ELIG CLIN: HCPCS | Performed by: INTERNAL MEDICINE

## 2024-08-01 PROCEDURE — 99214 OFFICE O/P EST MOD 30 MIN: CPT | Performed by: INTERNAL MEDICINE

## 2024-08-01 PROCEDURE — 1036F TOBACCO NON-USER: CPT | Performed by: INTERNAL MEDICINE

## 2024-08-01 ASSESSMENT — ENCOUNTER SYMPTOMS
ABDOMINAL PAIN: 0
BACK PAIN: 0
SHORTNESS OF BREATH: 1
GASTROINTESTINAL NEGATIVE: 1
COUGH: 1
EYE PAIN: 0
ALLERGIC/IMMUNOLOGIC NEGATIVE: 1
EYES NEGATIVE: 1
CHEST TIGHTNESS: 0
PHOTOPHOBIA: 0

## 2024-08-01 NOTE — PROGRESS NOTES
Mouth/Throat:      Mouth: Mucous membranes are moist.   Eyes:      Extraocular Movements: Extraocular movements intact.      Pupils: Pupils are equal, round, and reactive to light.   Cardiovascular:      Rate and Rhythm: Normal rate and regular rhythm.      Heart sounds: No murmur heard.     No friction rub. No gallop.   Pulmonary:      Effort: No respiratory distress.      Breath sounds: No wheezing or rhonchi.   Musculoskeletal:         General: No swelling.      Cervical back: Normal range of motion.      Right lower leg: No edema.      Left lower leg: No edema.   Skin:     General: Skin is warm and dry.      Findings: No rash.   Neurological:      General: No focal deficit present.      Mental Status: He is oriented to person, place, and time.   Psychiatric:         Mood and Affect: Mood normal.         Behavior: Behavior normal.         Judgment: Judgment normal.          /80   Pulse 72   Ht 1.803 m (5' 11\")   Wt 93.9 kg (207 lb 1.6 oz)   BMI 28.88 kg/m²      Wt Readings from Last 10 Encounters:   08/01/24 93.9 kg (207 lb 1.6 oz)   06/26/24 91.7 kg (202 lb 3.2 oz)   06/21/24 91.8 kg (202 lb 6.4 oz)   06/19/24 91.8 kg (202 lb 6.4 oz)   06/12/24 92.1 kg (203 lb)   06/05/24 91.6 kg (202 lb)   05/29/24 90.9 kg (200 lb 4.8 oz)   05/15/24 89.7 kg (197 lb 12.8 oz)   05/08/24 89.6 kg (197 lb 9.6 oz)   05/01/24 90.7 kg (200 lb)           Medical problems and test results were reviewed with the patient today.           Lab Results   Component Value Date/Time    BUN 12 03/01/2024 03:51 AM     No results found for: \"SUMMER\", \"CREAPOC\"  Lab Results   Component Value Date/Time    K 4.2 03/01/2024 03:51 AM       No results found for: \"CHOL\", \"CHOLPOCT\", \"CHLST\", \"CHOLV\", \"HDL\", \"HDLPOC\", \"HDLC\", \"LDL\", \"VLDLC\", \"VLDL\"    ASSESSMENT and PLAN    ICD-10-CM    1. Coronary artery disease involving native coronary artery of native heart without angina pectoris  I25.10 Echo (TTE) complete (PRN contrast/bubble/strain/3D)

## 2024-08-14 NOTE — TELEPHONE ENCOUNTER
90 day complete echo ordered/[facility]   Call Center TCM Note      Flowsheet Row Responses   [facility] patient discharged from? Non-  [[facility]]   Does the patient have one of the following disease processes/diagnoses(primary or secondary)? Other   TCM attempt successful? No   Unsuccessful attempts Attempt 1  [Attempted to reach patient, mother and father, listed on PCP verbal release.]            Rosa Arnold RN    8/14/2024, 15:59 EDT

## 2024-08-25 ENCOUNTER — HOSPITAL ENCOUNTER (EMERGENCY)
Age: 84
Discharge: HOME | End: 2024-08-25
Payer: MEDICARE

## 2024-08-25 ENCOUNTER — APPOINTMENT (OUTPATIENT)
Dept: GENERAL RADIOLOGY | Age: 84
End: 2024-08-25
Payer: MEDICARE

## 2024-08-25 VITALS
OXYGEN SATURATION: 96 % | WEIGHT: 216 LBS | RESPIRATION RATE: 18 BRPM | TEMPERATURE: 98.6 F | HEIGHT: 71 IN | DIASTOLIC BLOOD PRESSURE: 56 MMHG | SYSTOLIC BLOOD PRESSURE: 143 MMHG | HEART RATE: 95 BPM | BODY MASS INDEX: 30.24 KG/M2

## 2024-08-25 DIAGNOSIS — T14.8XXA MUSCLE STRAIN: Primary | ICD-10-CM

## 2024-08-25 LAB
ANION GAP SERPL CALC-SCNC: 14 MMOL/L (ref 9–18)
BASOPHILS # BLD: 0.1 K/UL (ref 0–0.2)
BASOPHILS NFR BLD: 1 % (ref 0–2)
BUN SERPL-MCNC: 19 MG/DL (ref 8–23)
CALCIUM SERPL-MCNC: 9 MG/DL (ref 8.3–10.4)
CHLORIDE SERPL-SCNC: 102 MMOL/L (ref 98–107)
CO2 SERPL-SCNC: 25 MMOL/L (ref 21–32)
CREAT SERPL-MCNC: 0.81 MG/DL (ref 0.8–1.5)
DIFFERENTIAL METHOD BLD: ABNORMAL
EOSINOPHIL # BLD: 0.3 K/UL (ref 0–0.8)
EOSINOPHIL NFR BLD: 3 % (ref 0.5–7.8)
ERYTHROCYTE [DISTWIDTH] IN BLOOD BY AUTOMATED COUNT: 16.9 % (ref 11.9–14.6)
GLUCOSE SERPL-MCNC: 118 MG/DL (ref 65–100)
HCT VFR BLD AUTO: 39.8 % (ref 41.1–50.3)
HGB BLD-MCNC: 12.6 G/DL (ref 13.6–17.2)
IMM GRANULOCYTES # BLD AUTO: 0 K/UL (ref 0–0.5)
IMM GRANULOCYTES NFR BLD AUTO: 0 % (ref 0–5)
LYMPHOCYTES # BLD: 1.6 K/UL (ref 0.5–4.6)
LYMPHOCYTES NFR BLD: 21 % (ref 13–44)
MCH RBC QN AUTO: 27.9 PG (ref 26.1–32.9)
MCHC RBC AUTO-ENTMCNC: 31.7 G/DL (ref 31.4–35)
MCV RBC AUTO: 88.2 FL (ref 82–102)
MONOCYTES # BLD: 1.2 K/UL (ref 0.1–1.3)
MONOCYTES NFR BLD: 15 % (ref 4–12)
NEUTS SEG # BLD: 4.7 K/UL (ref 1.7–8.2)
NEUTS SEG NFR BLD: 60 % (ref 43–78)
NRBC # BLD: 0 K/UL (ref 0–0.2)
PLATELET # BLD AUTO: 226 K/UL (ref 150–450)
PMV BLD AUTO: 10.4 FL (ref 9.4–12.3)
POTASSIUM SERPL-SCNC: 4.3 MMOL/L (ref 3.5–5.1)
RBC # BLD AUTO: 4.51 M/UL (ref 4.23–5.6)
SODIUM SERPL-SCNC: 141 MMOL/L (ref 133–143)
WBC # BLD AUTO: 7.9 K/UL (ref 4.3–11.1)

## 2024-08-25 PROCEDURE — 80048 BASIC METABOLIC PNL TOTAL CA: CPT

## 2024-08-25 PROCEDURE — 85025 COMPLETE CBC W/AUTO DIFF WBC: CPT

## 2024-08-25 PROCEDURE — 73060 X-RAY EXAM OF HUMERUS: CPT

## 2024-08-25 PROCEDURE — 99284 EMERGENCY DEPT VISIT MOD MDM: CPT

## 2024-08-25 ASSESSMENT — LIFESTYLE VARIABLES
HOW OFTEN DO YOU HAVE A DRINK CONTAINING ALCOHOL: MONTHLY OR LESS
HOW MANY STANDARD DRINKS CONTAINING ALCOHOL DO YOU HAVE ON A TYPICAL DAY: 1 OR 2

## 2024-08-25 ASSESSMENT — PAIN DESCRIPTION - DESCRIPTORS: DESCRIPTORS: ACHING

## 2024-08-25 ASSESSMENT — PAIN DESCRIPTION - ORIENTATION: ORIENTATION: LEFT;UPPER

## 2024-08-25 ASSESSMENT — PAIN DESCRIPTION - FREQUENCY: FREQUENCY: CONTINUOUS

## 2024-08-25 ASSESSMENT — PAIN SCALES - GENERAL: PAINLEVEL_OUTOF10: 5

## 2024-08-25 ASSESSMENT — PAIN - FUNCTIONAL ASSESSMENT
PAIN_FUNCTIONAL_ASSESSMENT: ACTIVITIES ARE NOT PREVENTED
PAIN_FUNCTIONAL_ASSESSMENT: 0-10

## 2024-08-25 ASSESSMENT — PAIN DESCRIPTION - PAIN TYPE: TYPE: ACUTE PAIN

## 2024-08-25 ASSESSMENT — PAIN DESCRIPTION - LOCATION: LOCATION: ARM

## 2024-08-25 NOTE — ED TRIAGE NOTES
Pt states that he had an open heart surgery done in Feb. Had an impella in his left upper arm and now, having increased pain and bruising.

## 2024-08-25 NOTE — DISCHARGE INSTRUCTIONS
Ice to the area twice a day avoid any strenuous activity with the left arm.  Wear sling for comfort.  See your primary care physician in 2 days for recheck return to ER for any worsening symptoms meaning worsening swelling pain or any fever

## 2024-08-26 ENCOUNTER — TELEPHONE (OUTPATIENT)
Age: 84
End: 2024-08-26

## 2024-08-26 NOTE — ED PROVIDER NOTES
Emergency Department Provider Note       PCP: Cam Llanes MD   Age: 84 y.o.   Sex: male     DISPOSITION Decision To Discharge 08/25/2024 05:16:43 PM  Condition at Disposition: Good       ICD-10-CM    1. Muscle strain  T14.8XXA           Medical Decision Making     84-year-old male with left bicep area redness and swelling after working out on exercise equipment.  Patient is on Eliquis.  CBC shows a normal hemoglobin at 12 electrolytes are normal x-rays are negative.  I do not feel he is having excessive bleeding into the soft tissues due to it is soft hemoglobin is normal he is on Eliquis so suspicion for DVT is low.  Patient placed in a sling told to use ice to the area twice a day see his primary care doctor in 2 to 3 days for recheck return for any worsening symptom     1 acute, uncomplicated illness or injury.  Shared medical decision making was utilized in creating the patients health plan today.    I independently ordered and reviewed each unique test.       I interpreted the X-rays left humerus x-rays negative.  I interpreted the CBC BMP normal.              History     84-year-old male to complaint of left upper arm redness and swelling after he went to the gym 2 days ago.  He states he was using some exercise equipment and came home and notices left upper arm was red and tender it has gotten little more swollen yesterday.  He has been using some ice with some relief.  He is on Eliquis due to coronary artery disease.  He denies any chest pain shortness of breath.          ROS     Review of Systems   All other systems reviewed and are negative.       Physical Exam     Vitals signs and nursing note reviewed:  Vitals:    08/25/24 1630 08/25/24 1645 08/25/24 1700 08/25/24 1724   BP: 90/77 115/66 122/67 (!) 143/56   Pulse:    95   Resp:    18   Temp:       TempSrc:       SpO2: 95% 93% 98% 96%   Weight:       Height:          Physical Exam  Vitals reviewed.   Constitutional:       Appearance: Normal

## 2024-08-26 NOTE — TELEPHONE ENCOUNTER
Patients son called stating his dad has the following issues :    ER visit this weekend  Swelling and bruising in left arm  Had test and the results are in his chart  María Ferguson  Son is concerned   Questioning previous procedure?

## 2024-08-26 NOTE — TELEPHONE ENCOUNTER
Pt's son, Luis,  returned call and was informed of Dr. Diallo's response.  He verb understanding.

## 2024-08-26 NOTE — TELEPHONE ENCOUNTER
Called s/w pt's son, Luis.    Reports seen in ER yesterday for bruising, swelling, and pain in the (lt) bicep area after working out in the gym on Friday.   Applying  ice and wearing sling. To follow up w/PCP in 2-3 days.  Pt can move arm better today per Jietndra.  Luis would like Dr. Diallo to review ER notes and make an assessment.  Has concerns due to pt being on Eliquis.  Does he need to follow w/Yoel Tran?  CABG w/impella Feb 2024.

## 2024-09-12 ENCOUNTER — APPOINTMENT (RX ONLY)
Dept: URBAN - METROPOLITAN AREA CLINIC 329 | Facility: CLINIC | Age: 84
Setting detail: DERMATOLOGY
End: 2024-09-12

## 2024-09-12 DIAGNOSIS — D18.0 HEMANGIOMA: ICD-10-CM

## 2024-09-12 DIAGNOSIS — L57.8 OTHER SKIN CHANGES DUE TO CHRONIC EXPOSURE TO NONIONIZING RADIATION: ICD-10-CM

## 2024-09-12 DIAGNOSIS — L82.1 OTHER SEBORRHEIC KERATOSIS: ICD-10-CM

## 2024-09-12 PROBLEM — D18.01 HEMANGIOMA OF SKIN AND SUBCUTANEOUS TISSUE: Status: ACTIVE | Noted: 2024-09-12

## 2024-09-12 PROCEDURE — ? SUNSCREEN RECOMMENDATIONS

## 2024-09-12 PROCEDURE — 99213 OFFICE O/P EST LOW 20 MIN: CPT

## 2024-09-12 PROCEDURE — ? FULL BODY SKIN EXAM - DECLINED

## 2024-09-12 PROCEDURE — ? COUNSELING

## 2024-09-12 ASSESSMENT — LOCATION SIMPLE DESCRIPTION DERM
LOCATION SIMPLE: LEFT UPPER BACK
LOCATION SIMPLE: RIGHT FOREARM
LOCATION SIMPLE: ABDOMEN
LOCATION SIMPLE: LEFT FOREARM

## 2024-09-12 ASSESSMENT — LOCATION DETAILED DESCRIPTION DERM
LOCATION DETAILED: PERIUMBILICAL SKIN
LOCATION DETAILED: LEFT MEDIAL UPPER BACK
LOCATION DETAILED: RIGHT PROXIMAL DORSAL FOREARM
LOCATION DETAILED: LEFT INFERIOR MEDIAL UPPER BACK
LOCATION DETAILED: EPIGASTRIC SKIN
LOCATION DETAILED: LEFT PROXIMAL DORSAL FOREARM

## 2024-09-12 ASSESSMENT — LOCATION ZONE DERM
LOCATION ZONE: ARM
LOCATION ZONE: TRUNK

## 2024-09-12 NOTE — PROCEDURE: FULL BODY SKIN EXAM - DECLINED
Instructions: This plan will send the code FBSD to the PM system.  DO NOT or CHANGE the price.
Price (Do Not Change): 0.00
Detail Level: Simple
Stable.

## 2024-09-17 ENCOUNTER — OFFICE VISIT (OUTPATIENT)
Age: 84
End: 2024-09-17
Payer: MEDICARE

## 2024-09-17 VITALS
WEIGHT: 210 LBS | HEIGHT: 71 IN | DIASTOLIC BLOOD PRESSURE: 62 MMHG | BODY MASS INDEX: 29.4 KG/M2 | SYSTOLIC BLOOD PRESSURE: 116 MMHG | HEART RATE: 54 BPM

## 2024-09-17 DIAGNOSIS — I48.0 PAROXYSMAL A-FIB (HCC): ICD-10-CM

## 2024-09-17 DIAGNOSIS — I25.10 CORONARY ARTERY DISEASE INVOLVING NATIVE CORONARY ARTERY OF NATIVE HEART WITHOUT ANGINA PECTORIS: Primary | ICD-10-CM

## 2024-09-17 DIAGNOSIS — E78.2 MIXED HYPERLIPIDEMIA: ICD-10-CM

## 2024-09-17 DIAGNOSIS — Z95.1 S/P CABG X 3: ICD-10-CM

## 2024-09-17 PROCEDURE — G8427 DOCREV CUR MEDS BY ELIG CLIN: HCPCS | Performed by: INTERNAL MEDICINE

## 2024-09-17 PROCEDURE — 1123F ACP DISCUSS/DSCN MKR DOCD: CPT | Performed by: INTERNAL MEDICINE

## 2024-09-17 PROCEDURE — G8419 CALC BMI OUT NRM PARAM NOF/U: HCPCS | Performed by: INTERNAL MEDICINE

## 2024-09-17 PROCEDURE — 99214 OFFICE O/P EST MOD 30 MIN: CPT | Performed by: INTERNAL MEDICINE

## 2024-09-17 PROCEDURE — 1036F TOBACCO NON-USER: CPT | Performed by: INTERNAL MEDICINE

## 2024-09-17 ASSESSMENT — ENCOUNTER SYMPTOMS
CHEST TIGHTNESS: 0
EYE PAIN: 0
SHORTNESS OF BREATH: 0
BACK PAIN: 0
RESPIRATORY NEGATIVE: 1
ALLERGIC/IMMUNOLOGIC NEGATIVE: 1
ABDOMINAL PAIN: 0
EYES NEGATIVE: 1
GASTROINTESTINAL NEGATIVE: 1
PHOTOPHOBIA: 0

## 2024-12-30 NOTE — TELEPHONE ENCOUNTER
Requested Prescriptions     Pending Prescriptions Disp Refills    apixaban (ELIQUIS) 5 MG TABS tablet 180 tablet 3     Sig: Take 1 tablet by mouth 2 times daily     Verified rx. Last OV 09/17/24. Erx to pharm on file.

## 2024-12-30 NOTE — TELEPHONE ENCOUNTER
MEDICATION REFILL REQUEST    Pt stated it need to be fill today or yamileth       Name of Medication:    apixaban (ELIQUIS) 5 MG TABS tablet   Dose:  5mg  Frequency:    Quantity:    Days' supply:            Pharmacy Name/Location:    [facility] - [address] -  [phone] - F [phone]  [address] [place] [address]  Phone: [phone]  Fax: [phone]

## 2025-03-17 ENCOUNTER — OFFICE VISIT (OUTPATIENT)
Age: 85
End: 2025-03-17
Payer: MEDICARE

## 2025-03-17 ENCOUNTER — TELEPHONE (OUTPATIENT)
Age: 85
End: 2025-03-17

## 2025-03-17 VITALS
HEIGHT: 71 IN | SYSTOLIC BLOOD PRESSURE: 128 MMHG | BODY MASS INDEX: 28.77 KG/M2 | DIASTOLIC BLOOD PRESSURE: 68 MMHG | HEART RATE: 73 BPM | WEIGHT: 205.5 LBS

## 2025-03-17 DIAGNOSIS — I25.10 CORONARY ARTERY DISEASE INVOLVING NATIVE CORONARY ARTERY OF NATIVE HEART WITHOUT ANGINA PECTORIS: Primary | ICD-10-CM

## 2025-03-17 DIAGNOSIS — I50.20 HFREF (HEART FAILURE WITH REDUCED EJECTION FRACTION) (HCC): ICD-10-CM

## 2025-03-17 DIAGNOSIS — I48.0 PAROXYSMAL A-FIB (HCC): ICD-10-CM

## 2025-03-17 PROCEDURE — G8419 CALC BMI OUT NRM PARAM NOF/U: HCPCS | Performed by: INTERNAL MEDICINE

## 2025-03-17 PROCEDURE — 1123F ACP DISCUSS/DSCN MKR DOCD: CPT | Performed by: INTERNAL MEDICINE

## 2025-03-17 PROCEDURE — 1126F AMNT PAIN NOTED NONE PRSNT: CPT | Performed by: INTERNAL MEDICINE

## 2025-03-17 PROCEDURE — 93000 ELECTROCARDIOGRAM COMPLETE: CPT | Performed by: INTERNAL MEDICINE

## 2025-03-17 PROCEDURE — 99214 OFFICE O/P EST MOD 30 MIN: CPT | Performed by: INTERNAL MEDICINE

## 2025-03-17 PROCEDURE — 1036F TOBACCO NON-USER: CPT | Performed by: INTERNAL MEDICINE

## 2025-03-17 PROCEDURE — G8428 CUR MEDS NOT DOCUMENT: HCPCS | Performed by: INTERNAL MEDICINE

## 2025-03-17 RX ORDER — LISINOPRIL 5 MG/1
2.5 TABLET ORAL DAILY
Qty: 15 TABLET | Refills: 11 | Status: SHIPPED | OUTPATIENT
Start: 2025-03-17

## 2025-03-17 RX ORDER — SPIRONOLACTONE 25 MG/1
12.5 TABLET ORAL DAILY
Qty: 15 TABLET | Refills: 11 | Status: SHIPPED | OUTPATIENT
Start: 2025-03-17

## 2025-03-17 ASSESSMENT — ENCOUNTER SYMPTOMS
EYE PAIN: 0
EYES NEGATIVE: 1
ALLERGIC/IMMUNOLOGIC NEGATIVE: 1
PHOTOPHOBIA: 0
ABDOMINAL PAIN: 0
RESPIRATORY NEGATIVE: 1
BACK PAIN: 0
CHEST TIGHTNESS: 0
SHORTNESS OF BREATH: 0
GASTROINTESTINAL NEGATIVE: 1

## 2025-03-17 NOTE — TELEPHONE ENCOUNTER
Pt would like someone to call him. He seen Dr. Diallo today but forgot to discuss a personal subject. He wants to know if it is ok to have intercourse.

## 2025-03-17 NOTE — PROGRESS NOTES
[facility] CARDIOLOGY  [address] [place] [address], [address]  [place] [address]  PHONE: [phone]      25    NAME:  Dionisio Gordon Jr.  : 1940  MRN: [id number]         SUBJECTIVE:   Dionisio Gordon Jr. is a 84 y.o. 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 (Independent review/interpretation by me)      HPI:  Continues to deal with fatigue post bypass surgery.  Is wondering when that will improve.  Completed cardiac rehab.  Is suboptimally titrated on heart failure medications.    Past Medical History, Past Surgical History, Family history, Social History, and Medications were all reviewed with the patient today and updated as necessary.       Current Outpatient Medications:     apixaban (ELIQUIS) 5 MG TABS tablet, Take 1 tablet by mouth 2 times daily, Disp: 180 tablet, Rfl: 3    atorvastatin (LIPITOR) 40 MG tablet, Take 1 tablet by mouth nightly, Disp: 30 tablet, Rfl: 10    carvedilol (COREG) 3.125 MG tablet, Take 1 tablet by mouth 2 times daily (with meals), Disp: 60 tablet, Rfl: 11    aspirin 81 MG EC tablet, Take 1 tablet by mouth daily, Disp: 30 tablet, Rfl: 3    acetaminophen (TYLENOL) 325 MG tablet, Take 2 tablets by mouth every 6 hours as needed for Pain, Disp: 120

## 2025-03-17 NOTE — TELEPHONE ENCOUNTER
I called the patient and informed him Dr. Diallo stated it was okay to have intercourse.  Patient verbalized understanding.

## 2025-03-19 ENCOUNTER — APPOINTMENT (OUTPATIENT)
Dept: URBAN - METROPOLITAN AREA CLINIC 329 | Facility: CLINIC | Age: 85
Setting detail: DERMATOLOGY
End: 2025-03-19

## 2025-03-19 DIAGNOSIS — D22 MELANOCYTIC NEVI: ICD-10-CM

## 2025-03-19 DIAGNOSIS — D18.0 HEMANGIOMA: ICD-10-CM

## 2025-03-19 DIAGNOSIS — L81.4 OTHER MELANIN HYPERPIGMENTATION: ICD-10-CM

## 2025-03-19 DIAGNOSIS — L82.0 INFLAMED SEBORRHEIC KERATOSIS: ICD-10-CM

## 2025-03-19 DIAGNOSIS — L57.0 ACTINIC KERATOSIS: ICD-10-CM | Status: INADEQUATELY CONTROLLED

## 2025-03-19 DIAGNOSIS — L82.1 OTHER SEBORRHEIC KERATOSIS: ICD-10-CM

## 2025-03-19 PROBLEM — D18.01 HEMANGIOMA OF SKIN AND SUBCUTANEOUS TISSUE: Status: ACTIVE | Noted: 2025-03-19

## 2025-03-19 PROBLEM — D22.5 MELANOCYTIC NEVI OF TRUNK: Status: ACTIVE | Noted: 2025-03-19

## 2025-03-19 PROCEDURE — ? OBSERVATION

## 2025-03-19 PROCEDURE — ? LIQUID NITROGEN

## 2025-03-19 PROCEDURE — ? COUNSELING

## 2025-03-19 PROCEDURE — ? FULL BODY SKIN EXAM

## 2025-03-19 PROCEDURE — ? TREATMENT REGIMEN

## 2025-03-19 PROCEDURE — 17004 DESTROY PREMAL LESIONS 15/>: CPT

## 2025-03-19 PROCEDURE — 99213 OFFICE O/P EST LOW 20 MIN: CPT | Mod: 25

## 2025-03-19 ASSESSMENT — LOCATION ZONE DERM
LOCATION ZONE: FACE
LOCATION ZONE: HAND
LOCATION ZONE: NECK
LOCATION ZONE: TRUNK
LOCATION ZONE: ARM
LOCATION ZONE: EAR

## 2025-03-19 ASSESSMENT — LOCATION SIMPLE DESCRIPTION DERM
LOCATION SIMPLE: CHEST
LOCATION SIMPLE: INFERIOR FOREHEAD
LOCATION SIMPLE: ABDOMEN
LOCATION SIMPLE: LEFT FOREARM
LOCATION SIMPLE: RIGHT FOREHEAD
LOCATION SIMPLE: RIGHT FOREARM
LOCATION SIMPLE: RIGHT EAR
LOCATION SIMPLE: LEFT HAND
LOCATION SIMPLE: LEFT UPPER BACK
LOCATION SIMPLE: LEFT FOREHEAD
LOCATION SIMPLE: POSTERIOR NECK
LOCATION SIMPLE: RIGHT UPPER BACK
LOCATION SIMPLE: RIGHT LOWER BACK
LOCATION SIMPLE: RIGHT SHOULDER
LOCATION SIMPLE: RIGHT ELBOW

## 2025-03-19 ASSESSMENT — LOCATION DETAILED DESCRIPTION DERM
LOCATION DETAILED: RIGHT SUPERIOR HELIX
LOCATION DETAILED: LEFT DISTAL DORSAL FOREARM
LOCATION DETAILED: RIGHT PROXIMAL DORSAL FOREARM
LOCATION DETAILED: EPIGASTRIC SKIN
LOCATION DETAILED: LEFT VENTRAL PROXIMAL FOREARM
LOCATION DETAILED: LEFT PROXIMAL DORSAL FOREARM
LOCATION DETAILED: LEFT INFERIOR FOREHEAD
LOCATION DETAILED: RIGHT LATERAL ELBOW
LOCATION DETAILED: RIGHT POSTERIOR SHOULDER
LOCATION DETAILED: RIGHT SUPERIOR MEDIAL MIDBACK
LOCATION DETAILED: RIGHT LATERAL TRAPEZIAL NECK
LOCATION DETAILED: PERIUMBILICAL SKIN
LOCATION DETAILED: RIGHT MEDIAL UPPER BACK
LOCATION DETAILED: LEFT ULNAR DORSAL HAND
LOCATION DETAILED: LEFT RADIAL DORSAL HAND
LOCATION DETAILED: INFERIOR MID FOREHEAD
LOCATION DETAILED: RIGHT MEDIAL SUPERIOR CHEST
LOCATION DETAILED: RIGHT LATERAL FOREHEAD
LOCATION DETAILED: RIGHT PROXIMAL RADIAL DORSAL FOREARM
LOCATION DETAILED: RIGHT DISTAL DORSAL FOREARM
LOCATION DETAILED: RIGHT VENTRAL DISTAL FOREARM
LOCATION DETAILED: LEFT LATERAL UPPER BACK
LOCATION DETAILED: LEFT MEDIAL SUPERIOR CHEST

## 2025-03-19 NOTE — HPI: EVALUATION OF SKIN LESION(S)
What Type Of Note Output Would You Prefer (Optional)?: Bullet Format
Hpi Title: Evaluation of Skin Lesions
How Severe Are Your Spot(S)?: mild
Additional History: Patient with no new lesions or changes

## 2025-03-19 NOTE — PROCEDURE: LIQUID NITROGEN
Render Post-Care Instructions In Note?: no
Number Of Freeze-Thaw Cycles: 2 freeze-thaw cycles
Detail Level: Detailed
Post-Care Instructions: I reviewed with the patient in detail post-care instructions. Patient is to wear sunprotection, and avoid picking at any of the treated lesions. Pt may apply Vaseline to crusted or scabbing areas.
Consent: The patient's consent was obtained including but not limited to risks of crusting, scabbing, blistering, scarring, darker or lighter pigmentary change, recurrence, incomplete removal and infection.
Duration Of Freeze Thaw-Cycle (Seconds): 0
Show Applicator Variable?: Yes

## 2025-03-19 NOTE — PROCEDURE: REASSURANCE
Hide Include Location In Plan Question?: No
Detail Level: Zone
Additional Note: Lesion on patient’s face is benign.

## 2025-03-24 DIAGNOSIS — I50.23 ACUTE ON CHRONIC SYSTOLIC (CONGESTIVE) HEART FAILURE: ICD-10-CM

## 2025-03-24 DIAGNOSIS — I25.110 ATHEROSCLEROSIS OF NATIVE CORONARY ARTERY OF NATIVE HEART WITH UNSTABLE ANGINA PECTORIS (HCC): ICD-10-CM

## 2025-03-24 DIAGNOSIS — I50.20 HFREF (HEART FAILURE WITH REDUCED EJECTION FRACTION) (HCC): ICD-10-CM

## 2025-03-24 LAB
ALBUMIN SERPL-MCNC: 3.6 G/DL (ref 3.2–4.6)
ALBUMIN/GLOB SERPL: 1.2 (ref 1–1.9)
ALP SERPL-CCNC: 61 U/L (ref 40–129)
ALT SERPL-CCNC: 14 U/L (ref 8–55)
ANION GAP SERPL CALC-SCNC: 8 MMOL/L (ref 7–16)
AST SERPL-CCNC: 31 U/L (ref 15–37)
BILIRUB DIRECT SERPL-MCNC: 0.3 MG/DL (ref 0–0.4)
BILIRUB SERPL-MCNC: 0.9 MG/DL (ref 0–1.2)
BUN SERPL-MCNC: 14 MG/DL (ref 8–23)
CALCIUM SERPL-MCNC: 9.5 MG/DL (ref 8.8–10.2)
CHLORIDE SERPL-SCNC: 100 MMOL/L (ref 98–107)
CHOLEST SERPL-MCNC: 100 MG/DL (ref 0–200)
CO2 SERPL-SCNC: 27 MMOL/L (ref 20–29)
CREAT SERPL-MCNC: 0.9 MG/DL (ref 0.8–1.3)
GLOBULIN SER CALC-MCNC: 3 G/DL (ref 2.3–3.5)
GLUCOSE SERPL-MCNC: 103 MG/DL (ref 70–99)
HDLC SERPL-MCNC: 46 MG/DL (ref 40–60)
HDLC SERPL: 2.2 (ref 0–5)
LDLC SERPL CALC-MCNC: 42 MG/DL (ref 0–100)
POTASSIUM SERPL-SCNC: 5.2 MMOL/L (ref 3.5–5.1)
PROT SERPL-MCNC: 6.6 G/DL (ref 6.3–8.2)
SODIUM SERPL-SCNC: 136 MMOL/L (ref 136–145)
TRIGL SERPL-MCNC: 61 MG/DL (ref 0–150)
VLDLC SERPL CALC-MCNC: 12 MG/DL (ref 6–23)

## 2025-04-14 ENCOUNTER — OFFICE VISIT (OUTPATIENT)
Age: 85
End: 2025-04-14
Payer: MEDICARE

## 2025-04-14 VITALS
WEIGHT: 198 LBS | DIASTOLIC BLOOD PRESSURE: 60 MMHG | SYSTOLIC BLOOD PRESSURE: 90 MMHG | HEART RATE: 72 BPM | BODY MASS INDEX: 27.72 KG/M2 | HEIGHT: 71 IN

## 2025-04-14 DIAGNOSIS — I50.20 HFREF (HEART FAILURE WITH REDUCED EJECTION FRACTION) (HCC): ICD-10-CM

## 2025-04-14 DIAGNOSIS — I48.0 PAROXYSMAL A-FIB (HCC): ICD-10-CM

## 2025-04-14 DIAGNOSIS — I25.10 CORONARY ARTERY DISEASE INVOLVING NATIVE CORONARY ARTERY OF NATIVE HEART WITHOUT ANGINA PECTORIS: Primary | ICD-10-CM

## 2025-04-14 PROCEDURE — 1159F MED LIST DOCD IN RCRD: CPT | Performed by: INTERNAL MEDICINE

## 2025-04-14 PROCEDURE — G8419 CALC BMI OUT NRM PARAM NOF/U: HCPCS | Performed by: INTERNAL MEDICINE

## 2025-04-14 PROCEDURE — 99214 OFFICE O/P EST MOD 30 MIN: CPT | Performed by: INTERNAL MEDICINE

## 2025-04-14 PROCEDURE — 1123F ACP DISCUSS/DSCN MKR DOCD: CPT | Performed by: INTERNAL MEDICINE

## 2025-04-14 PROCEDURE — 1036F TOBACCO NON-USER: CPT | Performed by: INTERNAL MEDICINE

## 2025-04-14 PROCEDURE — 1126F AMNT PAIN NOTED NONE PRSNT: CPT | Performed by: INTERNAL MEDICINE

## 2025-04-14 PROCEDURE — G8427 DOCREV CUR MEDS BY ELIG CLIN: HCPCS | Performed by: INTERNAL MEDICINE

## 2025-04-14 PROCEDURE — 1160F RVW MEDS BY RX/DR IN RCRD: CPT | Performed by: INTERNAL MEDICINE

## 2025-04-14 ASSESSMENT — ENCOUNTER SYMPTOMS
RESPIRATORY NEGATIVE: 1
EYE PAIN: 0
CHEST TIGHTNESS: 0
PHOTOPHOBIA: 0
EYES NEGATIVE: 1
ABDOMINAL PAIN: 0
SHORTNESS OF BREATH: 0
ALLERGIC/IMMUNOLOGIC NEGATIVE: 1
BACK PAIN: 0
GASTROINTESTINAL NEGATIVE: 1

## 2025-04-14 NOTE — PROGRESS NOTES
[facility] CARDIOLOGY  [address] [place] [address], [address]  [place] [address]  PHONE: [phone]      25    NAME:  Dionisio Gordon Jr.  : 1940  MRN: [id number]         SUBJECTIVE:   Dionisio Gordon Jr. is a 84 y.o. 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              Flutter ablation - Dr Ma              Afib post op - on amiodarone  2) CAD  Cath 24 - Multivessel disease (Green)              CABG - 24 Jose Guadalupe LIMA to LAD, SVG to pLV, SVG to PDA, MAZE ARACELIS clip              Decompensation with impella placed 2/15/24  3) Echo              24 - LVEF 35-40% (inferior HK)              24 - LVEF 30-35% with inferior HK  4) Lipids              23 - HDL 45, , Trig 77, Ratio 3.9              24 - HDL 56, , Trig 115, Ratio 3.3   3/24/25 - HDL 46, LDL 42, Trig 61, Ratio 2.2    HPI:  At our last visit we started guideline directed heart failure therapy.  He seems to be tolerating the medications without issue.  We had a follow-up lipid panel that showed good control.  We also did a BMP to ensure that he was not getting hyperkalemic from the spironolactone and his potassium seems to be borderline but acceptable.    Past Medical History, Past Surgical History, Family history, Social History, and Medications were all reviewed with the patient today and updated as necessary.       Current Outpatient Medications:     lisinopril (PRINIVIL;ZESTRIL) 5 MG tablet, Take 0.5 tablets by mouth daily, Disp: 15 tablet, Rfl: 11    spironolactone (ALDACTONE) 25 MG tablet, Take 0.5 tablets by mouth daily, Disp: 15 tablet, Rfl: 11    apixaban (ELIQUIS) 5 MG TABS tablet, Take 1 tablet by mouth 2 times daily, Disp: 180 tablet, Rfl: 3    atorvastatin (LIPITOR) 40 MG tablet, Take 1 tablet by mouth nightly,

## 2025-05-07 RX ORDER — CARVEDILOL 3.12 MG/1
3.12 TABLET ORAL 2 TIMES DAILY WITH MEALS
Qty: 180 TABLET | Refills: 3 | Status: SHIPPED | OUTPATIENT
Start: 2025-05-07

## 2025-05-07 NOTE — TELEPHONE ENCOUNTER
Last office visit 4/14/2025.    Requested Prescriptions     Pending Prescriptions Disp Refills    carvedilol (COREG) 3.125 MG tablet [Pharmacy Med Name: CARVEDILOL 3.125 MG TABS 3.125 Tablet] 180 tablet 3     Sig: Take 1 tablet by mouth 2 times daily (with meals)

## 2025-07-17 ENCOUNTER — OFFICE VISIT (OUTPATIENT)
Age: 85
End: 2025-07-17
Payer: MEDICARE

## 2025-07-17 VITALS
BODY MASS INDEX: 27.76 KG/M2 | WEIGHT: 198.3 LBS | SYSTOLIC BLOOD PRESSURE: 110 MMHG | HEART RATE: 60 BPM | HEIGHT: 71 IN | DIASTOLIC BLOOD PRESSURE: 60 MMHG

## 2025-07-17 DIAGNOSIS — I48.0 PAROXYSMAL A-FIB (HCC): Primary | ICD-10-CM

## 2025-07-17 DIAGNOSIS — I25.10 CORONARY ARTERY DISEASE INVOLVING NATIVE CORONARY ARTERY OF NATIVE HEART WITHOUT ANGINA PECTORIS: ICD-10-CM

## 2025-07-17 DIAGNOSIS — I50.20 HFREF (HEART FAILURE WITH REDUCED EJECTION FRACTION) (HCC): ICD-10-CM

## 2025-07-17 PROCEDURE — G8427 DOCREV CUR MEDS BY ELIG CLIN: HCPCS | Performed by: INTERNAL MEDICINE

## 2025-07-17 PROCEDURE — 99214 OFFICE O/P EST MOD 30 MIN: CPT | Performed by: INTERNAL MEDICINE

## 2025-07-17 PROCEDURE — 1126F AMNT PAIN NOTED NONE PRSNT: CPT | Performed by: INTERNAL MEDICINE

## 2025-07-17 PROCEDURE — 1160F RVW MEDS BY RX/DR IN RCRD: CPT | Performed by: INTERNAL MEDICINE

## 2025-07-17 PROCEDURE — 1036F TOBACCO NON-USER: CPT | Performed by: INTERNAL MEDICINE

## 2025-07-17 PROCEDURE — G8419 CALC BMI OUT NRM PARAM NOF/U: HCPCS | Performed by: INTERNAL MEDICINE

## 2025-07-17 PROCEDURE — 1159F MED LIST DOCD IN RCRD: CPT | Performed by: INTERNAL MEDICINE

## 2025-07-17 PROCEDURE — 1123F ACP DISCUSS/DSCN MKR DOCD: CPT | Performed by: INTERNAL MEDICINE

## 2025-07-17 ASSESSMENT — ENCOUNTER SYMPTOMS
BACK PAIN: 0
RESPIRATORY NEGATIVE: 1
SHORTNESS OF BREATH: 0
GASTROINTESTINAL NEGATIVE: 1
EYE PAIN: 0
ABDOMINAL PAIN: 0
ALLERGIC/IMMUNOLOGIC NEGATIVE: 1
PHOTOPHOBIA: 0
CHEST TIGHTNESS: 0
EYES NEGATIVE: 1

## 2025-07-17 NOTE — PROGRESS NOTES
[facility] CARDIOLOGY  [address] [place] [address], [address]  [place] [address]  PHONE: [phone]      25    NAME:  Dionisio Gordon Jr.  : 1940  MRN: [id number]         SUBJECTIVE:   Dionisio Gordon Jr. is a 85 y.o. 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 Compliant with meds.    Past Medical History, Past Surgical History, Family history, Social History, and Medications were all reviewed with the patient today and updated as necessary.       Current Outpatient Medications:     carvedilol (COREG) 3.125 MG tablet, Take 1 tablet by mouth 2 times daily (with meals), Disp: 180 tablet, Rfl: 3    lisinopril (PRINIVIL;ZESTRIL) 5 MG tablet, Take 0.5 tablets by mouth daily, Disp: 15 tablet, Rfl: 11    spironolactone (ALDACTONE) 25 MG tablet, Take 0.5 tablets by mouth daily, Disp: 15 tablet, Rfl: 11    apixaban (ELIQUIS) 5 MG TABS tablet, Take 1 tablet by mouth 2 times daily, Disp: 180 tablet, Rfl: 3    atorvastatin (LIPITOR) 40 MG tablet, Take 1 tablet by mouth nightly, Disp: 30 tablet, Rfl: 10    aspirin 81 MG EC tablet, Take 1 tablet by mouth daily, Disp: 30 tablet, Rfl: 3    acetaminophen (TYLENOL) 325 MG tablet, Take 2

## 2025-08-07 ENCOUNTER — TELEPHONE (OUTPATIENT)
Age: 85
End: 2025-08-07

## 2025-08-13 ENCOUNTER — TELEPHONE (OUTPATIENT)
Age: 85
End: 2025-08-13

## 2025-08-13 RX ORDER — ATORVASTATIN CALCIUM 40 MG/1
40 TABLET, FILM COATED ORAL NIGHTLY
Qty: 90 TABLET | Refills: 3 | Status: SHIPPED | OUTPATIENT
Start: 2025-08-13

## 2025-08-20 ENCOUNTER — APPOINTMENT (OUTPATIENT)
Dept: URBAN - METROPOLITAN AREA CLINIC 329 | Facility: CLINIC | Age: 85
Setting detail: DERMATOLOGY
End: 2025-08-20

## 2025-08-20 DIAGNOSIS — L57.0 ACTINIC KERATOSIS: ICD-10-CM

## 2025-08-20 DIAGNOSIS — L21.8 OTHER SEBORRHEIC DERMATITIS: ICD-10-CM | Status: INADEQUATELY CONTROLLED

## 2025-08-20 PROBLEM — L30.9 DERMATITIS, UNSPECIFIED: Status: ACTIVE | Noted: 2025-08-20

## 2025-08-20 PROCEDURE — ? PRESCRIPTION MEDICATION MANAGEMENT

## 2025-08-20 PROCEDURE — ? COUNSELING

## 2025-08-20 PROCEDURE — ? MEDICATION COUNSELING

## 2025-08-20 PROCEDURE — ? PRESCRIPTION

## 2025-08-20 PROCEDURE — ? LIQUID NITROGEN

## 2025-08-20 PROCEDURE — ? FULL BODY SKIN EXAM - DECLINED

## 2025-08-20 RX ORDER — MOMETASONE FUROATE 1 MG/G
CREAM TOPICAL
Qty: 45 | Refills: 3 | Status: ERX | COMMUNITY
Start: 2025-08-20

## 2025-08-20 RX ADMIN — MOMETASONE FUROATE: 1 CREAM TOPICAL at 00:00

## 2025-08-20 ASSESSMENT — LOCATION DETAILED DESCRIPTION DERM
LOCATION DETAILED: LEFT INFERIOR CENTRAL MALAR CHEEK
LOCATION DETAILED: LEFT CENTRAL MALAR CHEEK
LOCATION DETAILED: LEFT SUPERIOR MEDIAL FOREHEAD

## 2025-08-20 ASSESSMENT — LOCATION SIMPLE DESCRIPTION DERM
LOCATION SIMPLE: LEFT FOREHEAD
LOCATION SIMPLE: LEFT CHEEK

## 2025-08-20 ASSESSMENT — LOCATION ZONE DERM: LOCATION ZONE: FACE

## (undated) DEVICE — WIRE PACING SZ 2-0 L24IN NONABSORBABLE LIGHT/DARK

## (undated) DEVICE — DRAPE SLUSH DISC W44XL66IN ST FOR RND BSIN HUSH SLUSH SYS

## (undated) DEVICE — PERCUTANEOUS INSERTION KIT-ARTERIAL: Brand: PERCUTANEOUS INSERTION KIT-ARTERIAL

## (undated) DEVICE — CATHETER THOR 24FR L22IN PVC 5 EYELET STR ATRAUM

## (undated) DEVICE — CATHETER THOR 32FR L23IN PVC 5 EYELET STR ATRAUM

## (undated) DEVICE — Device

## (undated) DEVICE — PERFUSION PACK XCOATING 76320] TERUMO CARDIOVASCULAR]

## (undated) DEVICE — CATHETER THOR 32FR L23IN PVC 6 EYELET STR ATRAUM

## (undated) DEVICE — TTL1LYR 16FR10ML 100%SIL TMPST TR: Brand: MEDLINE

## (undated) DEVICE — SYSTEM CLOSURE 6-12 FR VEN VASC VASCADE MVP

## (undated) DEVICE — CATHETER US GE COMPATIBILITY SOUNDSTAR ECO 10FR

## (undated) DEVICE — SUTURE ETHBND EXCEL SZ 0 L18IN NONABSORBABLE GRN L36MM CT-1 CX21D

## (undated) DEVICE — CANNULA NSL ORAL AD FOR CAPNOFLEX CO2 O2 AIRLFE

## (undated) DEVICE — GAUZE,SPONGE,4"X4",12PLY,STERILE,LF,2'S: Brand: MEDLINE

## (undated) DEVICE — CABG DR WILLIAMS: Brand: MEDLINE INDUSTRIES, INC.

## (undated) DEVICE — SHEATH GUID 11.5X8.5FR L71MM M CRV L22MM BIDIR STEER CARTO

## (undated) DEVICE — APPLICATOR MEDICATED 26 CC SOLUTION HI LT ORNG CHLORAPREP

## (undated) DEVICE — MOUTHPIECE ENDOSCP L CTRL OPN AND SIDE PORTS DISP

## (undated) DEVICE — GLOVE SURG SZ 7 L11.2IN THK9.8MIL STRW LTX POLYMER BEAD CUF

## (undated) DEVICE — SURGICAL PROCEDURE KIT BRONCHSCP CUST

## (undated) DEVICE — ELECTRODE TRAIN EDGE SYS W/ QUIK-COMBO CONN

## (undated) DEVICE — CARDINAL HEALTH FLEXIBLE LIGHT HANDLE COVER: Brand: CARDINAL HEALTH

## (undated) DEVICE — SOLUTION IRRIG 3000ML 0.9% SOD CHL USP UROMATIC PLAS CONT

## (undated) DEVICE — DRAIN SURG SGL COLL PT TB FOR ATS BG OASIS

## (undated) DEVICE — BAND COMPR L24CM REG CLR PLAS HEMSTAT EXT HK AND LOOP RETEN

## (undated) DEVICE — MASK O2 O2 LN L7FT CO2 LN L10FT FEM BG 750ML M CONC ADPT

## (undated) DEVICE — MPS® DELIVERY SET WITH 6 FT. DELIVERY TUBING: Brand: MPS

## (undated) DEVICE — SOLUTION IRRIG 1000ML H2O STRL BLT

## (undated) DEVICE — CLAMP INSERT: Brand: STEALTH® CLAMP INSERT

## (undated) DEVICE — SUTURE SILK PERMAHAND PRECUT 6 X 30 IN SZ 1 BLK BRAID A307H

## (undated) DEVICE — INTENDED FOR TISSUE SEPARATION, AND OTHER PROCEDURES THAT REQUIRE A SHARP SURGICAL BLADE TO PUNCTURE OR CUT.: Brand: BARD-PARKER ® CARBON RIB-BACK BLADES

## (undated) DEVICE — GUIDEWIRE .035X260 3MMJ TCFC: Brand: MEDLINE INDUSTRIES, INC.

## (undated) DEVICE — SUTURE CHROMIC GUT SZ 4-0 L27IN ABSRB BRN L26MM SH 1/2 CIR G121H

## (undated) DEVICE — Device: Brand: PORTEX

## (undated) DEVICE — AUTOTRANSFUSION KIT 120 MH FAST STRT AT1 FOR CATS +

## (undated) DEVICE — CATHETER DIAG 6FR L110CM PIGTAILS CRV STYL PIG145 DXTERITY

## (undated) DEVICE — PRESSURE MONITORING SET: Brand: TRUWAVE, VAMP PLUS

## (undated) DEVICE — CATHETER COR DIAG JUDKINS R 5.0 CRV 6FR 100CM 0 SIDE H

## (undated) DEVICE — SUTURE VCRL SZ 4-0 L27IN ABSRB UD L19MM PS-2 3/8 CIR PRIM J426H

## (undated) DEVICE — CLAMP SURG ISOLATOR SYNERGY

## (undated) DEVICE — 3M™ TEGADERM™ TRANSPARENT FILM DRESSING FRAME STYLE, 1626W, 4 IN X 4-3/4 IN (10 CM X 12 CM), 50/CT 4CT/CASE: Brand: 3M™ TEGADERM™

## (undated) DEVICE — SUTURE PDS II SZ 1 L36IN ABSRB VLT L48MM CTX 1/2 CIR Z371T

## (undated) DEVICE — CANISTER, RIGID, 3000CC: Brand: MEDLINE INDUSTRIES, INC.

## (undated) DEVICE — FLOTRAC SENSOR W/ VAMP ADULT (60"/152CM): Brand: FLOTRAC, VAMP

## (undated) DEVICE — KIT CATH L11.5CM DIA9FR CTRL VEN POLYUR ANTIMIC COAT DBL

## (undated) DEVICE — PINNACLE INTRODUCER SHEATH: Brand: PINNACLE

## (undated) DEVICE — PATCH REF EXT FOR CARTO 3 SYS (EA = 6 PACKS)

## (undated) DEVICE — DRAPE TWL SURG 16X26IN BLU ORB04] ALLCARE INC]

## (undated) DEVICE — 18G NG KIT WITH 96IN PROBE COVER (10 PK): Brand: SITE-RITE

## (undated) DEVICE — AIRWAY NP ROBTAZZ STYL 28FR

## (undated) DEVICE — SUTURE PROL SZ 4-0 L30IN NONABSORBABLE BLU SH-1 L22MM 1/2 8526H

## (undated) DEVICE — CATHETER COR DIAG JUDKINS L 3.5 CRV 6FR 100CM 0 SIDE H

## (undated) DEVICE — SUTURE VCRL SZ 3-0 L36IN ABSRB UD L36MM CT-1 1/2 CIR J944H

## (undated) DEVICE — SINGLE USE BIOPSY VALVE MAJ-210: Brand: SINGLE USE BIOPSY VALVE (STERILE)

## (undated) DEVICE — SYSTEM ENDOSCP VES HARV W/ TOOL CANN SEAL SHT PRT BLNT TIP

## (undated) DEVICE — APPLIER CLP L9.375IN APER 2.1MM CLS L3.8MM 20 SM TI CLP

## (undated) DEVICE — PLEDGET VASC W3/16XL3/8IN THK1/16IN PTFE SFT

## (undated) DEVICE — NEEDLE HYPO 23GA L1IN TURQ S STL HUB POLYPR SHLD REG BVL

## (undated) DEVICE — SUTURE ETHBND EXCEL 2-0 L30IN NONABSORBABLE GRN L26MM SH MX563

## (undated) DEVICE — SOLUTION IRRIG 1000ML LAC RINGER PLAS POUR BTL

## (undated) DEVICE — 1840 FOAM BLOCK NEEDLE COUNTER: Brand: DEVON

## (undated) DEVICE — SURGIFOAM SPNG SZ 100

## (undated) DEVICE — SUTURE PERMAHAND SZ 3-0 L30IN NONABSORBABLE BLK SH L26MM K832H

## (undated) DEVICE — PACK PROCEDURE SURG OPEN HRT BRINGBACK

## (undated) DEVICE — APPLIER CLP L9.38IN M LIG TI DISP STR RNG HNDL LIGACLP

## (undated) DEVICE — RED RUBBER ROBINSON URETHRAL CATHETER, RADIOPAQUE, SMOOTH ROUNDED TIP, 18 FR (6.0 MM): Brand: DOVER

## (undated) DEVICE — COVER,LIGHT HANDLE,FLX,2/PK: Brand: MEDLINE INDUSTRIES, INC.

## (undated) DEVICE — TUBING PMP FOR CARTO SYS SMARTABLATE

## (undated) DEVICE — SINGLE USE SUCTION VALVE MAJ-209: Brand: SINGLE USE SUCTION VALVE (STERILE)

## (undated) DEVICE — SUTURE PERMA-HAND SZ 0 L30IN NONABSORBABLE BLK L36MM CT-1 424H

## (undated) DEVICE — SUTURE S STL SZ 6 L18IN NONABSORBABLE SIL L48MM CCS 1/2 CIR M654G

## (undated) DEVICE — SOLUTION IRRIG 1000ML 0.9% SOD CHL USP POUR PLAS BTL

## (undated) DEVICE — GLIDESHEATH SLENDER STAINLESS STEEL KIT: Brand: GLIDESHEATH SLENDER

## (undated) DEVICE — LUBE JELLY FOIL PACK 1.4 OZ: Brand: MEDLINE INDUSTRIES, INC.

## (undated) DEVICE — PINNACLE TIF INTRODUCER SHEATH: Brand: PINNACLE

## (undated) DEVICE — PUMP HEART IMPELLA 5.5 W/SMART ASSIST S2

## (undated) DEVICE — TUBING, SUCTION, 1/4" X 10', STRAIGHT: Brand: MEDLINE

## (undated) DEVICE — ELECTRODE PT RET AD L9FT HI MOIST COND ADH HYDRGEL CORDED

## (undated) DEVICE — GUIDE SURG SELECTION FOR GILLINOV COSGROVE LAA EXCLUSION SYS

## (undated) DEVICE — WANG TRANSBRONCHIAL HISTOLOGY NEEDLE FOR PERIPHERAL REGION, 19 G, 1.9 MM X 130 CM: Brand: WANG

## (undated) DEVICE — KENDALL RADIOLUCENT FOAM MONITORING ELECTRODE RECTANGULAR SHAPE: Brand: KENDALL

## (undated) DEVICE — CATHETER ABLAT 8FR L115CM 1-6-2MM SPC TIP 3.5MM FJ CRV